# Patient Record
Sex: MALE | Race: WHITE | NOT HISPANIC OR LATINO | Employment: PART TIME | URBAN - METROPOLITAN AREA
[De-identification: names, ages, dates, MRNs, and addresses within clinical notes are randomized per-mention and may not be internally consistent; named-entity substitution may affect disease eponyms.]

---

## 2017-01-12 ENCOUNTER — DOCUMENTATION ONLY (OUTPATIENT)
Dept: FAMILY MEDICINE | Facility: CLINIC | Age: 57
End: 2017-01-12

## 2017-01-12 NOTE — PROGRESS NOTES
Pre-Visit Chart Review  For Appointment Scheduled on 1-17-17    Health Maintenance Due   Topic Date Due    Hepatitis C Screening  1960    Lipid Panel  1960    TETANUS VACCINE  03/04/1980    Colonoscopy  03/04/2012    Influenza Vaccine  08/01/2016

## 2017-01-17 ENCOUNTER — OFFICE VISIT (OUTPATIENT)
Dept: FAMILY MEDICINE | Facility: CLINIC | Age: 57
End: 2017-01-17
Payer: COMMERCIAL

## 2017-01-17 VITALS
DIASTOLIC BLOOD PRESSURE: 78 MMHG | SYSTOLIC BLOOD PRESSURE: 128 MMHG | OXYGEN SATURATION: 96 % | TEMPERATURE: 99 F | HEART RATE: 67 BPM | RESPIRATION RATE: 16 BRPM | BODY MASS INDEX: 25.93 KG/M2 | HEIGHT: 71 IN | WEIGHT: 185.19 LBS

## 2017-01-17 DIAGNOSIS — I95.9 HYPOTENSION, UNSPECIFIED HYPOTENSION TYPE: ICD-10-CM

## 2017-01-17 DIAGNOSIS — Z11.59 ENCOUNTER FOR HEPATITIS C SCREENING TEST FOR LOW RISK PATIENT: ICD-10-CM

## 2017-01-17 DIAGNOSIS — Z12.11 COLON CANCER SCREENING: ICD-10-CM

## 2017-01-17 DIAGNOSIS — Z86.711 HISTORY OF PULMONARY EMBOLISM: ICD-10-CM

## 2017-01-17 DIAGNOSIS — R97.20 ELEVATED PSA: ICD-10-CM

## 2017-01-17 DIAGNOSIS — Z76.89 ESTABLISHING CARE WITH NEW DOCTOR, ENCOUNTER FOR: ICD-10-CM

## 2017-01-17 DIAGNOSIS — S14.129A INCOMPLETE INJURY OF CERVICAL SPINAL CORD WITH CENTRAL CORD SYNDROME: ICD-10-CM

## 2017-01-17 DIAGNOSIS — R25.2 SPASM: ICD-10-CM

## 2017-01-17 DIAGNOSIS — Z00.00 ANNUAL PHYSICAL EXAM: Primary | ICD-10-CM

## 2017-01-17 DIAGNOSIS — Z86.718 HISTORY OF DVT (DEEP VEIN THROMBOSIS): ICD-10-CM

## 2017-01-17 DIAGNOSIS — K21.9 GASTROESOPHAGEAL REFLUX DISEASE, ESOPHAGITIS PRESENCE NOT SPECIFIED: ICD-10-CM

## 2017-01-17 PROCEDURE — 99396 PREV VISIT EST AGE 40-64: CPT | Mod: S$GLB,,, | Performed by: FAMILY MEDICINE

## 2017-01-17 PROCEDURE — 99999 PR PBB SHADOW E&M-EST. PATIENT-LVL III: CPT | Mod: PBBFAC,,, | Performed by: FAMILY MEDICINE

## 2017-01-17 RX ORDER — OMEPRAZOLE 20 MG/1
20 CAPSULE, DELAYED RELEASE ORAL DAILY
Qty: 90 CAPSULE | Refills: 3 | Status: SHIPPED | OUTPATIENT
Start: 2017-01-17 | End: 2018-01-12 | Stop reason: SDUPTHER

## 2017-01-17 RX ORDER — STANDARDIZED SENNA CONCENTRATE 8.6 MG/1
2 TABLET ORAL DAILY
COMMUNITY

## 2017-01-17 RX ORDER — MIDODRINE HYDROCHLORIDE 5 MG/1
10 TABLET ORAL 4 TIMES DAILY
Qty: 720 TABLET | Refills: 5 | Status: SHIPPED | OUTPATIENT
Start: 2017-01-17 | End: 2018-02-07 | Stop reason: SDUPTHER

## 2017-01-17 RX ORDER — CHOLECALCIFEROL (VITAMIN D3) 25 MCG
185 TABLET ORAL DAILY
COMMUNITY

## 2017-01-17 RX ORDER — LANOLIN ALCOHOL/MO/W.PET/CERES
400 CREAM (GRAM) TOPICAL 2 TIMES DAILY
Qty: 180 TABLET | Refills: 3 | Status: SHIPPED | OUTPATIENT
Start: 2017-01-17 | End: 2018-03-26 | Stop reason: SDUPTHER

## 2017-01-17 RX ORDER — DIAZEPAM 5 MG/1
5 TABLET ORAL 2 TIMES DAILY
Qty: 180 TABLET | Refills: 1 | Status: SHIPPED | OUTPATIENT
Start: 2017-01-17 | End: 2017-08-06 | Stop reason: SDUPTHER

## 2017-01-17 RX ORDER — ASPIRIN 81 MG/1
81 TABLET ORAL DAILY
COMMUNITY

## 2017-01-17 NOTE — MR AVS SNAPSHOT
[facility]  [address]  [place] [address]  Phone: [phone]  Fax: [phone]                  Alberto Dangelo   2017 3:20 PM   Office Visit    Description:  Male : 1960   Provider:  Dirk Ortiz MD   Department:  [facility]           Reason for Visit     Establish Care           Diagnoses this Visit        Comments    Annual physical exam    -  Primary     Colon cancer screening         Encounter for hepatitis C screening test for low risk patient         Elevated PSA         Spasm         Hypotension, unspecified hypotension type         Gastroesophageal reflux disease, esophagitis presence not specified         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 - Oral    Pharmacy: [facility] [address] [place] [address] [address] of [address] [facility] Ph #: [phone]       midodrine (PROAMATINE) 5 MG Tab 720 tablet 5 2017     Take 2 tablets (10 mg total) by mouth 4 (four) times daily. - Oral    Pharmacy: [facility] [address] [place] [address] [address] of [address] [place] Ph #: [phone]       omeprazole (PRILOSEC) 20 MG capsule 90 capsule 3 2017     Take 1 capsule (20 mg total) by mouth once daily. - Oral    [facility] [id number]  [address]ANE [address]Z [address] of [address] [employer] Ph #: [phone]       magnesium oxide (MAG-OX) 400 mg tablet 180 tablet 3 2017     Take 1 tablet (400 mg total) by mouth 2 (two) times daily. - Oral    Pharmacy:  [facility] [address] [place] [address] [address] AT [facility] [address] Ph #: [phone]         [facility] On Call     [facility] On Call Nurse Care Line - 24/7 Assistance  Registered nurses in the [facility] On Call Center provide clinical advisement, health education, appointment booking, and other advisory services.  Call for this free service at [phone].             Medications           Message regarding Medications     Verify the changes and/or additions to your medication regime listed below are the same as discussed with your clinician today.  If any of these changes or additions are incorrect, please notify your healthcare provider.        CHANGE how you are taking these medications     Start Taking Instead of    diazePAM (VALIUM) 5 MG tablet diazepam (VALIUM) 5 MG tablet    Dosage:  Take 1 tablet (5 mg total) by mouth 2 (two) times daily. Dosage:  Take 1 tablet (5 mg total) by mouth 2 (two) times daily.    Reason for Change:  Reorder            Verify that the below list of medications is an accurate representation of the medications you are currently taking.  If none reported, the list may be blank. If incorrect, please contact your healthcare provider. Carry this list with you in case of emergency.           Current Medications     aspirin (ECOTRIN) 81 MG EC tablet Take 81 mg by mouth once daily.    bisacodyl (FLEET) 10 mg/30 mL Enem Place 10 mg rectally once daily.    diazePAM (VALIUM) 5 MG tablet Take 1 tablet (5 mg total) by mouth 2 (two) times daily.    magnesium oxide (MAG-OX) 400 mg tablet Take 1 tablet (400 mg total) by mouth 2 (two) times daily.    midodrine (PROAMATINE) 5 MG Tab Take 2 tablets (10 mg total) by mouth 4 (four) times daily.    omeprazole (PRILOSEC) 20 MG capsule Take 1 capsule (20 mg total) by mouth once daily.    POLYETHYLENE GLYCOL 3350 (MIRALAX ORAL) Take 1 Dose by mouth every evening.    senna (SENOKOT) 8.6 mg tablet Take 2 tablets by mouth once  "daily.    vitamin D 1000 units Tab Take 185 mg by mouth once daily.           Clinical Reference Information           Vital Signs - Last Recorded  Most recent update: 1/17/2017  3:41 PM by Kamila Contreras MA    BP Pulse Temp Resp Ht Wt    128/78 (BP Location: Right arm, Patient Position: Sitting, BP Method: Automatic) 67 98.5 °F (36.9 °C) (Oral) 16 5' 11" (1.803 m) 84 kg (185 lb 3 oz)    SpO2 BMI             96% 25.83 kg/m2         Blood Pressure          Most Recent Value    BP  128/78      Allergies as of 1/17/2017     Codeine    Ambien [Zolpidem]      Immunizations Administered on Date of Encounter - 1/17/2017     None      Orders Placed During Today's Visit      Normal Orders This Visit    POCT Hemocult Stool X3     Future Labs/Procedures Expected by Expires    CBC auto differential  1/17/2017 1/18/2018    Comprehensive metabolic panel  1/17/2017 1/18/2018    Hepatitis C antibody  1/17/2017 3/18/2018    Lipid panel  1/17/2017 3/18/2018    PSA, Screening  1/17/2017 1/18/2018    TSH  1/17/2017 1/18/2018      "
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
## 2017-01-24 ENCOUNTER — LAB VISIT (OUTPATIENT)
Dept: LAB | Facility: HOSPITAL | Age: 57
End: 2017-01-24
Attending: FAMILY MEDICINE
Payer: COMMERCIAL

## 2017-01-24 DIAGNOSIS — Z00.00 ANNUAL PHYSICAL EXAM: ICD-10-CM

## 2017-01-24 DIAGNOSIS — Z11.59 ENCOUNTER FOR HEPATITIS C SCREENING TEST FOR LOW RISK PATIENT: ICD-10-CM

## 2017-01-24 DIAGNOSIS — R97.20 ELEVATED PSA: ICD-10-CM

## 2017-01-24 LAB
ALBUMIN SERPL BCP-MCNC: 4.3 G/DL
ALP SERPL-CCNC: 70 U/L
ALT SERPL W/O P-5'-P-CCNC: 47 U/L
ANION GAP SERPL CALC-SCNC: 9 MMOL/L
AST SERPL-CCNC: 33 U/L
BASOPHILS # BLD AUTO: 0.05 K/UL
BASOPHILS NFR BLD: 1 %
BILIRUB SERPL-MCNC: 0.9 MG/DL
BUN SERPL-MCNC: 20 MG/DL
CALCIUM SERPL-MCNC: 9.8 MG/DL
CHLORIDE SERPL-SCNC: 103 MMOL/L
CHOLEST/HDLC SERPL: 5.3 {RATIO}
CO2 SERPL-SCNC: 28 MMOL/L
COMPLEXED PSA SERPL-MCNC: 2.3 NG/ML
CREAT SERPL-MCNC: 1.3 MG/DL
DIFFERENTIAL METHOD: ABNORMAL
EOSINOPHIL # BLD AUTO: 0.1 K/UL
EOSINOPHIL NFR BLD: 2.3 %
ERYTHROCYTE [DISTWIDTH] IN BLOOD BY AUTOMATED COUNT: 12.7 %
EST. GFR  (AFRICAN AMERICAN): >60 ML/MIN/1.73 M^2
EST. GFR  (NON AFRICAN AMERICAN): >60 ML/MIN/1.73 M^2
GLUCOSE SERPL-MCNC: 96 MG/DL
HCT VFR BLD AUTO: 45.9 %
HCV AB SERPL QL IA: NEGATIVE
HDL/CHOLESTEROL RATIO: 18.8 %
HDLC SERPL-MCNC: 207 MG/DL
HDLC SERPL-MCNC: 39 MG/DL
HGB BLD-MCNC: 15.9 G/DL
LDLC SERPL CALC-MCNC: 110.4 MG/DL
LYMPHOCYTES # BLD AUTO: 1.4 K/UL
LYMPHOCYTES NFR BLD: 28.2 %
MCH RBC QN AUTO: 32 PG
MCHC RBC AUTO-ENTMCNC: 34.6 %
MCV RBC AUTO: 92 FL
MONOCYTES # BLD AUTO: 0.4 K/UL
MONOCYTES NFR BLD: 7.4 %
NEUTROPHILS # BLD AUTO: 3.1 K/UL
NEUTROPHILS NFR BLD: 60.7 %
NONHDLC SERPL-MCNC: 168 MG/DL
PLATELET # BLD AUTO: 178 K/UL
PMV BLD AUTO: 12.1 FL
POTASSIUM SERPL-SCNC: 4.1 MMOL/L
PROT SERPL-MCNC: 7.6 G/DL
RBC # BLD AUTO: 4.97 M/UL
SODIUM SERPL-SCNC: 140 MMOL/L
TRIGL SERPL-MCNC: 288 MG/DL
TSH SERPL DL<=0.005 MIU/L-ACNC: 0.85 UIU/ML
WBC # BLD AUTO: 5.11 K/UL

## 2017-01-24 PROCEDURE — 84443 ASSAY THYROID STIM HORMONE: CPT

## 2017-01-24 PROCEDURE — 80053 COMPREHEN METABOLIC PANEL: CPT

## 2017-01-24 PROCEDURE — 36415 COLL VENOUS BLD VENIPUNCTURE: CPT | Mod: PO

## 2017-01-24 PROCEDURE — 86803 HEPATITIS C AB TEST: CPT

## 2017-01-24 PROCEDURE — 85025 COMPLETE CBC W/AUTO DIFF WBC: CPT

## 2017-01-24 PROCEDURE — 84153 ASSAY OF PSA TOTAL: CPT

## 2017-01-24 PROCEDURE — 80061 LIPID PANEL: CPT

## 2017-01-31 ENCOUNTER — CLINICAL SUPPORT (OUTPATIENT)
Dept: REHABILITATION | Facility: HOSPITAL | Age: 57
End: 2017-01-31
Attending: FAMILY MEDICINE
Payer: COMMERCIAL

## 2017-01-31 DIAGNOSIS — S14.129A INCOMPLETE INJURY OF CERVICAL SPINAL CORD WITH CENTRAL CORD SYNDROME: ICD-10-CM

## 2017-01-31 PROCEDURE — 97116 GAIT TRAINING THERAPY: CPT | Mod: PN

## 2017-02-02 ENCOUNTER — CLINICAL SUPPORT (OUTPATIENT)
Dept: REHABILITATION | Facility: HOSPITAL | Age: 57
End: 2017-02-02
Attending: FAMILY MEDICINE
Payer: COMMERCIAL

## 2017-02-02 DIAGNOSIS — S14.129A INCOMPLETE INJURY OF CERVICAL SPINAL CORD WITH CENTRAL CORD SYNDROME: ICD-10-CM

## 2017-02-02 PROCEDURE — 97116 GAIT TRAINING THERAPY: CPT | Mod: PN

## 2017-02-02 NOTE — PROGRESS NOTES
Name: Alberto Dangelo  [facility] Number: 1960  Date of Treatment: 02/02/2017   Diagnosis:   Encounter Diagnosis   Name Primary?   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 Soreness in abs from workout yesterday. Mod I via manual w/c. wans to be able to walk better by November to walk his daughter down the aisle in her wedding.     Objective    Patient received individual therapy to increase strength, endurance, ROM, flexibility, posture and core stabilization with activities as follows:     Gait training via [employer] system for 50:18 minutes (plus set up):     Set up at 30 kg unloading. Computer-assisted robotic gait training via [employer] x 2.5 km/h. GF x 100% x 15', 90% x 10', 80% for distance of 2081 m. No stoppages.       Continue HEP daily.    Pt demo good understanding of the education provided. Alberto demonstrated good return demonstration of activities.     Assessment:     Initial ankle PF B which improved with warmup. Cues for increased contractions for hips flex and extn.     Pt will continue to benefit from skilled PT intervention. Medical Necessity is demonstrated by:  Requires skilled supervision to complete and progress HEP and Weakness.    Patient is making good progress towards established goals.    Plan:    Continue with established Plan of Care towards PT goals.

## 2017-02-03 NOTE — PROGRESS NOTES
"Name: Alberto Dangelo  [facility] Number: [id number]  Date of Treatment:01/31/2017  Diagnosis:   Encounter Diagnosis   Name Primary?   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  "I've been doing therapy while I was out but it was a different kind of therapy."  Patient reports their pain to be n/a/10 on a 0-10 scale with 0 being no pain and 10 being the worst pain imaginable.    Objective    Pt returned to PT after being out of town for several weeks.  No significant changes reported.  Patient received individual therapy to increase flexibility, posture and core stabilization with 0 patients with activities as follows:     Alberto participated in dynamic functional therapeutic activities to improve functional performance Including: Set up with 30 kg unloading.  Pt participated in computer assisted robotic gait training via [employer] @ 2.5 kph x 50 min, 06 sec for a total distance of 2056 m.  Utilized guidance force of 100% x 18 min, decreased to 90% x 12', decreased to 80% x 10', completed training @ 70%.  .        Written Home Exercises Provided: Instructed pt to increase stretching to ankles to facilitate improved gait training.   Pt demo good understanding of the education provided. Alberto demonstrated good return demonstration of activities.     Assessment:   Pt demonstrated decreased ankle DF which interfered somewhat with training session due to decreased foot clearance.  Several adjustments required to Lokomat set-up to achieve and maintain adequate ankle DF to prevent excessive toe drag this date.     Pt will continue to benefit from skilled PT intervention. Medical Necessity is demonstrated by:  Fall Risk, Unable to participate fully in daily activities, Weakness and Gait impairment  Patient is making fair progress towards established " goals.    New/Revised goals: N/A      Plan:  Continue with established Plan of Care towards PT goals.

## 2017-02-07 ENCOUNTER — CLINICAL SUPPORT (OUTPATIENT)
Dept: REHABILITATION | Facility: HOSPITAL | Age: 57
End: 2017-02-07
Attending: FAMILY MEDICINE
Payer: COMMERCIAL

## 2017-02-07 DIAGNOSIS — S14.129A INCOMPLETE INJURY OF CERVICAL SPINAL CORD WITH CENTRAL CORD SYNDROME: ICD-10-CM

## 2017-02-07 PROCEDURE — 97116 GAIT TRAINING THERAPY: CPT | Mod: PN

## 2017-02-07 NOTE — PROGRESS NOTES
Name: Alberto Dangelo  [facility] Number: [id number]  Date of Treatment: 02/07/2017   Diagnosis: No diagnosis found.    Time in: 1410  Time Out: 1540  Total Treatment Time: 65    Subjective:    Alberto reports no pain. Via manual w/c mod I.     Objective    Patient received individual therapy to increase strength, endurance, ROM, flexibility, posture and core stabilization with activities as follows:     Gait training via [employer] system for 53:32 minutes (plus set up):     Set up at 30 kg unloading. Computer-assisted robotic gait training via [employer] x 2.5 km/h. GF x 100% x 18', 90% x 10', 80% x 12', then 70% for distance of 53:32 m. No stoppages.     Continue HEP daily.    Pt demo good understanding of the education provided. Alberto demonstrated good return demonstration of activities.     Assessment:     Improving tones B LE's since return from hiatus with decreased spasticity with initial set up.     Pt will continue to benefit from skilled PT intervention. Medical Necessity is demonstrated by:  Requires skilled supervision to complete and progress HEP and Weakness.    Patient is making good progress towards established goals.    Plan:    Continue with established Plan of Care towards PT goals.

## 2017-02-07 NOTE — PLAN OF CARE
Date: 2/2/2017    PHYSICAL THERAPY UPDATED PLAN OF TREATMENT    Patient name: Alberto Dangelo  Onset Date: 7/30/2010  SOC Date:  8/24/2016  Primary Diagnosis:    1. 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  However, limited flexibility remains problematic.  Gastrocs R ankle to -10*. L to -7* DF manually; however, pt able to achieve neutral in standing.      L-force testing:                         LEFT                           RIGHT                                       FLEX            EXT           FLEX          EXT              HIP      0.24 Nm     19.99 Nm    0.75 Nm     0.02 Nm            KNEE    3.62 Nm     26.97 Nm   0.44 Nm     18.71 Nm  Standing:  Pt is able to stand with UE support with minimal genu recurvatum B.    Sit <-> stand moderate UE support.    Gait: pt able to ambulate short distances with KFO's and assistive device.  No significant change yet noted.      Pt traveled out of state from 12/2016.  Returned to therapy 1/31/2017.  Decline noted in L force testing results. However, pt has shown progress in LE flexibility, sit<>stand transfer, and trunk stability in standing.      Previous Short Term Goals Status:   1) Improve B ankle DF to at least 5* passively to facilitate improved foot clearance during gait, Not met  2) Improve LE flexibility to min to mod limitation in all areas, Progressing, 3) Assess stand pivot transfers and gait with assistive device. Not met (Pt was out of town for several weeks during this current POC and did not participate in therapy at this facility.  Therefore,  unable to assess gait with assistive device).    New Short Term Goals Status:   Continue from previous POC updated dated 10/15/2016 as patient did not attend sufficient sessions to achieve goals.    Long Term Goal Status:   continue per initial plan of care.1) Increase strength in LE as indicated by improved L-force testing, Progressing 2) Pt will safely ambulate > 200' using appropriate assistive device, Not yet assessed.  3) Pt will consistently perform safe stand pivot transfers, Not yet assessed. 4) Pt will demonstrate improved postural stability    Reasons for Recertification of Therapy:   Pt was out of town for approximately 8 weeks during this current POC which resulted in decline in overall status.  He continues to demonstrate significant LE tone (extensor), impaired LE flexibility, impaired ankle ROM, impaired functional mobility and impaired gait.  He should benefit from resuming PT with focus on gait training utilizing Lokomat.      Certification Period: 2/11/2017 to 5/11/2017  Recommended Treatment Plan: 2 times per week for 12 weeks: Gait Training, Locomotor Training, Manual Therapy, Neuromuscular Re-ed, Patient Education, Therapeutic Exercise and Other Dry needling PRN  Other Recommendations: Pt may benefit from dry needling to facilitate improved extensor tone in LE.          Therapist's Name: Candice Herzog, PT   Date: 2/2/2017  I CERTIFY THE NEED FOR THESE SERVICES FURNISHED UNDER THIS PLAN OF TREATMENT AND WHILE UNDER MY CARE    Physician's comments: ________________________________________________________________________________________________________________________________________________      Physician's Name: ___________________________________

## 2017-02-09 ENCOUNTER — CLINICAL SUPPORT (OUTPATIENT)
Dept: REHABILITATION | Facility: HOSPITAL | Age: 57
End: 2017-02-09
Attending: FAMILY MEDICINE
Payer: COMMERCIAL

## 2017-02-09 DIAGNOSIS — S14.129A INCOMPLETE INJURY OF CERVICAL SPINAL CORD WITH CENTRAL CORD SYNDROME: ICD-10-CM

## 2017-02-09 PROCEDURE — 97116 GAIT TRAINING THERAPY: CPT | Mod: PN

## 2017-02-09 NOTE — PROGRESS NOTES
Name: Alberto Dangelo  [facility] Number: [id number]  Date of Treatment: 02/09/2017   Diagnosis:   Encounter Diagnosis   Name Primary?   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 Mod I in manual w/c.     Objective    Patient received individual therapy to increase strength, endurance, ROM, flexibility, posture and core stabilization with activities as follows:     Gait training via [employer] system for 55:20 minutes (plus set up):     Set up at 30 kg unloading. Computer-assisted robotic gait training via [employer] x 2.5 km/h. GF x 100% x 15', 90% x 10', 80% x 10', then 70% for distance of 2286 m. No stoppages.       Continue HEP daily.    Pt demo good understanding of the education provided. Alberto demonstrated good return demonstration of activities.     Assessment:     Improving with decreased tones with initial few minutes of lokomotor training.     Pt will continue to benefit from skilled PT intervention. Medical Necessity is demonstrated by:  Requires skilled supervision to complete and progress HEP and Weakness.    Patient is making good progress towards established goals.    Plan:    Continue with established Plan of Care towards PT goals.

## 2017-02-14 ENCOUNTER — CLINICAL SUPPORT (OUTPATIENT)
Dept: REHABILITATION | Facility: HOSPITAL | Age: 57
End: 2017-02-14
Attending: FAMILY MEDICINE
Payer: COMMERCIAL

## 2017-02-14 DIAGNOSIS — S14.129A INCOMPLETE INJURY OF CERVICAL SPINAL CORD WITH CENTRAL CORD SYNDROME: ICD-10-CM

## 2017-02-14 PROCEDURE — 97116 GAIT TRAINING THERAPY: CPT | Mod: PN

## 2017-02-14 NOTE — PROGRESS NOTES
Name: Alberto Dangelo  [facility] Number: [id number]  Date of Treatment: 02/14/2017   Diagnosis:   Encounter Diagnosis   Name Primary?   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  Patient reports their pain to be n/a/10 on a 0-10 scale with 0 being no pain and 10 being the worst pain imaginable.    Objective    Patient received individual therapy to increase ROM, flexibility, posture and core stabilization with 0 patients with activities as follows:     Alberto participated in dynamic functional therapeutic activities to improve functional performance for 65 minutes. Including: Set up with 30 kg unloading.  Pt participated in computer assisted robotic gait training via [employer] @ 2.5 kph x 50 min, 10 sec for a total distance of 2074 m.  Utilized guidance force of 100% x 11 min, decreased to 90% for 9', then decreased to 80% for 12'.  Completed training @ 70% guidance force.  .      Written Home Exercises Provided: Emphasized importance of stretching calves to facilitate improved gait training efforts.    Pt demo good understanding of the education provided. Alberto demonstrated good return demonstration of activities.     Assessment:   Improved foot clearance noted during training this date.  Tolerated decreased guidance force without difficulty.      Pt will continue to benefit from skilled PT intervention. Medical Necessity is demonstrated by:  Fall Risk, Unable to participate fully in daily activities and Weakness.    Patient is making fair progress towards established goals.    New/Revised goals: N/A      Plan:  Continue with established Plan of Care towards PT goals.

## 2017-02-21 ENCOUNTER — CLINICAL SUPPORT (OUTPATIENT)
Dept: REHABILITATION | Facility: HOSPITAL | Age: 57
End: 2017-02-21
Attending: FAMILY MEDICINE
Payer: COMMERCIAL

## 2017-02-21 DIAGNOSIS — S14.129A INCOMPLETE INJURY OF CERVICAL SPINAL CORD WITH CENTRAL CORD SYNDROME: ICD-10-CM

## 2017-02-21 PROCEDURE — 97116 GAIT TRAINING THERAPY: CPT | Mod: PN

## 2017-02-21 NOTE — PROGRESS NOTES
Name: Alberto Dangelo  [facility] Number: [id number]  Date of Treatment: 02/21/2017   Diagnosis:   Encounter Diagnosis   Name Primary?   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 Via manual w/c. Missed last appt 2* feeling like possible autonomic dysreflexia issue with extreme fatigue and LB, LE discomfort. Did not have fever.  Better now.     Objective    Patient received individual therapy to increase strength, endurance, ROM, flexibility, posture and core stabilization with activities as follows:     Gait training via [employer] system for 55:23 minutes (plus set up):     Set up at 30kg, then decreased to 25 kg unloading. Computer-assisted robotic gait training via [employer] x 2.5 km/h. GF x 100% x 10', 90% x 10', 80% x 10' 70% for distance of 2294 m. No stoppages.       Continue HEP daily.    Pt demo good understanding of the education provided.    Assessment:     Good sequencing with UE swing with occasional self-correction with deviations. Fatigues after a few minutes into 70% GF with decreased feedback via monitor from force sensors in hips and knees.     Pt will continue to benefit from skilled PT intervention. Medical Necessity is demonstrated by:  Requires skilled supervision to complete and progress HEP and Weakness.    Patient is making good progress towards established goals.    Plan:    Continue with established Plan of Care towards PT goals.

## 2017-02-23 ENCOUNTER — CLINICAL SUPPORT (OUTPATIENT)
Dept: REHABILITATION | Facility: HOSPITAL | Age: 57
End: 2017-02-23
Attending: FAMILY MEDICINE
Payer: COMMERCIAL

## 2017-02-23 DIAGNOSIS — S14.129A INCOMPLETE INJURY OF CERVICAL SPINAL CORD WITH CENTRAL CORD SYNDROME: ICD-10-CM

## 2017-02-23 PROCEDURE — 97116 GAIT TRAINING THERAPY: CPT | Mod: PN

## 2017-02-23 NOTE — PROGRESS NOTES
Name: Alberto Dangelo  [facility] Number: [id number]  Date of Treatment: 02/23/2017   Diagnosis:   Encounter Diagnosis   Name Primary?   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 Mod I with manual w/c.    Objective    Patient received individual therapy to increase strength, endurance, ROM, flexibility, posture and core stabilization with activities as follows:     Gait training via [employer] system for 55:25 minutes (plus set up):     Set up at 30-25 kg unloading. Computer-assisted robotic gait training via [employer] x 2.5 km/h. GF x 100% x 8', 90% x 32', 70% for distance of 2289 m. No stoppages.     Continue HEP daily.    Pt demo good understanding of the education provided. Alberto demonstrated good return demonstration of activities.     Assessment:     Increased spasticity R LE with initial set up and during transitions with decreased GF.     Pt will continue to benefit from skilled PT intervention. Medical Necessity is demonstrated by:  Requires skilled supervision to complete and progress HEP and Weakness.    Patient is making good progress towards established goals.    Plan:    Continue with established Plan of Care towards PT goals.

## 2017-03-07 ENCOUNTER — CLINICAL SUPPORT (OUTPATIENT)
Dept: REHABILITATION | Facility: HOSPITAL | Age: 57
End: 2017-03-07
Attending: FAMILY MEDICINE
Payer: COMMERCIAL

## 2017-03-07 ENCOUNTER — TELEPHONE (OUTPATIENT)
Dept: FAMILY MEDICINE | Facility: CLINIC | Age: 57
End: 2017-03-07

## 2017-03-07 DIAGNOSIS — S14.129A INCOMPLETE INJURY OF CERVICAL SPINAL CORD WITH CENTRAL CORD SYNDROME: ICD-10-CM

## 2017-03-07 PROCEDURE — 97116 GAIT TRAINING THERAPY: CPT | Mod: PN

## 2017-03-07 PROCEDURE — 97110 THERAPEUTIC EXERCISES: CPT | Mod: PN

## 2017-03-07 NOTE — PROGRESS NOTES
Name: Alberto Dangelo  [facility] Number: [id number]  Date of Treatment: 03/07/2017   Diagnosis:   Encounter Diagnosis   Name Primary?   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 Just finished BR break and ready for completion of lokomat session which was begin last hour with Candice PT.     Objective    Patient received individual therapy to increase strength, endurance, ROM, flexibility, posture and core stabilization with activities as follows:     Gait training via [employer] computerized system for 32:00 minutes (plus set up):     Set up at 30 kg unloading. Computer-assisted robotic gait training via [email or web address] x 2.5 km/h. GF x 100% x 1'. 90% x 19', then 70% for distance of 1321 m. No stoppages.       Continue HEP daily.    Pt demo good understanding of the education provided. Alberto demonstrated good return demonstration of activities.     Assessment:     Good improvement of toe drag R after cues.     Pt will continue to benefit from skilled PT intervention. Medical Necessity is demonstrated by:  Requires skilled supervision to complete and progress HEP and Weakness.    Patient is making good progress towards established goals.    Plan:    Continue with established Plan of Care towards PT goals.

## 2017-03-07 NOTE — TELEPHONE ENCOUNTER
Received fax from [employer] out [facility] - asking  to sign order for functional electrical stimulator. We have no documentation to support this. Patient does receive therapy in [place] due cervical spine injury. I left voice mail asking contact Suzette Alvarez to please fax records to support need.

## 2017-03-08 NOTE — TELEPHONE ENCOUNTER
[employer] to fax over information on stimulator-patient seen at [facility] facility in [place] 3/2/17 to try it out-nothing in computer.

## 2017-03-08 NOTE — TELEPHONE ENCOUNTER
Patient trying to get order for functional electrical stimulator for his legs. Paperwork and order form on your desk.

## 2017-03-09 ENCOUNTER — CLINICAL SUPPORT (OUTPATIENT)
Dept: REHABILITATION | Facility: HOSPITAL | Age: 57
End: 2017-03-09
Attending: FAMILY MEDICINE
Payer: COMMERCIAL

## 2017-03-09 DIAGNOSIS — S14.129A INCOMPLETE INJURY OF CERVICAL SPINAL CORD WITH CENTRAL CORD SYNDROME: ICD-10-CM

## 2017-03-09 PROCEDURE — 97116 GAIT TRAINING THERAPY: CPT | Mod: PN

## 2017-03-09 NOTE — PROGRESS NOTES
Name: Alberto Dangelo  [facility] Number: [id number]  Date of Treatment: 03/09/2017   Diagnosis:   Encounter Diagnosis   Name Primary?   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 Mod I in manual w/c.      Objective    Patient received individual therapy to increase strength, endurance, ROM, flexibility, posture and core stabilization with activities as follows:     Gait training via [employer] system for 55:15 minutes (plus set up):     Set up at 30 kg unloading. Computer-assisted robotic gait training via [employer] x 2.5 km/h. GF x 100% x 10', 90% x 30', 70% x 15' for distance of 2282 m. One stoppage with warm up 2* increased tones which improved.     Continue HEP daily.    Pt demo good understanding of the education provided. Alberto demonstrated good return demonstration of activities.     Assessment:     Improving ability to correct R foot drag after warm up with occasional cues but did need a little time walking on lokomat with decreased loading for better adjustments to strapping.     Pt will continue to benefit from skilled PT intervention. Medical Necessity is demonstrated by:  Requires skilled supervision to complete and progress HEP and Weakness.    Patient is making good progress towards established goals.    Plan:    Continue with established Plan of Care towards PT goals.

## 2017-03-09 NOTE — TELEPHONE ENCOUNTER
I did what I could.  Most of the questions I could not answer, I just don't have the information and I don't have the training for it.

## 2017-03-13 NOTE — PROGRESS NOTES
"Name: Alberto Dangelo  [facility] Number: [id number]  Date of Treatment: 03/07/2017   Diagnosis:   Encounter Diagnosis   Name Primary?   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  Patient reports their pain to be n/a/10 on a 0-10 scale with 0 being no pain and 10 being the worst pain imaginable.  Pt requested to interrupt session due to needing to use the restroom.      Objective    Patient received individual therapy to increase strength, endurance, flexibility, posture and core stabilization with 0 patients with activities as follows:     Alberto  participated in dynamic functional therapeutic activities to improve functional performance for 35 (including set up) minutes. Including: Set up with 30 kg unloading.  Pt participated in computer assisted robotic gait training via Lokomat @ 2.5 kph x 23 min, 12 sec for a total distance of 955 m.  Utilized guidance force of 100% x 15 min then decreased to 90% for 8 min 12 sec.  Pt removed from Lokomat in order to use restroom.  Care handed off to Giulia Lock PTA to complete training session.  See her note for full details.        Written Home Exercises Provided: N/a  Pt demo good understanding of the education provided. Alberto demonstrated good return demonstration of activities.     Assessment:   Session was interrupted by pt needing to use restroom.  Prior to stopping, pt demonstrated excellent heel strike/toe off throughout session.  Posture erect.      Pt will continue to benefit from skilled PT intervention. Medical Necessity is demonstrated by:  Fall Risk, Unable to participate fully in daily activities, Weakness and Core Weakness  Patient is making fair progress towards established goals.    New/Revised goals: N/A      Plan:  Continue with established Plan of Care towards PT goals.   "

## 2017-03-14 ENCOUNTER — CLINICAL SUPPORT (OUTPATIENT)
Dept: REHABILITATION | Facility: HOSPITAL | Age: 57
End: 2017-03-14
Attending: FAMILY MEDICINE
Payer: COMMERCIAL

## 2017-03-14 DIAGNOSIS — S14.129A INCOMPLETE INJURY OF CERVICAL SPINAL CORD WITH CENTRAL CORD SYNDROME: ICD-10-CM

## 2017-03-14 PROCEDURE — 97116 GAIT TRAINING THERAPY: CPT | Mod: PN

## 2017-03-14 NOTE — PROGRESS NOTES
Name: Alberto Dangelo  [facility] Number: [id number]  Date of Treatment: 03/14/2017   Diagnosis:   Encounter Diagnosis   Name Primary?   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 Stretches before lokomat session mod I in gym . Mod I via manual w/c.       Objective    Patient received individual therapy to increase strength, endurance, ROM, flexibility, posture and core stabilization with activities as follows:     Gait training via [employer] system for 56:00 minutes (plus set up):     Set up at 30 kg unloading. Computer-assisted robotic gait training via [employer] x 2.4 km/h. GF x 100% x 7', 90% x 10', then 70% for distance of 2319 m. No stoppages.     Continue HEP daily.    Pt demo good understanding of the education provided. Alberto demonstrated good return demonstration of activities.     Assessment:     Consistent biofeedback on monitor with swing and stance phases throughout session with decrease 2* fatigue toward end of session. Progressing with improved tolerance for decreased GF-progressed to 70% faster today without problems.    Pt will continue to benefit from skilled PT intervention. Medical Necessity is demonstrated by:  Requires skilled supervision to complete and progress HEP and Weakness.    Patient is making good progress towards established goals.    Plan:    Continue with established Plan of Care towards PT goals.

## 2017-03-16 ENCOUNTER — CLINICAL SUPPORT (OUTPATIENT)
Dept: REHABILITATION | Facility: HOSPITAL | Age: 57
End: 2017-03-16
Attending: FAMILY MEDICINE
Payer: COMMERCIAL

## 2017-03-16 DIAGNOSIS — S14.129A INCOMPLETE INJURY OF CERVICAL SPINAL CORD WITH CENTRAL CORD SYNDROME: ICD-10-CM

## 2017-03-16 PROCEDURE — 97116 GAIT TRAINING THERAPY: CPT | Mod: PN

## 2017-03-16 NOTE — PROGRESS NOTES
Name: Alberto Dangelo  [facility] Number: [id number]  Date of Treatment: 03/16/2017   Diagnosis:   Encounter Diagnosis   Name Primary?   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 Mod I in manual w/c. Stretched mod I in gym prior to session.      Objective    Patient received individual therapy to increase strength, endurance, ROM, flexibility, posture and core stabilization with activities as follows:     Gait training via [employer] system for 55:24 minutes (plus set up):     Set up at 30 kg unloading. Computer-assisted robotic gait training via [employer] x 2.4 km/h. GF x 100% x 22', 70% for distance of 2202 m. No stoppages.     Continue HEP daily.    Pt demo good understanding of the education provided. Alberto demonstrated good return demonstration of activities.     Assessment:     Pt reported sensation of L LE mechanics not feeling like usual with gait, reports improved with adjustments. Progressing with faster GF drop to 70% with good feedback x approx 10' afterward and then fatigued although cont to demo minimal feedback via monitor. Initial R foot drop with difficulty clearing during swing; however, pt was able to correct clearance after warm up.,     Pt will continue to benefit from skilled PT intervention. Medical Necessity is demonstrated by:  Requires skilled supervision to complete and progress HEP and Weakness.    Patient is making good progress towards established goals.    Plan:    Continue with established Plan of Care towards PT goals.

## 2017-03-21 ENCOUNTER — CLINICAL SUPPORT (OUTPATIENT)
Dept: REHABILITATION | Facility: HOSPITAL | Age: 57
End: 2017-03-21
Attending: FAMILY MEDICINE
Payer: COMMERCIAL

## 2017-03-21 DIAGNOSIS — S14.129A INCOMPLETE INJURY OF CERVICAL SPINAL CORD WITH CENTRAL CORD SYNDROME: ICD-10-CM

## 2017-03-21 PROCEDURE — 97116 GAIT TRAINING THERAPY: CPT | Mod: PN

## 2017-03-23 ENCOUNTER — CLINICAL SUPPORT (OUTPATIENT)
Dept: REHABILITATION | Facility: HOSPITAL | Age: 57
End: 2017-03-23
Attending: FAMILY MEDICINE
Payer: COMMERCIAL

## 2017-03-23 ENCOUNTER — TELEPHONE (OUTPATIENT)
Dept: FAMILY MEDICINE | Facility: CLINIC | Age: 57
End: 2017-03-23

## 2017-03-23 DIAGNOSIS — S14.129A INCOMPLETE INJURY OF CERVICAL SPINAL CORD WITH CENTRAL CORD SYNDROME: ICD-10-CM

## 2017-03-23 PROCEDURE — 97116 GAIT TRAINING THERAPY: CPT | Mod: PN

## 2017-03-23 NOTE — TELEPHONE ENCOUNTER
----- Message from Akosua Sidhu sent at 3/22/2017  4:24 PM CDT -----  Contact: Suzette with [employer] at [phone]  Suzette with [employer] at [phone], calling to validate receipt of the additional forms and information. Also, please give the status of the Medical necessity. Thanks.

## 2017-03-23 NOTE — TELEPHONE ENCOUNTER
Left voicemail for Suzette Marquez was not able to fill out some of the order for electrical stimulator as he is not qualified and cannot answer questions. Form was picked up by patient to take to his physical therapist to see if he could answer them.

## 2017-03-23 NOTE — PROGRESS NOTES
Name: Alberto Dangelo  [facility] Number: [id number]  Date of Treatment: 03/23/2017   Diagnosis:   Encounter Diagnosis   Name Primary?   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 Via manual w/c.      Objective    Patient received individual therapy to increase strength, endurance, ROM, flexibility, posture and core stabilization with activities as follows:     Gait training via [employer] system for 55:40 minutes (plus set up):     Set up at 30 kg unloading. Computer-assisted robotic gait training via [employer] x 2.5 km/h. GF x 100% x 7', 90% x 5', then 70% for distance of 2298 m.     Continue HEP daily.    Pt demo good understanding of the education provided. Alberto demonstrated good return demonstration of activities.     Assessment:     Cues for R LE clearance toward end of [employer] session 2* dragging with good correction.     Pt will continue to benefit from skilled PT intervention. Medical Necessity is demonstrated by:  Requires skilled supervision to complete and progress HEP and Weakness.    Patient is making good progress towards established goals.    Plan:    Continue with established Plan of Care towards PT goals.

## 2017-03-27 ENCOUNTER — PATIENT MESSAGE (OUTPATIENT)
Dept: FAMILY MEDICINE | Facility: CLINIC | Age: 57
End: 2017-03-27

## 2017-03-27 DIAGNOSIS — S14.129A INCOMPLETE INJURY OF CERVICAL SPINAL CORD WITH CENTRAL CORD SYNDROME: Primary | ICD-10-CM

## 2017-03-27 DIAGNOSIS — G90.9 AUTONOMIC DYSFUNCTION: ICD-10-CM

## 2017-03-28 ENCOUNTER — CLINICAL SUPPORT (OUTPATIENT)
Dept: REHABILITATION | Facility: HOSPITAL | Age: 57
End: 2017-03-28
Attending: FAMILY MEDICINE
Payer: COMMERCIAL

## 2017-03-28 DIAGNOSIS — S14.129A INCOMPLETE INJURY OF CERVICAL SPINAL CORD WITH CENTRAL CORD SYNDROME: ICD-10-CM

## 2017-03-28 PROCEDURE — 97116 GAIT TRAINING THERAPY: CPT | Mod: PN

## 2017-03-28 NOTE — PROGRESS NOTES
Name: Alberto Dangelo  [facility] Number: [id number]  Date of Treatment: 03/28/2017   Diagnosis:   Encounter Diagnosis   Name Primary?   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 Mod I in manual w/c.      Objective    Patient received individual therapy to increase strength, endurance, ROM, flexibility, posture and core stabilization with activities as follows:     Gait training via [employer] system for 56:37 minutes (plus set up):     Set up at 30 kg unloading. Computer-assisted robotic gait training via [employer] x 2.5 km/h. GF x 100% x 7', 90% x 16', 70% for distance of 2334 m. One stoppage 2* spasticity.     Continue HEP daily.    Pt demo good understanding of the education provided. Alberto demonstrated good return demonstration of activities.     Assessment:     Initial spasticity with warmup which improved after a few minutes. Initial toe drag with loading weight on LE's but resolved with time. Consistent positive feedback via monitor.     Pt will continue to benefit from skilled PT intervention. Medical Necessity is demonstrated by:  Requires skilled supervision to complete and progress HEP and Weakness.    Patient is making good progress towards established goals.    Plan:    Continue with established Plan of Care towards PT goals.

## 2017-03-28 NOTE — PROGRESS NOTES
Name: Alberto Dangelo  [facility] Number: [id number]  Date of Treatment:3/21/2017  Diagnosis:   Encounter Diagnosis   Name Primary?   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  Patient reports their pain to be n/a/10 on a 0-10 scale with 0 being no pain and 10 being the worst pain imaginable.    Objective    Patient received individual therapy to increase flexibility, posture, core stabilization and gait deficits with 0 patients with activities as follows:     Pt performed self stretching prior to initiating gait training session.    Alberto participated in dynamic functional therapeutic activities to improve functional performance. Including: Set up with 30 kg unloading.  Pt participated in computer assisted robotic gait training via Lokomat @ 2.5 kph x 52 min, 09 sec for a total distance of 2139 m.  Utilized guidance force of 100% x 5 min, decreased tl 90% x 12', then to 80% x 13 min.  Completed remaining session @ 70%.  .        Written Home Exercises Provided: N/A  Pt demo good understanding of the education provided. Alberto demonstrated good return demonstration of activities.     Assessment:   Pt initially had difficulty achieving comfortable gait training set up.  Had to reset Lokomat in order to achieve ideal alignment to complete session.      Pt will continue to benefit from skilled PT intervention. Medical Necessity is demonstrated by:  Fall Risk, Unable to participate fully in daily activities and gait deficits.      Patient is making fair progress towards established goals.    New/Revised goals: N/A      Plan:  Continue with established Plan of Care towards PT goals.

## 2017-03-30 ENCOUNTER — CLINICAL SUPPORT (OUTPATIENT)
Dept: REHABILITATION | Facility: HOSPITAL | Age: 57
End: 2017-03-30
Attending: FAMILY MEDICINE
Payer: COMMERCIAL

## 2017-03-30 DIAGNOSIS — S14.129A INCOMPLETE INJURY OF CERVICAL SPINAL CORD WITH CENTRAL CORD SYNDROME: ICD-10-CM

## 2017-03-30 PROCEDURE — 97116 GAIT TRAINING THERAPY: CPT | Mod: PN

## 2017-03-30 NOTE — MR AVS SNAPSHOT
[facility] [address] [place] [address]  Phone: [phone]  Fax: [phone]                  Alberto Dangelo   3/30/2017 2:00 PM   Appointment    Description:  Male : 1960   Provider:  Giulia Lock PTA   Department:  [facility]                To Do List           Future Appointments        Provider Department Dept Phone    3/30/2017 2:00 PM Giulia Lock PTA [facility] [phone]    2017 2:00 PM Candice Herzog, PT [facility] [phone]    2017 2:00 PM Giulia Lock PTA [facility] [phone]    2017 2:00 PM Giulia Lock PTA [facility] [phone]    2017 2:00 PM Giulia Lock PTA [facility] [phone]      Goals (5 Years of Data)     None      [facility]     [facility] Care Line -  Assistance  Unless otherwise directed by your provider, please contact [facility], our nurse care line that is available for  assistance.     Registered nurses in the [facility] provide: appointment scheduling, clinical advisement, health education, and other advisory services.  Call: [phone] (toll free)               Medications           Message regarding Medications     Verify the changes and/or additions to your medication regime listed below are the same as discussed with your clinician today.  If any of these changes or additions are incorrect, please notify your healthcare provider.             Verify that the below list of medications is an accurate representation of the medications you are currently taking.  If none reported, the list may be blank. If incorrect, please contact your healthcare provider. Carry this list with you in case of emergency.           Current Medications     aspirin (ECOTRIN) 81 MG EC tablet Take 81 mg by mouth once daily.    bisacodyl (FLEET) 10  mg/30 mL Enem Place 10 mg rectally once daily.    diazePAM (VALIUM) 5 MG tablet Take 1 tablet (5 mg total) by mouth 2 (two) times daily.    magnesium oxide (MAG-OX) 400 mg tablet Take 1 tablet (400 mg total) by mouth 2 (two) times daily.    midodrine (PROAMATINE) 5 MG Tab Take 2 tablets (10 mg total) by mouth 4 (four) times daily.    omeprazole (PRILOSEC) 20 MG capsule Take 1 capsule (20 mg total) by mouth once daily.    POLYETHYLENE GLYCOL 3350 (MIRALAX ORAL) Take 1 Dose by mouth every evening.    senna (SENOKOT) 8.6 mg tablet Take 2 tablets by mouth once daily.    vitamin D 1000 units Tab Take 185 mg by mouth once daily.           Clinical Reference Information           Allergies as of 3/30/2017     Codeine    Ambien [Zolpidem]      Immunizations Administered on Date of Encounter - 3/30/2017     None      Language Assistance Services     ATTENTION: Language assistance services are available, free of charge. Please call [phone].      ATENCIÓN: Si coltonla dianne, tiene a go disposición servicios gratuitos de asistencia lingüística. Llame al [phone].     [facility] Ý: N?u b?n nói Ti?ng Vi?t, có các d?ch v? h? tr? ngôn ng? mi?n phí dành cho b?n. G?i s? [phone].         [facility] complies with applicable Federal civil rights laws and does not discriminate on the basis of race, color, national origin, age, disability, or sex.

## 2017-03-30 NOTE — PROGRESS NOTES
Name: Alberto Dangelo  [facility] Number: [id number]  Date of Treatment: 03/30/2017   Diagnosis:   Encounter Diagnosis   Name Primary?   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 Mod I with manual w/c. Stretches mod I in gym before session.     Objective    Patient received individual therapy to increase strength, endurance, ROM, flexibility, posture and core stabilization with activities as follows:     Gait training via [employer] system for 55:12 minutes (plus set up):     Set up at 30 kg unloading. Computer-assisted robotic gait training via [employer] x 2.5 km/h. GF x 90% x 25', 70% for distance of 2277 m. One stoppage with initial start 2* tones, which improved with warm up.     Continue HEP daily.    Pt demo good understanding of the education provided. Alberto demonstrated good return demonstration of activities.     Assessment:     Initial R LE foot drop which improved with warm up and strap adjustments. Good UE swing with gait training on [employer]. Consistent positive feedback via monitor throughout session. Dizziness reported after session with removal of harness, resolved after sitting.     Pt will continue to benefit from skilled PT intervention. Medical Necessity is demonstrated by:  Requires skilled supervision to complete and progress HEP and Weakness.    Patient is making good progress towards established goals.    Plan:    Continue with established Plan of Care towards PT goals.

## 2017-03-31 ENCOUNTER — TELEPHONE (OUTPATIENT)
Dept: FAMILY MEDICINE | Facility: CLINIC | Age: 57
End: 2017-03-31

## 2017-03-31 ENCOUNTER — PATIENT MESSAGE (OUTPATIENT)
Dept: FAMILY MEDICINE | Facility: CLINIC | Age: 57
End: 2017-03-31

## 2017-03-31 NOTE — TELEPHONE ENCOUNTER
----- Message from Kamila Jacobo sent at 3/27/2017 11:06 AM CDT -----  Contact:  call [phone]    Calling to  Speak to  Lillian   Has  Questions // please call  For  details

## 2017-04-04 ENCOUNTER — CLINICAL SUPPORT (OUTPATIENT)
Dept: REHABILITATION | Facility: HOSPITAL | Age: 57
End: 2017-04-04
Attending: FAMILY MEDICINE
Payer: COMMERCIAL

## 2017-04-04 DIAGNOSIS — S14.129A INCOMPLETE INJURY OF CERVICAL SPINAL CORD WITH CENTRAL CORD SYNDROME: ICD-10-CM

## 2017-04-04 PROCEDURE — 97116 GAIT TRAINING THERAPY: CPT | Mod: PN

## 2017-04-05 ENCOUNTER — TELEPHONE (OUTPATIENT)
Dept: FAMILY MEDICINE | Facility: CLINIC | Age: 57
End: 2017-04-05

## 2017-04-05 DIAGNOSIS — G90.9 UNSPECIFIED DISORDER OF AUTONOMIC NERVOUS SYSTEM: ICD-10-CM

## 2017-04-05 DIAGNOSIS — S14.129D CENTRAL CORD SYNDROME OF CERVICAL SPINAL CORD, SUBSEQUENT ENCOUNTER: Primary | ICD-10-CM

## 2017-04-05 NOTE — PROGRESS NOTES
"Name: Alberto Dangelo  [facility] Number: [id number]  Date of Treatment: 04/04/2017   Diagnosis:   Encounter Diagnosis   Name Primary?   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  Patient reports their pain to be 0/10 on a 0-10 scale with 0 being no pain and 10 being the worst pain imaginable.    Objective    Patient received individual therapy to increase ROM, flexibility, posture and core stabilization with 0 patients with activities as follows:     Performed self stretching of B gastrocs in // bars.  Demonstrated ability to ambulate a few steps: 1) with increased stance width, decreased knee flexion for swing through, and increased lift with B UE; 2) with decreased sadaf pt is able to flex hip/knee for swing through, increased foot drop, decreased UE support required.    Alberto participated in dynamic functional therapeutic activities to improve functional performance for 67 minutes. Including: Set up with 30 kg unloading.  Pt participated in computer assisted robotic gait training via [employer] @ 2.5 kph x 50 min, 15 sec for a total distance of 2073 m.  Utilized guidance force of 100% x 11 min, decreased to 90% x 9 min, then completed session with GF at 80%.        Written Home Exercises Provided: N/A  Pt demo good understanding of the education provided. Alberto demonstrated good return demonstration of activities.     Assessment:   Good gait training effort achieved with good foot clearance B throughout session.      Pt will continue to benefit from skilled PT intervention. Medical Necessity is demonstrated by:  Fall Risk, Unable to participate fully in daily activities and Requires skilled supervision to complete and progress HEP.    Patient is making fair progress towards established goals.    New/Revised goals: N/A      Plan:  Continue with " established Plan of Care towards PT goals.

## 2017-04-06 ENCOUNTER — CLINICAL SUPPORT (OUTPATIENT)
Dept: REHABILITATION | Facility: HOSPITAL | Age: 57
End: 2017-04-06
Attending: FAMILY MEDICINE
Payer: COMMERCIAL

## 2017-04-06 DIAGNOSIS — S14.129A INCOMPLETE INJURY OF CERVICAL SPINAL CORD WITH CENTRAL CORD SYNDROME: ICD-10-CM

## 2017-04-06 PROCEDURE — 97116 GAIT TRAINING THERAPY: CPT | Mod: PN

## 2017-04-06 NOTE — PROGRESS NOTES
Name: Alberto Dangelo  Clinic Number: [id number]  Date of Treatment: 04/06/2017   Diagnosis:   Encounter Diagnosis   Name Primary?   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  Patient denies pain. Mod I in manual w/c.     Objective    Patient received individual therapy to increase strength, endurance, ROM, flexibility, posture and core stabilization with activities as follows:     Gait training via [employer] system for 56:34 minutes (plus set up):     Set up at 30 kg unloading. Computer-assisted robotic gait training via [employer]PT x 2.5 km/h. GF x 100% x 18', then 70% for distance of 2339 m. No stoppages.     Continue HEP daily.    Pt demo good understanding of the education provided. Alberto demonstrated good return demonstration of activities.     Assessment:     Motivated and good concentration on LE mechanics as well as UE swing sequencing throughout session. Good feedback via monitor re: hip and knee ROM which decreased at end of session with fatigue.     Pt will continue to benefit from skilled PT intervention. Medical Necessity is demonstrated by:  Requires skilled supervision to complete and progress HEP and Weakness.    Patient is making good progress towards established goals.    Plan:    Continue with established Plan of Care towards PT goals.

## 2017-04-11 ENCOUNTER — CLINICAL SUPPORT (OUTPATIENT)
Dept: REHABILITATION | Facility: HOSPITAL | Age: 57
End: 2017-04-11
Attending: FAMILY MEDICINE
Payer: COMMERCIAL

## 2017-04-11 DIAGNOSIS — S14.129A INCOMPLETE INJURY OF CERVICAL SPINAL CORD WITH CENTRAL CORD SYNDROME: ICD-10-CM

## 2017-04-11 PROCEDURE — 97116 GAIT TRAINING THERAPY: CPT | Mod: PN

## 2017-04-11 NOTE — PROGRESS NOTES
"Name: Alberto Dangelo  [facility] Number: [id number]  Date of Treatment: 04/11/2017   Diagnosis:   Encounter Diagnosis   Name Primary?   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  Patient denies pain. Presents in manual w/c mod I and stretches mod I in PT gym prior to session.     Objective    Patient received individual therapy to increase strength, endurance, ROM, flexibility, posture and core stabilization with activities as follows:     Gait training via [employer] system for 56:14 minutes (plus set up):     Set up at 30 kg unloading. Computer-assisted robotic gait training via [employer] x 2.5 km/h. GF x 100% x 5', 90% x 20', then 70% for distance of 2312 m. One initial stoppage with warm up 2* R LE spasticity.     Continue HEP daily.    Pt demo good understanding of the education provided. Alberto demonstrated good return demonstration of activities.     Assessment:     Good tolerance for loading on LE's today. Good feedback via monitor re: hip and knee gait mechanics with mm fatigue notable with last few minutes of lokomat training.     Plan:    Continue with established Plan of Care towards PT goals.   "

## 2017-04-13 ENCOUNTER — CLINICAL SUPPORT (OUTPATIENT)
Dept: REHABILITATION | Facility: HOSPITAL | Age: 57
End: 2017-04-13
Attending: FAMILY MEDICINE
Payer: COMMERCIAL

## 2017-04-13 DIAGNOSIS — S14.129A INCOMPLETE INJURY OF CERVICAL SPINAL CORD WITH CENTRAL CORD SYNDROME: ICD-10-CM

## 2017-04-13 PROCEDURE — 97116 GAIT TRAINING THERAPY: CPT | Mod: PN

## 2017-04-13 NOTE — PROGRESS NOTES
Name: Alberto Dangelo  [facility] Number: [id number]  Date of Treatment: 04/13/2017   Diagnosis:   Encounter Diagnosis   Name Primary?   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 Mod I stretching in PT gym prior to session. Via manual w/c.     Objective    Patient received individual therapy to increase strength, endurance, ROM, flexibility, posture and core stabilization with activities as follows:     Gait training via [employer] system for 56:42 minutes (plus set up):     Set up at 30 kg unloading. Computer-assisted robotic gait training via [employer] x 2.5 km/h. GF x 90% x 5', 85% x 15', 70% for distance of 2343 m. NO stoppages.     Continue HEP daily.    Pt demo good understanding of the education provided. Alberto demonstrated good return demonstration of activities.     Assessment:     Good tolerance for initial GF 90%. Cont with positive biofeedback of hip and knee gait mechanics via monitor throughout session.     Pt will continue to benefit from skilled PT intervention. Medical Necessity is demonstrated by:  Requires skilled supervision to complete and progress HEP and Weakness.    Patient is making good progress towards established goals.    Plan:    Continue with established Plan of Care towards PT goals.

## 2017-04-18 ENCOUNTER — CLINICAL SUPPORT (OUTPATIENT)
Dept: REHABILITATION | Facility: HOSPITAL | Age: 57
End: 2017-04-18
Attending: FAMILY MEDICINE
Payer: COMMERCIAL

## 2017-04-18 DIAGNOSIS — S14.129A INCOMPLETE INJURY OF CERVICAL SPINAL CORD WITH CENTRAL CORD SYNDROME: ICD-10-CM

## 2017-04-18 PROCEDURE — 97116 GAIT TRAINING THERAPY: CPT | Mod: PN

## 2017-04-18 NOTE — PLAN OF CARE
Date: 04/18/2017      PHYSICAL THERAPY UPDATED PLAN OF TREATMENT    Patient name: Alberto Dangelo  Onset Date:  7/30/2010  SOC Date:  8/24/2016  Primary Diagnosis:    1. 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  Moderate hamstring tightness B.    Tone:  Increased extensor tone noted throughout B LE particularly in gastrocs and quads.  Less so in gluteals.    ROM:    Hip   Right     Left   Pain/Dysfunction with Movement     AROM PROM MMT AROM PROM MMT     Flexion X 118* 11/5 1/5* 130* 1/5     Extension X 5* 1/5 X 8* 2/5     Abduction X 22* 1/5 18* 30* 2-/5     Adduction X 15* 1/5 0* 15* 1/5     Internal rotation N/T N/T N/T N/T N/T N/T     External rotation N/T N/T N/T N/T N/T N/T        Knee   Right     Left   Pain/Dysfunction with Movement     AROM PROM MMT AROM PROM MMT     Flexion X 140* 1/5 X  142* 1/5     Extension -40* 0* 2/5 0* 0* 2+/5        Ankle   Right     Left   Pain/Dysfunction with Movement     AROM PROM MMT AROM PROM MMT     Plantarflexion                  Dorsiflexion X -10* 0/5 X -6* 1/5     Inversion                 Eversion                   L-force test:              LEFT                                              RIGHT                     FLEX                  EXT                      FLEX                     EXT        HIP     3.95 Nm           38.77 Nm                 1.35 Nm              11.11 Nm     KNEE    6.24 Nm           28.50 Nm                 0.63 Nm              12.63 Nm    Transfers:  Modified independent.  Requires significant UE support.  Pt utilizes extensor tone to assist in achieving transfer.  Hyperextends knees in standing with  minimal hip flexion and significant UE support.    Gait:  Mod I using rolling walker.  Pt utilizes vaulting type pattern, requiring significant ankle PF on stance foot to allow for swing through of opposite LE.  Trendelenburg noted B during stance. Pt struggles to advance LE during swing phase due to extensor tone.  Pt unable to disengage knee extension to allow for knee flexion during swing through.  He also utilizes hip circumduction to allow for swing through.  Fatigues quickly during gait activities.  Gait is inefficient and requires excessive energy consumption.      Previous Short Term Goals Status:   1) Improve B ankle DF to at least 5* passively to facilitate improved foot clearance during gait, Not met 2) Improve LE flexibility to min to mod limitation in all areas, Progressing, 3) Assess stand pivot transfers and gait with assistive device. MET   .      New Short Term Goals Status:    1) Initiate gait training/neuromuscular reeducation using [employer] L300 system, 2) Pt will demonstrate improved toe clearance during gait using [employer] system, 3) Improve passive ankle DF to > 5* B, 4) Pt will stand with minimal UE support and less genurecurvatum B.  Long Term Goal Status:   continue per initial plan of care..1) Increase strength in LE as indicated by improved L-force testing, Progressing 2) Pt will safely ambulate > 200' using appropriate assistive device, Progressing. 3) Pt will consistently perform safe stand pivot transfers, Not met. 4) Pt will demonstrate improved postural stability  Progressing. 5) Facilitate decreased extensor tone in LE.    Reasons for Recertification of Therapy:   Pt is progressing well with Lokomat gait training, demonstrating improved muscle force generated, improved LE flexibility.  However, he continues with significant LE flexibility limitation due to tone, impaired posture, impaired gait.  He has been assessed for appropriateness of use of [employer] for gait training and has been  deemed an appropriate candidate.      Certification Period: 5/1/2017 to 7/24/2017  Recommended Treatment Plan: 4 times per week for 12 weeks: Gait Training, Locomotor Training, Manual Therapy, Neuromuscular Re-ed, Patient Education, Therapeutic Exercise and Other trial of Bion[employer] NESS L300 system  Other Recommendations: Pt will attend PT 2x/wk for Locomotor gait training utilizing Lokomat and 2x/wk for training utilizing Bioness system.  He will participate in other activities during therapy sessions including stretching, tone inhibition, postural correction/ stabilization, transfer training, manual therapy, patient education,         Therapist's Name: Candice Herzog, PT   Date: 04/18/2017    I CERTIFY THE NEED FOR THESE SERVICES FURNISHED UNDER THIS PLAN OF TREATMENT AND WHILE UNDER MY CARE    Physician's comments: ________________________________________________________________________________________________________________________________________________      Physician's Name: ___________________________________

## 2017-04-18 NOTE — PROGRESS NOTES
Name: Alberto Dangelo  [facility] Number: [id number]  Date of Treatment: 04/18/2017   Diagnosis:   Encounter Diagnosis   Name Primary?   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  Patient reports their pain to be n/a/10 on a 0-10 scale with 0 being no pain and 10 being the worst pain imaginable.    Objective    Patient received individual therapy to increase ROM, flexibility, posture and core stabilization with 0 patients with activities as follows:     Alberto participated in dynamic functional therapeutic activities to improve functional performance Including: Set up with 30 kg unloading.  Pt participated in computer assisted robotic gait training via [employer] @ 2.5 kph x 54 min, 54 sec for a total distance of 2274 m.  Utilized guidance force of 100% x 10 min, decreased to 90% x 6', to 80% x 4' then 70% for remaining session.  L-force testing completed for progress report.  .      Written Home Exercises Provided: N/A  Pt demo good understanding of the education provided. Alberto demonstrated good return demonstration of activities.     Assessment:   Pt demonstrated difficulty achieving adequate ankle DF for appropriate heel strike B.      Pt will continue to benefit from skilled PT intervention. Medical Necessity is demonstrated by:  Fall Risk, Unable to participate fully in daily activities, Requires skilled supervision to complete and progress HEP and excessive tone which interferes with ability to achieve ankle DF.    Patient is making fair progress towards established goals.    New/Revised goals: See updated POC.        Plan:  Continue with established Plan of Care towards PT goals. See updated POC.

## 2017-04-20 ENCOUNTER — CLINICAL SUPPORT (OUTPATIENT)
Dept: REHABILITATION | Facility: HOSPITAL | Age: 57
End: 2017-04-20
Attending: FAMILY MEDICINE
Payer: COMMERCIAL

## 2017-04-20 DIAGNOSIS — S14.129A INCOMPLETE INJURY OF CERVICAL SPINAL CORD WITH CENTRAL CORD SYNDROME: ICD-10-CM

## 2017-04-20 PROCEDURE — 97110 THERAPEUTIC EXERCISES: CPT | Mod: PN

## 2017-04-20 NOTE — PROGRESS NOTES
Name: Alberto Dangelo  [facility] Number: [id number]  Date of Treatment: 04/20/2017   Diagnosis:   Encounter Diagnosis   Name Primary?   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 Mod I in manual w/c.  Mod I stretching prior to RX in PT gym.     Objective    Patient received individual therapy to increase strength, endurance, ROM, flexibility, posture and core stabilization with activities as follows:       Gait training via [employer] system for 50:21 minutes (plus set up):     Set up at 30 kg unloading. Computer-assisted robotic gait training via [employer] x 2.5 km/h. GF x 90% x 20', 80% x 20', 70% for distance of 2080 m. No stoppages.     Continue HEP daily.    Pt demo good understanding of the education provided. Alberto demonstrated good return demonstration of activities.     Assessment:     Fatigue at last few minutes of training but improving tolerance for lower GF to start after adequate stretching routine.     Pt will continue to benefit from skilled PT intervention. Medical Necessity is demonstrated by:  Requires skilled supervision to complete and progress HEP and Weakness.    Patient is making good progress towards established goals.    Plan:    Continue with established Plan of Care towards PT goals.

## 2017-04-25 ENCOUNTER — CLINICAL SUPPORT (OUTPATIENT)
Dept: REHABILITATION | Facility: HOSPITAL | Age: 57
End: 2017-04-25
Attending: FAMILY MEDICINE
Payer: COMMERCIAL

## 2017-04-25 DIAGNOSIS — S14.129A INCOMPLETE INJURY OF CERVICAL SPINAL CORD WITH CENTRAL CORD SYNDROME: ICD-10-CM

## 2017-04-25 PROCEDURE — 97116 GAIT TRAINING THERAPY: CPT | Mod: PN

## 2017-04-25 NOTE — PROGRESS NOTES
Name: Alberto Dangelo  [facility] Number: [id number]  Date of Treatment: 04/25/2017   Diagnosis:   Encounter Diagnosis   Name Primary?   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 Mod I in manual w/c with mod I stretching routine in PT gym prior to session.    Objective    Patient received individual therapy to increase strength, endurance, ROM, flexibility, posture and core stabilization with activities as follows:     Gait training via [employer] system for 55:25 minutes (plus set up):     Set up at 30 kg unloading. Computer-assisted robotic gait training via [employer] x 2.5 km/h. GF x 90% x 11', 80% x 12', 70% for distance of 2295 m. NO stoppages.     Continue HEP daily.    Pt demo good understanding of the education provided. Alberto demonstrated good return demonstration of activities.     Assessment:     Improving tolerance for quick unloading with decreased toe drag R and faster drop of GF with minimal feedback throughout.     Pt will continue to benefit from skilled PT intervention. Medical Necessity is demonstrated by:  Requires skilled supervision to complete and progress HEP and Weakness.    Patient is making good progress towards established goals.    Plan:    Continue with established Plan of Care towards PT goals.

## 2017-04-27 ENCOUNTER — CLINICAL SUPPORT (OUTPATIENT)
Dept: REHABILITATION | Facility: HOSPITAL | Age: 57
End: 2017-04-27
Attending: FAMILY MEDICINE
Payer: COMMERCIAL

## 2017-04-27 DIAGNOSIS — S14.129A INCOMPLETE INJURY OF CERVICAL SPINAL CORD WITH CENTRAL CORD SYNDROME: ICD-10-CM

## 2017-04-27 PROCEDURE — 97116 GAIT TRAINING THERAPY: CPT | Mod: PN

## 2017-04-27 NOTE — PROGRESS NOTES
Name: Alberto Dangelo  [facility] Number: [id number]  Date of Treatment: 04/27/2017   Diagnosis:   Encounter Diagnosis   Name Primary?   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 Mod I in manual w/c, stretched inn PT gym mod I prior to session.     Objective    Patient received individual therapy to increase strength, endurance, ROM, flexibility, posture and core stabilization with activities as follows:     Gait training via [employer] system for 56:04 minutes (plus set up):     Set up at 30 kg unloading. Computer-assisted robotic gait training via [employer] x 2.5 km/h. GF x 90% X 10', 80% x 20', 70% for distance of 2223 m. No stoppages.     Continue HEP daily.    Pt demo good understanding of the education provided. Alberto demonstrated good return demonstration of activities.     Assessment:     Improving endurance at 70% with consistent feedback minimally throughout rest of session after decrease of GF.     Pt will continue to benefit from skilled PT intervention. Medical Necessity is demonstrated by:  Requires skilled supervision to complete and progress HEP and Weakness.    Patient is making good progress towards established goals.    Plan:    Continue with established Plan of Care towards PT goals.

## 2017-05-02 ENCOUNTER — TELEPHONE (OUTPATIENT)
Dept: FAMILY MEDICINE | Facility: CLINIC | Age: 57
End: 2017-05-02

## 2017-05-02 ENCOUNTER — CLINICAL SUPPORT (OUTPATIENT)
Dept: REHABILITATION | Facility: HOSPITAL | Age: 57
End: 2017-05-02
Attending: FAMILY MEDICINE
Payer: COMMERCIAL

## 2017-05-02 DIAGNOSIS — S14.129A INCOMPLETE INJURY OF CERVICAL SPINAL CORD WITH CENTRAL CORD SYNDROME: ICD-10-CM

## 2017-05-02 PROCEDURE — 97116 GAIT TRAINING THERAPY: CPT | Mod: PN

## 2017-05-02 NOTE — TELEPHONE ENCOUNTER
----- Message from Yovani Gaming sent at 5/2/2017  4:09 PM CDT -----  Contact: 612  Rose ([employer]) called regarding pt stated that pt wants to try some sample cataract coude and is sending over a request /pls call back [phone]

## 2017-05-02 NOTE — PROGRESS NOTES
"Name: Alberto Dangelo  [facility] Number: [id number]  Date of Treatment: 05/02/2017   Diagnosis:   Encounter Diagnosis   Name Primary?   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    Objective    Patient received individual therapy to increase endurance, flexibility, posture and core stabilization with 0 patients with activities as follows:     Alberto participated in dynamic functional therapeutic activities to improve functional performance. Including: Set up with 30 kg unloading.  Pt participated in computer assisted robotic gait training via [email or web address] @ 2.5 kph x 54 min, 10 sec for a total distance of 2239 m.  Utilized guidance force of 100% x 10 min, decreased to 90% x 10', Completed session @ 80% guidance force.      Written Home Exercises Provided: Instructed pt in seated anterior/posterior pelvic tilt, lateral pelvic tilt, proper weight shifting in standing.  Pt verbalized understanding.    Pt demo good understanding of the education provided. Alberto demonstrated good return demonstration of activities.     Assessment:   Pt stands with anterior pelvic tilt and tends to lock knees in extension.      Pt will continue to benefit from skilled PT intervention. Medical Necessity is demonstrated by:  Fall Risk, Unable to participate fully in daily activities, Requires skilled supervision to complete and progress HEP, Weakness and Gait Instability.      Patient is making fair progress towards established goals.    New/Revised goals: N/A      Plan:  Continue with established Plan of Care towards PT goals.   "

## 2017-05-04 ENCOUNTER — CLINICAL SUPPORT (OUTPATIENT)
Dept: REHABILITATION | Facility: HOSPITAL | Age: 57
End: 2017-05-04
Attending: FAMILY MEDICINE
Payer: COMMERCIAL

## 2017-05-04 DIAGNOSIS — R53.1 DECREASED STRENGTH: ICD-10-CM

## 2017-05-04 DIAGNOSIS — S14.129A INCOMPLETE INJURY OF CERVICAL SPINAL CORD WITH CENTRAL CORD SYNDROME: ICD-10-CM

## 2017-05-04 DIAGNOSIS — Z78.9 IMPAIRED MOTOR CONTROL: Primary | ICD-10-CM

## 2017-05-04 PROCEDURE — 97116 GAIT TRAINING THERAPY: CPT | Mod: PO | Performed by: PHYSICAL THERAPIST

## 2017-05-04 PROCEDURE — 97110 THERAPEUTIC EXERCISES: CPT | Mod: PO | Performed by: PHYSICAL THERAPIST
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
## 2017-05-10 ENCOUNTER — DOCUMENTATION ONLY (OUTPATIENT)
Dept: REHABILITATION | Facility: HOSPITAL | Age: 57
End: 2017-05-10

## 2017-05-10 ENCOUNTER — CLINICAL SUPPORT (OUTPATIENT)
Dept: REHABILITATION | Facility: HOSPITAL | Age: 57
End: 2017-05-10
Attending: FAMILY MEDICINE
Payer: COMMERCIAL

## 2017-05-10 PROCEDURE — 97112 NEUROMUSCULAR REEDUCATION: CPT | Mod: PO

## 2017-05-10 PROCEDURE — 97116 GAIT TRAINING THERAPY: CPT | Mod: PO

## 2017-05-10 NOTE — PROGRESS NOTES
I, FLORENCIA RiveraT, met with Zora Pierre PTA to discuss this pt's POC. Pt is here for Bioness trial. Pt is using L300 plus on right, L300 on left. Pt needs sensor set in mid-forefoot area in shoe (due to poor heel contact). Provide VC to decrease vaulting during ambualtion. Pt can participate in exercise training with Bioness (i.e. Heel slides, bridging). Address hip flexion strengthening in side lying.      Kala Bajwa DPT  5/10/2017    Face to face consultation with supervision PT held on 05/10/2017    Zora Pierre PTA

## 2017-05-11 NOTE — PROGRESS NOTES
"                                                    Physical Therapy Progress Note     Name: Alberto Dangelo  Clinic Number: [id number]  Diagnosis: No diagnosis found.  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  Hip hiking and tone noticed with gait.      Written Home Exercises: did not give any this visit  Pt demo good understanding of the education provided. Alberto demonstrated good return demonstration of activities.     Education provided re: POC, HEP  No spiritual or educational barriers to learning provided    Pt has no cultural, educational or language barriers to learning provided.    Assessment   Pt tolerated his first trial well with Bioness and did not have any complaints.  Increased time needed to set up the Bioness and place quick fit pads on B LE.  Pt may do well with individual pads on the L calf cuff for increased DF.  Once Bioness was set up and intensity was increased pt was able to ambulate better in // bars but cont to hip hike a little to clear his feet.      Pt rehab potential is Good. Pt will benefit from continuing skilled outpatient physical therapy to address the deficits " listed below in the problem list, provide pt/family education and to maximize pt's level of independence in the home and community environment.         Medical necessity is demonstrated by the following IMPAIRMENTS/PROMBLEM LIST:   1. Fall Risk - impaired balance   2. Weakness   3. Impaired muscle tone  4. Gait deviations   5. Decreased ambulation   6. Decreased activity tolerance   7. Continued inability to participate in vocational pursuits   8. Requires skilled supervision to complete and progress HEP      Anticipated barriers to physical therapy: poor motor control in hips     Pt's spiritual, cultural and educational needs considered and pt agreeable to plan of care and goals as stated below:      GOALS:   1) Facilitate improved tone in LE, Progressing   2) Increase passive ankle DF to neutral, MET 3) Facilitate improved LE flexibility. Progressing  New Short Term Goals Status:    1) Improve B ankle DF to at least 5* passively to facilitate improved foot clearance during gait,   2) Improve LE flexibility to min to mod limitation in all areas,   3) Assess stand pivot transfers and gait with assistive device.   Long Term Goal Status:  continue per initial plan of care.   1) Increase strength in LE as indicated by improved L-force testing, Progressing   2) Pt will safely ambulate > 200' with supervision using appropriate assistive device/ Bioness units.- pt ambulates up to 30 ft with Bioness L300 on left and L300 Plus  3) Pt will consistently perform safe stand pivot transfers, Not yet assessed.   4) Pt will demonstrate improved postural stability. Met.   5) new 5/4/17:  Pt will demonstrate competency in application of Bioness device and use of training mode.         Plan   Continue PT 1- 2x weekly under established Plan of Care, with treatment to include: pt education, HEP, therapeutic exercises, neuromuscular re-education/balance exercises, therapeutic activities, joint mobilizations, and modalities PRN, to work  towards established goals. Pt may be seen by PTA to carry out plan of care.     Zora Pierre, PTA   05/11/2017

## 2017-05-12 PROBLEM — Z78.9 IMPAIRED MOTOR CONTROL: Status: ACTIVE | Noted: 2017-05-12

## 2017-05-12 PROBLEM — R53.1 DECREASED STRENGTH: Status: ACTIVE | Noted: 2017-05-12

## 2017-05-16 ENCOUNTER — CLINICAL SUPPORT (OUTPATIENT)
Dept: REHABILITATION | Facility: HOSPITAL | Age: 57
End: 2017-05-16
Attending: FAMILY MEDICINE
Payer: COMMERCIAL

## 2017-05-16 DIAGNOSIS — S14.129A INCOMPLETE INJURY OF CERVICAL SPINAL CORD WITH CENTRAL CORD SYNDROME: ICD-10-CM

## 2017-05-16 PROCEDURE — 97116 GAIT TRAINING THERAPY: CPT | Mod: PN

## 2017-05-16 NOTE — PROGRESS NOTES
Name: Alberto Dangelo  [facility] Number: [id number]  Date of Treatment: 05/16/2017   Diagnosis:   Encounter Diagnosis   Name Primary?   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  Mod I in manual w/c.     Objective    Patient received individual therapy to increase strength, endurance, ROM, flexibility, posture and core stabilization with activities as follows:     Standing TKE B 10/3 while unweighted in [facility] harnessing system    Gait training via [employer] computerized system :    Set up at 30 kg unloading. Computer-assisted robotic gait training via [employer] x 2.5 km/h. GF x 90% x 10', 80% x 20', then 70%. Completion of session per Candice Herzog PT.    Assessment:     Pt will continue to benefit from skilled PT intervention. Medical Necessity is demonstrated by:  Requires skilled supervision to complete and progress HEP and Weakness.    Patient is making good progress towards established goals.    Plan:    Continue with established Plan of Care towards PT goals.

## 2017-05-17 ENCOUNTER — CLINICAL SUPPORT (OUTPATIENT)
Dept: REHABILITATION | Facility: HOSPITAL | Age: 57
End: 2017-05-17
Attending: FAMILY MEDICINE
Payer: COMMERCIAL

## 2017-05-17 DIAGNOSIS — Z78.9 IMPAIRED MOTOR CONTROL: Primary | ICD-10-CM

## 2017-05-17 DIAGNOSIS — R53.1 DECREASED STRENGTH: ICD-10-CM

## 2017-05-17 PROCEDURE — 97112 NEUROMUSCULAR REEDUCATION: CPT | Mod: PO

## 2017-05-18 ENCOUNTER — CLINICAL SUPPORT (OUTPATIENT)
Dept: REHABILITATION | Facility: HOSPITAL | Age: 57
End: 2017-05-18
Attending: FAMILY MEDICINE
Payer: COMMERCIAL

## 2017-05-18 DIAGNOSIS — S14.129A INCOMPLETE INJURY OF CERVICAL SPINAL CORD WITH CENTRAL CORD SYNDROME: ICD-10-CM

## 2017-05-18 PROCEDURE — 97116 GAIT TRAINING THERAPY: CPT | Mod: PN

## 2017-05-18 NOTE — PROGRESS NOTES
Name: Alberto Dangelo  [facility] Number: [id number]  Date of Treatment: 05/18/2017   Diagnosis:   Encounter Diagnosis   Name Primary?   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 Mod I in manual w/c.     Objective    Patient received individual therapy to increase strength, endurance, ROM, flexibility, posture and core stabilization with activities as follows:     Standing TKE B in harness with unweighting, emphasis on hip extn with knee extn    Gait training via [employer] system for 55:30 minutes (plus set up):     Set up at 30 kg unloading. Computer-assisted robotic gait training via [employer] x 2.5 km/h. GF x 90% x 10', 80% x 33', then 65% for distance of 2286 m. No stoppages.     Continue HEP daily.    Pt demo good understanding of the education provided. Alberto demonstrated good return demonstration of activities.     Assessment:     Progressing with decreased GF and improving ability to extend hips with knee extn in standing.     Pt will continue to benefit from skilled PT intervention. Medical Necessity is demonstrated by:  Requires skilled supervision to complete and progress HEP and Weakness.    Patient is making good progress towards established goals.    Plan:    Continue with established Plan of Care towards PT goals.

## 2017-05-18 NOTE — PROGRESS NOTES
"                                                    Physical Therapy Progress Note     Name: Alberto Dangelo  Clinic Number: [id number]  Diagnosis:   Encounter Diagnoses   Name Primary?   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  The weather was to bad."  Pain Scale:  denies    Objective     Patient received individual therapy with activities as follows:       Neuromuscular re-education x 70 min including:    In // bars  Bioness L 300 plus and L 300 set up in W/C x 45 mins with [employer] Representatives Hans.  Pt was fit to personal panels on both the R LE and LLE.  Ambulation trials with RW x 10ft x 2 with Min A.  Min A to clear LLE toe level.       Training mood: L 300 plus on R LE and L 300 on the LLE.    5 sec on/8 secs off x 15 mins to include  Lunges and mini squats.    Written Home Exercises: did not give any this visit  Pt demo good understanding of the education provided. Alberto demonstrated good return demonstration of activities.     Education provided re: POC, HEP  No spiritual or educational barriers to learning provided    Pt has no cultural, educational or language barriers to learning provided.    Assessment   Pt tolerated tx session well and cont to be motivated.  [employer] representatives were available in clinic today to assist with set up, and they switched pt from a quick fit to " personal panels on both calf cuffs.  Pt with improved gait on the R LE, but cont to have problems on the LLE due to decreased hip flexion and decreased strength.  Pt cont with tx in // bars on training mood to increase strength in B LE's.  Violeta, PT  Recommended pt ride the FES bike at least 3 hours a day to the pt.  Will attempt E-stim as well during tx session for hip flexors.  Cont with POC.      Pt rehab potential is Good. Pt will benefit from continuing skilled outpatient physical therapy to address the deficits listed below in the problem list, provide pt/family education and to maximize pt's level of independence in the home and community environment.         Medical necessity is demonstrated by the following IMPAIRMENTS/PROMBLEM LIST:   1. Fall Risk - impaired balance   2. Weakness   3. Impaired muscle tone  4. Gait deviations   5. Decreased ambulation   6. Decreased activity tolerance   7. Continued inability to participate in vocational pursuits   8. Requires skilled supervision to complete and progress HEP      Anticipated barriers to physical therapy: poor motor control in hips     Pt's spiritual, cultural and educational needs considered and pt agreeable to plan of care and goals as stated below:      GOALS:   1) Facilitate improved tone in LE, Progressing   2) Increase passive ankle DF to neutral, MET 3) Facilitate improved LE flexibility. Progressing  New Short Term Goals Status:    1) Improve B ankle DF to at least 5* passively to facilitate improved foot clearance during gait,   2) Improve LE flexibility to min to mod limitation in all areas,   3) Assess stand pivot transfers and gait with assistive device.   Long Term Goal Status:  continue per initial plan of care.   1) Increase strength in LE as indicated by improved L-force testing, Progressing   2) Pt will safely ambulate > 200' with supervision using appropriate assistive device/ [employer] units.- pt ambulates up to 30 ft with [employer] L300 on  left and L300 Plus  3) Pt will consistently perform safe stand pivot transfers, Not yet assessed.   4) Pt will demonstrate improved postural stability. Met.   5) new 5/4/17:  Pt will demonstrate competency in application of Bioness device and use of training mode.         Plan   Continue PT 1- 2x weekly under established Plan of Care, with treatment to include: pt education, HEP, therapeutic exercises, neuromuscular re-education/balance exercises, therapeutic activities, joint mobilizations, and modalities PRN, to work towards established goals. Pt may be seen by PTA to carry out plan of care.     Zora Pierre, PTA   05/18/2017

## 2017-05-19 ENCOUNTER — CLINICAL SUPPORT (OUTPATIENT)
Dept: REHABILITATION | Facility: HOSPITAL | Age: 57
End: 2017-05-19
Attending: FAMILY MEDICINE
Payer: COMMERCIAL

## 2017-05-19 DIAGNOSIS — R53.1 DECREASED STRENGTH: ICD-10-CM

## 2017-05-19 DIAGNOSIS — Z78.9 IMPAIRED MOTOR CONTROL: Primary | ICD-10-CM

## 2017-05-19 PROCEDURE — 97112 NEUROMUSCULAR REEDUCATION: CPT | Mod: PO

## 2017-05-19 NOTE — PROGRESS NOTES
"                                                    Physical Therapy Progress Note     Name: Alberto Dangelo  Clinic Number: [id number]  Diagnosis:   Encounter Diagnoses   Name Primary?    Impaired motor control Yes    Decreased strength      Physician: Dirk Ortiz MD  Treatment Orders: PT Eval and Treat  Past Medical History:   Diagnosis Date    Autonomic dysfunction     Constipation     Deep vein thrombosis     Depression     GERD (gastroesophageal reflux disease)     Incomplete injury of cervical spinal cord with central cord syndrome     Neurogenic bladder     Pulmonary embolism        Evaluation Date: 8/24/2016  Visit auth #: [id number]  (Prior visits: 42)  Plan of care expiration: 7/24/2017  Precautions: universal, fall    G codes 4/10              Subjective   Pt reports: " I was thinking, can we try to use just both calf cuffs and not the thigh cuff today."  Pain Scale:  denies    Objective     Patient received individual therapy with activities as follows:       Neuromuscular re-education x 45 min including:    In // bars  Bioness L 300 set up   Pt was fit to personal panels on both the R LE and LLE.  Ambulation trials in // bars with L 300 on B LE.  Attempting placing sensors in different places on the R shoe.        Written Home Exercises: did not give any this visit  Pt demo good understanding of the education provided. Alberto demonstrated good return demonstration of activities.     Education provided re: POC, HEP  No spiritual or educational barriers to learning provided    Pt has no cultural, educational or language barriers to learning provided.    Assessment   Pt tolerated tx session well and had no complaints.  Pt cont with Bioness training and required multiple rest breaks to rotate sensor pads for correct toe off gait.  Pt does better with sensors more medially and central towards toes.  Will adjust toe off setting next tx session.   Cont with POC.      Pt rehab potential is Good. Pt " will benefit from continuing skilled outpatient physical therapy to address the deficits listed below in the problem list, provide pt/family education and to maximize pt's level of independence in the home and community environment.         Medical necessity is demonstrated by the following IMPAIRMENTS/PROMBLEM LIST:   1. Fall Risk - impaired balance   2. Weakness   3. Impaired muscle tone  4. Gait deviations   5. Decreased ambulation   6. Decreased activity tolerance   7. Continued inability to participate in vocational pursuits   8. Requires skilled supervision to complete and progress HEP      Anticipated barriers to physical therapy: poor motor control in hips     Pt's spiritual, cultural and educational needs considered and pt agreeable to plan of care and goals as stated below:      GOALS:   1) Facilitate improved tone in LE, Progressing   2) Increase passive ankle DF to neutral, MET 3) Facilitate improved LE flexibility. Progressing  New Short Term Goals Status:    1) Improve B ankle DF to at least 5* passively to facilitate improved foot clearance during gait,   2) Improve LE flexibility to min to mod limitation in all areas,   3) Assess stand pivot transfers and gait with assistive device.   Long Term Goal Status:  continue per initial plan of care.   1) Increase strength in LE as indicated by improved L-force testing, Progressing   2) Pt will safely ambulate > 200' with supervision using appropriate assistive device/ Bioness units.- pt ambulates up to 30 ft with Bioness L300 on left and L300 Plus  3) Pt will consistently perform safe stand pivot transfers, Not yet assessed.   4) Pt will demonstrate improved postural stability. Met.   5) new 5/4/17:  Pt will demonstrate competency in application of Bioness device and use of training mode.         Plan   Continue PT 1- 2x weekly under established Plan of Care, with treatment to include: pt education, HEP, therapeutic exercises, neuromuscular  re-education/balance exercises, therapeutic activities, joint mobilizations, and modalities PRN, to work towards established goals. Pt may be seen by PTA to carry out plan of care.     Zora Pierre, PTA   05/19/2017

## 2017-05-22 NOTE — PROGRESS NOTES
Name: lAberto Dangelo  [facility] Number: [id number]  Date of Treatment: 05/16/2017  Diagnosis:   Encounter Diagnosis   Name Primary?   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      Objective    Assumed care of patient from [facility].  Completed gait training using [employer] robotic gait  for a total of 55 min, 03 sec (total session) for a distance of 2279 m.      Written Home Exercises Provided: N/A    Pt demo good understanding of the education provided. Alberto demonstrated good return demonstration of activities.     Assessment:   Excellent training session.     Pt will continue to benefit from skilled PT intervention. Medical Necessity is demonstrated by:  Fall Risk, Unable to participate fully in daily activities, Requires skilled supervision to complete and progress HEP and Weakness.    Patient is making fair progress towards established goals.    New/Revised goals: N/A      Plan:  Continue with established Plan of Care towards PT goals.

## 2017-05-23 ENCOUNTER — CLINICAL SUPPORT (OUTPATIENT)
Dept: REHABILITATION | Facility: HOSPITAL | Age: 57
End: 2017-05-23
Attending: FAMILY MEDICINE
Payer: COMMERCIAL

## 2017-05-23 DIAGNOSIS — S14.129A INCOMPLETE INJURY OF CERVICAL SPINAL CORD WITH CENTRAL CORD SYNDROME: ICD-10-CM

## 2017-05-23 PROCEDURE — 97110 THERAPEUTIC EXERCISES: CPT | Mod: PN

## 2017-05-23 NOTE — PROGRESS NOTES
Name: Alberto Dangelo  [facility] Number: [id number]  Date of Treatment: 05/23/2017   Diagnosis:   Encounter Diagnosis   Name Primary?   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 Mod I via manual w/c.      Objective    Patient received individual therapy to increase strength, endurance, ROM, flexibility, posture and core stabilization with activities as follows:     Gait training via [employer] system for 60:20 minutes (plus set up):     Set up at 20 kg unloading. Computer-assisted robotic gait training via [employer] x 2.5 km/h. GF x 90% x 12', 65% for distance of 2485 m. NO stoppages.       Continue HEP daily.    Pt demo good understanding of the education provided. Alberto demonstrated good return demonstration of activities.     Assessment:     Progressing with decreased unloading today with faster drop of GF with minimal feedback throughout.     Pt will continue to benefit from skilled PT intervention. Medical Necessity is demonstrated by:  Requires skilled supervision to complete and progress HEP and Weakness.    Patient is making good progress towards established goals.    Plan:    Continue with established Plan of Care towards PT goals.

## 2017-05-24 ENCOUNTER — CLINICAL SUPPORT (OUTPATIENT)
Dept: REHABILITATION | Facility: HOSPITAL | Age: 57
End: 2017-05-24
Attending: FAMILY MEDICINE
Payer: COMMERCIAL

## 2017-05-24 DIAGNOSIS — Z78.9 IMPAIRED MOTOR CONTROL: Primary | ICD-10-CM

## 2017-05-24 DIAGNOSIS — R53.1 DECREASED STRENGTH: ICD-10-CM

## 2017-05-24 PROCEDURE — 97112 NEUROMUSCULAR REEDUCATION: CPT | Mod: PO

## 2017-05-24 NOTE — PROGRESS NOTES
"                                                    Physical Therapy Progress Note     Name: Alberto Dangelo  Clinic Number: [id number]  Diagnosis:   Encounter Diagnoses   Name Primary?   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  Ambulation trials in // bars with L 300 on B LE.   Sensors placed on R shoe mid foot/heel, and LLE on the medial side of foot near meta tarsel head of great toe       Written Home Exercises: did not give any this visit  Pt demo good understanding of the education provided. Alberto demonstrated good return demonstration of activities.     Education provided re: POC, HEP  No spiritual or educational barriers to learning provided    Pt has no cultural, educational or language barriers to learning provided.    Assessment   Pt tolerated tx session well and had no complaints.  Pt cont with Bioness training, and cont to adjust sensor pads.  Sensor on the LLE was placed medially near great toe meta tarsel head..  Cont to adjust R LE and will adjust more next time.  Pt having a difficult time with HS facilitation during gait.  Will adjust the delay or decrease the " delay next tx session on the HS cuff.   Cont with POC.      Pt rehab potential is Good. Pt will benefit from continuing skilled outpatient physical therapy to address the deficits listed below in the problem list, provide pt/family education and to maximize pt's level of independence in the home and community environment.         Medical necessity is demonstrated by the following IMPAIRMENTS/PROMBLEM LIST:   1. Fall Risk - impaired balance   2. Weakness   3. Impaired muscle tone  4. Gait deviations   5. Decreased ambulation   6. Decreased activity tolerance   7. Continued inability to participate in vocational pursuits   8. Requires skilled supervision to complete and progress HEP      Anticipated barriers to physical therapy: poor motor control in hips     Pt's spiritual, cultural and educational needs considered and pt agreeable to plan of care and goals as stated below:      GOALS:   1) Facilitate improved tone in LE, Progressing   2) Increase passive ankle DF to neutral, MET 3) Facilitate improved LE flexibility. Progressing  New Short Term Goals Status:    1) Improve B ankle DF to at least 5* passively to facilitate improved foot clearance during gait,   2) Improve LE flexibility to min to mod limitation in all areas,   3) Assess stand pivot transfers and gait with assistive device.   Long Term Goal Status:  continue per initial plan of care.   1) Increase strength in LE as indicated by improved L-force testing, Progressing   2) Pt will safely ambulate > 200' with supervision using appropriate assistive device/ Bioness units.- pt ambulates up to 30 ft with Bioness L300 on left and L300 Plus  3) Pt will consistently perform safe stand pivot transfers, Not yet assessed.   4) Pt will demonstrate improved postural stability. Met.   5) new 5/4/17:  Pt will demonstrate competency in application of Bioness device and use of training mode.         Plan   Continue PT 1- 2x weekly under established Plan of Care, with  treatment to include: pt education, HEP, therapeutic exercises, neuromuscular re-education/balance exercises, therapeutic activities, joint mobilizations, and modalities PRN, to work towards established goals. Pt may be seen by PTA to carry out plan of care.     Zora Pierre, PTA   05/24/2017

## 2017-05-25 ENCOUNTER — CLINICAL SUPPORT (OUTPATIENT)
Dept: REHABILITATION | Facility: HOSPITAL | Age: 57
End: 2017-05-25
Attending: FAMILY MEDICINE
Payer: COMMERCIAL

## 2017-05-25 DIAGNOSIS — S14.129A INCOMPLETE INJURY OF CERVICAL SPINAL CORD WITH CENTRAL CORD SYNDROME: ICD-10-CM

## 2017-05-25 PROCEDURE — 97116 GAIT TRAINING THERAPY: CPT | Mod: PN

## 2017-05-25 NOTE — PROGRESS NOTES
Name: Alberto Dangelo  [facility] Number: [id number]  Date of Treatment: 05/25/2017   Diagnosis: No diagnosis found.    Time in: 1410  Time Out: 1530  Total Treatment Time: 65    Subjective:    Alberto reports no pain. Mod I in manual w/c.      Objective    Patient received individual therapy to increase strength, endurance, ROM, flexibility, posture and core stabilization with activities as follows:     Standing ex's in unweighted position: TKE B, hip and knee flexion AAROM, and lateral weight shifting L/R    Gait training via [employer] system for 60:12 minutes (plus set up):     Set up at 20 kg unloading. Computer-assisted robotic gait training via [employer] x 2.5 km/h. GF x 90%, 65% for distance of 2485 m. No stoppages.     Continue HEP daily.    Pt demo good understanding of the education provided. Alberto demonstrated good return demonstration of activities.     Assessment:     Strong tones with standing balance ex's, cues required to reduce compensatory trunk extn for TKE. Good lokomat session with consistent feedback throughout although minimal at end with fatigue.     Pt will continue to benefit from skilled PT intervention. Medical Necessity is demonstrated by:  Requires skilled supervision to complete and progress HEP and Weakness.    Patient is making good progress towards established goals.    Plan:    Continue with established Plan of Care towards PT goals.

## 2017-05-26 ENCOUNTER — CLINICAL SUPPORT (OUTPATIENT)
Dept: REHABILITATION | Facility: HOSPITAL | Age: 57
End: 2017-05-26
Attending: FAMILY MEDICINE
Payer: COMMERCIAL

## 2017-05-26 DIAGNOSIS — Z78.9 IMPAIRED MOTOR CONTROL: Primary | ICD-10-CM

## 2017-05-26 DIAGNOSIS — R53.1 DECREASED STRENGTH: ICD-10-CM

## 2017-05-26 DIAGNOSIS — S14.129A INCOMPLETE INJURY OF CERVICAL SPINAL CORD WITH CENTRAL CORD SYNDROME: ICD-10-CM

## 2017-05-26 PROCEDURE — 97110 THERAPEUTIC EXERCISES: CPT | Mod: PO | Performed by: PHYSICAL THERAPIST

## 2017-05-26 PROCEDURE — 97116 GAIT TRAINING THERAPY: CPT | Mod: PO | Performed by: PHYSICAL THERAPIST

## 2017-05-26 NOTE — PROGRESS NOTES
Physical Therapy Progress Note     Name: Alberto Dangelo  Clinic Number: [id number]  Diagnosis:   Impaired motor control Yes     Decreased strength          Physician: Dirk Ortiz MD  Treatment Orders: PT Eval and Treat  Past Medical History:   Diagnosis Date    Autonomic dysfunction     Constipation     Deep vein thrombosis     Depression     GERD (gastroesophageal reflux disease)     Incomplete injury of cervical spinal cord with central cord syndrome     Neurogenic bladder     Pulmonary embolism        Evaluation Date: 8/24/2016  Visit auth #: [id number]  (Prior visits: 42)  Plan of care expiration: 7/24/2017  Precautions: universal, fall    G codes 6/10              Subjective   Pt reports: no new complaints  Pain Scale:  denies    Objective     Patient received individual therapy with activities as follows:       Gait training x 55 min including:    In // bars  Bioness L 300 Plus adjustment- added plus on left    Ambulation trials in // bars with L 300 on B LE.   Sensors placed on R shoe forefoot, and LLE midfoot  Trials of ambulation in // bars with A x 2 for improving weight bearing through LE, timing, and technique    therapeutic exercise x 20 minutes:   side lying hip flexion/ extension- until fatigue, mod A  LTR with ball, LE strapped together, VC for pt to isolate motion        Written Home Exercises: pt instructed to use FES cycle prior to therapy with Bioness to attempt to decrease tone.  Pt demo good understanding of the education provided. Alberto demonstrated good return demonstration of activities.     Education provided re: POC, HEP  No spiritual or educational barriers to learning provided    Pt has no cultural, educational or language barriers to learning provided.    Assessment   Alberto tolerated treatment well. L300 plus added to LLE to improve knee flexions for swing. Presser sensors moved to forefoot on right and midfoot on left.  Pt did demonstrate improved knee flex on left with addition of L300 Plus. Pt required assistance x 2 for gait training in parallel bars to clear feet right>left. PT discussed lack of hip flexion strength and motor control as a limiting factor for use of Bioness. This was addressed during the session and will be addressed in future sessions. Pt can benefit from continued training with Bioness and continued strengthening to address gait ability and technique.     Pt rehab potential is Good. Pt will benefit from continuing skilled outpatient physical therapy to address the deficits listed below in the problem list, provide pt/family education and to maximize pt's level of independence in the home and community environment.         Medical necessity is demonstrated by the following IMPAIRMENTS/PROMBLEM LIST:   1. Fall Risk - impaired balance   2. Weakness   3. Impaired muscle tone  4. Gait deviations   5. Decreased ambulation   6. Decreased activity tolerance   7. Continued inability to participate in vocational pursuits   8. Requires skilled supervision to complete and progress HEP      Anticipated barriers to physical therapy: poor motor control in hips     Pt's spiritual, cultural and educational needs considered and pt agreeable to plan of care and goals as stated below:      GOALS:   1) Facilitate improved tone in LE, Progressing   2) Increase passive ankle DF to neutral, MET 3) Facilitate improved LE flexibility. Progressing  New Short Term Goals Status:    1) Improve B ankle DF to at least 5* passively to facilitate improved foot clearance during gait,   2) Improve LE flexibility to min to mod limitation in all areas,   3) Assess stand pivot transfers and gait with assistive device.   Long Term Goal Status:  continue per initial plan of care.   1) Increase strength in LE as indicated by improved L-force testing, Progressing   2) Pt will safely ambulate > 200' with supervision using appropriate assistive device/  Bioness units.- pt ambulates up to 30 ft with Bioness L300 on left and L300 Plus  3) Pt will consistently perform safe stand pivot transfers, Not yet assessed.   4) Pt will demonstrate improved postural stability. Met.   5) new 5/4/17:  Pt will demonstrate competency in application of Bioness device and use of training mode.-ongoing  6) new 5/26/17: Pt will demonstrate improved bilateral hip flexion strength to 2/5 to improve ability to perform swing phase/ foot clearance during gait with Bioness L300 Plus.- trace bilaterally        Plan   Continue PT 1- 2x weekly under established Plan of Care, with treatment to include: pt education, HEP, therapeutic exercises, neuromuscular re-education/balance exercises, therapeutic activities, joint mobilizations, and modalities PRN, to work towards established goals. Pt may be seen by PTA to carry out plan of care.     Kala Bajwa, PT   05/26/2017

## 2017-05-29 ENCOUNTER — DOCUMENTATION ONLY (OUTPATIENT)
Dept: REHABILITATION | Facility: HOSPITAL | Age: 57
End: 2017-05-29

## 2017-05-29 NOTE — PROGRESS NOTES
I, Kala Bajwa, FLORENICAT, met with Zora Pierre PTA to discuss this pt's POC. PT adjusted bilateral Bioness units and added Plus to left. Practice gait in // bars, may need to hold feet to allow for increased weight bearing to activate device and decrease tendency for vaulting. Address sidelying hip flexion/ extension, pelvic tilts, ab strengthening to improve gait.     Kala Bajwa DPT  5/29/2017     Face to face consultation with supervision PT held on 05/29/2017    Zora Pierre PTA

## 2017-05-30 ENCOUNTER — CLINICAL SUPPORT (OUTPATIENT)
Dept: REHABILITATION | Facility: HOSPITAL | Age: 57
End: 2017-05-30
Attending: FAMILY MEDICINE
Payer: COMMERCIAL

## 2017-05-30 DIAGNOSIS — S14.129A INCOMPLETE INJURY OF CERVICAL SPINAL CORD WITH CENTRAL CORD SYNDROME: ICD-10-CM

## 2017-05-30 PROCEDURE — 97116 GAIT TRAINING THERAPY: CPT | Mod: PN

## 2017-05-30 NOTE — PROGRESS NOTES
"Name: Alberto Dangelo  [facility] Number: [id number]  Date of Treatment: 05/30/2017   Diagnosis:   Encounter Diagnosis   Name Primary?   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    Objective    Patient received individual therapy to increase endurance, flexibility, posture, core stabilization and gait quality with 0 patients with activities as follows:     Pt performed self stretching in standing in harness prior to full hook up in [employer]    Alberto participated in dynamic functional therapeutic activities to improve functional performance for 67 minutes. Including: Set up with 20 kg unloading.  Pt participated in computer assisted robotic gait training via [employer] @ 2.5 kph x 55 min, 14 sec for a total distance of 2287 m.  Utilized guidance force of 90% x 10 min then decreased to 65 for remainder of session.        Written Home Exercises Provided: N/A  Pt demo good understanding of the education provided. Alberto demonstrated good return demonstration of activities.     Assessment:   Good session achieved with appropriate heel strike and toe off achieved after ~ 5 min of gait training.      Pt will continue to benefit from skilled PT intervention. Medical Necessity is demonstrated by:  Fall Risk, Unable to participate fully in daily activities and impaired gait/trunk control     Patient is making steady progress towards established goals.    New/Revised goals: N/A      Plan:  Continue with established Plan of Care towards PT goals.   "

## 2017-06-01 ENCOUNTER — CLINICAL SUPPORT (OUTPATIENT)
Dept: REHABILITATION | Facility: HOSPITAL | Age: 57
End: 2017-06-01
Attending: FAMILY MEDICINE
Payer: COMMERCIAL

## 2017-06-01 DIAGNOSIS — S14.129A INCOMPLETE INJURY OF CERVICAL SPINAL CORD WITH CENTRAL CORD SYNDROME: ICD-10-CM

## 2017-06-01 PROCEDURE — 97116 GAIT TRAINING THERAPY: CPT | Mod: PN

## 2017-06-01 NOTE — PROGRESS NOTES
Name: Alberto Dangelo  [facility] Number: [id number]  Date of Treatment: 06/01/2017   Diagnosis:   Encounter Diagnosis   Name Primary?    Incomplete injury of cervical spinal cord with central cord syndrome        Time in: 1420  Time Out: 1540  Total Treatment Time: 65    Subjective:    Alberto reports no complaints. Mod I propulsion in manual w/c.     Objective    Patient received individual therapy to increase strength, endurance, ROM, flexibility, posture and core stabilization with activities as follows:   Gait training via [employer] system for 60:20 minutes (plus set up):     Set up at 20 kg unloading. Computer-assisted robotic gait training via [employer] x 2.5 km/h. GF x 90% x 10', 65% for distance of 2497 m. No stoppages.     Continue HEP daily.    Pt demo good understanding of the education provided. Alberto demonstrated good return demonstration of activities.     Assessment:     Improving with more consistent minimal feedback on monitor B LE swing and stance phases throughout decrease of GF at 65%.     Pt will continue to benefit from skilled PT intervention. Medical Necessity is demonstrated by:  Requires skilled supervision to complete and progress HEP and Weakness.    Patient is making good progress towards established goals.    Plan:    Continue with established Plan of Care towards PT goals.

## 2017-06-02 ENCOUNTER — CLINICAL SUPPORT (OUTPATIENT)
Dept: REHABILITATION | Facility: HOSPITAL | Age: 57
End: 2017-06-02
Attending: FAMILY MEDICINE
Payer: COMMERCIAL

## 2017-06-02 DIAGNOSIS — R53.1 DECREASED STRENGTH: ICD-10-CM

## 2017-06-02 DIAGNOSIS — S14.129A INCOMPLETE INJURY OF CERVICAL SPINAL CORD WITH CENTRAL CORD SYNDROME: ICD-10-CM

## 2017-06-02 DIAGNOSIS — Z78.9 IMPAIRED MOTOR CONTROL: Primary | ICD-10-CM

## 2017-06-02 PROCEDURE — 97116 GAIT TRAINING THERAPY: CPT | Mod: PO | Performed by: PHYSICAL THERAPIST

## 2017-06-02 PROCEDURE — 97110 THERAPEUTIC EXERCISES: CPT | Mod: PO | Performed by: PHYSICAL THERAPIST

## 2017-06-02 NOTE — PROGRESS NOTES
Physical Therapy Progress Note     Name: Alberto Dangelo  Clinic Number: [id number]  Diagnosis:   Impaired motor control Yes     Decreased strength          Physician: Dirk Ortiz MD  Treatment Orders: PT Eval and Treat  Past Medical History:   Diagnosis Date    Autonomic dysfunction     Constipation     Deep vein thrombosis     Depression     GERD (gastroesophageal reflux disease)     Incomplete injury of cervical spinal cord with central cord syndrome     Neurogenic bladder     Pulmonary embolism        Evaluation Date: 8/24/2016  Visit auth #: [id number]  (Prior visits: 42)  Plan of care expiration: 7/24/2017  Precautions: universal, fall    G codes 7/10              Subjective   Pt reports: no new complaints  Pain Scale:  denies    Objective     Patient received individual therapy with activities as follows:       Gait training x 38 min including:    Bioness L 300 Plus adjustment- added separate programs for left/ right    Ambulation trials in // bars with L 300 on B LE.   Sensors placed on R shoe forefoot, and LLE midfoot  Trials of ambulation in // bars with mod A x 1 for improving weight bearing through BLE; mod VC for timing, and technique  Gait improvements with Bioness L300 plus: improved knee flexion (R>L) for swing, improved right foot clearance for swing, improved ability to advance LLE.    therapeutic exercise x 15 minutes: (PT assisted by SPT)  side lying hip flexion/ extension- until fatigue, mod A on left, max A on right  LTR with ball, LE strapped together, VC for pt to isolate motion  Bilateral hip/ knee flex/ ext with ball until fatigue, max A    Written Home Exercises: 6/2/17- pt educated on hip flexion strengthening in pool  pt instructed to use FES cycle prior to therapy with Bioness to attempt to decrease tone.  Pt demo good understanding of the education provided. Alberto demonstrated good return demonstration of activities.      Education provided re: POC, HEP  No spiritual or educational barriers to learning provided    Pt has no cultural, educational or language barriers to learning provided.    Assessment   Alberto tolerated treatment well. Pt demonstrated improved knee flexion (right>left) during gait trials with use of L300 Pluses. Pt's extensor tone in LLE impaired ambulation. Pt required at least moderate manual assistance from PT to improve weight bearing through feet to utilize sensor for Bioness. Pt demonstrated improved hip flexion (uncontrolled) in side lying. Pt can benefit from continued training with Bioness and continued strengthening to address gait ability and technique.     Pt rehab potential is Good. Pt will benefit from continuing skilled outpatient physical therapy to address the deficits listed below in the problem list, provide pt/family education and to maximize pt's level of independence in the home and community environment.         Medical necessity is demonstrated by the following IMPAIRMENTS/PROMBLEM LIST:   1. Fall Risk - impaired balance   2. Weakness   3. Impaired muscle tone  4. Gait deviations   5. Decreased ambulation   6. Decreased activity tolerance   7. Continued inability to participate in vocational pursuits   8. Requires skilled supervision to complete and progress HEP      Anticipated barriers to physical therapy: poor motor control in hips     Pt's spiritual, cultural and educational needs considered and pt agreeable to plan of care and goals as stated below:      GOALS:   1) Facilitate improved tone in LE, Progressing   2) Increase passive ankle DF to neutral, MET 3) Facilitate improved LE flexibility. Progressing  New Short Term Goals Status:    1) Improve B ankle DF to at least 5* passively to facilitate improved foot clearance during gait,   2) Improve LE flexibility to min to mod limitation in all areas,   3) Assess stand pivot transfers and gait with assistive device.   Long Term Goal  Status:  continue per initial plan of care.   1) Increase strength in LE as indicated by improved L-force testing, Progressing   2) Pt will safely ambulate > 200' with supervision using appropriate assistive device/ Bioness units.- pt ambulates up to 30 ft with Bioness L300 on left and L300 Plus  3) Pt will consistently perform safe stand pivot transfers, Not yet assessed.   4) Pt will demonstrate improved postural stability. Met.   5) new 5/4/17:  Pt will demonstrate competency in application of Bioness device and use of training mode.-ongoing  6) new 5/26/17: Pt will demonstrate improved bilateral hip flexion strength to 2/5 to improve ability to perform swing phase/ foot clearance during gait with Bioness L300 Plus.- trace bilaterally        Plan   Continue PT 1- 2x weekly under established Plan of Care, with treatment to include: pt education, HEP, therapeutic exercises, neuromuscular re-education/balance exercises, therapeutic activities, joint mobilizations, and modalities PRN, to work towards established goals. Pt may be seen by PTA to carry out plan of care.     Kala Bajwa, PT   06/02/2017

## 2017-06-06 ENCOUNTER — CLINICAL SUPPORT (OUTPATIENT)
Dept: REHABILITATION | Facility: HOSPITAL | Age: 57
End: 2017-06-06
Attending: FAMILY MEDICINE
Payer: COMMERCIAL

## 2017-06-06 DIAGNOSIS — S14.129A INCOMPLETE INJURY OF CERVICAL SPINAL CORD WITH CENTRAL CORD SYNDROME: ICD-10-CM

## 2017-06-06 PROCEDURE — 97116 GAIT TRAINING THERAPY: CPT | Mod: PN

## 2017-06-06 NOTE — PROGRESS NOTES
"Name: Alberto Dangelo  [facility] Number: [id number]  Date of Treatment: 06/06/2017   Diagnosis:   Encounter Diagnosis   Name Primary?    Incomplete injury of cervical spinal cord with central cord syndrome        Time in: 1405  Time Out: 1525  Total Treatment Time: 65  Group Time: 0      Subjective:    Alberto reports improvement of symptoms.  Patient reports their pain to be n/a/10 on a 0-10 scale with 0 being no pain and 10 being the worst pain imaginable.    Objective    Patient received individual therapy to increase flexibility, posture, core stabilization and gait quality/stability with 0 patients with activities as follows:     Passive stretching to B hip flexors in Lázaro test position 2x45" ea.  After set up in harness, pt performed self stretching in standing to B calves and hip flexors.      Alberto participated in dynamic functional therapeutic activities to improve functional performance for 65 minutes. Including: Set up with 15 kg unloading.  Pt participated in computer assisted robotic gait training via [employer] @ 2.5 kph x 49 min, 41 sec for a total distance of 2057 m.  Utilized guidance force of 95% x 5 min, decreased to 90% x 5 min.  Completed session @ 65%.  Session discontinued at 49'41" at patient request due to need to void.  Disconnected from unit without difficulty.        Written Home Exercises Provided: Discussed importance of stretching and hip flexor strengthening at home.    Pt demo good understanding of the education provided. Alberto demonstrated good return demonstration of activities.     Assessment:   Good session today with consistent toe clearance throughout session.  Pt focused on weight shift onto stance leg.      Pt will continue to benefit from skilled PT intervention. Medical Necessity is demonstrated by:  Fall Risk, Unable to participate fully in daily activities, Requires skilled supervision to complete and progress HEP, Weakness and Impaired gait/functional " mobility    Patient is making fair progress towards established goals.    New/Revised goals: N/A this date.        Plan:  Continue with established Plan of Care towards PT goals.

## 2017-06-08 ENCOUNTER — CLINICAL SUPPORT (OUTPATIENT)
Dept: REHABILITATION | Facility: HOSPITAL | Age: 57
End: 2017-06-08
Attending: FAMILY MEDICINE
Payer: COMMERCIAL

## 2017-06-08 DIAGNOSIS — S14.129A INCOMPLETE INJURY OF CERVICAL SPINAL CORD WITH CENTRAL CORD SYNDROME: ICD-10-CM

## 2017-06-08 PROCEDURE — 97116 GAIT TRAINING THERAPY: CPT | Mod: PN

## 2017-06-08 NOTE — PROGRESS NOTES
Name: Alberto Dangelo  [facility] Number: [id number]  Date of Treatment: 06/08/2017   Diagnosis:   Encounter Diagnosis   Name Primary?   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 Mod I in manual w/c, mod I stretching routine prior to session.     Objective    Patient received individual therapy to increase strength, endurance, ROM, flexibility, posture and core stabilization with activities as follows:     Gait training via [employer] system for 49:11 minutes (plus set up):     Set up at 15 kg unloading. Computer-assisted robotic gait training via [employer] x 2.5 km/h. GF x 90% x 10', 80% x 10', 65% for distance of 2035 m. No stoppages.     Continue HEP daily.    Pt demo good understanding of the education provided. Alberto demonstrated good return demonstration of activities.     Assessment:     Doing well at 15 kg unloading without stoppages. Initial R toe drag which improved with mobilization. Cont minimal feedback throughout session via monitor.     Pt will continue to benefit from skilled PT intervention. Medical Necessity is demonstrated by:  Requires skilled supervision to complete and progress HEP and Weakness.    Patient is making good progress towards established goals.    Plan:    Continue with established Plan of Care towards PT goals.

## 2017-06-09 ENCOUNTER — CLINICAL SUPPORT (OUTPATIENT)
Dept: REHABILITATION | Facility: HOSPITAL | Age: 57
End: 2017-06-09
Attending: FAMILY MEDICINE
Payer: COMMERCIAL

## 2017-06-09 DIAGNOSIS — Z78.9 IMPAIRED MOTOR CONTROL: Primary | ICD-10-CM

## 2017-06-09 DIAGNOSIS — R53.1 DECREASED STRENGTH: ICD-10-CM

## 2017-06-09 DIAGNOSIS — S14.129A INCOMPLETE INJURY OF CERVICAL SPINAL CORD WITH CENTRAL CORD SYNDROME: ICD-10-CM

## 2017-06-09 PROCEDURE — 97116 GAIT TRAINING THERAPY: CPT | Mod: PO | Performed by: PHYSICAL THERAPIST

## 2017-06-13 ENCOUNTER — CLINICAL SUPPORT (OUTPATIENT)
Dept: REHABILITATION | Facility: HOSPITAL | Age: 57
End: 2017-06-13
Attending: FAMILY MEDICINE
Payer: COMMERCIAL

## 2017-06-13 DIAGNOSIS — S14.129A INCOMPLETE INJURY OF CERVICAL SPINAL CORD WITH CENTRAL CORD SYNDROME: ICD-10-CM

## 2017-06-13 PROCEDURE — 97116 GAIT TRAINING THERAPY: CPT | Mod: PN

## 2017-06-13 NOTE — PROGRESS NOTES
"[id number]  Date of Treatment: 06/13/2017   Diagnosis:   Encounter Diagnosis   Name Primary?   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    Objective    Patient received individual therapy to increase strength, endurance, flexibility and core stabilization with 0 patients with activities as follows:     Alberto participated in dynamic functional therapeutic activities to improve functional performance for 67 minutes. Including: Set up with 15 kg unloading.  Pt participated in computer assisted robotic gait training via Lokomat @ 2.5 kph x 56 min, 57 sec for a total distance of 2353 m.  Utilized guidance force of 90%% x 10 min, decreased to 80% x 10' then completed session with guidance force of 65%.  Session interrupted by reports of L distal cuff rubbing on medial aspect of L ankle.  Training paused in order to reset Lokomat for improved alignment.  Pt reported cessation of rubbing following adjustment.        Written Home Exercises Provided: Discussed possible use of forearm crutches for ambulation activities.  Pt reports that he has been progressed to use of forearm crutches while training with [employer].    Pt demo good understanding of the education provided. Alberto demonstrated good return demonstration of activities.     Assessment:   Rubbing of L medial ankle by distal cuff resulted in temporary disruption of training session; however, was able to resume activities after adjustments were made.      Pt will continue to benefit from skilled PT intervention. Medical Necessity is demonstrated by:  Fall Risk, Unable to participate fully in daily activities, Requires " skilled supervision to complete and progress HEP and Weakness.    Patient is making fair progress towards established goals.    New/Revised goals: N/A      Plan:  Continue with established Plan of Care towards PT goals.

## 2017-06-15 ENCOUNTER — CLINICAL SUPPORT (OUTPATIENT)
Dept: REHABILITATION | Facility: HOSPITAL | Age: 57
End: 2017-06-15
Attending: FAMILY MEDICINE
Payer: COMMERCIAL

## 2017-06-15 DIAGNOSIS — S14.129A INCOMPLETE INJURY OF CERVICAL SPINAL CORD WITH CENTRAL CORD SYNDROME: ICD-10-CM

## 2017-06-15 PROCEDURE — 97116 GAIT TRAINING THERAPY: CPT | Mod: PN

## 2017-06-15 NOTE — PROGRESS NOTES
"Name: Alberto Dangelo  [facility] Number: [id number]  Date of Treatment: 06/15/2017   Diagnosis:   Encounter Diagnosis   Name Primary?    Incomplete injury of cervical spinal cord with central cord syndrome        Time in: 1400  Time Out: 1600  Total Treatment Time: 120  Group Time: 0      Subjective:    Alberto reports using Bioness on LE's yesterday at PT session, still working out some of the "glitches". He was able to stretch in // bars today before set up on Lokomat.  Patient reports their pain to be n/a/10 on a 0-10 scale with 0 being no pain and 10 being the worst pain imaginable.        Objective  Patient received individual therapy to increase strength, endurance, flexibility and core stabilization with 0 patients with activities as follows:      Alberto participated in dynamic functional therapeutic activities to improve functional performance for 67 minutes. Including: Set up with 30 kg unloading.  Pt participated in computer assisted robotic gait training via Lokomat @ 2.5 kph x 61 min, 2 sec for a total distance of 2508 m.  Utilized guidance force of 90%% x 12min, decreased to 65% for completion of session . Session interrupted by cuff at R thigh needing tightening 10 min into session.      Written Home Exercises Provided: cont w HEP.   Pt demo good understanding of the education provided. Alberto demonstrated good return demonstration of activities.     Assessment:   Some clonus noted R LE w initial Lokomat set up. Stoppages related to  adjustments and not pt.  Pt will continue to benefit from skilled PT intervention. Medical Necessity is demonstrated by:  Fall Risk, Unable to participate fully in daily activities, Requires skilled supervision to complete and progress HEP and Weakness.     Patient is making fair progress towards established goals.     New/Revised goals: N/A         Plan:  Continue with established Plan of Care towards PT goals.   "

## 2017-06-20 ENCOUNTER — CLINICAL SUPPORT (OUTPATIENT)
Dept: REHABILITATION | Facility: HOSPITAL | Age: 57
End: 2017-06-20
Attending: FAMILY MEDICINE
Payer: COMMERCIAL

## 2017-06-20 DIAGNOSIS — S14.129A INCOMPLETE INJURY OF CERVICAL SPINAL CORD WITH CENTRAL CORD SYNDROME: ICD-10-CM

## 2017-06-20 PROCEDURE — 97116 GAIT TRAINING THERAPY: CPT | Mod: PN

## 2017-06-20 NOTE — PROGRESS NOTES
Name: Alberto Dangelo  [facility] Number: [id number]  Date of Treatment: 06/20/2017   Diagnosis:   Encounter Diagnosis   Name Primary?   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 Stretches mod I in gym prior to session. Mod I propulsion in manual w/c.      Objective    Patient received individual therapy to increase strength, endurance, ROM, flexibility, posture and core stabilization with activities as follows:     Gait training via [employer] system for 60:21 minutes (plus set up):     Set up at 15 kg unloading. Computer-assisted robotic gait training via [employer] x 2.5 km/h. GF x 90% x 10', then 65% for distance of 2497 m. No stoppages.     Continue HEP daily.    Pt demo good understanding of the education provided. Alberto demonstrated good return demonstration of activities.     Assessment:     Cues for increased B knee extn with WB with 15 kg unloading to prevent flexed stance.     Pt will continue to benefit from skilled PT intervention. Medical Necessity is demonstrated by:  Requires skilled supervision to complete and progress HEP and Weakness.    Patient is making good progress towards established goals.    Plan:    Continue with established Plan of Care towards PT goals.

## 2017-06-22 ENCOUNTER — CLINICAL SUPPORT (OUTPATIENT)
Dept: REHABILITATION | Facility: HOSPITAL | Age: 57
End: 2017-06-22
Attending: FAMILY MEDICINE
Payer: COMMERCIAL

## 2017-06-22 PROCEDURE — 97116 GAIT TRAINING THERAPY: CPT | Mod: PN

## 2017-06-22 NOTE — PROGRESS NOTES
Name: Alberto Dangelo  [facility] Number: [id number]  Date of Treatment: 06/22/2017   Diagnosis: No diagnosis found.    Time in: 1410  Time Out: 1530  Total Treatment Time: 65    Subjective:    Alberto reports no pain. Mod I in manual w/c. Mod I stretching in PT gym prior to session.      Objective    Patient received individual therapy to increase strength, endurance, ROM, flexibility, posture and core stabilization with activities as follows:     Gait training via [employer] system for 60:38 minutes (plus set up):     Set up at 15 kg unloading. Computer-assisted robotic gait training via [employer] x 2.5 km/h. GF x 90% x 5', 80% x 10', 65% for distance of 2499 m. NO stoppages.     Continue HEP daily.    Pt demo good understanding of the education provided. Alberto demonstrated good return demonstration of activities.     Assessment:     Improved B knee extn with WB today with improved LE clearance.     Pt will continue to benefit from skilled PT intervention. Medical Necessity is demonstrated by:  Requires skilled supervision to complete and progress HEP and Weakness.    Patient is making good progress towards established goals.    Plan:    Continue with established Plan of Care towards PT goals.

## 2017-06-27 ENCOUNTER — CLINICAL SUPPORT (OUTPATIENT)
Dept: REHABILITATION | Facility: HOSPITAL | Age: 57
End: 2017-06-27
Attending: FAMILY MEDICINE
Payer: COMMERCIAL

## 2017-06-27 DIAGNOSIS — S14.129A INCOMPLETE INJURY OF CERVICAL SPINAL CORD WITH CENTRAL CORD SYNDROME: ICD-10-CM

## 2017-06-27 PROCEDURE — 97116 GAIT TRAINING THERAPY: CPT | Mod: PN

## 2017-06-27 NOTE — PROGRESS NOTES
Name: Alberto Dangelo  [facility] Number: [id number]  Date of Treatment: 06/27/2017   Diagnosis:   Encounter Diagnosis   Name Primary?    Incomplete injury of cervical spinal cord with central cord syndrome        Time in: 1506  Time Out: 1540  Total Treatment Time: 34    Subjective:    Completion of session, care taken from Candice PT.     Objective    Patient received individual therapy to increase strength, endurance, ROM, flexibility, posture and core stabilization with activities as follows:     Gait training via [employer] system for 28 minutes (plus set up):     Set up at 15 kg unloading. Computer-assisted robotic gait training via [employer] x 2.5 km/h. GF x 65% for distance of 1162 m. No stoppages.       Continue HEP daily.    Pt demo good understanding of the education provided. Alberto demonstrated good return demonstration of activities.     Assessment:     Consistent minimal feedback throughout gait training.     Pt will continue to benefit from skilled PT intervention. Medical Necessity is demonstrated by:  Requires skilled supervision to complete and progress HEP and Weakness.    Patient is making good progress towards established goals.    Plan:    Continue with established Plan of Care towards PT goals.

## 2017-06-29 ENCOUNTER — CLINICAL SUPPORT (OUTPATIENT)
Dept: REHABILITATION | Facility: HOSPITAL | Age: 57
End: 2017-06-29
Attending: FAMILY MEDICINE
Payer: COMMERCIAL

## 2017-06-29 DIAGNOSIS — S14.129A INCOMPLETE INJURY OF CERVICAL SPINAL CORD WITH CENTRAL CORD SYNDROME: ICD-10-CM

## 2017-06-29 PROCEDURE — 97116 GAIT TRAINING THERAPY: CPT | Mod: PN

## 2017-06-30 NOTE — PROGRESS NOTES
Name: Alberto Dangelo  [facility] Number: [id number]  Date of Treatment: 6/29/2017   Diagnosis:   Encounter Diagnosis   Name Primary?    Incomplete injury of cervical spinal cord with central cord syndrome        Time in: 1410  Time Out: 1535  Total Treatment Time: 65    Subjective:    Alberto reports no complaints. Mod I in manual w/c, mod I stretching in PT gym prior to session.     Objective    Patient received individual therapy to increase strength, endurance, ROM, flexibility, posture and core stabilization with activities as follows:     Alberto received therapeutic exercises to develop strength, endurance, ROM, flexibility, posture and core stabilization for 60:39 minutes including:     Gait training via [email or web address] system for 60:39 minutes (plus set up):     Set up at 15 kg unloading. Computer-assisted robotic gait training via [employer] x 2.5 km/h. GF x 90% x 10', 65% for distance of 2513 m. No stoppages.     Continue HEP daily.    Pt demo good understanding of the education provided. Alberto demonstrated good return demonstration of activities.     Assessment:     Great session with consistent minimal feedback throughout.     Pt will continue to benefit from skilled PT intervention. Medical Necessity is demonstrated by:  Requires skilled supervision to complete and progress HEP and Weakness.    Patient is making good progress towards established goals.    Plan:    Continue with established Plan of Care towards PT goals.

## 2017-07-01 NOTE — PROGRESS NOTES
Name: Alberto Dangelo  [facility] Number: [id number]  Date of Treatment:06/28/2017    Diagnosis:   Encounter Diagnosis   Name Primary?    Incomplete injury of cervical spinal cord with central cord syndrome        Time in: 1410  Time Out: 1506  Total Treatment Time: 37  Group Time: 0      Subjective:    Alberto reports improvement of symptoms.  Patient reports their pain to be n/a/10 on a 0-10 scale with 0 being no pain and 10 being the worst pain imaginable.    Objective    Patient received individual therapy to increase strength, endurance, flexibility and core stabilization with 0 patients with activities as follows:     Pt performed self stretching prior to initiating treatment session.    Alberto participated in dynamic functional therapeutic activities to improve functional performance for 37 minutes. Including: Set up with 15 kg unloading.  Pt participated in computer assisted robotic gait training via [employer] @ 2.5 kph x 33 min, 0 sec for a total distance of 1370 m.  Utilized guidance force of 90% x 10 min, 45 sec then decreasing GF to 65% for remainder of session.  At 33' care of patient turned over to Giulia Lock PTA for completion of session.  .      Written Home Exercises Provided: N/A  Pt demo good understanding of the education provided. Alberto demonstrated good return demonstration of activities.     Assessment:   Excellent training session achieved this date with consistent heel strike and toe off.      Pt will continue to benefit from skilled PT intervention. Medical Necessity is demonstrated by:  Fall Risk, Unable to participate fully in daily activities and increased tone.      Patient is making fair progress towards established goals.    New/Revised goals: N/A      Plan:  Continue with established Plan of Care towards PT goals.

## 2017-07-06 ENCOUNTER — CLINICAL SUPPORT (OUTPATIENT)
Dept: REHABILITATION | Facility: HOSPITAL | Age: 57
End: 2017-07-06
Attending: FAMILY MEDICINE
Payer: COMMERCIAL

## 2017-07-06 DIAGNOSIS — S14.129A INCOMPLETE INJURY OF CERVICAL SPINAL CORD WITH CENTRAL CORD SYNDROME: ICD-10-CM

## 2017-07-06 PROCEDURE — 97116 GAIT TRAINING THERAPY: CPT | Mod: PN

## 2017-07-10 NOTE — PROGRESS NOTES
"Name: Alberto Dangelo  [facility] Number: [id number]  Date of Treatment: 07/06/2017  Diagnosis:   Encounter Diagnosis   Name Primary?   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  Patient reports their pain to be n/a/10 on a 0-10 scale with 0 being no pain and 10 being the worst pain imaginable.  "I will be out of town for the next 2 weeks so I won't be able to come in."    Objective    Patient received individual therapy to increase strength, endurance, flexibility and core stabilization with 0 patients with activities as follows:     Pt performed self stretching in // bars prior to Lokomat set up.      Alberto participated in dynamic functional therapeutic activities to improve functional performance for 67 minutes. Including: Set up with 15 kg unloading.  Pt participated in computer assisted robotic gait training via Lokomat @ 2.5 kph x 59 min, 59 sec for a total distance of 2486 m.  Utilized guidance force of 90% x 11 min then decreased to 65% for remainder of session.      Written Home Exercises Provided: Emphasized importance of stretching during absence from PT.    Pt demo good understanding of the education provided. Alberto demonstrated good return demonstration of activities.     Assessment:   Achieved excellent gait pattern throughout Lokomat session.  Pt demonstrated good arm swing throughout.     Pt will continue to benefit from skilled PT intervention. Medical Necessity is demonstrated by:  Fall Risk, Unable to participate fully in daily activities, Weakness, amd Impaired gait.    Patient is making steady progress towards established goals.    New/Revised goals: N/A.  POC update due 7/24/2017      Plan:  Continue with established Plan of Care towards PT goals.   "

## 2017-07-25 ENCOUNTER — CLINICAL SUPPORT (OUTPATIENT)
Dept: REHABILITATION | Facility: HOSPITAL | Age: 57
End: 2017-07-25
Attending: FAMILY MEDICINE
Payer: COMMERCIAL

## 2017-07-25 DIAGNOSIS — S14.129A INCOMPLETE INJURY OF CERVICAL SPINAL CORD WITH CENTRAL CORD SYNDROME: ICD-10-CM

## 2017-07-25 PROCEDURE — 97116 GAIT TRAINING THERAPY: CPT | Mod: PN

## 2017-07-25 NOTE — PROGRESS NOTES
Name: Albetro Dangelo  [facility] Number: [id number]  Date of Treatment: 07/27/2017   Diagnosis:   Encounter Diagnosis   Name Primary?    Incomplete injury of cervical spinal cord with central cord syndrome        Time in: 1400  Time Out: 1545  Total Treatment Time: 70  Group Time: 0      Subjective:    Alberto returns to PT after 2 weeks travel to [place]. His daughter is getting  in November. His goal is to walk his daughter down the aisle with ease using his walker.  Patient reports their pain to be 0/10 on a 0-10 scale with 0 being no pain and 10 being the worst pain imaginable.    Objective    Patient received individual therapy to increase strength, endurance, posture and gait with 0 patients with activities as follows:     Alberto received therapeutic exercises to develop strength, endurance, ROM, flexibility, posture and core stabilization for 70 minutes including:      [employer] computerized system for L-force testing for 10 min and 60:01 minutes (plus set up) gait training:      Set up at 15 kg unloading. Computer-assisted robotic gait training via [employer] (mytaxi) x 2.5 km/h. GF x 90% x 20', 65% for distance of 2501 m. No stoppages.      Discussed updated POC and proposed goals.     Pt demo good understanding of the education provided. Alberto demonstrated good return demonstration of activities.        Assessment/Plan:   See updated goals and POC this date.  Pt to be decreasing frequency at [facility] 2/2 scheduling conflicts.

## 2017-08-01 ENCOUNTER — CLINICAL SUPPORT (OUTPATIENT)
Dept: REHABILITATION | Facility: HOSPITAL | Age: 57
End: 2017-08-01
Attending: FAMILY MEDICINE
Payer: COMMERCIAL

## 2017-08-01 DIAGNOSIS — S14.129A INCOMPLETE INJURY OF CERVICAL SPINAL CORD WITH CENTRAL CORD SYNDROME: ICD-10-CM

## 2017-08-01 PROCEDURE — 97116 GAIT TRAINING THERAPY: CPT | Mod: PN

## 2017-08-01 NOTE — PROGRESS NOTES
Name: Alberto Dangelo  [facility] Number: [id number]  Date of Treatment: 08/01/2017   Diagnosis:   Encounter Diagnosis   Name Primary?   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 Via manual w/c mod I and stretched in PT gym prior to appt.    Objective    Patient received individual therapy to increase strength, endurance, ROM, flexibility, posture and core stabilization with activities as follows:     Gait training via [employer] system for 60:19 minutes (plus set up):     Set up at 15 kg unloading. Computer-assisted robotic gait training via [employer] x 2.5 km/h. GF x 80% x 11', 75% x 22', 65% for distance of 2497 m. No stoppages.     Alberto received therapeutic exercises to develop strength, endurance, ROM, flexibility, posture and core stabilization for 65 minutes including:     Standing NDT step thru L/R with A 10/10 with UE support on // bars  Minisquats x 5 with // bars    Continue HEP daily.    Pt demo good understanding of the education provided. Alberto demonstrated good return demonstration of activities.     Assessment:     Mm fatigue notable after session but still able to perform standing ex's. A for hip and knee flexion for step thru ex. Compensatory trunk rotation and contralateral HR for LE swing through    Pt will continue to benefit from skilled PT intervention. Medical Necessity is demonstrated by:  Requires skilled supervision to complete and progress HEP and Weakness.    Patient is making good progress towards established goals.    Plan:    Continue with established Plan of Care towards PT goals.

## 2017-08-04 ENCOUNTER — CLINICAL SUPPORT (OUTPATIENT)
Dept: REHABILITATION | Facility: HOSPITAL | Age: 57
End: 2017-08-04
Attending: FAMILY MEDICINE
Payer: COMMERCIAL

## 2017-08-04 DIAGNOSIS — S14.129A INCOMPLETE INJURY OF CERVICAL SPINAL CORD WITH CENTRAL CORD SYNDROME: ICD-10-CM

## 2017-08-04 DIAGNOSIS — R53.1 DECREASED STRENGTH: ICD-10-CM

## 2017-08-04 DIAGNOSIS — Z78.9 IMPAIRED MOTOR CONTROL: Primary | ICD-10-CM

## 2017-08-04 PROCEDURE — 97116 GAIT TRAINING THERAPY: CPT | Mod: PO | Performed by: PHYSICAL THERAPIST

## 2017-08-04 NOTE — PROGRESS NOTES
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   Pain Scale:  denies    Objective     Patient received individual therapy with activities as follows:       Gait training x 40 min including:    Gait training with RW with bilateral ankle ace wrapped for DF  // bars: gait training (min-mod A) with focus on hip extension in stance and using hip ext/ lumbar lordosis for advancing LE   Side stepping with mod A to move LE in proper position    Written Home Exercises: 6/2/17- pt educated on hip flexion strengthening in pool  pt instructed to use FES cycle prior to therapy with Bioness to attempt to decrease tone.  Pt demo good understanding of the education provided. Alberto demonstrated good return demonstration of activities.     Education provided re: POC, HEP  No spiritual or educational barriers to learning provided    Pt has no cultural, educational or language barriers to learning provided.    Assessment   Pt is returning to PT after ~2 months due to work related travel. Alberto tolerated treatment well. Pt discussed the desire to participate in gait training without use of Bioness to  improve overall gait ability. Pt indicated that he has not been formally trained with AD use and has a personal goal to walk down the aisle for daughter's wedding. PT agreed with pt and will focus on decreasing vaulting and improving active use of LE during gait. PT will address strength deficits as needed. Poor hip flexion strength and motor control in combination with LE extensor tone are limiting factors to progress. Pt can benefit from continued skilled PT to address impairments listed below to improve gait ability. POC extended x 12 weeks to address gait training and strengthening.     Pt rehab potential is Good. Pt will benefit from continuing skilled outpatient physical therapy to address the deficits listed below in the problem list, provide pt/family education and to maximize pt's level of independence in the home and community environment.         Medical necessity is demonstrated by the following IMPAIRMENTS/PROMBLEM LIST:   1. Fall Risk - impaired balance   2. Weakness   3. Impaired muscle tone  4. Gait deviations   5. Decreased ambulation   6. Decreased activity tolerance   7. Continued inability to participate in vocational pursuits   8. Requires skilled supervision to complete and progress HEP      Anticipated barriers to physical therapy: poor motor control in hips     Pt's spiritual, cultural and educational needs considered and pt agreeable to plan of care and goals as stated below:      GOALS:   1) Facilitate improved tone in LE, Progressing   2) Increase passive ankle DF to neutral, MET 3) Facilitate improved LE flexibility. Progressing  New Short Term Goals Status:    1) Improve B ankle DF to at least 5* passively to facilitate improved foot clearance during gait,   2) Improve LE flexibility to min to mod limitation in all areas,   3) Assess stand pivot transfers and gait with assistive device.   Long Term Goal Status:  continue per initial plan of care.   1) Increase strength in LE as indicated  by improved L-force testing, Progressing   2) Pt will safely ambulate > 200' with supervision using appropriate assistive device/ Bioness units.- improving (8/4/17)pt participated in gait training in // bar with bilateral feet ace wrapped for DF, min- mod A to advance LE/ maintain proper positioning for 10 foot intervals.   3) Pt will consistently perform safe stand pivot transfers, Not yet assessed.   4) Pt will demonstrate improved postural stability. Met.   5) new 5/4/17:  Pt will demonstrate competency in application of Bioness device and use of training mode.-ongoing (8/4/17)- pt reports he wishes to focus on general gait training without use of Bioness  6) new 5/26/17: Pt will demonstrate improved bilateral hip flexion strength to 2/5 to improve ability to perform swing phase/ foot clearance during gait with Bioness L300 Plus.- ~same (8/4/17)- trace bilaterally        Plan   Continue PT 1- 2x weekly under established Plan of Care, with treatment to include: pt education, HEP, therapeutic exercises, neuromuscular re-education/balance exercises, therapeutic activities, joint mobilizations, and modalities PRN, to work towards established goals. Pt may be seen by PTA to carry out plan of care.     Kala Bajwa, PT   08/04/2017

## 2017-08-06 DIAGNOSIS — R25.2 SPASM: ICD-10-CM

## 2017-08-06 RX ORDER — DIAZEPAM 5 MG/1
TABLET ORAL
Qty: 60 TABLET | Refills: 0 | Status: SHIPPED | OUTPATIENT
Start: 2017-08-06 | End: 2017-10-03 | Stop reason: SDUPTHER

## 2017-08-08 ENCOUNTER — CLINICAL SUPPORT (OUTPATIENT)
Dept: REHABILITATION | Facility: HOSPITAL | Age: 57
End: 2017-08-08
Attending: FAMILY MEDICINE
Payer: COMMERCIAL

## 2017-08-08 DIAGNOSIS — S14.129A INCOMPLETE INJURY OF CERVICAL SPINAL CORD WITH CENTRAL CORD SYNDROME: ICD-10-CM

## 2017-08-08 PROCEDURE — 97116 GAIT TRAINING THERAPY: CPT | Mod: PN

## 2017-08-10 ENCOUNTER — CLINICAL SUPPORT (OUTPATIENT)
Dept: REHABILITATION | Facility: HOSPITAL | Age: 57
End: 2017-08-10
Attending: FAMILY MEDICINE
Payer: COMMERCIAL

## 2017-08-10 DIAGNOSIS — S14.129A INCOMPLETE INJURY OF CERVICAL SPINAL CORD WITH CENTRAL CORD SYNDROME: ICD-10-CM

## 2017-08-10 PROCEDURE — 97116 GAIT TRAINING THERAPY: CPT | Mod: PN

## 2017-08-10 NOTE — PROGRESS NOTES
"Name: Alberto Dangelo  [facility] Number: [id number]  Date of Treatment: 08/10/2017   Diagnosis:   Encounter Diagnosis   Name Primary?   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  I did a lot of abs work yesterday and it is catching up with me."  Patient reports their pain to be n/a/10 on a 0-10 scale with 0 being no pain and 10 being the worst pain imaginable.    Objective    Patient received individual therapy to increase endurance, flexibility, posture and core stabilization with 0 patients with activities as follows:     Alberto participated in dynamic functional therapeutic activities to improve functional performance for 67 minutes. Including: Set up with 15 kg unloading.  Pt participated in computer assisted robotic gait training via [employer] @ 2.5 kph x 56 min, 20 sec for a total distance of 2334 m.  Utilized guidance force of 80% x 10 min, decreased to 65% x 25 min.  Completed remainder of session at 55% guidance force.  .      Written Home Exercises Provided: N/A  Pt demo good understanding of the education provided. Alberto demonstrated good return demonstration of activities.     Assessment:   Pt demonstrated spasming of abdominal mm and gluteals during initiation of gait training session; however, achieved consistent gait pattern with good toe off and heel strike B.      Pt will continue to benefit from skilled PT intervention. Medical Necessity is demonstrated by:  Fall Risk, Unable to participate fully in daily activities, increased tone, and impaired functional mobility/gait.    Patient is making steady progress towards established goals.    New/Revised goals: N/A      Plan:  Continue with established Plan of Care towards PT goals.     "

## 2017-08-11 NOTE — PROGRESS NOTES
"Name: Alberto Dangelo  [facility] Number: [id number]  Date of Treatment: 08/08/2017  Diagnosis:   Encounter Diagnosis   Name Primary?   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  "My abs are really sore today ; I did a lot of ab work yesterday."  "I don't like the constant shocking of the Bioness".  Patient reports their pain to be n/a/10 on a 0-10 scale with 0 being no pain and 10 being the worst pain imaginable.    Objective    Patient received individual therapy to increase ROM, flexibility, posture and core stabilization with 0 patients with activities as follows:     Alberto participated in dynamic functional therapeutic activities to improve functional performance for 67 minutes. Including: Set up with 15 kg unloading.  Pt participated in computer assisted robotic gait training via [employer] @ 2.5 kph x 53 min, 04 sec for a total distance of 2200 m.  Utilized guidance force of 80% x 13 min then decreased to 65% for the remainder of the session.        Written Home Exercises Provided: N/A  Pt demo good understanding of the education provided. Alberto demonstrated good return demonstration of activities.     Assessment:   Excellent training session achieved today.  No stoppages during session.      Pt will continue to benefit from skilled PT intervention. Medical Necessity is demonstrated by:  Unable to participate fully in daily activities, impaired functional mobility, excessive tone.    Patient is making steady progress towards established goals.    New/Revised goals: N/A      Plan:  Continue with established Plan of Care towards PT goals.   "

## 2017-08-15 ENCOUNTER — CLINICAL SUPPORT (OUTPATIENT)
Dept: REHABILITATION | Facility: HOSPITAL | Age: 57
End: 2017-08-15
Attending: FAMILY MEDICINE
Payer: COMMERCIAL

## 2017-08-15 DIAGNOSIS — S14.129A INCOMPLETE INJURY OF CERVICAL SPINAL CORD WITH CENTRAL CORD SYNDROME: ICD-10-CM

## 2017-08-15 PROCEDURE — 97116 GAIT TRAINING THERAPY: CPT | Mod: PN

## 2017-08-15 NOTE — PROGRESS NOTES
Name: Alberto Dangelo  [facility] Number: [id number]  Date of Treatment: 08/15/2017   Diagnosis:   Encounter Diagnosis   Name Primary?   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 Via manual w/c mod I. Mod I stretching in PT gym prior to session.     Objective    Patient received individual therapy to increase strength, endurance, ROM, flexibility, posture and core stabilization with activities as follows:     Gait training via [employer] system for 60:50 minutes (plus set up):     Set up at 15 kg unloading. Computer-assisted robotic gait training via [employer] x 2.5 km/h. GF x 80% x 10', 65% x 30', 55% for distance of 2520 m. No stoppages.     Continue HEP daily.    Pt demo good understanding of the education provided. Alberto demonstrated good return demonstration of activities.     Assessment:     Fatigue with decreased GF but minimal consistent feedback of swing and stance throughout.     Pt will continue to benefit from skilled PT intervention. Medical Necessity is demonstrated by:  Requires skilled supervision to complete and progress HEP and Weakness.    Patient is making good progress towards established goals.    Plan:    Continue with established Plan of Care towards PT goals.

## 2017-08-17 ENCOUNTER — CLINICAL SUPPORT (OUTPATIENT)
Dept: REHABILITATION | Facility: HOSPITAL | Age: 57
End: 2017-08-17
Attending: FAMILY MEDICINE
Payer: COMMERCIAL

## 2017-08-17 DIAGNOSIS — S14.129A INCOMPLETE INJURY OF CERVICAL SPINAL CORD WITH CENTRAL CORD SYNDROME: ICD-10-CM

## 2017-08-17 PROCEDURE — 97116 GAIT TRAINING THERAPY: CPT | Mod: PN

## 2017-08-17 NOTE — PROGRESS NOTES
Name: Alberto Dangelo  Clinic Number: [id number]  Date of Treatment: 08/17/2017   Diagnosis:   Encounter Diagnosis   Name Primary?   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 He will be addressing gait training when attending PT tomorrow and agreed to time ambulation w AD x 60 ft .  Patient reports their pain to be 0/10 on a 0-10 scale with 0 being no pain and 10 being the worst pain imaginable.    Objective    Patient received individual therapy to increase strength, endurance, ROM, flexibiklityposture and gait with 0 patients with activities as follows:     Alberto received therapeutic exercises to develop strength, endurance, posture and gait for minutes including:   Patient received individual therapy to increase strength, endurance, ROM, flexibility, posture and core stabilization with activities as follows:      Gait training via [employer] computerized system for 60:18 minutes (plus set up):      Set up at 15 kg unloading. Computer-assisted robotic gait training via [employer] x 2.5 km/h. GF x 80% x 10', 65% x 20', 55% for distance of 2266 m.  One stoppage at 30:14 mins when  left foot stopping TM w tone, resolved w repositioning .     Continue HEP daily.       Assessment:      Some difficulty in positioning  R LE w noted increased ER today.   Delay w stoppage limited distance achieved over timed use of [employer].  Pt has name of local vendor to start discussion on new w/c/seating system.  Pt will continue to benefit from skilled PT intervention. Medical Necessity is demonstrated by:  Unable to participate fully in daily activities, impaired functional mobility, excessive tone.     Patient is making steady progress towards established goals.     New/Revised goals: N/A     Plan:  Continue with established Plan of Care towards PT  goals.

## 2017-08-18 ENCOUNTER — CLINICAL SUPPORT (OUTPATIENT)
Dept: REHABILITATION | Facility: HOSPITAL | Age: 57
End: 2017-08-18
Attending: FAMILY MEDICINE
Payer: COMMERCIAL

## 2017-08-18 DIAGNOSIS — S14.129A INCOMPLETE INJURY OF CERVICAL SPINAL CORD WITH CENTRAL CORD SYNDROME: ICD-10-CM

## 2017-08-18 DIAGNOSIS — Z78.9 IMPAIRED MOTOR CONTROL: Primary | ICD-10-CM

## 2017-08-18 DIAGNOSIS — R53.1 DECREASED STRENGTH: ICD-10-CM

## 2017-08-18 PROCEDURE — 97116 GAIT TRAINING THERAPY: CPT | Mod: PO | Performed by: PHYSICAL THERAPIST

## 2017-08-18 PROCEDURE — 97110 THERAPEUTIC EXERCISES: CPT | Mod: PO | Performed by: PHYSICAL THERAPIST

## 2017-08-22 ENCOUNTER — CLINICAL SUPPORT (OUTPATIENT)
Dept: REHABILITATION | Facility: HOSPITAL | Age: 57
End: 2017-08-22
Attending: FAMILY MEDICINE
Payer: COMMERCIAL

## 2017-08-22 DIAGNOSIS — S14.129A INCOMPLETE INJURY OF CERVICAL SPINAL CORD WITH CENTRAL CORD SYNDROME: ICD-10-CM

## 2017-08-22 PROCEDURE — 97116 GAIT TRAINING THERAPY: CPT | Mod: PN

## 2017-08-22 NOTE — PROGRESS NOTES
Name: Alberto Dangelo  [facility] Number: [id number]  Date of Treatment: 08/22/2017   Diagnosis:   Encounter Diagnosis   Name Primary?   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  Patient reports their pain to be n/a/10 on a 0-10 scale with 0 being no pain and 10 being the worst pain imaginable.    Objective    Patient received individual therapy to increase endurance, ROM, flexibility, posture and core stabilization with 0 patients with activities as follows:     Alberto  participated in dynamic functional therapeutic activities to improve functional performance for 67 minutes. Including: Set up with 15 kg unloading.  Pt participated in computer assisted robotic gait training via [employer] @ 2.5 kph x 60 min, 26 sec for a total distance of 2493 m.  Utilized guidance force of 100% x 5 min, decreased to 80% x 15', decreased to 60% x 10'.  Completed remainder of training  @ 55% guidance force.  Training interrupted at 28 min due to discomfort @ knees.  Adjusted alignment and restarted.  Completed remaining treatment without difficulty.        Written Home Exercises Provided: N/A  Pt demo good understanding of the education provided. Alberto demonstrated good return demonstration of activities.     Assessment:   Overall achieved a good training session.  Achieving good heel strike and toe off.  However, developed some knee discomfort due to min hip external rotation     Pt will continue to benefit from skilled PT intervention. Medical Necessity is demonstrated by:  Fall Risk, Unable to participate fully in daily activities, Weakness and gait impairment    Patient is making fair progress towards established goals.    New/Revised goals: N/A      Plan:  Continue with established Plan of Care towards PT goals.

## 2017-08-24 ENCOUNTER — CLINICAL SUPPORT (OUTPATIENT)
Dept: REHABILITATION | Facility: HOSPITAL | Age: 57
End: 2017-08-24
Attending: FAMILY MEDICINE
Payer: COMMERCIAL

## 2017-08-24 DIAGNOSIS — S14.129A INCOMPLETE INJURY OF CERVICAL SPINAL CORD WITH CENTRAL CORD SYNDROME: ICD-10-CM

## 2017-08-24 PROCEDURE — 97116 GAIT TRAINING THERAPY: CPT | Mod: PN

## 2017-08-24 NOTE — PROGRESS NOTES
Name: Alberto Dangelo  [facility] Number: [id number]  Date of Treatment: 08/24/2017   Diagnosis:   Encounter Diagnosis   Name Primary?   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 Via manual w/c mod I. Stretching in PT gym prior to appt.    Objective:    Patient received individual therapy to increase strength, endurance, ROM, flexibility, posture and core stabilization with activities as follows:     Gait training via [employer] system for 61:13 minutes (plus set up):     Set up at 15 kg unloading. Computer-assisted robotic gait training via [employer] x 2.5 km/h. GF x 80% x 10', 65% for distance of 2532 m. No stoppages.     Unweighting NDT step through L/R with A x 15 reps each with vc for increased hip and knee flexion, ankles ace-wrapped to neutral DF.     Continue HEP daily.      Pt demo good understanding of the education provided. Alberto demonstrated good return demonstration of activities.     Assessment:     Knee flexion R>L in mid stance.    Pt will continue to benefit from skilled PT intervention. Medical Necessity is demonstrated by:  Requires skilled supervision to complete and progress HEP and Weakness.    Patient is making good progress towards established goals.    Plan:    Continue with established Plan of Care towards PT goals.

## 2017-08-25 ENCOUNTER — CLINICAL SUPPORT (OUTPATIENT)
Dept: REHABILITATION | Facility: HOSPITAL | Age: 57
End: 2017-08-25
Attending: FAMILY MEDICINE
Payer: COMMERCIAL

## 2017-08-25 DIAGNOSIS — S14.129A INCOMPLETE INJURY OF CERVICAL SPINAL CORD WITH CENTRAL CORD SYNDROME: ICD-10-CM

## 2017-08-25 DIAGNOSIS — Z78.9 IMPAIRED MOTOR CONTROL: Primary | ICD-10-CM

## 2017-08-25 DIAGNOSIS — R53.1 DECREASED STRENGTH: ICD-10-CM

## 2017-08-25 PROCEDURE — 97116 GAIT TRAINING THERAPY: CPT | Mod: PO | Performed by: PHYSICAL THERAPIST

## 2017-08-25 NOTE — PROGRESS NOTES
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     Neurogenic bladder     Pulmonary embolism        Evaluation Date: 8/24/2016  Visit auth #: [id number]  Plan of care expiration: 7/24/2017  Extended POC: 10/16/2017  Precautions: universal, fall        Subjective   Pt reports: no new complaints  Pain Scale:  denies    Objective     Patient received individual therapy with activities as follows:     Gait training x 45 min including:    Gait training performed with bilateral PRO AFOs and foot sliders  // bars: gait training with BUE support forward (mod A) with focus on weight shift and "sliding foot forward" - 3 laps    Side stepping with BUE min-mod A to move LE in proper position-  2- 1/2 laps    ambulation training with RW with bilateral AFOs and foot sliders 46' x 2 with mod A     therapeutic exercise (supervised with PT tech) x 17 minutes to improve motor control and strength to improve ambulation ability:   Bilateral hip/ knee flex/ ext with ball, mod A 30-40x  LTR with ball (lLE strapped together) 20x  Side lying hip flex/ ext with max A 20x    NP today:  Reviewed A-P and lateral pelvic tilts    Written Home Exercises: 8/18/17- practice pelvic tilts in all directions  6/2/17- pt educated on hip flexion strengthening in pool  pt instructed to use FES cycle prior to therapy with Bioness to attempt to decrease tone.  Pt demo good understanding of the education provided. Alberto demonstrated good return demonstration of activities.     Education " provided re: POC, HEP  No spiritual or educational barriers to learning provided    Pt has no cultural, educational or language barriers to learning provided.    Assessment   Alberto tolerated treatment well. Pt reports good compliance with HEP. Pt used bilateral PRO AFOs today for gait training. Pt demonstrated decreased vaulting and improved ability to advance feet (L>R) with use of orthotics and sliders. Pt demonstrates scissoring and required frequent repositioning (R>L). Tactile cues were provided during gait training for improved weight shift, improved pelvic positioning,improved foot clearance/ placement, and right knee flexion. Pt demonstrated a good improvement in ability to advance left foot (performed at least 70%). Pt is currently unable to clear toes during swing phase. Poor hip flexion strength and motor control in combination with LE extensor tone are limiting factors to progress. Pt can benefit from continued skilled PT to address impairments listed below to improve gait ability.     Pt rehab potential is Good. Pt will benefit from continuing skilled outpatient physical therapy to address the deficits listed below in the problem list, provide pt/family education and to maximize pt's level of independence in the home and community environment.         Medical necessity is demonstrated by the following IMPAIRMENTS/PROMBLEM LIST:   1. Fall Risk - impaired balance   2. Weakness   3. Impaired muscle tone  4. Gait deviations   5. Decreased ambulation   6. Decreased activity tolerance   7. Continued inability to participate in vocational pursuits   8. Requires skilled supervision to complete and progress HEP      Anticipated barriers to physical therapy: poor motor control in hips     Pt's spiritual, cultural and educational needs considered and pt agreeable to plan of care and goals as stated below:      GOALS:   1) Facilitate improved tone in LE, Progressing   2) Increase passive ankle DF to neutral, MET  3) Facilitate improved LE flexibility. Progressing  New Short Term Goals Status:    1) Improve B ankle DF to at least 5* passively to facilitate improved foot clearance during gait,   2) Improve LE flexibility to min to mod limitation in all areas,   3) Assess stand pivot transfers and gait with assistive device.   Long Term Goal Status:  continue per initial plan of care.   1) Increase strength in LE as indicated by improved L-force testing, Progressing   2) Pt will safely ambulate > 200' with supervision using appropriate assistive device/ Bioness units.- improving (8/4/17)pt participated in gait training in // bar with bilateral feet ace wrapped for DF, min- mod A to advance LE/ maintain proper positioning for 10 foot intervals.   3) Pt will consistently perform safe stand pivot transfers, Not yet assessed.   4) Pt will demonstrate improved postural stability. Met.   5) new 5/4/17:  Pt will demonstrate competency in application of Bioness device and use of training mode.-ongoing (8/4/17)- pt reports he wishes to focus on general gait training without use of Bioness  6) new 5/26/17: Pt will demonstrate improved bilateral hip flexion strength to 2/5 to improve ability to perform swing phase/ foot clearance during gait with Bioness L300 Plus.- ~same (8/4/17)- trace bilaterally        Plan   Continue PT 1- 2x weekly under established Plan of Care, with treatment to include: pt education, HEP, therapeutic exercises, neuromuscular re-education/balance exercises, therapeutic activities, joint mobilizations, and modalities PRN, to work towards established goals. Pt may be seen by PTA to carry out plan of care.     Kala Bajwa, PT   08/25/2017

## 2017-08-31 ENCOUNTER — CLINICAL SUPPORT (OUTPATIENT)
Dept: REHABILITATION | Facility: HOSPITAL | Age: 57
End: 2017-08-31
Attending: FAMILY MEDICINE
Payer: COMMERCIAL

## 2017-08-31 DIAGNOSIS — S14.129A INCOMPLETE INJURY OF CERVICAL SPINAL CORD WITH CENTRAL CORD SYNDROME: ICD-10-CM

## 2017-08-31 PROCEDURE — 97116 GAIT TRAINING THERAPY: CPT | Mod: PN

## 2017-08-31 NOTE — PROGRESS NOTES
Name: Alberto Dangelo  [facility] Number: [id number]  Date of Treatment: 08/31/2017   Diagnosis:   Encounter Diagnosis   Name Primary?   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 Mod I in manual w/c and stretches mod I in PT gym.     Objective:    Patient received individual therapy to increase strength, endurance, ROM, flexibility, posture and core stabilization with activities as follows:     Gait training via [employer] system for 60:37 minutes (plus set up):     Set up at 15 kg unloading. Computer-assisted robotic gait training via [employer] x 2.5 km/h. GF x 80% x 18', 65% x 17', 60% for distance of 2509 m. No stoppages.       Continue HEP daily.    Pt demo good understanding of the education provided. Alberto demonstrated good return demonstration of activities.     Assessment:     No stoppages with good biofeedback throughout, fatigued quickly after GF drop to 60% but cont minimal feedback.    Pt will continue to benefit from skilled PT intervention. Medical Necessity is demonstrated by:  Requires skilled supervision to complete and progress HEP and Weakness.    Patient is making good progress towards established goals.    Plan:    Continue with established Plan of Care towards PT goals.

## 2017-09-01 ENCOUNTER — CLINICAL SUPPORT (OUTPATIENT)
Dept: REHABILITATION | Facility: HOSPITAL | Age: 57
End: 2017-09-01
Attending: FAMILY MEDICINE
Payer: COMMERCIAL

## 2017-09-01 DIAGNOSIS — S14.129A INCOMPLETE INJURY OF CERVICAL SPINAL CORD WITH CENTRAL CORD SYNDROME: ICD-10-CM

## 2017-09-01 DIAGNOSIS — R53.1 DECREASED STRENGTH: ICD-10-CM

## 2017-09-01 DIAGNOSIS — Z78.9 IMPAIRED MOTOR CONTROL: Primary | ICD-10-CM

## 2017-09-01 PROCEDURE — 97116 GAIT TRAINING THERAPY: CPT | Mod: PO | Performed by: PHYSICAL THERAPIST

## 2017-09-01 NOTE — PROGRESS NOTES
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     Neurogenic bladder     Pulmonary embolism        Evaluation Date: 8/24/2016  Visit auth #: [id number]  Plan of care expiration: 7/24/2017  Extended POC: 10/16/2017  Precautions: universal, fall        Subjective   Pt reports: no new complaints  Pain Scale:  denies    Objective     Patient received individual therapy with activities as follows:     Gait training x 45 min including:    Gait training performed with bilateral PRO AFOs and foot sliders  // bars: Side stepping with BUE min-mod A to move LE in proper position-  1 lap    ambulation training with RW with bilateral AFOs and foot sliders 46' x 4 with mod A     strength  Hip flex: right= 2-/5, left= 2/5  Hip ext: right= 2-/5, left= 2/5  Hip abd: right= 1/5, left= 2-/5  Hip add: MARCOS at 2+/5    therapeutic exercise (supervised with PT tech) x 20 minutes to improve motor control and strength to improve ambulation ability:     Bilateral hip/ knee flex/ ext with ball, mod A 30-40x  LTR with ball (lLE strapped together) 20x  Side lying hip flex/ ext with max A 20x  Clamshells 20x    NP today:  Reviewed A-P and lateral pelvic tilts    Written Home Exercises: 8/18/17- practice pelvic tilts in all directions  6/2/17- pt educated on hip flexion strengthening in pool  pt instructed to use FES cycle prior to therapy with Bioness to attempt to decrease tone.  Pt demo good understanding of the education provided. Alberto demonstrated good return demonstration  of activities.     Education provided re: POC, HEP  No spiritual or educational barriers to learning provided    Pt has no cultural, educational or language barriers to learning provided.    Assessment   Alberto tolerated treatment well. Pt reports good compliance with HEP. Pt used bilateral PRO AFOs today for gait training. Pt demonstrated a good improvement with ability to advance LLE during gait with decreased vaulting noted. Increased gait impairments are noted with right swing due to decreased motor control of this extremity. Tactile cues were provided during gait training for improved pelvic positioning,improved foot clearance/ placement, and right knee flexion. Pt only require verbal cues for appropriate weight shift. Intermittent scissoring is noted. Pt is currently unable to clear toes during swing phase. Pt demonstrates improved motor control and strength in hips in all directions (L>R). Poor pelvic mobility occurs during gait. Gait improvements are also limited by increased extensor tone.  Pt can benefit from continued skilled PT to address impairments listed below to improve gait ability.     Pt rehab potential is Good. Pt will benefit from continuing skilled outpatient physical therapy to address the deficits listed below in the problem list, provide pt/family education and to maximize pt's level of independence in the home and community environment.         Medical necessity is demonstrated by the following IMPAIRMENTS/PROMBLEM LIST:   1. Fall Risk - impaired balance   2. Weakness   3. Impaired muscle tone  4. Gait deviations   5. Decreased ambulation   6. Decreased activity tolerance   7. Continued inability to participate in vocational pursuits   8. Requires skilled supervision to complete and progress HEP      Anticipated barriers to physical therapy: poor motor control in hips     Pt's spiritual, cultural and educational needs considered and pt agreeable to plan of care and goals as stated below:       GOALS:   1) Facilitate improved tone in LE, Progressing   2) Increase passive ankle DF to neutral, MET 3) Facilitate improved LE flexibility. Progressing  New Short Term Goals Status:    1) Improve B ankle DF to at least 5* passively to facilitate improved foot clearance during gait,   2) Improve LE flexibility to min to mod limitation in all areas,   3) Assess stand pivot transfers and gait with assistive device.   Long Term Goal Status:  continue per initial plan of care.   1) Increase strength in LE as indicated by improved L-force testing, Progressing   2) Pt will safely ambulate > 200' with supervision using appropriate assistive device/ Bioness units.- improving (9/1/17)pt participated in gait training with RW, MARCOS PRO AFOs, MARCOS foot sliders, and mod A to advance LE/  maintain proper positioning for 40-50 ft intervals  3) Pt will consistently perform safe stand pivot transfers, Not yet assessed.   4) Pt will demonstrate improved postural stability. Met.   5) new 5/4/17:  Pt will demonstrate competency in application of Bioness device and use of training mode.-NA 9/1/17 pt would like to focus on regular gait training  6) new 5/26/17: Pt will demonstrate improved bilateral hip flexion strength to 2/5 to improve ability to perform swing phase/ foot clearance during gait (with Bioness L300 Plus.)- improved- right 2-/5, left 2/5        Plan   Continue PT 1- 2x weekly under established Plan of Care, with treatment to include: pt education, HEP, therapeutic exercises, neuromuscular re-education/balance exercises, therapeutic activities, joint mobilizations, and modalities PRN, to work towards established goals. Pt may be seen by PTA to carry out plan of care.     Kala Bajwa, PT   09/01/2017

## 2017-09-05 ENCOUNTER — CLINICAL SUPPORT (OUTPATIENT)
Dept: REHABILITATION | Facility: HOSPITAL | Age: 57
End: 2017-09-05
Attending: FAMILY MEDICINE
Payer: COMMERCIAL

## 2017-09-05 DIAGNOSIS — S14.129A INCOMPLETE INJURY OF CERVICAL SPINAL CORD WITH CENTRAL CORD SYNDROME: ICD-10-CM

## 2017-09-05 PROCEDURE — 97116 GAIT TRAINING THERAPY: CPT | Mod: PN

## 2017-09-05 NOTE — PROGRESS NOTES
Name: Alberto Dangelo  [facility] Number: [id number]  Date of Treatment: 09/05/2017   Diagnosis:   Encounter Diagnosis   Name Primary?   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 Mod I in manual w/c. Ordering a new w/c with Thomas from [employer] w/c [employer].     Objective    Patient received individual therapy to increase strength, endurance, ROM, flexibility, posture and core stabilization with activities as follows:     Gait training via [employer] system for 60:42 minutes (plus set up):     Set up at 15 kg unloading. Computer-assisted robotic gait training via [employer] x 2.5 km/h. GF x 80% x 21', 60% for distance of 2503 m. No stoppages.     Continue HEP daily.    Pt demo good understanding of the education provided. Alberto demonstrated good return demonstration of activities.     Assessment:     Consistent feedback with fatigue latter part of 60% GF. Improving UE swing sequencing with gait.     Pt will continue to benefit from skilled PT intervention. Medical Necessity is demonstrated by:  Requires skilled supervision to complete and progress HEP and Weakness.    Patient is making good progress towards established goals.    Plan:    Continue with established Plan of Care towards PT goals.

## 2017-09-07 ENCOUNTER — CLINICAL SUPPORT (OUTPATIENT)
Dept: REHABILITATION | Facility: HOSPITAL | Age: 57
End: 2017-09-07
Attending: FAMILY MEDICINE
Payer: COMMERCIAL

## 2017-09-07 DIAGNOSIS — S14.129A INCOMPLETE INJURY OF CERVICAL SPINAL CORD WITH CENTRAL CORD SYNDROME: ICD-10-CM

## 2017-09-07 PROCEDURE — 97116 GAIT TRAINING THERAPY: CPT | Mod: PN

## 2017-09-07 NOTE — PROGRESS NOTES
Name: Alberto Dangelo  [facility] Number: [id number]  Date of Treatment: 09/07/2017   Diagnosis:   Encounter Diagnosis   Name Primary?   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 Mod I in manual w/c and mod I stretching prior to appt in PT gym.      Objective    Patient received individual therapy to increase strength, endurance, ROM, flexibility, posture and core stabilization with activities as follows:     Gait training via [employer] system for 60:07 minutes (plus set up):     Set up at 15 kg unloading. Computer-assisted robotic gait training via [employer] x 2.5 km/h. GF x 80% x 11', 60% for distance of 2477 m. One stop for BR break which required unstrapping and restrapping pt into [employer].     Continue HEP daily.    Pt demo good understanding of the education provided. Alberto demonstrated good return demonstration of activities.     Assessment:     Consistent feedback with quicker drop of GF today; however, increased GF temporarily for warm up when restarting after break.     Pt will continue to benefit from skilled PT intervention. Medical Necessity is demonstrated by:  Requires skilled supervision to complete and progress HEP and Weakness.    Patient is making good progress towards established goals.    Plan:    Continue with established Plan of Care towards PT goals.

## 2017-09-12 ENCOUNTER — CLINICAL SUPPORT (OUTPATIENT)
Dept: REHABILITATION | Facility: HOSPITAL | Age: 57
End: 2017-09-12
Attending: FAMILY MEDICINE
Payer: COMMERCIAL

## 2017-09-12 DIAGNOSIS — S14.129A INCOMPLETE INJURY OF CERVICAL SPINAL CORD WITH CENTRAL CORD SYNDROME: ICD-10-CM

## 2017-09-12 PROCEDURE — 97116 GAIT TRAINING THERAPY: CPT | Mod: PN

## 2017-09-13 NOTE — PROGRESS NOTES
Name: Alberto Dangelo  [facility] Number: [id number]  Date of Treatment:09/12/2017   Diagnosis:   Encounter Diagnosis   Name Primary?   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  Patient reports their pain to be n/a/10 on a 0-10 scale with 0 being no pain and 10 being the worst pain imaginable.    Objective    Patient received individual therapy to increase strength, flexibility and core stabilization with 0 patients with activities as follows:     Alberto participated in dynamic functional therapeutic activities to improve functional performance for 67 minutes. Including: Set up with 15 kg unloading.  Pt participated in computer assisted robotic gait training via [employer] @ 2.5-3.0 kph x 60 min, 18 sec for a total distance of 2523 m.  Utilized guidance force of 80% x 15 min then decreased to 60% for remainder of session.  Final 4 min of training were completed @ 3.0 kph.        Written Home Exercises Provided: Pt continues to participate in HEP as previiously instructed  Pt demo good understanding of the education provided. Alberto demonstrated good return demonstration of activities.     Assessment:   Pt demonstrated good control with sit >  bars starting with hips and knees flexed.  Min to mod use of UE for support during transfer.      Pt will continue to benefit from skilled PT intervention. Medical Necessity is demonstrated by:  Fall Risk, Unable to participate fully in daily activities and impaired gait    Patient is making fair progress towards established goals.    New/Revised goals: N/A      Plan:  Continue with established Plan of Care towards PT goals.

## 2017-09-15 ENCOUNTER — CLINICAL SUPPORT (OUTPATIENT)
Dept: REHABILITATION | Facility: HOSPITAL | Age: 57
End: 2017-09-15
Attending: FAMILY MEDICINE
Payer: COMMERCIAL

## 2017-09-15 DIAGNOSIS — S14.129A INCOMPLETE INJURY OF CERVICAL SPINAL CORD WITH CENTRAL CORD SYNDROME: ICD-10-CM

## 2017-09-15 DIAGNOSIS — Z78.9 IMPAIRED MOTOR CONTROL: Primary | ICD-10-CM

## 2017-09-15 DIAGNOSIS — R53.1 DECREASED STRENGTH: ICD-10-CM

## 2017-09-15 PROCEDURE — 97116 GAIT TRAINING THERAPY: CPT | Mod: PO | Performed by: PHYSICAL THERAPIST

## 2017-09-15 NOTE — PROGRESS NOTES
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     Neurogenic bladder     Pulmonary embolism        Evaluation Date: 8/24/2016  Visit auth #: [id number]  Plan of care expiration: 7/24/2017  Extended POC: 10/16/2017  Precautions: universal, fall        Subjective   Pt reports: no new complaints  Pain Scale:  denies    Objective   Pt ~10-15 min late  Patient received individual therapy with activities as follows:     Gait training x 38 min including:    DF stretch via runner's stretch with mod A for maintaining heel contact    Gait training performed with bilateral PRO AFOs and foot sliders and PTB to encourage swing-   // bars: Side stepping with BUE min-mod A to move LE in proper position-  1 lap    ambulation training with RW with bilateral AFOs and foot sliders + Tband 25' x 2 + 50 ft with min A for weight shifts and hip position    therapeutic exercise (supervised with PT tech) x 20 minutes to improve motor control and strength to improve ambulation ability:     Bilateral hip/ knee flex/ ext with ball, mod A 30-40x  LTR with ball (lLE strapped together) 20x  Side lying hip flex/ ext with max A 20x  Clamshells 20x    NP today:  Reviewed A-P and lateral pelvic tilts    Written Home Exercises: 8/18/17- practice pelvic tilts in all directions  6/2/17- pt educated on hip flexion strengthening in pool  pt instructed to use FES cycle prior to therapy with Bioness to attempt to decrease tone.  Pt demo good understanding of the education provided. Alberto  demonstrated good return demonstration of activities.     Education provided re: POC, HEP  No spiritual or educational barriers to learning provided    Pt has no cultural, educational or language barriers to learning provided.    Assessment   Alberto tolerated treatment well. Pt used bilateral PRO AFOs today for gait training. PT also added thera band to each LE to encourage hip and knee flexion during swing.  Tactile cues were provided during gait training for improved pelvic positioning and weight shifts. Narrow base of support is noted. Pt was able to intermittently clear toes during swing phase. Pt continues to vault, but appears smoother and continuous stepping is noted.  Poor pelvic mobility occurs during gait. Gait improvements are also limited by increased extensor tone.  Pt can benefit from continued skilled PT to address impairments listed below to improve gait ability.      Pt rehab potential is Good. Pt will benefit from continuing skilled outpatient physical therapy to address the deficits listed below in the problem list, provide pt/family education and to maximize pt's level of independence in the home and community environment.         Medical necessity is demonstrated by the following IMPAIRMENTS/PROMBLEM LIST:   1. Fall Risk - impaired balance   2. Weakness   3. Impaired muscle tone  4. Gait deviations   5. Decreased ambulation   6. Decreased activity tolerance   7. Continued inability to participate in vocational pursuits   8. Requires skilled supervision to complete and progress HEP      Anticipated barriers to physical therapy: poor motor control in hips     Pt's spiritual, cultural and educational needs considered and pt agreeable to plan of care and goals as stated below:      GOALS:   1) Facilitate improved tone in LE, Progressing   2) Increase passive ankle DF to neutral, MET 3) Facilitate improved LE flexibility. Progressing  New Short Term Goals Status:    1) Improve B ankle DF to at least  5* passively to facilitate improved foot clearance during gait,   2) Improve LE flexibility to min to mod limitation in all areas,   3) Assess stand pivot transfers and gait with assistive device.   Long Term Goal Status:  continue per initial plan of care.   1) Increase strength in LE as indicated by improved L-force testing, Progressing   2) Pt will safely ambulate > 200' with supervision using appropriate assistive device/ Bioness units.- improving (9/1/17)pt participated in gait training with RW, MARCOS PRO AFOs, MARCOS foot sliders, and mod A to advance LE/  maintain proper positioning for 40-50 ft intervals  3) Pt will consistently perform safe stand pivot transfers, Not yet assessed.   4) Pt will demonstrate improved postural stability. Met.   5) new 5/4/17:  Pt will demonstrate competency in application of Bioness device and use of training mode.-NA 9/1/17 pt would like to focus on regular gait training  6) new 5/26/17: Pt will demonstrate improved bilateral hip flexion strength to 2/5 to improve ability to perform swing phase/ foot clearance during gait (with Bioness L300 Plus.)- improved- right 2-/5, left 2/5        Plan   Continue PT 1- 2x weekly under established Plan of Care, with treatment to include: pt education, HEP, therapeutic exercises, neuromuscular re-education/balance exercises, therapeutic activities, joint mobilizations, and modalities PRN, to work towards established goals. Pt may be seen by PTA to carry out plan of care.     Kala Bajwa, PT   09/15/2017

## 2017-09-19 ENCOUNTER — CLINICAL SUPPORT (OUTPATIENT)
Dept: REHABILITATION | Facility: HOSPITAL | Age: 57
End: 2017-09-19
Attending: FAMILY MEDICINE
Payer: COMMERCIAL

## 2017-09-19 DIAGNOSIS — S14.129A INCOMPLETE INJURY OF CERVICAL SPINAL CORD WITH CENTRAL CORD SYNDROME: ICD-10-CM

## 2017-09-19 PROCEDURE — 97116 GAIT TRAINING THERAPY: CPT | Mod: PN

## 2017-09-19 NOTE — PROGRESS NOTES
Name: Alberto Dangelo  [facility] Number: [id number]  Date of Treatment: 09/19/2017   Diagnosis:   Encounter Diagnosis   Name Primary?   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 States he continues OP PT in [place] for gait training and strengthening and performs HEP regularly.  Via manual w/c mod I and mod I stretching LE program in PT gym prior to session.     Objective:    Patient received individual therapy to increase strength, endurance, ROM, flexibility, posture and core stabilization with activities as follows:     Gait training via [employer] system for 60:31 minutes (plus set up):     Set up at 15 kg unloading. Computer-assisted robotic gait training via [employer] x 2.5 km/h. GF x 80% x 10', 65% x 10', 55% for distance of 2500 m. No stoppages.     Continue HEP daily.    Pt demo good understanding of the education provided. Alberto demonstrated good return demonstration of activities.     Assessment:     Increased difficulty with B knee extn during stance when GF dropped but cont to demonstrate consistent minimal feedback throughout session. L LE IR with setup which was corrected with strapping but later had L tibial ER which was corrected with readjustments.     Pt will continue to benefit from skilled PT intervention. Medical Necessity is demonstrated by:  Requires skilled supervision to complete and progress HEP and Weakness.    Patient is making good progress towards established goals.    Plan:    Continue with established Plan of Care towards PT goals.

## 2017-09-22 ENCOUNTER — CLINICAL SUPPORT (OUTPATIENT)
Dept: REHABILITATION | Facility: HOSPITAL | Age: 57
End: 2017-09-22
Attending: FAMILY MEDICINE
Payer: COMMERCIAL

## 2017-09-22 DIAGNOSIS — S14.129A INCOMPLETE INJURY OF CERVICAL SPINAL CORD WITH CENTRAL CORD SYNDROME: ICD-10-CM

## 2017-09-22 DIAGNOSIS — R53.1 DECREASED STRENGTH: ICD-10-CM

## 2017-09-22 DIAGNOSIS — Z78.9 IMPAIRED MOTOR CONTROL: Primary | ICD-10-CM

## 2017-09-22 PROCEDURE — 97116 GAIT TRAINING THERAPY: CPT | Mod: PO | Performed by: PHYSICAL THERAPIST

## 2017-09-22 NOTE — PROGRESS NOTES
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     Neurogenic bladder     Pulmonary embolism        Evaluation Date: 8/24/2016  Visit auth #: [id number]  Plan of care expiration: 7/24/2017  Extended POC: 10/16/2017  Precautions: universal, fall        Subjective   Pt reports: no new complaints  Pain Scale:  denies    Objective     Patient received individual therapy with activities as follows:     Gait training x 39 min including:    DF stretch via runner's stretch with supervision    Gait training performed with bilateral PRO AFOs and foot sliders and PTB to encourage swing-   // bars: Side stepping with BUE min-mod A to move LE in proper position-  1 lap x 2    ambulation training with RW with bilateral AFOs and foot sliders + Tband 45' x 2 + 30 ft with min A- CGA for improving weight bearing through stance LE    therapeutic exercise (supervised with PT tech) x 20 minutes to improve motor control and strength to improve ambulation ability:     Bilateral hip/ knee flex/ ext with ball, mod A 40x  LTR with ball (lLE strapped together) 40x  Side lying hip flex/ ext with max A 40x  Clamshells 40x    NP today:  Reviewed A-P and lateral pelvic tilts    Written Home Exercises: 8/18/17- practice pelvic tilts in all directions  6/2/17- pt educated on hip flexion strengthening in pool  pt  instructed to use FES cycle prior to therapy with Bioness to attempt to decrease tone.  Pt demo good understanding of the education provided. Alberto demonstrated good return demonstration of activities.     Education provided re: POC, HEP  No spiritual or educational barriers to learning provided    Pt has no cultural, educational or language barriers to learning provided.    Assessment   Alberto tolerated treatment well. Pt reports excellent compliance with HEP for strengthening. Reports ambulation practice at home is mainly vaulting to get from place to place. Pt used bilateral PRO AFOs and bilateral thera bands to each LE to encourage hip and knee flexion during swing.  Facilitation/ approximation was provided to encourage increased weight bearing through stance LE. Narrow base of support continues, but pt demonstrates decreased scissoring. Pt was able to consistently clear toes during swing phase with set up used today. Pt continues to vault, but appears smoother.  Poor pelvic mobility occurs during gait. Gait improvements are also limited by increased extensor tone.  Pt can benefit from continued skilled PT to address impairments listed below to improve gait ability.      Pt rehab potential is Good. Pt will benefit from continuing skilled outpatient physical therapy to address the deficits listed below in the problem list, provide pt/family education and to maximize pt's level of independence in the home and community environment.         Medical necessity is demonstrated by the following IMPAIRMENTS/PROMBLEM LIST:   1. Fall Risk - impaired balance   2. Weakness   3. Impaired muscle tone  4. Gait deviations   5. Decreased ambulation   6. Decreased activity tolerance   7. Continued inability to participate in vocational pursuits   8. Requires skilled supervision to complete and progress HEP      Anticipated barriers to physical therapy: poor motor control in hips     Pt's spiritual, cultural and educational  needs considered and pt agreeable to plan of care and goals as stated below:      GOALS:   1) Facilitate improved tone in LE, Progressing   2) Increase passive ankle DF to neutral, MET 3) Facilitate improved LE flexibility. Progressing  New Short Term Goals Status:    1) Improve B ankle DF to at least 5* passively to facilitate improved foot clearance during gait,   2) Improve LE flexibility to min to mod limitation in all areas,   3) Assess stand pivot transfers and gait with assistive device.   Long Term Goal Status:  continue per initial plan of care.   1) Increase strength in LE as indicated by improved L-force testing, Progressing   2) Pt will safely ambulate > 200' with supervision using appropriate assistive device/ Bioness units.- improving (9/22/17)pt participated in gait training with RW, MARCOS PRO AFOs, MARCOS foot sliders, MARCOS thera bands to encourage LE flex, and min A to improve LE weight bearing for 40-50 ft intervals  3) Pt will consistently perform safe stand pivot transfers, Not yet assessed.   4) Pt will demonstrate improved postural stability. Met.   5) new 5/4/17:  Pt will demonstrate competency in application of Bioness device and use of training mode.-NA 9/1/17 pt would like to focus on regular gait training  6) new 5/26/17: Pt will demonstrate improved bilateral hip flexion strength to 2/5 to improve ability to perform swing phase/ foot clearance during gait (with Bioness L300 Plus.)- improved  9/1/17- right 2-/5, left 2/5        Plan   Continue PT 1x weekly under established Plan of Care, with treatment to include: pt education, HEP, therapeutic exercises, neuromuscular re-education/balance exercises, therapeutic activities, joint mobilizations, and modalities PRN, to work towards established goals. Pt may be seen by PTA to carry out plan of care.     Kala Bajwa, PT   09/22/2017

## 2017-09-26 ENCOUNTER — CLINICAL SUPPORT (OUTPATIENT)
Dept: REHABILITATION | Facility: HOSPITAL | Age: 57
End: 2017-09-26
Attending: FAMILY MEDICINE
Payer: COMMERCIAL

## 2017-09-26 DIAGNOSIS — S14.129A INCOMPLETE INJURY OF CERVICAL SPINAL CORD WITH CENTRAL CORD SYNDROME: ICD-10-CM

## 2017-09-26 PROCEDURE — 97116 GAIT TRAINING THERAPY: CPT | Mod: PN

## 2017-09-26 NOTE — PROGRESS NOTES
Name: Alberto Dangelo  [facility] Number: [id number]  Date of Treatment: 09/26/2017   Diagnosis: No diagnosis found.    Time in: 1400  Time Out: 1600  Total Treatment Time: 75 min  Group Time: 0      Subjective:    Alberto reports no pain today. He has lost some weight and is in hopes that will improve ability to gait train w AD..   Objective    Patient received individual therapy to increase strength, endurance, ROM, flexibility, posture and core stabilization with activities as follows:   Pt initiated standing stretches w support of upright rails of stairs prior to session  Gait training via [employer] system for 60:01 minutes (plus set up):      Set up at 15 kg unloading. Computer-assisted robotic gait training via [employer] x 2.5 km/h. GF x 80% x 20', 65% x 30', 55% for distance of 2452 m.         Right Left     PROM PROM   Knee Flexion 135* 135*   Knee ext 0* 0*   Hip flexion 138* 138*   Hip extension 15* 15*      Ankle Right Left     PROM PROM   Plantarflexion 70* 65*   Dorsiflexion w knee extended -2 0       Continue HEP daily.     Pt demo good understanding of the education provided. Alberto demonstrated good return demonstration of activities.            Assessment:   Multiple stoppages to assure proper alignment and secure cuff. LLE ER corrected with repositioning placement in thigh and leg cuffs.  Noted excessive hip ER when supine for assessing PROM.  Observed active movement R ankle DF/toe extension, trace on R w active toe flexion.  LE PROM taken to complete w/c assessment   Pt will continue to benefit from skilled PT intervention. Medical Necessity is demonstrated by:  Fall Risk, Requires skilled supervision to complete and progress HEP and Weakness.    Patient is making good progress towards established goals.    New/Revised goals: n/a      Plan:  Continue with established Plan of Care towards PT goals.

## 2017-09-28 ENCOUNTER — CLINICAL SUPPORT (OUTPATIENT)
Dept: REHABILITATION | Facility: HOSPITAL | Age: 57
End: 2017-09-28
Attending: FAMILY MEDICINE
Payer: COMMERCIAL

## 2017-09-28 DIAGNOSIS — S14.129A INCOMPLETE INJURY OF CERVICAL SPINAL CORD WITH CENTRAL CORD SYNDROME: ICD-10-CM

## 2017-09-28 PROCEDURE — 97116 GAIT TRAINING THERAPY: CPT | Mod: PN

## 2017-09-28 NOTE — PROGRESS NOTES
Name: Alberto Dangelo  [facility] Number: [id number]  Date of Treatment: 09/28/2017   Diagnosis:   Encounter Diagnosis   Name Primary?   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 Mod I in manual w/c with mod I stretching in PT gym prior to RX.    Objective:    Patient received individual therapy to increase strength, endurance, ROM, flexibility, posture and core stabilization with activities as follows:     Gait training via [employer] system for 60:03 minutes (plus set up):     Set up at 15 kg unloading. Computer-assisted robotic gait training via [employer] x 2.5 km/h. GF x 80% x 17', then 60% for distance of 2460 m. No stoppages.     Continue HEP daily.  Pt demo good understanding of the education provided. Alberto demonstrated good return demonstration of activities.     Assessment:     Improving endurance with decreased GF today through feedback with significant increase swing and stance phases nursing home through session, even with distractions.     Pt will continue to benefit from skilled PT intervention. Medical Necessity is demonstrated by:  Requires skilled supervision to complete and progress HEP and Weakness.    Patient is making good progress towards established goals.    Plan:    Continue with established Plan of Care towards PT goals.

## 2017-10-03 ENCOUNTER — CLINICAL SUPPORT (OUTPATIENT)
Dept: REHABILITATION | Facility: HOSPITAL | Age: 57
End: 2017-10-03
Attending: FAMILY MEDICINE
Payer: COMMERCIAL

## 2017-10-03 DIAGNOSIS — S14.129A INCOMPLETE INJURY OF CERVICAL SPINAL CORD WITH CENTRAL CORD SYNDROME: ICD-10-CM

## 2017-10-03 DIAGNOSIS — R25.2 SPASM: ICD-10-CM

## 2017-10-03 PROCEDURE — 97116 GAIT TRAINING THERAPY: CPT | Mod: PN

## 2017-10-03 RX ORDER — DIAZEPAM 5 MG/1
5 TABLET ORAL EVERY 12 HOURS PRN
Qty: 60 TABLET | Refills: 1 | Status: SHIPPED | OUTPATIENT
Start: 2017-10-03 | End: 2018-01-06 | Stop reason: SDUPTHER

## 2017-10-03 NOTE — PROGRESS NOTES
Name: Alberto Dangelo  [facility] Number: [id number]  Date of Treatment: 10/03/2017   Diagnosis:   Encounter Diagnosis   Name Primary?   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  Denies pain. Via manual w/c. Limited stretching today in PT gym 2* time constraints per pt request.     Objective:    Patient received individual therapy to increase strength, endurance, ROM, flexibility, posture and core stabilization with activities as follows:     Gait training via [employer] system for 60:35 minutes (plus set up):     Set up at 15 kg unloading. Computer-assisted robotic gait training via [employer] x 2.5 km/h. GF x 80% x 11', then 60% for distance of 2508 m. Multiple initial stoppages 2* tones.       Continue HEP daily.    Pt demo good understanding of the education provided. Alberto demonstrated good return demonstration of activities.     Assessment:     Initial stoppages improved after warmup, possibly 2* limited stretching routine today. Consistent minimal feedback today throughout training.     Pt will continue to benefit from skilled PT intervention. Medical Necessity is demonstrated by:  Requires skilled supervision to complete and progress HEP and Weakness.    Patient is making good progress towards established goals.    Plan:    Continue with established Plan of Care towards PT goals.

## 2017-10-05 ENCOUNTER — CLINICAL SUPPORT (OUTPATIENT)
Dept: REHABILITATION | Facility: HOSPITAL | Age: 57
End: 2017-10-05
Attending: FAMILY MEDICINE
Payer: COMMERCIAL

## 2017-10-05 DIAGNOSIS — S14.129A INCOMPLETE INJURY OF CERVICAL SPINAL CORD WITH CENTRAL CORD SYNDROME: ICD-10-CM

## 2017-10-05 PROCEDURE — 97116 GAIT TRAINING THERAPY: CPT | Mod: PN

## 2017-10-05 NOTE — PROGRESS NOTES
Name: Alberto Dangelo  [facility] Number: [id number]  Date of Treatment: 10/05/2017   Diagnosis:   Encounter Diagnosis   Name Primary?   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 States he is having an improvement with transferring sit-stand using core mm activation to recruit increased quad and glutes for improved technique x last 6 weeks. Mod I in manual w/c with stretching prior to session mod I in PT gym.     Objective    Patient received individual therapy to increase strength, endurance, ROM, flexibility, posture and core stabilization with activities as follows:     Gait training via [employer] system for 60:35 minutes (plus set up):     Set up at 15 kg unloading. Computer-assisted robotic gait training via [email or web address] x 2.5 km/h. GF x 80% x 11', 60% for distance of 2510 m. No stoppages.       Continue HEP daily.    Pt demo good understanding of the education provided. Alberto demonstrated good return demonstration of activities.     Assessment:     Consistent feedback throughout session with looser strapping of feet-occasional drag, rasheed with talking without UE support but pt corrects well. No stoppages.     Pt will continue to benefit from skilled PT intervention. Medical Necessity is demonstrated by:  Requires skilled supervision to complete and progress HEP and Weakness.    Patient is making good progress towards established goals.    Plan:    Continue with established Plan of Care towards PT goals.

## 2017-10-06 ENCOUNTER — CLINICAL SUPPORT (OUTPATIENT)
Dept: REHABILITATION | Facility: HOSPITAL | Age: 57
End: 2017-10-06
Attending: FAMILY MEDICINE
Payer: COMMERCIAL

## 2017-10-06 DIAGNOSIS — S14.129A INCOMPLETE INJURY OF CERVICAL SPINAL CORD WITH CENTRAL CORD SYNDROME: ICD-10-CM

## 2017-10-06 DIAGNOSIS — Z78.9 IMPAIRED MOTOR CONTROL: Primary | ICD-10-CM

## 2017-10-06 DIAGNOSIS — R53.1 DECREASED STRENGTH: ICD-10-CM

## 2017-10-06 PROCEDURE — 97110 THERAPEUTIC EXERCISES: CPT | Mod: PO | Performed by: PHYSICAL THERAPIST

## 2017-10-06 PROCEDURE — 97116 GAIT TRAINING THERAPY: CPT | Mod: PO | Performed by: PHYSICAL THERAPIST

## 2017-10-06 NOTE — PROGRESS NOTES
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   G90.9 (ICD-10-CM) - Autonomic dysfunction       Encounter Diagnosis:   Impaired motor control Yes     Decreased strength          Physician: Dirk Ortiz MD  Treatment Orders: PT Eval and Treat  Past Medical History:   Diagnosis Date    Autonomic dysfunction     Constipation     Deep vein thrombosis     Depression     GERD (gastroesophageal reflux disease)     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 "I felt my abs kick in when I went to stand up"  Pain Scale:  denies    Objective     Patient received individual therapy with activities as follows:     Gait training x 35 min including:    DF stretch via runner's stretch with supervision    Gait training performed with bilateral PRO AFOs and foot sliders and PTB to encourage swing-   // bars: Side stepping with BUE min A to move LE in proper position-  2 laps x 2    ambulation training with RW with bilateral AFOs and foot sliders + Tband 100 ft x 2 with min A- CGA for improving weight bearing through stance LE    therapeutic exercise x 10 minutes to improve motor control and strength to improve ambulation ability:   Partial sit ups off of large wedge with pillows, minimal UE support- 2 x 10    Supervised treatment with PT tech:   Sitting: trunk rotation with 2000g ball with fair- balance 2 x 10    Chest press with 2000g ball 2 x 10    Bilateral hip/ knee flex/ ext with ball, mod A 2 x 20x  LTR with ball (lLE strapped together) 2 x " 20x  Side lying hip flex/ ext with max A 2 x 20x  Clamshells 2 x 10x    NP today:  Reviewed A-P and lateral pelvic tilts    Written Home Exercises: 8/18/17- practice pelvic tilts in all directions  6/2/17- pt educated on hip flexion strengthening in pool  pt instructed to use FES cycle prior to therapy with Bioness to attempt to decrease tone.  Pt demo good understanding of the education provided. Alberto demonstrated good return demonstration of activities.     Education provided re: POC, HEP  No spiritual or educational barriers to learning provided    Pt has no cultural, educational or language barriers to learning provided.    Assessment   Alberto tolerated treatment well. Pt reports excellent compliance with HEP for strengthening. Pt reports feeling improved contraction of abdominals with performing sit<>stand. Pt demonstrated improved tolerance to ambulation, requiring decreased sitting rest breaks. Pt demonstrated improved ability to advance LE using trunk/ pelvic control vs. Pure UE strength. Gait quality improved with increased practice. Pt continues to demonstrate a narrow base of support but only scissors ~25% of the time.   Fair pelvic mobility occurs during gait. Gait improvements are also limited by increased extensor tone. Therapeutic exercise advanced to include more core strengthening as pt reports increased return of strength. Pt can benefit from continued skilled PT to address impairments listed below to improve gait ability.      Pt rehab potential is Good. Pt will benefit from continuing skilled outpatient physical therapy to address the deficits listed below in the problem list, provide pt/family education and to maximize pt's level of independence in the home and community environment.         Medical necessity is demonstrated by the following IMPAIRMENTS/PROMBLEM LIST:   1. Fall Risk - impaired balance   2. Weakness   3. Impaired muscle tone  4. Gait deviations   5. Decreased ambulation    6. Decreased activity tolerance   7. Continued inability to participate in vocational pursuits   8. Requires skilled supervision to complete and progress HEP      Anticipated barriers to physical therapy: poor motor control in hips     Pt's spiritual, cultural and educational needs considered and pt agreeable to plan of care and goals as stated below:      GOALS:   1) Facilitate improved tone in LE, Progressing   2) Increase passive ankle DF to neutral, MET 3) Facilitate improved LE flexibility. Progressing  New Short Term Goals Status:    1) Improve B ankle DF to at least 5* passively to facilitate improved foot clearance during gait,   2) Improve LE flexibility to min to mod limitation in all areas,   3) Assess stand pivot transfers and gait with assistive device.   Long Term Goal Status:  continue per initial plan of care.   1) Increase strength in LE as indicated by improved L-force testing, Progressing   2) Pt will safely ambulate > 200' with supervision using appropriate assistive device/ Bioness units.- improving (10/6/17)pt participated in gait training with RW, MARCOS PRO AFOs, MARCOS foot sliders, MARCOS thera bands to encourage LE flex, and CGA- min A to improve LE weight bearing for 100 ft intervals  3) Pt will consistently perform safe stand pivot transfers, Not yet assessed.   4) Pt will demonstrate improved postural stability. Met.   5) new 5/4/17:  Pt will demonstrate competency in application of Bioness device and use of training mode.-NA 9/1/17 pt would like to focus on regular gait training  6) new 5/26/17: Pt will demonstrate improved bilateral hip flexion strength to 2/5 to improve ability to perform swing phase/ foot clearance during gait (with Bioness L300 Plus.)- improved  9/1/17- right 2-/5, left 2/5        Plan   Continue PT 1x weekly under established Plan of Care, with treatment to include: pt education, HEP, therapeutic exercises, neuromuscular re-education/balance exercises, therapeutic  activities, joint mobilizations, and modalities PRN, to work towards established goals. Pt may be seen by PTA to carry out plan of care.     Kala Bajwa, PT   10/06/2017

## 2017-10-10 ENCOUNTER — CLINICAL SUPPORT (OUTPATIENT)
Dept: REHABILITATION | Facility: HOSPITAL | Age: 57
End: 2017-10-10
Attending: FAMILY MEDICINE
Payer: COMMERCIAL

## 2017-10-10 DIAGNOSIS — S14.129A INCOMPLETE INJURY OF CERVICAL SPINAL CORD WITH CENTRAL CORD SYNDROME: ICD-10-CM

## 2017-10-10 PROCEDURE — 97116 GAIT TRAINING THERAPY: CPT | Mod: PN

## 2017-10-12 ENCOUNTER — CLINICAL SUPPORT (OUTPATIENT)
Dept: REHABILITATION | Facility: HOSPITAL | Age: 57
End: 2017-10-12
Attending: FAMILY MEDICINE
Payer: COMMERCIAL

## 2017-10-12 DIAGNOSIS — S14.129A INCOMPLETE INJURY OF CERVICAL SPINAL CORD WITH CENTRAL CORD SYNDROME: ICD-10-CM

## 2017-10-12 PROCEDURE — 97116 GAIT TRAINING THERAPY: CPT | Mod: PN

## 2017-10-12 NOTE — PROGRESS NOTES
Name: Alberto Dangelo  [facility] Number: [id number]  Date of Treatment: 10/12/2017   Diagnosis:   Encounter Diagnosis   Name Primary?   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 Mod I in manual w/c with mod I stretching in PT gym prior to session.        Objective:    Patient received individual therapy to increase strength, endurance, ROM, flexibility, posture and core stabilization with activities as follows:     Gait training via [employer] system for 60:17 minutes (plus set up):     Set up at 15 kg unloading. Computer-assisted robotic gait training via [email or web address] x 2.5 km/h. GF x 80% x 12', 60% for distance of 2475 m. No stoppages.       Continue HEP daily.    Pt demo good understanding of the education provided. Alberto demonstrated good return demonstration of activities.     Assessment:     Consistent feedback in swing and stance phases throughout session. Pt working on increased core mm activation with gait and UE swing while in lokomat.     Pt will continue to benefit from skilled PT intervention. Medical Necessity is demonstrated by:  Requires skilled supervision to complete and progress HEP and Weakness.    Patient is making good progress towards established goals.    Plan:  Continue with established Plan of Care towards PT goals.

## 2017-10-13 ENCOUNTER — CLINICAL SUPPORT (OUTPATIENT)
Dept: REHABILITATION | Facility: HOSPITAL | Age: 57
End: 2017-10-13
Attending: FAMILY MEDICINE
Payer: COMMERCIAL

## 2017-10-13 DIAGNOSIS — R53.1 DECREASED STRENGTH: ICD-10-CM

## 2017-10-13 DIAGNOSIS — Z78.9 IMPAIRED MOTOR CONTROL: Primary | ICD-10-CM

## 2017-10-13 DIAGNOSIS — S14.129A INCOMPLETE INJURY OF CERVICAL SPINAL CORD WITH CENTRAL CORD SYNDROME: ICD-10-CM

## 2017-10-13 PROCEDURE — 97116 GAIT TRAINING THERAPY: CPT | Mod: PO | Performed by: PHYSICAL THERAPIST

## 2017-10-13 NOTE — PLAN OF CARE
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   G90.9 (ICD-10-CM) - Autonomic dysfunction       Encounter Diagnosis:   Impaired motor control Yes     Decreased strength          Physician: Dirk Ortiz MD  Treatment Orders: PT Eval and Treat  Past Medical History:   Diagnosis Date    Autonomic dysfunction     Constipation     Deep vein thrombosis     Depression     GERD (gastroesophageal reflux disease)     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 "i've been practicing a lot of walking at home"  Pain Scale:  denies    Objective     Patient received individual therapy with activities as follows:     Gait training x 46 min including:    Strength:   Hip flex: R= 2/5, L=2/5  Hip ext: R= 2+/5, L=3-/5  Hip abd: B= 1/5  Hip add: B= 2+/5    DF stretch via runner's stretch with supervision    Gait training performed with bilateral PRO AFOs and foot sliders and PTB to encourage swing-   // bars: Side stepping with BUE min A to move LE in proper position-  1 laps x 2    ambulation training with RW with bilateral AFOs and foot sliders + Tband 84 ft x 2 with CGA- supervision for improving weight bearing through stance LE  ambulation to mat from // bar- close supervision  Stand>sit to mat from RW, min A    Supervised treatment with PT tech to improve motor control and strength to improve ambulation ability:   Partial sit ups off of large wedge with 1 pillow, minimal UE support- 2 x 10    Sitting: trunk rotation with 2000g ball with fair- balance 2 x " 10    Chest press with 2000g ball 2 x 10    Bilateral hip/ knee flex/ ext with ball, mod A 2 x 20x  LTR with ball (lLE strapped together) 2 x 20x  Side lying hip flex/ ext with max A 2 x 20x  Clamshells 2 x 10x      Written Home Exercises: 8/18/17- practice pelvic tilts in all directions  6/2/17- pt educated on hip flexion strengthening in pool  pt instructed to use FES cycle prior to therapy with Bioness to attempt to decrease tone.  Pt demo good understanding of the education provided. Alberto demonstrated good return demonstration of activities.     Education provided re: POC, HEP  No spiritual or educational barriers to learning provided    Pt has no cultural, educational or language barriers to learning provided.    Assessment   Alberto tolerated treatment well. Pt reports excellent compliance with practicing ambulation at home. Pt is demonstrating a good improvement in abdominal and hip strength. Pt demonstrates improve control of LE placement during forward ambulation. Occasional assistance is provided to increase proprioceptive input into LE during stance phase to improve LE motor control. Pt was able to prevent scissoring (noted <25% of the time). Pt met goal set for improving hip flexion strength, goal upgraded to reflect progress. new LTG added to address hip abd strength. Pt can benefit from continued skilled PT to address impairments listed below to improve gait ability.  POC extended x 12 weeks to allow for continued progress with gait, pt demonstrates good progress.     Pt rehab potential is Good. Pt will benefit from continuing skilled outpatient physical therapy to address the deficits listed below in the problem list, provide pt/family education and to maximize pt's level of independence in the home and community environment.         Medical necessity is demonstrated by the following IMPAIRMENTS/PROMBLEM LIST:   1. Fall Risk - impaired balance   2. Weakness   3. Impaired muscle tone  4. Gait  deviations   5. Decreased ambulation   6. Decreased activity tolerance   7. Continued inability to participate in vocational pursuits   8. Requires skilled supervision to complete and progress HEP      Anticipated barriers to physical therapy: poor motor control in hips     Pt's spiritual, cultural and educational needs considered and pt agreeable to plan of care and goals as stated below:      GOALS:   1) Facilitate improved tone in LE, Progressing   2) Increase passive ankle DF to neutral, MET 3) Facilitate improved LE flexibility. Progressing  New Short Term Goals Status:    1) Improve B ankle DF to at least 5* passively to facilitate improved foot clearance during gait,   2) Improve LE flexibility to min to mod limitation in all areas,   3) Assess stand pivot transfers and gait with assistive device.   Long Term Goal Status:  continue per initial plan of care.   1) Increase strength in LE as indicated by improved L-force testing, Progressing   2) Pt will safely ambulate > 200' with supervision using appropriate assistive device/ Bioness units.- improving (10/13/17)pt participated in gait training with RW, MARCOS PRO AFOs, MARCOS foot sliders, MARCOS thera bands to encourage LE flex, and CGA to improve LE weight bearing for 84 ft intervals  3) Pt will consistently perform safe stand pivot transfers, Not yet assessed.   4) Pt will demonstrate improved postural stability. Met.   5) new 5/4/17:  Pt will demonstrate competency in application of Bioness device and use of training mode.-NA 9/1/17 pt would like to focus on regular gait training  6) new 5/26/17: Pt will demonstrate improved bilateral hip flexion strength to 2/5 to improve ability to perform swing phase/ foot clearance during gait (with Bioness L300 Plus.)- met 10/13/17- 2/5 MARCOS    Revised 10/13/17- pt will demonstrate improved hip flex strength to 2+/5 to improve foot clearance during swing phase.  7) New 10/13/17: pt will demonstrate improved MARCOS hip abd  strength to 2/5 to improve ability to step sideways for transfers and to improve ZENA in standing/ ambulation.- 1/5     Plan   Continue PT 1x weekly under established Plan of Care, with treatment to include: pt education, HEP, therapeutic exercises, neuromuscular re-education/balance exercises, therapeutic activities, joint mobilizations, and modalities PRN, to work towards established goals. Pt may be seen by PTA to carry out plan of care.     Kala Bajwa, PT   10/13/2017

## 2017-10-13 NOTE — PROGRESS NOTES
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   G90.9 (ICD-10-CM) - Autonomic dysfunction       Encounter Diagnosis:   Impaired motor control Yes     Decreased strength          Physician: Dirk Ortiz MD  Treatment Orders: PT Eval and Treat  Past Medical History:   Diagnosis Date    Autonomic dysfunction     Constipation     Deep vein thrombosis     Depression     GERD (gastroesophageal reflux disease)     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 "i've been practicing a lot of walking at home"  Pain Scale:  denies    Objective     Patient received individual therapy with activities as follows:     Gait training x 46 min including:    Strength:   Hip flex: R= 2/5, L=2/5  Hip ext: R= 2+/5, L=3-/5  Hip abd: B= 1/5  Hip add: B= 2+/5    DF stretch via runner's stretch with supervision    Gait training performed with bilateral PRO AFOs and foot sliders and PTB to encourage swing-   // bars: Side stepping with BUE min A to move LE in proper position-  1 laps x 2    ambulation training with RW with bilateral AFOs and foot sliders + Tband 84 ft x 2 with CGA- supervision for improving weight bearing through stance LE  ambulation to mat from // bar- close supervision  Stand>sit to mat from RW, min A    Supervised treatment with PT tech to improve motor control and strength to improve ambulation ability:   Partial sit ups off of large wedge with 1 pillow, minimal UE support- 2 x 10    Sitting: trunk rotation with 2000g ball with fair- balance 2 x " 10    Chest press with 2000g ball 2 x 10    Bilateral hip/ knee flex/ ext with ball, mod A 2 x 20x  LTR with ball (lLE strapped together) 2 x 20x  Side lying hip flex/ ext with max A 2 x 20x  Clamshells 2 x 10x      Written Home Exercises: 8/18/17- practice pelvic tilts in all directions  6/2/17- pt educated on hip flexion strengthening in pool  pt instructed to use FES cycle prior to therapy with Bioness to attempt to decrease tone.  Pt demo good understanding of the education provided. Alberto demonstrated good return demonstration of activities.     Education provided re: POC, HEP  No spiritual or educational barriers to learning provided    Pt has no cultural, educational or language barriers to learning provided.    Assessment   Alberto tolerated treatment well. Pt reports excellent compliance with practicing ambulation at home. Pt is demonstrating a good improvement in abdominal and hip strength. Pt demonstrates improve control of LE placement during forward ambulation. Occasional assistance is provided to increase proprioceptive input into LE during stance phase to improve LE motor control. Pt was able to prevent scissoring (noted <25% of the time). Pt met goal set for improving hip flexion strength, goal upgraded to reflect progress. new LTG added to address hip abd strength. Pt can benefit from continued skilled PT to address impairments listed below to improve gait ability.  POC extended x 12 weeks to allow for continued progress with gait, pt demonstrates good progress.     Pt rehab potential is Good. Pt will benefit from continuing skilled outpatient physical therapy to address the deficits listed below in the problem list, provide pt/family education and to maximize pt's level of independence in the home and community environment.         Medical necessity is demonstrated by the following IMPAIRMENTS/PROMBLEM LIST:   1. Fall Risk - impaired balance   2. Weakness   3. Impaired muscle tone  4. Gait  deviations   5. Decreased ambulation   6. Decreased activity tolerance   7. Continued inability to participate in vocational pursuits   8. Requires skilled supervision to complete and progress HEP      Anticipated barriers to physical therapy: poor motor control in hips     Pt's spiritual, cultural and educational needs considered and pt agreeable to plan of care and goals as stated below:      GOALS:   1) Facilitate improved tone in LE, Progressing   2) Increase passive ankle DF to neutral, MET 3) Facilitate improved LE flexibility. Progressing  New Short Term Goals Status:    1) Improve B ankle DF to at least 5* passively to facilitate improved foot clearance during gait,   2) Improve LE flexibility to min to mod limitation in all areas,   3) Assess stand pivot transfers and gait with assistive device.   Long Term Goal Status:  continue per initial plan of care.   1) Increase strength in LE as indicated by improved L-force testing, Progressing   2) Pt will safely ambulate > 200' with supervision using appropriate assistive device/ Bioness units.- improving (10/13/17)pt participated in gait training with RW, MARCOS PRO AFOs, MARCOS foot sliders, MARCOS thera bands to encourage LE flex, and CGA to improve LE weight bearing for 84 ft intervals  3) Pt will consistently perform safe stand pivot transfers, Not yet assessed.   4) Pt will demonstrate improved postural stability. Met.   5) new 5/4/17:  Pt will demonstrate competency in application of Bioness device and use of training mode.-NA 9/1/17 pt would like to focus on regular gait training  6) new 5/26/17: Pt will demonstrate improved bilateral hip flexion strength to 2/5 to improve ability to perform swing phase/ foot clearance during gait (with Bioness L300 Plus.)- met 10/13/17- 2/5 MARCOS    Revised 10/13/17- pt will demonstrate improved hip flex strength to 2+/5 to improve foot clearance during swing phase.  7) New 10/13/17: pt will demonstrate improved MARCOS hip abd  strength to 2/5 to improve ability to step sideways for transfers and to improve ZENA in standing/ ambulation.- 1/5     Plan   Continue PT 1x weekly under established Plan of Care, with treatment to include: pt education, HEP, therapeutic exercises, neuromuscular re-education/balance exercises, therapeutic activities, joint mobilizations, and modalities PRN, to work towards established goals. Pt may be seen by PTA to carry out plan of care.     Kala Bajwa, PT   10/13/2017

## 2017-10-17 ENCOUNTER — CLINICAL SUPPORT (OUTPATIENT)
Dept: REHABILITATION | Facility: HOSPITAL | Age: 57
End: 2017-10-17
Attending: FAMILY MEDICINE
Payer: COMMERCIAL

## 2017-10-17 DIAGNOSIS — S14.129A INCOMPLETE INJURY OF CERVICAL SPINAL CORD WITH CENTRAL CORD SYNDROME: ICD-10-CM

## 2017-10-17 PROCEDURE — 97116 GAIT TRAINING THERAPY: CPT | Mod: PN

## 2017-10-17 NOTE — PROGRESS NOTES
"Name: Alberto Dangelo  [facility] Number: [id number]  Date of Treatment: 10/10/2017  Diagnosis:   Encounter Diagnosis   Name Primary?   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  Patient reports their pain to be n/a/10 on a 0-10 scale with 0 being no pain and 10 being the worst pain imaginable.    Objective    Patient received individual therapy to increase flexibility, posture and core stabilization with 0 patients with activities as follows:     Alberto participated in dynamic functional therapeutic activities to improve functional performance for 67 minutes. Including: Set up with 15 kg unloading.  Pt participated in computer assisted robotic gait training via [employer] @ 2.5 kph x 60 min, 06 sec for a total distance of 2547 m.  Utilized guidance force of 80% x 17 min, decreased to 60% x 34', decreased to 55% for remainder of session.  .     Written Home Exercises Provided: N/A  Pt demo good understanding of the education provided. Alberto demonstrated good return demonstration of activities.     Assessment:   Continues to demonstrate tight gastrocs and limited active hip flexion which interfere with ability to ambulate without device/assistance    Pt will continue to benefit from skilled PT intervention. Medical Necessity is demonstrated by:  Fall Risk, Unable to participate fully in daily activities, Requires skilled supervision to complete and progress HEP and Weakness.    Patient is making fair progress towards established goals.    New/Revised goals: N/A      Plan:  Continue with established Plan of Care towards PT goals.   "

## 2017-10-18 NOTE — PROGRESS NOTES
"Name: Alberto Dangelo  [facility] Number: [id number]  Date of Treatment: 10/17/2017   Diagnosis:   Encounter Diagnosis   Name Primary?   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  "If my hips would just lift up, I would be so much easier"   Patient reports their pain to be n/a/10 on a 0-10 scale with 0 being no pain and 10 being the worst pain imaginable.    Objective2    Patient received individual therapy to increase strength, endurance, flexibility and core stabilization with 0 patients with activities as follows:     Alberto participated in dynamic functional therapeutic activities to improve functional performance for 67 minutes. Including: Set up with 15 kg unloading x 40 min then [id number] to 10 kg for remaining session..  Pt participated in computer assisted robotic gait training via [employer] @ 2.5 kph x 47 min, then increased to 3 kph for remaining session (total time 60 min 14 sec for a total distance of 2607 m.  Utilized guidance force of 80% x 16 min then decreased to 60% for remainder of session.        Written Home Exercises Provided: N/A  Pt demo good understanding of the education provided. Alberto demonstrated good return demonstration of activities.     Assessment:   Achieved good gait training session but tight calves due to increased tone persists.      Pt will continue to benefit from skilled PT intervention. Medical Necessity is demonstrated by:  Fall Risk, Unable to participate fully in daily activities and Requires skilled supervision to complete and progress HEP.    Patient is making fair progress towards established goals.    New/Revised goals: N/A      Plan:  Continue with established Plan of Care towards PT goals.   "

## 2017-10-19 ENCOUNTER — CLINICAL SUPPORT (OUTPATIENT)
Dept: REHABILITATION | Facility: HOSPITAL | Age: 57
End: 2017-10-19
Attending: FAMILY MEDICINE
Payer: COMMERCIAL

## 2017-10-19 DIAGNOSIS — S14.129A INCOMPLETE INJURY OF CERVICAL SPINAL CORD WITH CENTRAL CORD SYNDROME: ICD-10-CM

## 2017-10-19 PROCEDURE — 97116 GAIT TRAINING THERAPY: CPT | Mod: PN

## 2017-10-19 NOTE — PROGRESS NOTES
Name: Alberto Dangelo  [facility] Number: [id number]  Date of Treatment: 10/19/2017   Diagnosis:   Encounter Diagnosis   Name Primary?   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 Mod I stretching routine in PT gym prior to session.  Mod I in     Objective:    Patient received individual therapy to increase strength, endurance, ROM, flexibility, posture and core stabilization with activities as follows:     Gait training via [employer] system for 60:18 minutes (plus set up):     Set up at 15 kg unloading. Computer-assisted robotic gait training via [employer] x 2.5 km/h. GF x 80% x 11', then 60% for distance of 2493 m. One stoppage 2* R foot drag.     Continue HEP daily.    Pt demo good understanding of the education provided. Alberto demonstrated good return demonstration of activities.     Assessment:    Great effort throughout session with good feedback.     Pt will continue to benefit from skilled PT intervention. Medical Necessity is demonstrated by:  Requires skilled supervision to complete and progress HEP and Weakness.    Patient is making good progress towards established goals.    Plan:    Continue with established Plan of Care towards PT goals.

## 2017-10-24 ENCOUNTER — CLINICAL SUPPORT (OUTPATIENT)
Dept: REHABILITATION | Facility: HOSPITAL | Age: 57
End: 2017-10-24
Attending: FAMILY MEDICINE
Payer: COMMERCIAL

## 2017-10-24 DIAGNOSIS — S14.129A INCOMPLETE INJURY OF CERVICAL SPINAL CORD WITH CENTRAL CORD SYNDROME: ICD-10-CM

## 2017-10-24 PROCEDURE — 97116 GAIT TRAINING THERAPY: CPT | Mod: PN

## 2017-10-24 NOTE — PROGRESS NOTES
Name: Alberto Dangelo  [facility] Number: [id number]  Date of Treatment: 10/24/2017   Diagnosis:   Encounter Diagnosis   Name Primary?   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  Patient reports their pain to be n/a/10 on a 0-10 scale with 0 being no pain and 10 being the worst pain imaginable.    Objective    Patient received individual therapy to increase strength, endurance, flexibility and core stabilization with 0 patients with activities as follows:     Alberto participated in dynamic functional therapeutic activities to improve functional performance for 67 minutes. Including: Set up with 15 kg unloading initially (for 27 min) then decreased to 5kg for remainder of session.  Pt participated in computer assisted robotic gait training via the grafter @ 2.5 kph x 60 min, 06 sec for a total distance of 2489 m.  Utilized guidance force of 80% x 12 min then decreased to 60% for remainder of session.      Written Home Exercises Provided: Reviewed importance of stretching.    Pt demo good understanding of the education provided. Alberto demonstrated good return demonstration of activities.     Assessment:   Pt demonstrated excellent foot clearance throughout session; however, did also demonstrate flexion of knees during stance phase B after 30 min.      Pt will continue to benefit from skilled PT intervention. Medical Necessity is demonstrated by:  Fall Risk, Unable to participate fully in daily activities, Weakness and limited gait activity.    Patient is making fair progress towards established goals.    New/Revised goals: N/A      Plan:  Continue with established Plan of Care towards PT goals.      Cardiovascular: Negative for chest pain and palpitations.   Gastrointestinal: Positive for constipation. Negative for diarrhea, nausea and vomiting.   Endocrine: Negative for polyuria.   Genitourinary: Negative for difficulty urinating, frequency and urgency.   Musculoskeletal: Positive for neck pain and neck stiffness. Negative for back pain and gait problem.   Skin: Negative for rash.   Allergic/Immunologic: Negative for immunocompromised state.   Neurological: Positive for light-headedness, numbness (little finger left hand on occasion) and headaches. Negative for seizures, syncope and weakness.   Hematological: Does not bruise/bleed easily.   Psychiatric/Behavioral: Negative for decreased concentration, dysphoric mood, sleep disturbance and suicidal ideas.       PHYSICAL EXAM     Physical Exam    ASSESSMENT/PLAN     This office note has been dictated.

## 2017-10-26 ENCOUNTER — CLINICAL SUPPORT (OUTPATIENT)
Dept: REHABILITATION | Facility: HOSPITAL | Age: 57
End: 2017-10-26
Attending: FAMILY MEDICINE
Payer: COMMERCIAL

## 2017-10-26 DIAGNOSIS — S14.129A INCOMPLETE INJURY OF CERVICAL SPINAL CORD WITH CENTRAL CORD SYNDROME: ICD-10-CM

## 2017-10-26 PROCEDURE — 97116 GAIT TRAINING THERAPY: CPT | Mod: PN

## 2017-10-26 NOTE — PROGRESS NOTES
Name: Alberto Dangelo  [facility] Number: [id number]  Date of Treatment: 10/26/2017   Diagnosis:   Encounter Diagnosis   Name Primary?   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     Objective:    Patient received individual therapy to increase strength, endurance, ROM, flexibility, posture and core stabilization with activities as follows:   Gait training via [employer] system for 62:40 minutes (plus set up):     Set up at 15 kg (dropped to 5 kg at 30 minutes of training) unloading. Computer-assisted robotic gait training via [employer] x 2.5 km/h. GF x 100 x 20', 60% for distance of 2592 m. No stoppages.       Continue HEP daily.    Pt demo good understanding of the education provided. Alberto demonstrated good return demonstration of activities.     Assessment:     Good tolerance for GF drop at 30 minutes.     Pt will continue to benefit from skilled PT intervention. Medical Necessity is demonstrated by:  Requires skilled supervision to complete and progress HEP and Weakness.    Patient is making good progress towards established goals.    Plan:    Continue with established Plan of Care towards PT goals.

## 2017-10-27 ENCOUNTER — CLINICAL SUPPORT (OUTPATIENT)
Dept: REHABILITATION | Facility: HOSPITAL | Age: 57
End: 2017-10-27
Attending: FAMILY MEDICINE
Payer: COMMERCIAL

## 2017-10-27 DIAGNOSIS — S14.129A INCOMPLETE INJURY OF CERVICAL SPINAL CORD WITH CENTRAL CORD SYNDROME: ICD-10-CM

## 2017-10-27 DIAGNOSIS — Z78.9 IMPAIRED MOTOR CONTROL: Primary | ICD-10-CM

## 2017-10-27 DIAGNOSIS — R53.1 DECREASED STRENGTH: ICD-10-CM

## 2017-10-27 PROCEDURE — 97110 THERAPEUTIC EXERCISES: CPT | Mod: PO | Performed by: PHYSICAL THERAPIST

## 2017-10-27 NOTE — PROGRESS NOTES
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 Pt late for appointment, accommodations made  Pain Scale:  denies    Objective   Pt missed original appointment, accommodations made    Patient received individual therapy with activities as follows:     Gait training x 30 min including:    DF stretch via runner's stretch     Gait training performed with bilateral PRO AFOs and bilateral ace wraps for DF  ambulation training with RW with bilateral AFOs and foot sliders + Tband 94 ft x 2 with CGA- supervision for improving weight bearing through stance LE    Pre gait with RW with above set up: forward step with intermittent A for foot placement 2 x 10   Intermittent mod A to step back    NP today:   // bars: Side stepping with BUE min A to move LE in proper position-  1 laps x 2  Stand>sit to mat from RW, min A    Supervised treatment with PT tech to improve motor control and strength to improve ambulation ability:   Partial sit ups off of large wedge with 1 pillow, minimal UE support- 2 x 10    Sitting: trunk rotation with 2000g ball with fair- balance 2 x 10    Chest press with  2000g ball 2 x 10    Bilateral hip/ knee flex/ ext with ball, mod A 2 x 20x  LTR with ball (lLE strapped together) 2 x 20x  Side lying hip flex/ ext with max A 2 x 20x  Clamshells 2 x 10x      Written Home Exercises: 8/18/17- practice pelvic tilts in all directions  6/2/17- pt educated on hip flexion strengthening in pool  pt instructed to use FES cycle prior to therapy with Bioness to attempt to decrease tone.  Pt demo good understanding of the education provided. Alberto demonstrated good return demonstration of activities.     Education provided re: POC, HEP  No spiritual or educational barriers to learning provided    Pt has no cultural, educational or language barriers to learning provided.    Assessment   Alberto tolerated treatment well. Pt reports he is now participating in use of Locomat with full body weight at 2.5-3 mph. Pt is demonstrating improved tolerance to gait training with increased distances tolerated prior to resting. Pt is also demonstrating improved BLE control for stepping (continues to minimally- moderately vault) during gait. Occasional scissoring occurs but has improved (occurs <25% of the time). Pt was able to tolerate pre gait activites with quick muscular fatigue noted.  Pt can benefit from continued skilled PT to address impairments listed below to improve gait ability.      Pt rehab potential is Good. Pt will benefit from continuing skilled outpatient physical therapy to address the deficits listed below in the problem list, provide pt/family education and to maximize pt's level of independence in the home and community environment.         Medical necessity is demonstrated by the following IMPAIRMENTS/PROMBLEM LIST:   1. Fall Risk - impaired balance   2. Weakness   3. Impaired muscle tone  4. Gait deviations   5. Decreased ambulation   6. Decreased activity tolerance   7. Continued inability to participate in vocational pursuits   8. Requires skilled supervision to complete and  progress HEP      Anticipated barriers to physical therapy: poor motor control in hips     Pt's spiritual, cultural and educational needs considered and pt agreeable to plan of care and goals as stated below:      GOALS:   1) Facilitate improved tone in LE, Progressing   2) Increase passive ankle DF to neutral, MET 3) Facilitate improved LE flexibility. Progressing  New Short Term Goals Status:    1) Improve B ankle DF to at least 5* passively to facilitate improved foot clearance during gait,   2) Improve LE flexibility to min to mod limitation in all areas,   3) Assess stand pivot transfers and gait with assistive device.   Long Term Goal Status:  continue per initial plan of care.   1) Increase strength in LE as indicated by improved L-force testing, Progressing   2) Pt will safely ambulate > 200' with supervision using appropriate assistive device/ Bioness units.- improving (10/13/17)pt participated in gait training with RW, MARCOS PRO AFOs, MARCOS foot sliders, MARCOS thera bands to encourage LE flex, and CGA to improve LE weight bearing for 84 ft intervals  3) Pt will consistently perform safe stand pivot transfers, Not yet assessed.   4) Pt will demonstrate improved postural stability. Met.   5) new 5/4/17:  Pt will demonstrate competency in application of Bioness device and use of training mode.-NA 9/1/17 pt would like to focus on regular gait training  6) new 5/26/17: Pt will demonstrate improved bilateral hip flexion strength to 2/5 to improve ability to perform swing phase/ foot clearance during gait (with Bioness L300 Plus.)- met 10/13/17- 2/5 MARCOS    Revised 10/13/17- pt will demonstrate improved hip flex strength to 2+/5 to improve foot clearance during swing phase.  7) New 10/13/17: pt will demonstrate improved MARCOS hip abd strength to 2/5 to improve ability to step sideways for transfers and to improve ZENA in standing/ ambulation.- 1/5     Plan   Continue PT 1x weekly under established Plan of Care, with  treatment to include: pt education, HEP, therapeutic exercises, neuromuscular re-education/balance exercises, therapeutic activities, joint mobilizations, and modalities PRN, to work towards established goals. Pt may be seen by PTA to carry out plan of care.     Kala Bajwa, PT   10/27/2017

## 2017-10-31 ENCOUNTER — CLINICAL SUPPORT (OUTPATIENT)
Dept: REHABILITATION | Facility: HOSPITAL | Age: 57
End: 2017-10-31
Attending: FAMILY MEDICINE
Payer: COMMERCIAL

## 2017-10-31 DIAGNOSIS — S14.129A INCOMPLETE INJURY OF CERVICAL SPINAL CORD WITH CENTRAL CORD SYNDROME: ICD-10-CM

## 2017-10-31 PROCEDURE — 97116 GAIT TRAINING THERAPY: CPT | Mod: PN

## 2017-11-01 NOTE — PLAN OF CARE
Date: 10/31/2017    PHYSICAL THERAPY UPDATED PLAN OF TREATMENT    Patient name: Alberto Dangelo  Onset Date:  7/30/2010  SOC Date:  8/24/2016  Primary Diagnosis:    1. 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 Home equipment includes B AFO's, shower chair, ramped entrance w support bars to swimming pool, FES bike, free weights, plyobox system.     Updated Assessment:  Pt presents to PT with elevated extensor tone in B LE, decreased ankle DF, decreased LE strength, and impaired posture.  He is motivated to make improvements in his functional mobility and gait by participating in gait training utilizing the [employer] computerized, robotic gait training device.     He attends PT at [facility] in [facility] 1 x /week w focus on gait training without assistance of Lokomat.    Knee   Right     Left       AROM PROM MMT AROM PROM MMT   Flexion X 140* 1/5 X  142* 1/5-2-/5   Extension -36* 0* 2+/5 0* 0* 2+/5      Ankle   Right     Left       AROM PROM MMT AROM PROM MMT   Plantarflexion                Dorsiflexion w knee extended X -5* 0/5 X -0* 1/5   Gait:  Pt typically utilizes wheelchair for mobility purposes; however, he is able to ambulate short distances using rolling walker, B PRO AFO's, and foot sliders.  He demonstrates poor foot clearance B, hip hiking, increased UE support. (per PT note 10/27/2017)  Distance 94' utilizing CGA   He tolerates 1 hour gait training on Locomat 2x/wk with sadaf from 2.5-3.0kph, unweighting from 15kg-5kg, and guidance force from 80%-60%  Pt reports improved flexibility and improved gait tolerance noted over the past couple of weeks.      Previous Short Term Goals Status:     1) Improve B ankle  DF to at least 5* passively to facilitate improved foot clearance during gait (Slowly progressing)  2) Improve LE flexibility to min to mod limitation in all areas, (slowly progressing)  3) Assess stand pivot transfers and gait with assistive device. (Not met)  4) Pt will amb 60 ft w RW, SBA in less than  30 sec  (Not met)    New Short Term Goals Status:   Continue 1-4 as listed above.      Long Term Goal Status:     1) Increase strength in B  LE as indicated by improved L-force testing (not tested this date)  2) Pt will safely ambulate > 200' with supervision (using appropriate assistive device/ Bioness units-Discontinued as pt no longer using Bioness)  (Not met)   3) Pt will consistently perform safe stand pivot transfers, Not yet assessed.   4) Pt will demonstrate improved postural stability. Met.   5) Pt will demonstrate competency in application of Bioness device and use of training mode.-ongoing- pt able to apply 3/4 pieces without cueing from PT, PT placed sensors in shoes (6/9/17)  N/A  Discontinued trial of Bioness based on patient request.    6) Modified:  Pt will demonstrate improved bilateral hip flexion strength to 2/5 to improve ability to perform swing phase/ foot clearance during gait   7) Added: 10/31/2017-Pt will demonstrate appropriate weight shift during ambulation to limit hip hiking and excessive weight shift.     Reasons for Recertification of Therapy:   Pt is progressing well with [employer] gait training, demonstrating increased tolerance for time and speed. He demonstrates improved LE flexibility, supine and sititng. Muscle tone continues to affect gait, posture, joint ROM and positioning. He is seen at two separate [facility] facilities due to specialized equipment available-[employer] robotic assisted locomotor training device at [facility].  Pt rehab potential is Good. Pt will benefit from continuing skilled outpatient physical therapy to address the deficits listed below in the problem  list, provide pt/family education and to maximize pt's level of independence in the home and community environment.      Medical necessity is demonstrated by the following IMPAIRMENTS/PROMBLEM LIST:   1. Fall Risk - impaired balance   2. Weakness   3. Impaired muscle tone  4. Gait deviations   5. Decreased ambulation   6. Decreased activity tolerance   7. Continued inability to participate in vocational pursuits   8. Requires skilled supervision to complete and progress HEP   Pt may be seen by PTA to carry out plan of care.       Certification Period:  11/1/2017 to 1/24/2017  Recommended Treatment Plan: 3 times per week for 12 weeks: Gait Training, Locomotor Training, Manual Therapy, Neuromuscular Re-ed, Patient Education, Therapeutic Activites and Therapeutic Exercise  Other Recommendations: Pt will continue to attend 1 x/week in [place] for gait training, transfer training, therex, etc and 2x/week in [place] for Lokomat locomotor training. Therapy sessions to include stretching, strengthening, postural control, core and LE strengthening along w pt education for carryover into HEP.     Other Recommendations: N/A      Therapist's Name:   Candice Herzog, PT  Date:10/31/2017  I CERTIFY THE NEED FOR THESE SERVICES FURNISHED UNDER THIS PLAN OF TREATMENT AND WHILE UNDER MY CARE    Physician's comments: ________________________________________________________________________________________________________________________________________________      Physician's Name: ___________________________________

## 2017-11-01 NOTE — PROGRESS NOTES
"Name: Alberto Dangelo  [facility] Number: [id number]  Date of Treatment: 10/31/2017   Diagnosis:   Encounter Diagnosis   Name Primary?   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  Patient reports their pain to be n/a/10 on a 0-10 scale with 0 being no pain and 10 being the worst pain imaginable.  "When you decreased the amount of unloading the other day, my walking got easier."    Objective    Patient received individual therapy to increase strength, ROM, flexibility and core stabilization with 0 patients with activities as follows:     Alberto participated in dynamic functional therapeutic activities to improve functional performance for 67 minutes. Including: Set up with 15 kg unloading x 15 min then decreased to 5kg for remaining of session.  Pt participated in computer assisted robotic gait training via [employer] @ 2.5 kph x 57 min then increased to 3 kph for remainder of session, Total time 60 min 40 sec for a total distance of 2541 m.  Utilized guidance force of 80% x 10 min then decreased to 60% for remainder of session..      Written Home Exercises Provided: N.A  Pt demo good understanding of the education provided. Alberto demonstrated good return demonstration of activities.     Assessment:   Excellent training session.  Tolerating decrease in unweighting without difficulty.    Pt will continue to benefit from skilled PT intervention. Medical Necessity is demonstrated by:  Fall Risk, Unable to participate fully in daily activities and Weakness.    Patient is making fair progress towards established goals.    New/Revised goals: See updated POC      Plan:  Continue with established Plan of Care towards PT goals.   "

## 2017-11-02 ENCOUNTER — CLINICAL SUPPORT (OUTPATIENT)
Dept: REHABILITATION | Facility: HOSPITAL | Age: 57
End: 2017-11-02
Attending: FAMILY MEDICINE
Payer: COMMERCIAL

## 2017-11-02 DIAGNOSIS — S14.129A INCOMPLETE INJURY OF CERVICAL SPINAL CORD WITH CENTRAL CORD SYNDROME: ICD-10-CM

## 2017-11-02 PROCEDURE — 97116 GAIT TRAINING THERAPY: CPT | Mod: PN

## 2017-11-03 ENCOUNTER — CLINICAL SUPPORT (OUTPATIENT)
Dept: REHABILITATION | Facility: HOSPITAL | Age: 57
End: 2017-11-03
Attending: FAMILY MEDICINE
Payer: COMMERCIAL

## 2017-11-03 DIAGNOSIS — R53.1 DECREASED STRENGTH: ICD-10-CM

## 2017-11-03 DIAGNOSIS — S14.129A INCOMPLETE INJURY OF CERVICAL SPINAL CORD WITH CENTRAL CORD SYNDROME: ICD-10-CM

## 2017-11-03 DIAGNOSIS — Z78.9 IMPAIRED MOTOR CONTROL: Primary | ICD-10-CM

## 2017-11-03 PROCEDURE — 97116 GAIT TRAINING THERAPY: CPT | Mod: PO | Performed by: PHYSICAL THERAPIST

## 2017-11-03 NOTE — PROGRESS NOTES
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   Pain Scale:  denies    Objective       Patient received individual therapy with activities as follows:     Gait training x 45 min including:    DF stretch via runner's stretch     Gait training performed with bilateral PRO AFOs and bilateral ace wraps for DF  ambulation training with RW with bilateral AFOs and foot sliders + Tband 128 ft x 2 with CGA- supervision for improving weight bearing through stance LE    // bars: Side stepping with BUE support and min A to move LE in proper position-  1 laps x 2    Sit<>stand to RW from w/c with mod I     Tall kneeling squats with theraball/ BUE support- mod A 10x    NP today:   Pre gait with RW with above set up: forward step with intermittent A for foot placement 2 x 10   Intermittent mod A to step back      Supervised treatment with PT tech to improve motor control and strength to improve ambulation ability:   Partial sit ups off of large wedge with 1 pillow, 2000g ball- 2 x 10    Sitting: trunk rotation with 3000g ball with fair- balance 2 x 10    Chest press with 3000g ball 2 x  10    Bilateral hip/ knee flex/ ext with ball, mod A 2 x 20x  LTR with ball (lLE strapped together) 2 x 20x  Side lying hip flex/ ext with max A 2 x 20x  Clamshells 2 x 10x      Written Home Exercises: 8/18/17- practice pelvic tilts in all directions  6/2/17- pt educated on hip flexion strengthening in pool  pt instructed to use FES cycle prior to therapy with Bioness to attempt to decrease tone.  Pt demo good understanding of the education provided. Alberto demonstrated good return demonstration of activities.     Education provided re: POC, HEP  No spiritual or educational barriers to learning provided    Pt has no cultural, educational or language barriers to learning provided.    Assessment   Alberto tolerated treatment well. No significant scissoring noted today. Pt demonstrated improved weight bearing in BLE during ambulation, minimal vaulting is noted due to decreased LE strength and increased tone. Pt was able to tolerate tall kneeling with BUE support, but demonstrated fast fatigue during squats. Improved BLE motor control is noted with sit<>stand. Improved ab strength was noted with pt performing modified sit ups without UE support.   Pt can benefit from continued skilled PT to address impairments listed below to improve gait ability.      Pt rehab potential is Good. Pt will benefit from continuing skilled outpatient physical therapy to address the deficits listed below in the problem list, provide pt/family education and to maximize pt's level of independence in the home and community environment.         Medical necessity is demonstrated by the following IMPAIRMENTS/PROMBLEM LIST:   1. Fall Risk - impaired balance   2. Weakness   3. Impaired muscle tone  4. Gait deviations   5. Decreased ambulation   6. Decreased activity tolerance   7. Continued inability to participate in vocational pursuits   8. Requires skilled supervision to complete and progress HEP      Anticipated barriers to physical therapy:  poor motor control in hips     Pt's spiritual, cultural and educational needs considered and pt agreeable to plan of care and goals as stated below:      GOALS:   1) Facilitate improved tone in LE, Progressing   2) Increase passive ankle DF to neutral, MET 3) Facilitate improved LE flexibility. Progressing  New Short Term Goals Status:    1) Improve B ankle DF to at least 5* passively to facilitate improved foot clearance during gait,   2) Improve LE flexibility to min to mod limitation in all areas,   3) Assess stand pivot transfers and gait with assistive device.   Long Term Goal Status:  continue per initial plan of care.   1) Increase strength in LE as indicated by improved L-force testing, Progressing   2) Pt will safely ambulate > 200' with supervision using appropriate assistive device/ Bioness units.- improving (10/13/17)pt participated in gait training with RW, MARCOS PRO AFOs, MARCOS foot sliders, MARCOS thera bands to encourage LE flex, and CGA to improve LE weight bearing for 84 ft intervals  3) Pt will consistently perform safe stand pivot transfers, Not yet assessed.   4) Pt will demonstrate improved postural stability. Met.   5) new 5/4/17:  Pt will demonstrate competency in application of Bioness device and use of training mode.-NA 9/1/17 pt would like to focus on regular gait training  6) new 5/26/17: Pt will demonstrate improved bilateral hip flexion strength to 2/5 to improve ability to perform swing phase/ foot clearance during gait (with Bioness L300 Plus.)- met 10/13/17- 2/5 MARCOS    Revised 10/13/17- pt will demonstrate improved hip flex strength to 2+/5 to improve foot clearance during swing phase.  7) New 10/13/17: pt will demonstrate improved MARCOS hip abd strength to 2/5 to improve ability to step sideways for transfers and to improve ZENA in standing/ ambulation.- 1/5     Plan   Continue PT 1x weekly under established Plan of Care, with treatment to include: pt education, HEP, therapeutic exercises,  neuromuscular re-education/balance exercises, therapeutic activities, joint mobilizations, and modalities PRN, to work towards established goals. Pt may be seen by PTA to carry out plan of care.     Kala Bajwa, PT   11/03/2017

## 2017-11-03 NOTE — PROGRESS NOTES
Name: Alberto Dangelo  [facility] Number: [id number]  Date of Treatment: 11/2/2017   Diagnosis:   Encounter Diagnosis   Name Primary?   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 Via manual w/c mod I. Mod I stretching in PT gym prior to session.    Objective:    Patient received individual therapy to increase strength, endurance, ROM, flexibility, posture and core stabilization with activities as follows:     Gait training via [employer] system for 60:27 minutes (plus set up):     Set up at 15 kg x 30', then 5 kg unloading. Computer-assisted robotic gait training via [employer] x 2.5 km/h. GF x 80% x 16', then 60% for distance of 2488 m. No stoppages.       Continue HEP daily.    Pt demo good understanding of the education provided. Alberto demonstrated good return demonstration of activities.     Assessment:     Cues for maintaining knee extn to prevent premature flexion in stance. Cont to demo good feedback via monitor throughout session in swing and stance phases.     Pt will continue to benefit from skilled PT intervention. Medical Necessity is demonstrated by:  Requires skilled supervision to complete and progress HEP and Weakness.    Patient is making good progress towards established goals.    Plan:    Continue with established Plan of Care towards PT goals.

## 2017-11-07 ENCOUNTER — CLINICAL SUPPORT (OUTPATIENT)
Dept: REHABILITATION | Facility: HOSPITAL | Age: 57
End: 2017-11-07
Attending: FAMILY MEDICINE
Payer: COMMERCIAL

## 2017-11-07 DIAGNOSIS — S14.129A INCOMPLETE INJURY OF CERVICAL SPINAL CORD WITH CENTRAL CORD SYNDROME: ICD-10-CM

## 2017-11-07 PROCEDURE — 97116 GAIT TRAINING THERAPY: CPT | Mod: PN

## 2017-11-07 PROCEDURE — 97116 GAIT TRAINING THERAPY: CPT

## 2017-11-07 NOTE — PROGRESS NOTES
Name: Alberto Dangelo  [facility] Number: [id number]  Date of Treatment: 11/07/2017   Diagnosis:   Encounter Diagnosis   Name Primary?   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 Mod I in manual w/c and mod I stretching in  PT gym prior to appt. States he feels like he is making progress with gait training with Kala, PT [facility].     Objective:    Patient received individual therapy to increase strength, endurance, ROM, flexibility, posture and core stabilization with activities as follows:     Gait training via [employer] system for 60:23 minutes (plus set up):     Set up at 15 kg x 30 min, then 5 kg unloading. Computer-assisted robotic gait training via [employer] x 2.5 km/h. GF x 80% x 15', then 60% for distance of 2494 m. No stoppages.     Continue HEP daily.    Pt demo good understanding of the education provided. Alberto demonstrated good return demonstration of activities.     Assessment:     Consistent minimal feedback throughout session and improved knee extn during stance today.     Pt will continue to benefit from skilled PT intervention. Medical Necessity is demonstrated by:  Requires skilled supervision to complete and progress HEP and Weakness.    Patient is making good progress towards established goals.    Plan:    Continue with established Plan of Care towards PT goals.

## 2017-11-09 ENCOUNTER — CLINICAL SUPPORT (OUTPATIENT)
Dept: REHABILITATION | Facility: HOSPITAL | Age: 57
End: 2017-11-09
Attending: FAMILY MEDICINE
Payer: COMMERCIAL

## 2017-11-09 DIAGNOSIS — S14.129A INCOMPLETE INJURY OF CERVICAL SPINAL CORD WITH CENTRAL CORD SYNDROME: ICD-10-CM

## 2017-11-10 ENCOUNTER — CLINICAL SUPPORT (OUTPATIENT)
Dept: REHABILITATION | Facility: HOSPITAL | Age: 57
End: 2017-11-10
Attending: FAMILY MEDICINE
Payer: COMMERCIAL

## 2017-11-10 DIAGNOSIS — Z78.9 IMPAIRED MOTOR CONTROL: Primary | ICD-10-CM

## 2017-11-10 DIAGNOSIS — R53.1 DECREASED STRENGTH: ICD-10-CM

## 2017-11-10 DIAGNOSIS — S14.129A INCOMPLETE INJURY OF CERVICAL SPINAL CORD WITH CENTRAL CORD SYNDROME: ICD-10-CM

## 2017-11-10 PROCEDURE — 97110 THERAPEUTIC EXERCISES: CPT | Mod: PO | Performed by: PHYSICAL THERAPIST

## 2017-11-10 PROCEDURE — 97116 GAIT TRAINING THERAPY: CPT | Mod: PO | Performed by: PHYSICAL THERAPIST

## 2017-11-10 NOTE — PROGRESS NOTES
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   Pain Scale:  denies    Objective       Patient received individual therapy with activities as follows:     Gait training x 30 min including:    DF stretch via runner's stretch- supervised     Gait training performed with bilateral PRO AFOs and bilateral ace wraps for DF- 20 ft + 63 ft x 2 with min A for tibial advancement and decreasing knee hyperextension to break tone    Sit<>stand to RW from w/c with mod I  (uncontrolled descent onto low surface)     Pre gait with RW with above set up: forward step with MIN  A for foot placement 10X   SIDE STEP WITH MIN a FOR FOOT PLACEMENT      Pt participated in therapeutic exercise x 20 minutes to improve strength and motor return/ control:   Tall kneeling squats with theraball/ BUE support- min-mod A 10x  Ab rolls with green tball with mod A 2 x 5  Prone on hands  Prone>quadruped with supervision    Quadruped alternate UE lift with 2# 10x ea    NP today:   Partial sit ups off of large wedge with 1 pillow, 2000g ball- 2 x 10  Sitting: trunk rotation with 3000g ball with fair- balance 2 x  10    Chest press with 3000g ball 2 x 10  Bilateral hip/ knee flex/ ext with ball, mod A 2 x 20x  LTR with ball (lLE strapped together) 2 x 20x  Side lying hip flex/ ext with max A 2 x 20x  Clamshells 2 x 10x      Written Home Exercises: 8/18/17- practice pelvic tilts in all directions  6/2/17- pt educated on hip flexion strengthening in pool  pt instructed to use FES cycle prior to therapy with Bioness to attempt to decrease tone.  Pt demo good understanding of the education provided. Alberto demonstrated good return demonstration of activities.     Education provided re: POC, HEP  No spiritual or educational barriers to learning provided    Pt has no cultural, educational or language barriers to learning provided.    Assessment   Alberto tolerated treatment well. Pt reported soreness in gluts, quads, and abs after last session. PT progress mat activities to address core and hip strength. PT noted good activation of thoracic erector spinae and fair recruitment of lumbar area erector spinae during core stability ex. Pt demonstrated ability to tolerate minimal knee flexion during stance phase to improve tibial advancement during stance. Pt required ~minimal assistance to perform. Pt demonstrated increased scissoring today. Pt demonstrated good tolerance to new exercises performed. Pt can benefit from continued skilled PT to address impairments listed below to improve gait ability.      Pt rehab potential is Good. Pt will benefit from continuing skilled outpatient physical therapy to address the deficits listed below in the problem list, provide pt/family education and to maximize pt's level of independence in the home and community environment.         Medical necessity is demonstrated by the following IMPAIRMENTS/PROMBLEM LIST:   1. Fall Risk - impaired balance   2. Weakness   3. Impaired muscle tone  4. Gait deviations   5. Decreased ambulation   6. Decreased activity tolerance   7. Continued inability to  participate in vocational pursuits   8. Requires skilled supervision to complete and progress HEP      Anticipated barriers to physical therapy: poor motor control in hips     Pt's spiritual, cultural and educational needs considered and pt agreeable to plan of care and goals as stated below:      GOALS:   1) Facilitate improved tone in LE, Progressing   2) Increase passive ankle DF to neutral, MET 3) Facilitate improved LE flexibility. Progressing  New Short Term Goals Status:    1) Improve B ankle DF to at least 5* passively to facilitate improved foot clearance during gait,   2) Improve LE flexibility to min to mod limitation in all areas,   3) Assess stand pivot transfers and gait with assistive device.   Long Term Goal Status:  continue per initial plan of care.   1) Increase strength in LE as indicated by improved L-force testing, Progressing   2) Pt will safely ambulate > 200' with supervision using appropriate assistive device/ Bioness units.- improving (11/10/17)pt participated in gait training with RW, MARCOS PRO AFOs, marcos feet ace wrapped for DF 63' x 2  3) Pt will consistently perform safe stand pivot transfers improved-  Can perform with RW and mod I  4) Pt will demonstrate improved postural stability. Met.   5) new 5/4/17:  Pt will demonstrate competency in application of Bioness device and use of training mode.-NA 9/1/17 pt would like to focus on regular gait training  6) new 5/26/17: Pt will demonstrate improved bilateral hip flexion strength to 2/5 to improve ability to perform swing phase/ foot clearance during gait (with Bioness L300 Plus.)- met 10/13/17- 2/5 MARCOS    Revised 10/13/17- pt will demonstrate improved hip flex strength to 2+/5 to improve foot clearance during swing phase.  7) New 10/13/17: pt will demonstrate improved MARCOS hip abd strength to 2/5 to improve ability to step sideways for transfers and to improve ZENA in standing/ ambulation. Improved (11/10/17)- 2-/5     Plan   Continue PT 1x  weekly under established Plan of Care, with treatment to include: pt education, HEP, therapeutic exercises, neuromuscular re-education/balance exercises, therapeutic activities, joint mobilizations, and modalities PRN, to work towards established goals. Pt may be seen by PTA to carry out plan of care.     Kala Bajwa, PT   11/10/2017

## 2017-11-14 ENCOUNTER — PATIENT MESSAGE (OUTPATIENT)
Dept: FAMILY MEDICINE | Facility: CLINIC | Age: 57
End: 2017-11-14

## 2017-11-21 ENCOUNTER — TELEPHONE (OUTPATIENT)
Dept: FAMILY MEDICINE | Facility: CLINIC | Age: 57
End: 2017-11-21

## 2017-11-22 NOTE — TELEPHONE ENCOUNTER
DME orders from [employer] signed by Dr. Ortiz and faxed back to [phone]-copy sent to scanning.

## 2017-11-24 ENCOUNTER — CLINICAL SUPPORT (OUTPATIENT)
Dept: REHABILITATION | Facility: HOSPITAL | Age: 57
End: 2017-11-24
Attending: FAMILY MEDICINE
Payer: COMMERCIAL

## 2017-11-24 DIAGNOSIS — Z78.9 IMPAIRED MOTOR CONTROL: Primary | ICD-10-CM

## 2017-11-24 DIAGNOSIS — R53.1 DECREASED STRENGTH: ICD-10-CM

## 2017-11-24 DIAGNOSIS — S14.129A INCOMPLETE INJURY OF CERVICAL SPINAL CORD WITH CENTRAL CORD SYNDROME: ICD-10-CM

## 2017-11-24 PROCEDURE — 97116 GAIT TRAINING THERAPY: CPT | Mod: PO | Performed by: PHYSICAL THERAPIST

## 2017-11-24 PROCEDURE — 97110 THERAPEUTIC EXERCISES: CPT | Mod: PO | Performed by: PHYSICAL THERAPIST

## 2017-11-24 NOTE — PROGRESS NOTES
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     Neurogenic bladder     Pulmonary embolism        Evaluation Date: 8/24/2016  Visit auth #: [id number]  Extended POC: 1/8/2018  Precautions: universal, fall        Subjective   Pt reports: pt reports he has been unable to participate in gait training with Locomat x 2 weeks due to mechanical issues  Pain Scale:  denies    Objective       Patient received individual therapy with activities as follows:     Gait training x 35 min including:    DF stretch via runner's stretch- supervised     Gait training performed with bilateral PRO AFOs, bilateral ace wraps for DF, and RW- 164 ft with CGA for tibial advancement and decreasing knee hyperextension to break tone    Sit<>stand to RW from w/c with mod I     Pre gait with RW with above set up: forward step with MIN  A for foot placement 10X  // bar: Side stepping with min -CGA for abd- 1 lap x 2      Pt participated in therapeutic exercise x 30 minutes to improve strength and motor return/ control:   Tall kneeling squats with theraball/ BUE support- min-mod A 10x (unable to complete>5)  Ab rolls with green tball with mod A 2 x 5 (unable to complete)  Bilateral hip/ knee flex/ ext with ball, mod A 2 x 20x (unable to complete due to increased quad tone)   LTR with ball (lLE strapped together)  2 x 20x    Prone on hands  Prone>quadruped with supervision  Quadruped alternate UE lift with 2# 10x ea with supervision    Partial sit ups off of large wedge, 3000g ball- 2 x 10, CGA  Sitting EOM with 2000g ball toss with trunk rotation  Overhead lift with 2000g ball 10x    Side lying: hip knee flex/ ext with powder board- until fatigue 2x, mod- max A    NP today:   Sitting: Chest press with 3000g ball 2 x 10  Side lying hip flex/ ext with max A 2 x 20x  Clamshells 2 x 10x      Written Home Exercises: 8/18/17- practice pelvic tilts in all directions  6/2/17- pt educated on hip flexion strengthening in pool  pt instructed to use FES cycle prior to therapy with Bioness to attempt to decrease tone.  Pt demo good understanding of the education provided. Alberto demonstrated good return demonstration of activities.     Education provided re: POC, HEP  No spiritual or educational barriers to learning provided    Pt has no cultural, educational or language barriers to learning provided.    Assessment   Alberto tolerated treatment well. Increased quad tone and limited recent participation in Locomat and HEP affected treatment today. Pt had more difficulty completing LE mat exercises due to increased extensor tone. Pt was able to tolerate more advanced core strengthening activities (i.e. Weighted ball toss). Pt also demonstrated progress in ambulation with only CGA needed to facilitate tibial advancement and decrease knee hyperextension needed for swing. Pt is ambulating with decreased noted vaulting, but continues to demonstrate decreased heel contact due to extensor tone. Pt was able to perform side stepping at parallel bars with decreased assistance, but increased trunk lean was noted. Pt demonstrated good tolerance to new exercises performed. Pt can benefit from continued skilled PT to address impairments listed below to improve gait ability.      Pt rehab potential is Good. Pt will benefit from continuing skilled  outpatient physical therapy to address the deficits listed below in the problem list, provide pt/family education and to maximize pt's level of independence in the home and community environment.         Medical necessity is demonstrated by the following IMPAIRMENTS/PROMBLEM LIST:   1. Fall Risk - impaired balance   2. Weakness   3. Impaired muscle tone  4. Gait deviations   5. Decreased ambulation   6. Decreased activity tolerance   7. Continued inability to participate in vocational pursuits   8. Requires skilled supervision to complete and progress HEP      Anticipated barriers to physical therapy: poor motor control in hips     Pt's spiritual, cultural and educational needs considered and pt agreeable to plan of care and goals as stated below:      GOALS:   1) Facilitate improved tone in LE, Progressing   2) Increase passive ankle DF to neutral, MET 3) Facilitate improved LE flexibility. Progressing  New Short Term Goals Status:    1) Improve B ankle DF to at least 5* passively to facilitate improved foot clearance during gait,   2) Improve LE flexibility to min to mod limitation in all areas,   3) Assess stand pivot transfers and gait with assistive device.   Long Term Goal Status:  continue per initial plan of care.   1) Increase strength in LE as indicated by improved L-force testing, Progressing   2) Pt will safely ambulate > 200' with supervision using appropriate assistive device/ Bioness units.- improving (11/10/17)pt participated in gait training with RW, MARCOS PRO AFOs, marcos feet ace wrapped for DF 63' x 2  3) Pt will consistently perform safe stand pivot transfers improved-  Can perform with RW and mod I  4) Pt will demonstrate improved postural stability. Met.   5) new 5/4/17:  Pt will demonstrate competency in application of Bioness device and use of training mode.-NA 9/1/17 pt would like to focus on regular gait training  6) new 5/26/17: Pt will demonstrate improved bilateral hip flexion strength to  2/5 to improve ability to perform swing phase/ foot clearance during gait (with [employer] L300 Plus.)- met 10/13/17- 2/5 MARCOS    Revised 10/13/17- pt will demonstrate improved hip flex strength to 2+/5 to improve foot clearance during swing phase.  7) New 10/13/17: pt will demonstrate improved MARCOS hip abd strength to 2/5 to improve ability to step sideways for transfers and to improve ZENA in standing/ ambulation. Improved (11/10/17)- 2-/5     Plan   Continue PT 1x weekly under established Plan of Care, with treatment to include: pt education, HEP, therapeutic exercises, neuromuscular re-education/balance exercises, therapeutic activities, joint mobilizations, and modalities PRN, to work towards established goals. Pt may be seen by PTA to carry out plan of care.     Kala Bajwa, PT   11/24/2017

## 2017-12-01 ENCOUNTER — CLINICAL SUPPORT (OUTPATIENT)
Dept: REHABILITATION | Facility: HOSPITAL | Age: 57
End: 2017-12-01
Attending: FAMILY MEDICINE
Payer: COMMERCIAL

## 2017-12-01 DIAGNOSIS — S14.129A INCOMPLETE INJURY OF CERVICAL SPINAL CORD WITH CENTRAL CORD SYNDROME: ICD-10-CM

## 2017-12-01 DIAGNOSIS — R53.1 DECREASED STRENGTH: ICD-10-CM

## 2017-12-01 DIAGNOSIS — Z78.9 IMPAIRED MOTOR CONTROL: Primary | ICD-10-CM

## 2017-12-01 PROCEDURE — 97116 GAIT TRAINING THERAPY: CPT | Mod: PO | Performed by: PHYSICAL THERAPIST

## 2017-12-01 NOTE — PROGRESS NOTES
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     Neurogenic bladder     Pulmonary embolism        Evaluation Date: 8/24/2016  Visit auth #: [id number]  Extended POC: 1/8/2018  Precautions: universal, fall        Subjective   Pt reports: pt reports obtained bilateral external AFOs and wore to session  Pain Scale:  denies    Objective       Patient received individual therapy with activities as follows:     Gait training x 55 min including:    DF stretch via runner's stretch- supervised     Gait training performed with bilateral external AFOs, sliders on toes, and RW- 120 ft with VC for decreasing knee hyperextension to break tone  Sit<>stand to RW from w/c with mod I   // bar: Side stepping with min -CGA for abd- 1 lap x 2    Biodex unweighted harness- ~70 lb unweighted (~40-50% of body weight)  0.5 mph, BUE support 10 min + 3 min + 3 min  Max A x 2 at LE to maintain speed and improve foot clearance (increased PF tone initially)      NP today:   Pre gait with RW with above set up: forward step with MIN  A for foot placement 10X      Pt participated in therapeutic exercise x 0 minutes to improve strength and motor return/ control:   NP today:   Tall kneeling squats with theraball/ BUE support- min-mod A 10x (unable to complete>5)  Ab rolls with green tball with mod A 2 x 5 (unable to  complete)  Bilateral hip/ knee flex/ ext with ball, mod A 2 x 20x (unable to complete due to increased quad tone)   LTR with ball (lLE strapped together) 2 x 20x  Prone on hands  Prone>quadruped with supervision  Quadruped alternate UE lift with 2# 10x ea with supervision  Partial sit ups off of large wedge, 3000g ball- 2 x 10, CGA  Sitting EOM with 2000g ball toss with trunk rotation  Overhead lift with 2000g ball 10x  Side lying: hip knee flex/ ext with powder board- until fatigue 2x, mod- max A  Sitting: Chest press with 3000g ball 2 x 10  Side lying hip flex/ ext with max A 2 x 20x  Clamshells 2 x 10x      Written Home Exercises: 8/18/17- practice pelvic tilts in all directions  6/2/17- pt educated on hip flexion strengthening in pool  pt instructed to use FES cycle prior to therapy with Bioness to attempt to decrease tone.  Pt demo good understanding of the education provided. Alberto demonstrated good return demonstration of activities.     Education provided re: POC, HEP  No spiritual or educational barriers to learning provided    Pt has no cultural, educational or language barriers to learning provided.    Assessment   Alberto tolerated treatment well. Pt arrived with new external AFOs and different shoes. Traction on shoes and new braces caused a slight increase in vaulting. Pt was nabil to partially correct once provided with sliders for feet. PT trialed use of unweighted harness to improve gait pattern/ central pattern generator. Pt was unweighted 40-50% and required max A x 2 with advancing LE. Pt demonstrated improved motor control of LLE vs RLE on treadmill. Improved gait pattern was noted with extended use. Pt tolerated treadmill use well.  Pt can benefit from continued skilled PT to address impairments listed below to improve gait ability.      Pt rehab potential is Good. Pt will benefit from continuing skilled outpatient physical therapy to address the deficits listed below in the problem list,  provide pt/family education and to maximize pt's level of independence in the home and community environment.         Medical necessity is demonstrated by the following IMPAIRMENTS/PROMBLEM LIST:   1. Fall Risk - impaired balance   2. Weakness   3. Impaired muscle tone  4. Gait deviations   5. Decreased ambulation   6. Decreased activity tolerance   7. Continued inability to participate in vocational pursuits   8. Requires skilled supervision to complete and progress HEP      Anticipated barriers to physical therapy: poor motor control in hips     Pt's spiritual, cultural and educational needs considered and pt agreeable to plan of care and goals as stated below:      GOALS:   1) Facilitate improved tone in LE, Progressing   2) Increase passive ankle DF to neutral, MET 3) Facilitate improved LE flexibility. Progressing  New Short Term Goals Status:    1) Improve B ankle DF to at least 5* passively to facilitate improved foot clearance during gait,   2) Improve LE flexibility to min to mod limitation in all areas,   3) Assess stand pivot transfers and gait with assistive device.   Long Term Goal Status:  continue per initial plan of care.   1) Increase strength in LE as indicated by improved L-force testing, Progressing   2) Pt will safely ambulate > 200' with supervision using appropriate assistive device/ Bioness units.- improving (11/10/17)pt participated in gait training with RW, MARCOS PRO AFOs, marcos feet ace wrapped for DF 63' x 2  3) Pt will consistently perform safe stand pivot transfers improved-  Can perform with RW and mod I  4) Pt will demonstrate improved postural stability. Met.   5) new 5/4/17:  Pt will demonstrate competency in application of Bioness device and use of training mode.-NA 9/1/17 pt would like to focus on regular gait training  6) new 5/26/17: Pt will demonstrate improved bilateral hip flexion strength to 2/5 to improve ability to perform swing phase/ foot clearance during gait (with Bioness  L300 Plus.)- met 10/13/17- 2/5 MARCOS    Revised 10/13/17- pt will demonstrate improved hip flex strength to 2+/5 to improve foot clearance during swing phase.  7) New 10/13/17: pt will demonstrate improved MARCOS hip abd strength to 2/5 to improve ability to step sideways for transfers and to improve ZENA in standing/ ambulation. Improved (11/10/17)- 2-/5     Plan   Continue PT 1x weekly under established Plan of Care, with treatment to include: pt education, HEP, therapeutic exercises, neuromuscular re-education/balance exercises, therapeutic activities, joint mobilizations, and modalities PRN, to work towards established goals. Pt may be seen by PTA to carry out plan of care.     Kala Bajwa, PT   12/01/2017

## 2017-12-15 ENCOUNTER — CLINICAL SUPPORT (OUTPATIENT)
Dept: REHABILITATION | Facility: HOSPITAL | Age: 57
End: 2017-12-15
Attending: FAMILY MEDICINE
Payer: COMMERCIAL

## 2017-12-15 DIAGNOSIS — Z78.9 IMPAIRED MOTOR CONTROL: Primary | ICD-10-CM

## 2017-12-15 DIAGNOSIS — R53.1 DECREASED STRENGTH: ICD-10-CM

## 2017-12-15 DIAGNOSIS — S14.129A INCOMPLETE INJURY OF CERVICAL SPINAL CORD WITH CENTRAL CORD SYNDROME: ICD-10-CM

## 2017-12-15 PROCEDURE — 97116 GAIT TRAINING THERAPY: CPT | Mod: PO | Performed by: PHYSICAL THERAPIST

## 2017-12-15 NOTE — PROGRESS NOTES
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   G90.9 (ICD-10-CM) - Autonomic dysfunction       Encounter Diagnosis:   Impaired motor control Yes     Decreased strength          Physician: Dirk Ortiz MD  Treatment Orders: PT Eval and Treat  Past Medical History:   Diagnosis Date    Autonomic dysfunction     Constipation     Deep vein thrombosis     Depression     GERD (gastroesophageal reflux disease)     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 Still unable to participate in Locomat use  Pain Scale:  denies    Objective       Patient received individual therapy with activities as follows:     Gait training x 45 min including:    DF stretch via runner's stretch  Prone hip flexor stretch  Child's pose     Sit<>stand to RW from w/c with mod I     Biodex unweighted harness- ~50-70 lb unweighted (~40-50% of body weight)  0.5 mph, BUE support 3''40" + 2'30" + 2'23" + 2'5"  Sliders applied to both feet  Max A x 2 at LE to maintain speed and improve foot clearance (increased PF tone initially)    Stepping up on 6" step with RW and mod A x 2  Stepping down 9" step- max A x 2 due to MARCOS knee buckling    NP today:   Pre gait with RW with above set up: forward step with MIN  A for foot placement 10X  Gait training performed with bilateral external AFOs, sliders on toes, and RW- 120 ft with VC for decreasing knee hyperextension to break tone  // bar: Side stepping with min -CGA for abd- 1 lap x 2      Pt participated in therapeutic exercise x 0 minutes to improve strength and " motor return/ control:   NP today:   Tall kneeling squats with theraball/ BUE support- min-mod A 10x (unable to complete>5)  Ab rolls with green tball with mod A 2 x 5 (unable to complete)  Bilateral hip/ knee flex/ ext with ball, mod A 2 x 20x (unable to complete due to increased quad tone)   LTR with ball (lLE strapped together) 2 x 20x  Prone on hands  Prone>quadruped with supervision  Quadruped alternate UE lift with 2# 10x ea with supervision  Partial sit ups off of large wedge, 3000g ball- 2 x 10, CGA  Sitting EOM with 2000g ball toss with trunk rotation  Overhead lift with 2000g ball 10x  Side lying: hip knee flex/ ext with powder board- until fatigue 2x, mod- max A  Sitting: Chest press with 3000g ball 2 x 10  Side lying hip flex/ ext with max A 2 x 20x  Clamshells 2 x 10x      Written Home Exercises: 8/18/17- practice pelvic tilts in all directions  6/2/17- pt educated on hip flexion strengthening in pool  pt instructed to use FES cycle prior to therapy with Bioness to attempt to decrease tone.  Pt demo good understanding of the education provided. Alberto demonstrated good return demonstration of activities.     Education provided re: POC, HEP  No spiritual or educational barriers to learning provided    Pt has no cultural, educational or language barriers to learning provided.    Assessment   Alberto tolerated treatment well. Pt arrived with new external AFOs and different shoes. Pt participated in use of unweighted harness system and treadmill to stimulate the central pattern generator to improve muscle memory and motor control for walking. Pt required decreased percentage of weight to be unweighted (67#>50#) to improve bilateral heel contact during training. Pt requires at least 75% to advance BLE due to increased extensor/ PF tone.  Improved gait pattern was noted with extended use. Pt tolerated treadmill use well.  Pt can benefit from continued skilled PT to address impairments listed below to improve  gait ability.      Pt rehab potential is Good. Pt will benefit from continuing skilled outpatient physical therapy to address the deficits listed below in the problem list, provide pt/family education and to maximize pt's level of independence in the home and community environment.         Medical necessity is demonstrated by the following IMPAIRMENTS/PROMBLEM LIST:   1. Fall Risk - impaired balance   2. Weakness   3. Impaired muscle tone  4. Gait deviations   5. Decreased ambulation   6. Decreased activity tolerance   7. Continued inability to participate in vocational pursuits   8. Requires skilled supervision to complete and progress HEP      Anticipated barriers to physical therapy: poor motor control in hips     Pt's spiritual, cultural and educational needs considered and pt agreeable to plan of care and goals as stated below:      GOALS:   1) Facilitate improved tone in LE, Progressing   2) Increase passive ankle DF to neutral, MET 3) Facilitate improved LE flexibility. Progressing  New Short Term Goals Status:    1) Improve B ankle DF to at least 5* passively to facilitate improved foot clearance during gait,   2) Improve LE flexibility to min to mod limitation in all areas,   3) Assess stand pivot transfers and gait with assistive device.   Long Term Goal Status:  continue per initial plan of care.   1) Increase strength in LE as indicated by improved L-force testing, Progressing   2) Pt will safely ambulate > 200' with supervision using appropriate assistive device/ Bioness units.- improving (11/10/17)pt participated in gait training with RW, MARCOS PRO AFOs, marcos feet ace wrapped for DF 63' x 2  3) Pt will consistently perform safe stand pivot transfers improved-  Can perform with RW and mod I  4) Pt will demonstrate improved postural stability. Met.   5) new 5/4/17:  Pt will demonstrate competency in application of Bioness device and use of training mode.-NA 9/1/17 pt would like to focus on regular gait  training  6) new 5/26/17: Pt will demonstrate improved bilateral hip flexion strength to 2/5 to improve ability to perform swing phase/ foot clearance during gait (with Upworthy L300 Plus.)- met 10/13/17- 2/5 MARCOS    Revised 10/13/17- pt will demonstrate improved hip flex strength to 2+/5 to improve foot clearance during swing phase.  7) New 10/13/17: pt will demonstrate improved MARCOS hip abd strength to 2/5 to improve ability to step sideways for transfers and to improve ZENA in standing/ ambulation. Improved (11/10/17)- 2-/5     Plan   Continue PT 1x weekly under established Plan of Care, with treatment to include: pt education, HEP, therapeutic exercises, neuromuscular re-education/balance exercises, therapeutic activities, joint mobilizations, and modalities PRN, to work towards established goals. Pt may be seen by PTA to carry out plan of care.     Kala Bajwa, PT   12/15/2017

## 2017-12-19 ENCOUNTER — CLINICAL SUPPORT (OUTPATIENT)
Dept: REHABILITATION | Facility: HOSPITAL | Age: 57
End: 2017-12-19
Attending: FAMILY MEDICINE
Payer: COMMERCIAL

## 2017-12-19 DIAGNOSIS — S14.129A INCOMPLETE INJURY OF CERVICAL SPINAL CORD WITH CENTRAL CORD SYNDROME: ICD-10-CM

## 2017-12-19 DIAGNOSIS — Z78.9 IMPAIRED MOTOR CONTROL: ICD-10-CM

## 2017-12-19 DIAGNOSIS — R53.1 DECREASED STRENGTH: Primary | ICD-10-CM

## 2017-12-19 PROCEDURE — 97116 GAIT TRAINING THERAPY: CPT | Mod: PN

## 2017-12-20 NOTE — PROGRESS NOTES
"Name: Alberto Dangelo  [facility] Number: [id number]  Date of Treatment: 12/19/2017   Diagnosis:   Encounter Diagnoses   Name Primary?   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  I've been working with those at the [facility].  I was getting good hip flexion but I've kind of lost some of it since I've been working on the treadmill at the [facility]..  Patient reports their pain to be n/a/10 on a 0-10 scale with 0 being no pain and 10 being the worst pain imaginable.    Objective    Pt returned to clinic today to resume [employer] training (as equipment now available).      Patient received individual therapy to increase flexibility, core stabilization and gait quality with 0 patients with activities as follows:    Set up with 15 kg unloading.  Pt participated in computer assisted robotic gait training via [employer] @ 2.5 kph x 56 min then increased to 3.0 kph x 4 min, 4 sec for a total time of 60 min, 4 sec and a total distance of 2523 m.  Utilized guidance force of 85% x 16 min then decreased to 60% for remainder of session.       Written Home Exercises Provided: Discussed importance of improving active hip flexion to assist with improving gait quality.  Pt demo good understanding of the education provided. Alberto demonstrated good return demonstration of activities.     Assessment:   Achieved good training session with good foot clearance throughout session due to adequate ankle DF.      Pt will continue to benefit from skilled PT intervention. Medical Necessity is demonstrated by:  Fall Risk, Unable to participate fully in daily activities, Requires skilled supervision to complete and progress HEP and Weakness.    Patient is making fair progress towards established " goals (due to interruption of [employer] training).    New/Revised goals: N/A      Plan:  Resume [employer] training with focus on following established Plan of Care with progression towards PT goals.

## 2017-12-26 ENCOUNTER — CLINICAL SUPPORT (OUTPATIENT)
Dept: REHABILITATION | Facility: HOSPITAL | Age: 57
End: 2017-12-26
Attending: FAMILY MEDICINE
Payer: COMMERCIAL

## 2017-12-26 DIAGNOSIS — S14.129A INCOMPLETE INJURY OF CERVICAL SPINAL CORD WITH CENTRAL CORD SYNDROME: ICD-10-CM

## 2017-12-26 DIAGNOSIS — R53.1 DECREASED STRENGTH: ICD-10-CM

## 2017-12-26 DIAGNOSIS — Z78.9 IMPAIRED MOTOR CONTROL: ICD-10-CM

## 2017-12-26 PROCEDURE — 97116 GAIT TRAINING THERAPY: CPT | Mod: PN

## 2017-12-26 NOTE — PROGRESS NOTES
"Name: Alberto Dangelo  [facility] Number: [id number]  Date of Treatment: 12/26/2017   Diagnosis:   Encounter Diagnoses   Name Primary?   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  It's a long drive so I wanted to make sure I came in today to get stretched out.  Patient reports their pain to be n/a/10 on a 0-10 scale with 0 being no pain and 10 being the worst pain imaginable.    Objective    Patient received individual therapy to increase strength, ROM, flexibility, core stabilization and gait quality with 0 patients with activities as follows:     Alberto was set up for [employer]:  Set up with 15 kg unloading.  Pt participated in computer assisted robotic gait training via [employer] @ 2.5 kph x 56 min, 0 sec for a total distance of 2315 m then increased sadaf to 3.0 km/h x 4 min 07 sec for a distance of 204 m.  Total time 60 min, 07 sec and total distance of 2519 m.  Utilized guidance force of 80% x 16 min then decreased to 60% for remainder of session.        Written Home Exercises Provided: N/A  Pt demo good understanding of the education provided. Alberto demonstrated good return demonstration of activities.     Assessment:   Good session after adjustment of R LE position.      Pt will continue to benefit from skilled PT intervention. Medical Necessity is demonstrated by:  Fall Risk, Unable to participate fully in daily activities and impaired gait.     Patient is making fair progress towards established goals.    New/Revised goals: N/A      Plan:  Continue with established Plan of Care towards PT goals.   "

## 2018-01-04 ENCOUNTER — CLINICAL SUPPORT (OUTPATIENT)
Dept: REHABILITATION | Facility: HOSPITAL | Age: 58
End: 2018-01-04
Attending: FAMILY MEDICINE
Payer: COMMERCIAL

## 2018-01-04 DIAGNOSIS — S14.129A INCOMPLETE INJURY OF CERVICAL SPINAL CORD WITH CENTRAL CORD SYNDROME: ICD-10-CM

## 2018-01-04 DIAGNOSIS — R53.1 DECREASED STRENGTH: ICD-10-CM

## 2018-01-04 DIAGNOSIS — Z78.9 IMPAIRED MOTOR CONTROL: ICD-10-CM

## 2018-01-04 PROCEDURE — 97116 GAIT TRAINING THERAPY: CPT | Mod: PN

## 2018-01-04 NOTE — PROGRESS NOTES
Name: Alberto Dangelo  [facility] Number: [id number]  Date of Treatment: 01/04/2018   Diagnosis:   Encounter Diagnoses   Name Primary?   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 Mod I in manual w/c with [employer].      Objective:    Patient received individual therapy to increase strength, endurance, ROM, flexibility, posture and core stabilization with activities as follows:     Gait training via [employer]ed system for 60:16 minutes (plus set up):     Set up at 15 kg unloading. Computer-assisted robotic gait training via [employer] x 2.5 kmph x 52', 3.0 kmph x 8' km/h. GF x 80% x 10', 60% for distance of 2561 m. No stoppages.     Continue HEP daily.    Pt demo good understanding of the education provided. Alberto demonstrated good return demonstration of activities.     Assessment:     Progressing with faster drop in GF and increased time in higher speed but did need slight unweighting for proper knee extn during stance at 3.0 kmph.    Pt will continue to benefit from skilled PT intervention. Medical Necessity is demonstrated by:  Requires skilled supervision to complete and progress HEP and Weakness.    Patient is making good progress towards established goals.    Plan:    Continue with established Plan of Care towards PT goals.

## 2018-01-06 DIAGNOSIS — R25.2 SPASM: ICD-10-CM

## 2018-01-06 RX ORDER — DIAZEPAM 5 MG/1
TABLET ORAL
Qty: 60 TABLET | Refills: 0 | Status: SHIPPED | OUTPATIENT
Start: 2018-01-06 | End: 2018-03-26 | Stop reason: SDUPTHER

## 2018-01-09 ENCOUNTER — CLINICAL SUPPORT (OUTPATIENT)
Dept: REHABILITATION | Facility: HOSPITAL | Age: 58
End: 2018-01-09
Attending: FAMILY MEDICINE
Payer: COMMERCIAL

## 2018-01-09 DIAGNOSIS — R53.1 DECREASED STRENGTH: ICD-10-CM

## 2018-01-09 DIAGNOSIS — Z78.9 IMPAIRED MOTOR CONTROL: ICD-10-CM

## 2018-01-09 DIAGNOSIS — S14.129A INCOMPLETE INJURY OF CERVICAL SPINAL CORD WITH CENTRAL CORD SYNDROME: ICD-10-CM

## 2018-01-09 PROCEDURE — 97116 GAIT TRAINING THERAPY: CPT | Mod: PN

## 2018-01-09 NOTE — PROGRESS NOTES
Name: Alberto Dangelo  [facility] Number: [id number]  Date of Treatment: 01/09/2018   Diagnosis:   Encounter Diagnoses   Name Primary?   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  Patient reports their pain to be n/a/10 on a 0-10 scale with 0 being no pain and 10 being the worst pain imaginable.    Objective    Patient received individual therapy to increase strength, endurance, flexibility, posture and core stabilization with 0 patients with activities as follows:     Alberto participated in dynamic functional therapeutic activities to improve functional performance for 67 minutes. Including: Set up with 15 kg unloading.  Pt participated in computer assisted robotic gait training via [employer] @ 2.5 kph x 56 min then increased to 3.0 kph x 4 min, for a total of 60 min, 10 sec for a total distance of 2523 m.  Utilized guidance force of 100% x 2 min decreased to 80% x 8', and completed @ 60% for remainder of session.       Written Home Exercises Provided: N/A  Pt demo good understanding of the education provided. Alberto demonstrated good return demonstration of activities.     Assessment:   Excellent training session achieved.  Able to reduce guidance force without interruption.  Also able to increase sadaf for last 4 min of session    Pt will continue to benefit from skilled PT intervention. Medical Necessity is demonstrated by:  Fall Risk, Unable to participate fully in daily activities, Requires skilled supervision to complete and progress HEP and Weakness.    Patient is making fair progress towards established goals.    New/Revised goals: N/A      Plan:  Continue with established Plan of Care towards PT goals.

## 2018-01-11 ENCOUNTER — CLINICAL SUPPORT (OUTPATIENT)
Dept: REHABILITATION | Facility: HOSPITAL | Age: 58
End: 2018-01-11
Attending: FAMILY MEDICINE
Payer: COMMERCIAL

## 2018-01-11 DIAGNOSIS — S14.129A INCOMPLETE INJURY OF CERVICAL SPINAL CORD WITH CENTRAL CORD SYNDROME: ICD-10-CM

## 2018-01-11 DIAGNOSIS — R53.1 DECREASED STRENGTH: ICD-10-CM

## 2018-01-11 DIAGNOSIS — Z78.9 IMPAIRED MOTOR CONTROL: ICD-10-CM

## 2018-01-11 PROCEDURE — 97116 GAIT TRAINING THERAPY: CPT | Mod: PN

## 2018-01-11 NOTE — PROGRESS NOTES
Name: Alberto Dangelo  Clinic Number: [id number]  Date of Treatment: 01/11/2018   Diagnosis:   Encounter Diagnoses   Name Primary?   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 Mod I stretching routine in PT gym prior to session. Mod I in manual w/c. States he has noticed that his R LE ER lately and asking what he can do to correct. Daily HEP and workouts to include squats x 100, weight shifts, NuStep, and [place]. Wants to  Increase speed on mEgot for longer time today.     Objective:    Patient received individual therapy to increase strength, endurance, ROM, flexibility, posture and core stabilization with activities as follows:     Gait training via [employer] computerized system for 60:27 minutes (plus set up):     Set up at 15 kg unloading. Computer-assisted robotic gait training via [employer] x 2.6-3.0 (slow progression ~every 10-15 min of 1 kmph increment) km/h. GF x 80% x 10', then 60% for distance of 2825 m. No stoppages.       Continue HEP daily.    Pt demo good understanding of the education provided. Alberto demonstrated good return demonstration of activities.     Assessment:     Good tolerance for increased speed for longer duration with slow progression. Notable knee flexion in stance with increased speed, requiring cues for correction.     Pt will continue to benefit from skilled PT intervention. Medical Necessity is demonstrated by:  Requires skilled supervision to complete and progress HEP and Weakness.    Patient is making good progress towards established goals.    Plan:    Continue with established Plan of Care towards PT goals.

## 2018-01-12 DIAGNOSIS — K21.9 GASTROESOPHAGEAL REFLUX DISEASE, ESOPHAGITIS PRESENCE NOT SPECIFIED: ICD-10-CM

## 2018-01-12 RX ORDER — OMEPRAZOLE 20 MG/1
CAPSULE, DELAYED RELEASE ORAL
Qty: 90 CAPSULE | Refills: 0 | Status: SHIPPED | OUTPATIENT
Start: 2018-01-12 | End: 2018-04-13 | Stop reason: SDUPTHER

## 2018-01-16 ENCOUNTER — CLINICAL SUPPORT (OUTPATIENT)
Dept: REHABILITATION | Facility: HOSPITAL | Age: 58
End: 2018-01-16
Attending: FAMILY MEDICINE
Payer: COMMERCIAL

## 2018-01-16 DIAGNOSIS — S14.129A INCOMPLETE INJURY OF CERVICAL SPINAL CORD WITH CENTRAL CORD SYNDROME: ICD-10-CM

## 2018-01-16 DIAGNOSIS — Z78.9 IMPAIRED MOTOR CONTROL: ICD-10-CM

## 2018-01-16 DIAGNOSIS — R53.1 DECREASED STRENGTH: ICD-10-CM

## 2018-01-16 PROCEDURE — 97116 GAIT TRAINING THERAPY: CPT | Mod: PN

## 2018-01-16 NOTE — PROGRESS NOTES
"Name: Alberto Dangelo  [facility] Number: [id number]  Date of Treatment: 01/16/2018   Diagnosis:   Encounter Diagnoses   Name Primary?   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 "Felt good" after last session, like a good workout.  Mod I in manual w/c and in stretching routine prior to PT session.     Objective:    Patient received individual therapy to increase strength, endurance, ROM, flexibility, posture and core stabilization with activities as follows:     Gait training via [employer] system for 63:01 minutes (plus set up):      Set up at 15 kg unloading. Computer-assisted robotic gait training via [employer] x 2.6 km/h (slow progression ~every 10-15 min of 1 kmph increment) km/h. GF x 80% x 10', then 60% for distance of 2988 m. No stoppages.    Continue HEP daily.    Pt demo good understanding of the education provided. Alberto demonstrated good return demonstration of activities.     Assessment:     Notable improvement in knee extn today at latter part of session with increased speed vs last session. No foot/ankle adjustments required today as pt able to prevent toe drag. Decreased fatigue after session and no SOB with increased speed.     Pt will continue to benefit from skilled PT intervention. Medical Necessity is demonstrated by:  Requires skilled supervision to complete and progress HEP and Weakness.    Patient is making good progress towards established goals.    Plan:    Continue with established Plan of Care towards PT goals.   "

## 2018-01-23 ENCOUNTER — CLINICAL SUPPORT (OUTPATIENT)
Dept: REHABILITATION | Facility: HOSPITAL | Age: 58
End: 2018-01-23
Attending: FAMILY MEDICINE
Payer: COMMERCIAL

## 2018-01-23 DIAGNOSIS — S14.129A INCOMPLETE INJURY OF CERVICAL SPINAL CORD WITH CENTRAL CORD SYNDROME: ICD-10-CM

## 2018-01-23 DIAGNOSIS — Z78.9 IMPAIRED MOTOR CONTROL: ICD-10-CM

## 2018-01-23 DIAGNOSIS — R53.1 DECREASED STRENGTH: ICD-10-CM

## 2018-01-23 PROCEDURE — 97116 GAIT TRAINING THERAPY: CPT | Mod: PN

## 2018-01-23 NOTE — PROGRESS NOTES
Name: Alberto Dangelo  [facility] Number: [id number]  Date of Treatment: 01/23/2018   Diagnosis:   Encounter Diagnoses   Name Primary?   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  Patient reports their pain to be n/a/10 on a 0-10 scale with 0 being no pain and 10 being the worst pain imaginable.    Objective    Patient received individual therapy to increase strength, endurance, flexibility and core stabilization with 0 patients with activities as follows:     L-force testing completed after initial set up, prior to training for POC update.    Alberto participated in dynamic functional therapeutic activities to improve functional performance for 75 minutes. Including: Set up with 15 kg unloading.  Pt participated in computer assisted robotic gait training via [employer] @ 2.5-3.0 kph x 60 min, 9 sec for a total distance of 2846 m.  Utilized guidance force of 80% x 10 min then decreased to 60% for remainder of session.      Written Home Exercises Provided: N/A  Pt demo good understanding of the education provided. Alberto demonstrated good return demonstration of activities.     Assessment:   Pt demonstrated consistent foot clearance and good arm swing throughout the session.      Pt will continue to benefit from skilled PT intervention. Medical Necessity is demonstrated by:  Unable to participate fully in daily activities, Requires skilled supervision to complete and progress HEP and Weakness.    Patient is making fair progress towards established goals.    New/Revised goals: See updated POC      Plan:  Continue with established Plan of Care towards PT goals.

## 2018-01-23 NOTE — PLAN OF CARE
Date: 01/23/2018      PHYSICAL THERAPY UPDATED PLAN OF TREATMENT    Patient name: Alberto Dangelo  Onset Date:  7/30/2010  SOC Date:  8/24/2016  Primary Diagnosis:    1. Impaired motor control     2. Decreased strength     3. 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 Home equipment includes B AFO's, shower chair, ramped entrance w support bars to swimming pool, FES bike, free weights, plyobox system.   Updated Assessment:    Knee   Right     Left       AROM PROM MMT AROM PROM MMT   Flexion  initiates  140*  1/5  initiates   142*  1/5-2-/5   Extension  -30*  0*  2+/5  0*  0*  2+/5      Ankle   Right     Left       AROM PROM MMT AROM PROM MMT   Plantarflexion                Dorsiflexion w knee extended X  -2* 0/5 X  2*  1/5        L-FORCE TEST           LEFT                                              RIGHT                             FLEX                  EXT                      FLEX                     EXT                HIP     0.46 Nm            28.45 Nm                 1.82 Nm             10.36 Nm             KNEE    9.55 Nm            18.21 Nm                 4.16  Nm            18.80 Nm    Gait:  Pt typically utilizes wheelchair for mobility purposes; however, he is able to ambulate short distances using rolling walker both with and without AFOs  He demonstrates significant hip hiking and circumduction B with decreased foot clearance, B foot drop when not using AFOs, increased UE support. He tolerates 1 hour gait training on Locomat 2x/wk with sadaf from 2.5-3.0kph, unweighting from 15kg-5kg, and guidance force from 80%-60% demonstrating good gait pattern, appropriate arm swing, good foot clearance, and excellent trunk  control.      Previous Short Term Goals Status:     1) Improve B ankle DF to at least 5* passively to facilitate improved foot clearance during gait (Almost met)  2) Improve LE flexibility to min to mod limitation in all areas, (slowly progressing)  3) Assess stand pivot transfers and gait with assistive device. (Not met)    4) Pt will amb 60 ft w RW, SBA in less than  30 sec  (Not met)    New Short Term Goals Status:        1) Pt will perform sit <> stand transfers with moderate UE support     2) Pt will improve B ankle DF to at least 5* passively to facilitate improved foot clearance during gait      3) Facilitate active hip flexion in standing for improved LE swing through in gait    Long Term Goal Status:   continue per previous plan of care.  1) Increase strength in B  LE as indicated by improved L-force testing   2) Pt will safely ambulate > 200' with supervision (using appropriate assistive device/ Bioness units-Discontinued as pt no longer using Bioness)  (Not met)   3) Pt will consistently perform safe stand pivot transfers, Not yet assessed.   4) Pt will demonstrate improved bilateral hip flexion strength to 2/5 to improve ability to perform swing phase/ foot clearance during gait     5) Pt will demonstrate appropriate weight shift during ambulation to limit hip hiking and excessive weight shift.   Reasons for Recertification of Therapy:   Pt continues to make slow gains in flexibility, trunk/core stability, and endurance which enhances his overall well being and functional mobility.  However, he continues with increased tone, decreased LE flexibility and limited LE strength which contribute to significant impairment in sit<>stand and stand pivot transfers, standing and gait activities.  He should continue to benefit from skilled PT services to address these limitations.      Certification Period: 1/23/2018 to 4/17/2018  Recommended Treatment Plan: 2 times per week for 12 weeks: Gait Training, Locomotor  Training, Neuromuscular Re-ed, Patient Education, Therapeutic Activites and Therapeutic Exercise  Other Recommendations: Pt may benefit from dry needling to address tone issues and facilitate muscle activity.          Therapist's Name: Candice Herzog, PT   Date: 01/23/2018    I CERTIFY THE NEED FOR THESE SERVICES FURNISHED UNDER THIS PLAN OF TREATMENT AND WHILE UNDER MY CARE    Physician's comments: ________________________________________________________________________________________________________________________________________________      Physician's Name: ___________________________________

## 2018-01-25 ENCOUNTER — CLINICAL SUPPORT (OUTPATIENT)
Dept: REHABILITATION | Facility: HOSPITAL | Age: 58
End: 2018-01-25
Attending: FAMILY MEDICINE
Payer: COMMERCIAL

## 2018-01-25 DIAGNOSIS — Z78.9 IMPAIRED MOTOR CONTROL: ICD-10-CM

## 2018-01-25 DIAGNOSIS — R53.1 DECREASED STRENGTH: ICD-10-CM

## 2018-01-25 DIAGNOSIS — S14.129A INCOMPLETE INJURY OF CERVICAL SPINAL CORD WITH CENTRAL CORD SYNDROME: ICD-10-CM

## 2018-01-25 PROCEDURE — 97116 GAIT TRAINING THERAPY: CPT | Mod: PN

## 2018-01-25 NOTE — PROGRESS NOTES
Name: Alberto Dangelo  [facility] Number: [id number]  Date of Treatment: 01/25/2018   Diagnosis:   Encounter Diagnoses   Name Primary?   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 Did UE workout this morning.      Objective:    Patient received individual therapy to increase strength, endurance, ROM, flexibility, posture and core stabilization with activities as follows:     Gait training via [employer] system for 60:17 minutes (plus set up):     Set up at 15 kg unloading. Computer-assisted robotic gait training via [employer] x 2.7-3.0 km/h. GF x 80% x 20', then 60% for distance of 2876 m. No stoppages.     Continue HEP daily.    Pt demo good understanding of the education provided. Pt demonstrated good return demonstration of activities.     Assessment:     Progressing with tolerance for faster increase in speed to 3.0 km/h for last 20 min of session. Needed strap adjustments once for feet 2* drag with increase of speed to 3.0 but good gait mechanics and consistent positive feedback via monitor.     Pt will continue to benefit from skilled PT intervention. Medical Necessity is demonstrated by:  Requires skilled supervision to complete and progress HEP and Weakness.    Patient is making good progress towards established goals.    Plan:    Continue with established Plan of Care towards PT goals.

## 2018-01-30 ENCOUNTER — CLINICAL SUPPORT (OUTPATIENT)
Dept: REHABILITATION | Facility: HOSPITAL | Age: 58
End: 2018-01-30
Attending: FAMILY MEDICINE
Payer: COMMERCIAL

## 2018-01-30 DIAGNOSIS — Z78.9 IMPAIRED MOTOR CONTROL: ICD-10-CM

## 2018-01-30 DIAGNOSIS — R53.1 DECREASED STRENGTH: ICD-10-CM

## 2018-01-30 DIAGNOSIS — S14.129A INCOMPLETE INJURY OF CERVICAL SPINAL CORD WITH CENTRAL CORD SYNDROME: ICD-10-CM

## 2018-01-30 PROCEDURE — 97116 GAIT TRAINING THERAPY: CPT | Mod: PN

## 2018-01-30 NOTE — PROGRESS NOTES
Name: Alberto Dangelo  [facility] Number: [id number]  Date of Treatment: 01/30/2018   Diagnosis:   Encounter Diagnoses   Name Primary?   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 Mod I in manual w/c and mod zi stretching routine prior to appt. Pt states had a great weekend, relaxing with his wife, uneventful.      Objective:    Patient received individual therapy to increase strength, endurance, ROM, flexibility, posture and core stabilization with activities as follows:     Gait training via [employer] system for 60:17 minutes (plus set up):     Set up at 15 kg unloading. Computer-assisted robotic gait training via [employer] x 2.7-3.0 km/h. GF x 80% x 20', then 60% for distance of 2850 m. No stoppages.       Continue HEP daily.    Pt demo good understanding of the education provided. Pt demonstrated good return demonstration of activities.     Assessment:     LE fatigue after 40 min with toe drag, requiring strap adjustments. Pt motivated and eager to progress. Improving TKE with stance B with increased speeds.     Pt will continue to benefit from skilled PT intervention. Medical Necessity is demonstrated by:  Requires skilled supervision to complete and progress HEP and Weakness.    Patient is making good progress towards established goals.    Plan:    Continue with established Plan of Care towards PT goals.

## 2018-02-01 ENCOUNTER — CLINICAL SUPPORT (OUTPATIENT)
Dept: REHABILITATION | Facility: HOSPITAL | Age: 58
End: 2018-02-01
Attending: FAMILY MEDICINE
Payer: COMMERCIAL

## 2018-02-01 DIAGNOSIS — S14.129A INCOMPLETE INJURY OF CERVICAL SPINAL CORD WITH CENTRAL CORD SYNDROME: ICD-10-CM

## 2018-02-01 DIAGNOSIS — R53.1 DECREASED STRENGTH: ICD-10-CM

## 2018-02-01 DIAGNOSIS — Z78.9 IMPAIRED MOTOR CONTROL: ICD-10-CM

## 2018-02-01 PROCEDURE — 97116 GAIT TRAINING THERAPY: CPT | Mod: PN

## 2018-02-01 NOTE — PROGRESS NOTES
Name: Alberto Dangelo  [facility] Number: [id number]  Date of Treatment: 02/01/2018   Diagnosis:   Encounter Diagnoses   Name Primary?   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 States his legs felt weak this morning when he was doing his squats. Mentioned that he spent approx 1.5 hours on [place] last night (usually performs much earlier in the day) and had flu shot yesterday. Didn't take tylenol or ibuprofen after shot.     Objective:    Patient received individual therapy to increase strength, endurance, ROM, flexibility, posture and core stabilization with activities as follows:     Gait training via [employer] system for 60:08 minutes (plus set up):     Set up at 15 kg unloading. Computer-assisted robotic gait training via [employer] x 2.7-3.0 km/h (increased at 15 min intervals). GF x 80% x 15', 70% x 10', then 60% for distance of 2833 m. No stoppages.     Continue HEP daily.    Pt demo good understanding of the education provided. Pt demonstrated good return demonstration of activities.     Assessment:     Good session with consistent feedback without fatigue.     Pt will continue to benefit from skilled PT intervention. Medical Necessity is demonstrated by:  Requires skilled supervision to complete and progress HEP and Weakness.    Patient is making good progress towards established goals.    Plan:    Continue with established Plan of Care towards PT goals.

## 2018-02-06 ENCOUNTER — CLINICAL SUPPORT (OUTPATIENT)
Dept: REHABILITATION | Facility: HOSPITAL | Age: 58
End: 2018-02-06
Attending: FAMILY MEDICINE
Payer: COMMERCIAL

## 2018-02-06 DIAGNOSIS — Z78.9 IMPAIRED MOTOR CONTROL: ICD-10-CM

## 2018-02-06 DIAGNOSIS — R53.1 DECREASED STRENGTH: ICD-10-CM

## 2018-02-06 DIAGNOSIS — S14.129A INCOMPLETE INJURY OF CERVICAL SPINAL CORD WITH CENTRAL CORD SYNDROME: ICD-10-CM

## 2018-02-06 PROCEDURE — 97116 GAIT TRAINING THERAPY: CPT | Mod: PN

## 2018-02-06 NOTE — PROGRESS NOTES
Name: Alberto Dangelo  [facility] Number: [id number]  Date of Treatment: 02/06/2018   Diagnosis:   Encounter Diagnoses   Name Primary?   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  Patient reports their pain to be n/a/10 on a 0-10 scale with 0 being no pain and 10 being the worst pain imaginable.    Objective    Patient received individual therapy to increase strength, flexibility, posture and core stabilization with 0 patients with activities as follows:     Alberto  participated in dynamic functional therapeutic activities to improve functional performance for 67 minutes. Including: Set up with 15 kg unloading.  Pt participated in computer assisted robotic gait training via [employer] @ 2.7 kph x 16 min then increased to 3.0 kph for remainder of session, Total gait training time 60 min, 11 sec for a total distance of 2917 m.  Utilized guidance force of 80% x 10 min then 60% for remainder of session.      Written Home Exercises Provided: N/A  Pt demo good understanding of the education provided. Alberto demonstrated good return demonstration of activities.     Assessment:   Demonstrated good posture throughout session.  Minimal correction of R foot toeing out required.      Pt will continue to benefit from skilled PT intervention. Medical Necessity is demonstrated by:  Unable to participate fully in daily activities, Requires skilled supervision to complete and progress HEP and Weakness.    Patient is making fair progress towards established goals.    New/Revised goals: N/A      Plan:  Continue with established Plan of Care towards PT goals.

## 2018-02-07 DIAGNOSIS — I95.9 HYPOTENSION, UNSPECIFIED HYPOTENSION TYPE: ICD-10-CM

## 2018-02-07 RX ORDER — MIDODRINE HYDROCHLORIDE 5 MG/1
TABLET ORAL
Qty: 720 TABLET | Refills: 0 | Status: SHIPPED | OUTPATIENT
Start: 2018-02-07 | End: 2018-06-03 | Stop reason: SDUPTHER

## 2018-02-08 ENCOUNTER — CLINICAL SUPPORT (OUTPATIENT)
Dept: REHABILITATION | Facility: HOSPITAL | Age: 58
End: 2018-02-08
Attending: FAMILY MEDICINE
Payer: COMMERCIAL

## 2018-02-08 DIAGNOSIS — Z78.9 IMPAIRED MOTOR CONTROL: ICD-10-CM

## 2018-02-08 DIAGNOSIS — R53.1 DECREASED STRENGTH: ICD-10-CM

## 2018-02-08 DIAGNOSIS — S14.129A INCOMPLETE INJURY OF CERVICAL SPINAL CORD WITH CENTRAL CORD SYNDROME: ICD-10-CM

## 2018-02-08 PROCEDURE — 97116 GAIT TRAINING THERAPY: CPT | Mod: PN

## 2018-02-08 NOTE — PROGRESS NOTES
Name: Alberto Dangelo  [facility] Number: [id number]  Date of Treatment: 02/08/2018   Diagnosis:   Encounter Diagnoses   Name Primary?   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 Mod I with manual w/ c. Mod I stretching program in PT gym prior to appt.    Objective:    Patient received individual therapy to increase strength, endurance, ROM, flexibility, posture and core stabilization with activities as follows:     Gait training via [employer] system for 60:12 minutes (plus set up):     Set up at 15 kg unloading. Computer-assisted robotic gait training via [employer] x 2.7-3.0 km/h. GF x 80% x 14', then 60% for distance of 2812 m. No stoppages.     Continue HEP daily.    Pt demo good understanding of the education provided. Patient demonstrated good return demonstration of activities.     Assessment:     Improving tolerance for increased speeds at faster rate with decreased LE fatigue. Consistent feedback.     Pt will continue to benefit from skilled PT intervention. Medical Necessity is demonstrated by:  Requires skilled supervision to complete and progress HEP and Weakness.    Patient is making good progress towards established goals.    Plan:    Continue with established Plan of Care towards PT goals.

## 2018-02-12 ENCOUNTER — DOCUMENTATION ONLY (OUTPATIENT)
Dept: REHABILITATION | Facility: HOSPITAL | Age: 58
End: 2018-02-12

## 2018-02-12 NOTE — PROGRESS NOTES
Physical Therapy (Land) Discharge    Pt was referred to outpatient PT on [facility] to demo Bioness L300 plus units. PT was able to elicit a contraction, but pt demonstrated significant trunk and LE strength deficits that needed to improve prior to obtaining Bioness L300 Plus's for gait.  PT began to address these impairments via increasing pt's HEP to include more hip and core strengthening. Pt was making good gains with improving strength and motor control to decrease vaulting and circumduction. BIoness L300 Plus were trialed multiple times, but appeared to increase extensor tone vs.assist with flexing LE.  PT also trialed unweighted harness over treadmill to address gait pattern. Pt required max A x 2 at a slowed speed (<1.0 mph) with ~50-70# unweighted. Pt's increased extensor tone is a limiting factor to improvement with gait. Pt demonstrated improved ambulation ability through the course of this treatment. Pt started with ambulation using a RW for ~30 ft. By the conclusion of PT, pt was able to ambulate up to 164 ft with RW and bilateral AFOs prior to resting. Pt no longer demonstrated scissoring during gait. Pt was also demonstrating improved core and LE strength and motor control (NT formally due to unanticipated d/c). PT also recommended external AFO's to determine if these devices would improve foot clearance as opposed to current PRO AFO's. Despite progress made, pt did not schedule appointments into 2018. PT left a message regarding continued land based PT, pt d/c'd    Status towards goals:   3) Pt will consistently perform safe stand pivot transfers improved-  Can perform with RW and mod I  4) Pt will demonstrate improved postural stability. Met.   5) new 5/4/17:  Pt will demonstrate competency in application of Bioness device and use of training mode.-NT as of  9/1/17- pt wanting to focus on gait with AFOs  6) new 5/26/17: Pt will demonstrate improved bilateral hip flexion strength to 2/5  to improve ability to perform swing phase/ foot clearance during gait (with [employer] L300 Plus.)- met 10/13/17- 2/5 MARCOS               Revised 10/13/17- pt will demonstrate improved hip flex strength to 2+/5 to improve foot clearance during swing phase. NT due to unanticipated d/c  7) New 10/13/17: pt will demonstrate improved MARCOS hip abd strength to 2/5 to improve ability to step sideways for transfers and to improve ZENA in standing/ ambulation. Improved (11/10/17)- 2-/5      Kala Bajwa DPT  2/12/2018

## 2018-02-15 ENCOUNTER — CLINICAL SUPPORT (OUTPATIENT)
Dept: REHABILITATION | Facility: HOSPITAL | Age: 58
End: 2018-02-15
Attending: FAMILY MEDICINE
Payer: COMMERCIAL

## 2018-02-15 DIAGNOSIS — R53.1 DECREASED STRENGTH: ICD-10-CM

## 2018-02-15 DIAGNOSIS — S14.129A INCOMPLETE INJURY OF CERVICAL SPINAL CORD WITH CENTRAL CORD SYNDROME: ICD-10-CM

## 2018-02-15 DIAGNOSIS — Z78.9 IMPAIRED MOTOR CONTROL: ICD-10-CM

## 2018-02-15 PROCEDURE — 97116 GAIT TRAINING THERAPY: CPT | Mod: PN

## 2018-02-15 NOTE — PROGRESS NOTES
Name: Alberto Dangelo  [facility] Number: [id number]  Date of Treatment: 02/15/2018   Diagnosis:   Encounter Diagnoses   Name Primary?   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 Went to [facility] in [place] without any issues. Has family coming to stay with his family this weekend.  Mod I in manual w/c and mod I stretching prior to session in PT gym.     Objective:    Patient received individual therapy to increase strength, endurance, ROM, flexibility, posture and core stabilization with activities as follows:   Gait training via [employer] system for 60:25 minutes (plus set up):     Set up at 15 kg unloading. Computer-assisted robotic gait training via [employer] x 2.8-3.0 (2.8 x 15', 2.9 x 30', then 3.0) km/h. GF x 80% x 10' then 60% for distance of 2875 m. No stoppages.     Continue HEP daily.    Pt demo good understanding of the education provided. Patient demonstrated good return demonstration of activities.     Assessment:     Improving tolerance for quick increase in speed without LE buckling. Consistent feedback.     Pt will continue to benefit from skilled PT intervention. Medical Necessity is demonstrated by:  Requires skilled supervision to complete and progress HEP and Weakness.    Patient is making good progress towards established goals.    Plan:    Continue with established Plan of Care towards PT goals.

## 2018-02-22 ENCOUNTER — CLINICAL SUPPORT (OUTPATIENT)
Dept: REHABILITATION | Facility: HOSPITAL | Age: 58
End: 2018-02-22
Attending: FAMILY MEDICINE
Payer: COMMERCIAL

## 2018-02-22 DIAGNOSIS — R53.1 DECREASED STRENGTH: ICD-10-CM

## 2018-02-22 DIAGNOSIS — Z78.9 IMPAIRED MOTOR CONTROL: ICD-10-CM

## 2018-02-22 DIAGNOSIS — S14.129A INCOMPLETE INJURY OF CERVICAL SPINAL CORD WITH CENTRAL CORD SYNDROME: ICD-10-CM

## 2018-02-22 PROCEDURE — 97116 GAIT TRAINING THERAPY: CPT | Mod: PN

## 2018-02-22 NOTE — PROGRESS NOTES
Name: Alberto Dangelo  Clinic Number: [id number]  Date of Treatment: 02/22/2018   Diagnosis:   Encounter Diagnoses   Name Primary?   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 Reports tightness B LE initially 2* limited stretching routine prior session 2* time constraints. Mod I in manual w/c. Has been working on increased glute mm activation with squats at home but is concerned re: increased R LE ER. Drinking more water lately.     Objective:    Patient received individual therapy to increase strength, endurance, ROM, flexibility, posture and core stabilization with activities as follows:     Gait training via [employer] system for 48:02 minutes (plus set up):     Set up at 15 kg unloading. Computer-assisted robotic gait training via [employer] x 2.5-3.0 (2.5 x 18', 2.9 x 13', then 3.0) km/h. GF x 80% x 16' then 60% for distance of 2227 m. No stoppages. Ended session a little early 2* need for BR break     Continue HEP daily.    Pt demo good understanding of the education provided. Patient demonstrated good return demonstration of activities.     Assessment:     Faster increase of speed to 3.0 at 30 minutes of gait training-initially good response but later increased foot drag, requiring strap adjustments; however, pt able to complete session at 3.0 from this point.     Pt will continue to benefit from skilled PT intervention. Medical Necessity is demonstrated by:  Requires skilled supervision to complete and progress HEP and Weakness.    Patient is making good progress towards established goals.    Plan:    Continue with established Plan of Care towards PT goals.

## 2018-02-24 ENCOUNTER — PATIENT MESSAGE (OUTPATIENT)
Dept: FAMILY MEDICINE | Facility: CLINIC | Age: 58
End: 2018-02-24

## 2018-02-25 ENCOUNTER — NURSE TRIAGE (OUTPATIENT)
Dept: ADMINISTRATIVE | Facility: CLINIC | Age: 58
End: 2018-02-25

## 2018-02-25 RX ORDER — CIPROFLOXACIN 500 MG/1
500 TABLET ORAL 2 TIMES DAILY
Qty: 20 TABLET | Refills: 0 | Status: SHIPPED | OUTPATIENT
Start: 2018-02-25 | End: 2018-03-07

## 2018-02-25 NOTE — TELEPHONE ENCOUNTER
"Patient stated that he has quadriplegia and his home test UTI kit was positive. He does self craterization. Reported bladder spasms and concerned about autonomic dysreflexia. Notified Dr. Benson who authorized Cipro 500mg BID for 10 days. Informed the patient that I would call in to the preferred pharmacy and he verbalized understanding. Instructed to call back with any additional problems and/or concerns    Reason for Disposition   All other urine symptoms    Answer Assessment - Initial Assessment Questions  1. SYMPTOM: "What's the main symptom you're concerned about?" (e.g., frequency, incontinence)      Possible UTI  2. ONSET: "When did the  ________  start?"      yesterday  3. PAIN: "Is there any pain?" If so, ask: "How bad is it?" (Scale: 1-10; mild, moderate, severe)      Spasms. 3-4/10  4. CAUSE: "What do you think is causing the symptoms?"      UTI  5. OTHER SYMPTOMS: "Do you have any other symptoms?" (e.g., fever, flank pain, blood in urine, pain with urination)      no  6. PREGNANCY: "Is there any chance you are pregnant?" "When was your last menstrual period?"      N/A    Protocols used: ST URINARY SYMPTOMS-A-      "

## 2018-02-27 ENCOUNTER — CLINICAL SUPPORT (OUTPATIENT)
Dept: REHABILITATION | Facility: HOSPITAL | Age: 58
End: 2018-02-27
Payer: COMMERCIAL

## 2018-02-27 DIAGNOSIS — Z78.9 IMPAIRED MOTOR CONTROL: ICD-10-CM

## 2018-02-27 DIAGNOSIS — R53.1 DECREASED STRENGTH: ICD-10-CM

## 2018-02-27 DIAGNOSIS — S14.129A INCOMPLETE INJURY OF CERVICAL SPINAL CORD WITH CENTRAL CORD SYNDROME: ICD-10-CM

## 2018-02-27 PROCEDURE — 97116 GAIT TRAINING THERAPY: CPT | Mod: PN

## 2018-02-27 NOTE — PROGRESS NOTES
"Name: Alberto Dangelo  [facility] Number: [id number]  Date of Treatment: 02/27/2018   Diagnosis:   Encounter Diagnoses   Name Primary?   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  Patient reports their pain to be n/a/10 on a 0-10 scale with 0 being no pain and 10 being the worst pain imaginable.  "I can get up a lot smoother now."  Pt reports having had a UTI since last week.      Objective    Patient received individual therapy to increase strength, endurance, flexibility and core stabilization with 0 patients with activities as follows:     Alberto participated in dynamic functional therapeutic activities to improve functional performance for 82 minutes. Including: Set up with 15 kg unloading.  Pt participated in computer assisted robotic gait training via [employer] @ 2.5 kph x 10 min increased to 2.7 kph x 10 min, then increased to 3.0 kph for remainder of session.  Pt participated in gait training x ~ 35 min then had to interrupt therapy in order to utilize bathroom.  Returned and had to undergo 2nd set up in order to complete session.   Total treatment time 60 min, 9 sec for a total distance of 2854 m.  Utilized guidance force of 80% x 10 min then decreased to 60% for remaining session.        Pt demo good understanding of the education provided. Alberto demonstrated good return demonstration of activities.     Assessment:   Pt demonstrates improved ability to perform sit <> stand transfers with decreased UE support.      Pt will continue to benefit from skilled PT intervention. Medical Necessity is demonstrated by:  Fall Risk, Unable to participate fully in daily activities, Requires skilled supervision to complete and progress HEP and Weakness.    Patient is making good progress towards established " goals.    New/Revised goals: N/A      Plan:  Continue with established Plan of Care towards PT goals.

## 2018-03-01 ENCOUNTER — CLINICAL SUPPORT (OUTPATIENT)
Dept: REHABILITATION | Facility: HOSPITAL | Age: 58
End: 2018-03-01
Payer: COMMERCIAL

## 2018-03-01 DIAGNOSIS — Z78.9 IMPAIRED MOTOR CONTROL: ICD-10-CM

## 2018-03-01 DIAGNOSIS — R53.1 DECREASED STRENGTH: ICD-10-CM

## 2018-03-01 DIAGNOSIS — S14.129A INCOMPLETE INJURY OF CERVICAL SPINAL CORD WITH CENTRAL CORD SYNDROME: ICD-10-CM

## 2018-03-01 PROCEDURE — 97116 GAIT TRAINING THERAPY: CPT | Mod: PN

## 2018-03-01 NOTE — PROGRESS NOTES
Name: Alberto Dangelo  [facility] Number: [id number]  Date of Treatment: 03/01/2018   Diagnosis:   Encounter Diagnoses   Name Primary?   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 Via manual w/c. Mod I stretching prior to appt.     Objective:    Patient received individual therapy to increase strength, endurance, ROM, flexibility, posture and core stabilization with activities as follows:     Gait training via [email or web address] system for 60:23 minutes (plus set up):     Set up at 15 kg unloading. Computer-assisted robotic gait training via [employer] x 2.8 x 15' then 3.0 km/h. GF x 80% x 15' then 60% for distance of 2958 m. No stoppages.       Continue HEP daily.    Pt demo good understanding of the education provided. Patient demonstrated good return demonstration of activities.     Assessment:     Much faster increase in speed today without LE buckling or toe drag issues as previous sessions. Consistent minimal feedback from hips and knees in swing and stance throughout session.     Pt will continue to benefit from skilled PT intervention. Medical Necessity is demonstrated by:  Requires skilled supervision to complete and progress HEP and Weakness.    Patient is making good progress towards established goals.    Plan:    Continue with established Plan of Care towards PT goals.

## 2018-03-06 ENCOUNTER — CLINICAL SUPPORT (OUTPATIENT)
Dept: REHABILITATION | Facility: HOSPITAL | Age: 58
End: 2018-03-06
Attending: FAMILY MEDICINE
Payer: COMMERCIAL

## 2018-03-06 DIAGNOSIS — S14.129A INCOMPLETE INJURY OF CERVICAL SPINAL CORD WITH CENTRAL CORD SYNDROME: ICD-10-CM

## 2018-03-06 DIAGNOSIS — R53.1 DECREASED STRENGTH: ICD-10-CM

## 2018-03-06 DIAGNOSIS — Z78.9 IMPAIRED MOTOR CONTROL: ICD-10-CM

## 2018-03-06 PROCEDURE — 97116 GAIT TRAINING THERAPY: CPT | Mod: PN

## 2018-03-08 ENCOUNTER — DOCUMENTATION ONLY (OUTPATIENT)
Dept: FAMILY MEDICINE | Facility: CLINIC | Age: 58
End: 2018-03-08

## 2018-03-08 ENCOUNTER — CLINICAL SUPPORT (OUTPATIENT)
Dept: REHABILITATION | Facility: HOSPITAL | Age: 58
End: 2018-03-08
Payer: COMMERCIAL

## 2018-03-08 DIAGNOSIS — Z78.9 IMPAIRED MOTOR CONTROL: ICD-10-CM

## 2018-03-08 DIAGNOSIS — R53.1 DECREASED STRENGTH: ICD-10-CM

## 2018-03-08 DIAGNOSIS — S14.129A INCOMPLETE INJURY OF CERVICAL SPINAL CORD WITH CENTRAL CORD SYNDROME: ICD-10-CM

## 2018-03-08 PROCEDURE — 97116 GAIT TRAINING THERAPY: CPT | Mod: PN

## 2018-03-08 NOTE — PROGRESS NOTES
Pre-Visit Chart Review  For Appointment Scheduled on 3-26-18    Health Maintenance Due   Topic Date Due    Colonoscopy  03/04/2012    Influenza Vaccine  08/01/2017

## 2018-03-08 NOTE — PROGRESS NOTES
Name: Alberto Dangelo  [employer] Number: [id number]  Date of Treatment: 03/08/2018   Diagnosis:   Encounter Diagnoses   Name Primary?   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 Mod I in manual w/c, mod I stretching prior to RX    Objective:    Patient received individual therapy to increase strength, endurance, ROM, flexibility, posture and core stabilization with activities as follows:     Gait training via [employer] system for 80:37 minutes (plus set up):     Set up at 15 kg unloading. Computer-assisted robotic gait training via [employer] x 2.8(x 10 min)-3.0 km/h. GF x 80% x 10', then 60% for distance of 3984 m. No stoppages.     Continue HEP daily.    Pt demo good understanding of the education provided. Patient demonstrated good return demonstration of activities.     Assessment:     Consistent feedback throughout session. Required increased strapping for initial foot drag with decreased GF, which improved with mobility. Overall decreased foot drag and knee buckling with changes in GF and speed vs past sessions.     Pt will continue to benefit from skilled PT intervention. Medical Necessity is demonstrated by:  Requires skilled supervision to complete and progress HEP and Weakness.    Patient is making good progress towards established goals.    Plan:    Continue with established Plan of Care towards PT goals.

## 2018-03-12 NOTE — PROGRESS NOTES
Name: Alberto Dangelo  [facility] Number: [id number]  Date of Treatment: 03/06/2018  Diagnosis:   Encounter Diagnoses   Name Primary?   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  Patient reports their pain to be n/a/10 on a 0-10 scale with 0 being no pain and 10 being the worst pain imaginable.    Objective    Patient received individual therapy to increase flexibility, posture, core stabilization and gait quality with 0 patients with activities as follows:     Alberto participated in dynamic functional therapeutic activities to improve functional performance for 46 minutes. Including: Set up with 15 kg unloading.  Pt participated in computer assisted robotic gait training via Lokomat @ 2.7 kph x 10 min then increased to 3.0 kph for remainder of session,  for a total distance of 1683 m.  Utilized guidance force of 80% x 10 min then decreased to 60% for remainder of session.  At 34 min of training, turned session over to Giulia Lock PTA to complete.      Written Home Exercises Provided: N/a  Pt demo good understanding of the education provided. Alberto demonstrated good return demonstration of activities.     Assessment:   Achieves good heel strike and toe off throughout session.  Tolerated increased sadaf without difficulty     Pt will continue to benefit from skilled PT intervention. Medical Necessity is demonstrated by:  Unable to participate fully in daily activities, Weakness and Gait impairment  Patient is making steady progress towards established goals.    New/Revised goals: N/A        Plan:  Continue with established Plan of Care towards PT goals.   Giulia Lock PTA to complete this session.

## 2018-03-12 NOTE — PROGRESS NOTES
Name: Alberto Dangelo  [facility] Number: [id number]  Date of Treatment: 03/12/2018   Diagnosis:   Encounter Diagnoses   Name Primary?   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 Care taken from FITZ Harvey for completion of session.     Objective:    Completion of Lokomat training     Gait training via [employer] robotic computerized system for 53 minutes (plus take down):     Set up at 15 kg unloading. Computer-assisted robotic gait training via [employer] x 3.0 km/h. GF x 60% for distance of 2630 m. No stoppages.     Continue HEP daily.    Pt demo good understanding of the education provided. Patient demonstrated good return demonstration of activities.     Assessment:     Increased time on lokomat 2* pt increased endurance and time availability.     Pt will continue to benefit from skilled PT intervention. Medical Necessity is demonstrated by:  Requires skilled supervision to complete and progress HEP and Weakness.    Patient is making good progress towards established goals.    Plan:    Continue with established Plan of Care towards PT goals.

## 2018-03-13 ENCOUNTER — CLINICAL SUPPORT (OUTPATIENT)
Dept: REHABILITATION | Facility: HOSPITAL | Age: 58
End: 2018-03-13
Payer: COMMERCIAL

## 2018-03-13 DIAGNOSIS — R53.1 DECREASED STRENGTH: ICD-10-CM

## 2018-03-13 DIAGNOSIS — Z78.9 IMPAIRED MOTOR CONTROL: ICD-10-CM

## 2018-03-13 DIAGNOSIS — S14.129A INCOMPLETE INJURY OF CERVICAL SPINAL CORD WITH CENTRAL CORD SYNDROME: ICD-10-CM

## 2018-03-13 PROCEDURE — 97116 GAIT TRAINING THERAPY: CPT | Mod: PN

## 2018-03-16 NOTE — PROGRESS NOTES
Name: Alberto Dangelo  [facility] Number: [id number]  Date of Treatment:03/13/2018  Diagnosis:   Encounter Diagnoses   Name Primary?   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  Patient reports their pain to be n/a/10 on a 0-10 scale with 0 being no pain and 10 being the worst pain imaginable.    Objective    Patient received individual therapy to increase strength, endurance, flexibility and core stabilization with 0 patients with activities as follows:     Alberto participated in dynamic functional therapeutic activities to improve functional performance for 90 minutes. Including: Set up with 15 kg unloading.  Pt participated in computer assisted robotic gait training via [employer] @ 2.7-3.0 kph x 82 min, 34 sec for a total distance of 4061 m.  Utilized guidance force of 80% x 10 min then decreased to 60% for remainder of session.  .      Written Home Exercises Provided: N/A  Pt demo good understanding of the education provided. Alberto demonstrated good return demonstration of activities.     Assessment:   Demonstrates good trunk control during [employer] activites as well as appropriate arm swing     Pt will continue to benefit from skilled PT intervention. Medical Necessity is demonstrated by:  Unable to participate fully in daily activities, Requires skilled supervision to complete and progress HEP, Impaired gait and functional mobility, and Weakness.    Patient is making steady progress towards established goals.    New/Revised goals: N/A      Plan:  Continue with established Plan of Care towards PT goals.

## 2018-03-20 ENCOUNTER — CLINICAL SUPPORT (OUTPATIENT)
Dept: REHABILITATION | Facility: HOSPITAL | Age: 58
End: 2018-03-20
Attending: FAMILY MEDICINE
Payer: COMMERCIAL

## 2018-03-20 DIAGNOSIS — Z78.9 IMPAIRED MOTOR CONTROL: ICD-10-CM

## 2018-03-20 DIAGNOSIS — S14.129A INCOMPLETE INJURY OF CERVICAL SPINAL CORD WITH CENTRAL CORD SYNDROME: ICD-10-CM

## 2018-03-20 DIAGNOSIS — R53.1 DECREASED STRENGTH: ICD-10-CM

## 2018-03-20 PROCEDURE — 97116 GAIT TRAINING THERAPY: CPT | Mod: PN

## 2018-03-22 ENCOUNTER — CLINICAL SUPPORT (OUTPATIENT)
Dept: REHABILITATION | Facility: HOSPITAL | Age: 58
End: 2018-03-22
Payer: COMMERCIAL

## 2018-03-22 DIAGNOSIS — Z12.11 COLON CANCER SCREENING: Primary | ICD-10-CM

## 2018-03-22 DIAGNOSIS — S14.129A INCOMPLETE INJURY OF CERVICAL SPINAL CORD WITH CENTRAL CORD SYNDROME: ICD-10-CM

## 2018-03-22 DIAGNOSIS — R53.1 DECREASED STRENGTH: ICD-10-CM

## 2018-03-22 DIAGNOSIS — Z78.9 IMPAIRED MOTOR CONTROL: ICD-10-CM

## 2018-03-22 PROCEDURE — 97116 GAIT TRAINING THERAPY: CPT | Mod: PN

## 2018-03-22 NOTE — PROGRESS NOTES
Name: Alberto Dangelo  [facility] Number: [id number]  Date of Treatment: 03/22/2018   Diagnosis:   Encounter Diagnoses   Name Primary?   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 Mod I in manual w/c and mod I stretching routine in PT gym prior to appt.     Objective:    Patient received individual therapy to increase strength, endurance, ROM, flexibility, posture and core stabilization with activities as follows:     Gait training via [employer] system for 60:18 minutes (plus set up):     Set up at 15 kg unloading. Computer-assisted robotic gait training via [employer] x 2.8 mph x 11', then 3.0 km/h. GF x 75% x 11', then 55% for distance of 2966 m. No stoppages.     Continue HEP daily.    Pt demo good understanding of the education provided. Patient demonstrated good return demonstration of activities.     Assessment:     Good tolerance for increased speed with decreased GF early on during session without excessive foot drag today.     Pt will continue to benefit from skilled PT intervention. Medical Necessity is demonstrated by:  Requires skilled supervision to complete and progress HEP and Weakness.    Patient is making good progress towards established goals.    Plan:    Continue with established Plan of Care towards PT goals.

## 2018-03-23 NOTE — PROGRESS NOTES
Name: Alberto Dangelo  [facility] Number: [id number]  Date of Treatment: 03/20/2018   Diagnosis:   Encounter Diagnoses   Name Primary?   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  Patient reports their pain to be n/a/10 on a 0-10 scale with 0 being no pain and 10 being the worst pain imaginable.    Objective  Patient performed self stretching (@ no charge)  prior to set up in harness.    Patient received individual therapy to increase strength, flexibility, core stabilization and gait quality with 0 patients with activities as follows:     Alberto participated in dynamic functional therapeutic activities to improve functional performance for 67 minutes. Including: Set up with 15 kg unloading.  Pt participated in computer assisted robotic gait training via [employer] @ 2.8-3.0 kph x 60 min, 18 sec for a total distance of 2950 m.  Utilized guidance force of 80% x 10 min then decreased to 60% for remainder of session.      Written Home Exercises Provided: Pt to continue his usual activities at home between Lokomat sessions.    Pt demo good understanding of the education provided. Alberto demonstrated good return demonstration of activities.     Assessment:   Pt achieving consistent heel strike and toe off throughout entire session following set up.  No ankle instability noted.      Pt will continue to benefit from skilled PT intervention. Medical Necessity is demonstrated by:  Unable to participate fully in daily activities, Weakness and Impaired gait/functional mobility    Patient is making slow but steady progress towards established goals.    New/Revised goals: N/A      Plan:  Continue with established Plan of Care towards PT goals.

## 2018-03-24 DIAGNOSIS — S14.129A INCOMPLETE INJURY OF CERVICAL SPINAL CORD WITH CENTRAL CORD SYNDROME: ICD-10-CM

## 2018-03-24 DIAGNOSIS — R25.2 SPASM: ICD-10-CM

## 2018-03-26 ENCOUNTER — LAB VISIT (OUTPATIENT)
Dept: LAB | Facility: HOSPITAL | Age: 58
End: 2018-03-26
Attending: FAMILY MEDICINE
Payer: COMMERCIAL

## 2018-03-26 ENCOUNTER — OFFICE VISIT (OUTPATIENT)
Dept: FAMILY MEDICINE | Facility: CLINIC | Age: 58
End: 2018-03-26
Attending: FAMILY MEDICINE
Payer: COMMERCIAL

## 2018-03-26 VITALS
HEIGHT: 71 IN | DIASTOLIC BLOOD PRESSURE: 80 MMHG | SYSTOLIC BLOOD PRESSURE: 134 MMHG | WEIGHT: 180.31 LBS | RESPIRATION RATE: 12 BRPM | TEMPERATURE: 98 F | OXYGEN SATURATION: 96 % | HEART RATE: 62 BPM | BODY MASS INDEX: 25.24 KG/M2

## 2018-03-26 DIAGNOSIS — K21.9 GASTROESOPHAGEAL REFLUX DISEASE, ESOPHAGITIS PRESENCE NOT SPECIFIED: ICD-10-CM

## 2018-03-26 DIAGNOSIS — Z12.11 COLON CANCER SCREENING: ICD-10-CM

## 2018-03-26 DIAGNOSIS — Z86.711 HISTORY OF PULMONARY EMBOLISM: ICD-10-CM

## 2018-03-26 DIAGNOSIS — Z12.5 PROSTATE CANCER SCREENING: ICD-10-CM

## 2018-03-26 DIAGNOSIS — Z00.00 ENCOUNTER FOR PREVENTIVE HEALTH EXAMINATION: Primary | ICD-10-CM

## 2018-03-26 DIAGNOSIS — Z00.00 ENCOUNTER FOR PREVENTIVE HEALTH EXAMINATION: ICD-10-CM

## 2018-03-26 DIAGNOSIS — S14.129A INCOMPLETE INJURY OF CERVICAL SPINAL CORD WITH CENTRAL CORD SYNDROME: ICD-10-CM

## 2018-03-26 DIAGNOSIS — R53.1 DECREASED STRENGTH: ICD-10-CM

## 2018-03-26 DIAGNOSIS — Z78.9 IMPAIRED MOTOR CONTROL: ICD-10-CM

## 2018-03-26 DIAGNOSIS — G90.9 AUTONOMIC DYSFUNCTION: ICD-10-CM

## 2018-03-26 DIAGNOSIS — R25.2 SPASM: ICD-10-CM

## 2018-03-26 DIAGNOSIS — Z86.718 HISTORY OF DVT (DEEP VEIN THROMBOSIS): ICD-10-CM

## 2018-03-26 LAB
ALBUMIN SERPL BCP-MCNC: 4.4 G/DL
ALP SERPL-CCNC: 82 U/L
ALT SERPL W/O P-5'-P-CCNC: 44 U/L
ANION GAP SERPL CALC-SCNC: 9 MMOL/L
AST SERPL-CCNC: 31 U/L
BASOPHILS # BLD AUTO: 0.08 K/UL
BASOPHILS NFR BLD: 1.3 %
BILIRUB SERPL-MCNC: 0.7 MG/DL
BUN SERPL-MCNC: 21 MG/DL
CALCIUM SERPL-MCNC: 9.8 MG/DL
CHLORIDE SERPL-SCNC: 103 MMOL/L
CO2 SERPL-SCNC: 28 MMOL/L
COMPLEXED PSA SERPL-MCNC: 1.3 NG/ML
CREAT SERPL-MCNC: 1.2 MG/DL
DIFFERENTIAL METHOD: NORMAL
EOSINOPHIL # BLD AUTO: 0.2 K/UL
EOSINOPHIL NFR BLD: 3 %
ERYTHROCYTE [DISTWIDTH] IN BLOOD BY AUTOMATED COUNT: 12.3 %
EST. GFR  (AFRICAN AMERICAN): >60 ML/MIN/1.73 M^2
EST. GFR  (NON AFRICAN AMERICAN): >60 ML/MIN/1.73 M^2
GLUCOSE SERPL-MCNC: 87 MG/DL
HCT VFR BLD AUTO: 47.5 %
HGB BLD-MCNC: 15.8 G/DL
IMM GRANULOCYTES # BLD AUTO: 0.03 K/UL
IMM GRANULOCYTES NFR BLD AUTO: 0.5 %
LYMPHOCYTES # BLD AUTO: 1.9 K/UL
LYMPHOCYTES NFR BLD: 30.2 %
MAGNESIUM SERPL-MCNC: 2.2 MG/DL
MCH RBC QN AUTO: 31 PG
MCHC RBC AUTO-ENTMCNC: 33.3 G/DL
MCV RBC AUTO: 93 FL
MONOCYTES # BLD AUTO: 0.5 K/UL
MONOCYTES NFR BLD: 7.9 %
NEUTROPHILS # BLD AUTO: 3.6 K/UL
NEUTROPHILS NFR BLD: 57.1 %
NRBC BLD-RTO: 0 /100 WBC
PLATELET # BLD AUTO: 180 K/UL
PMV BLD AUTO: 12.3 FL
POTASSIUM SERPL-SCNC: 4.1 MMOL/L
PROT SERPL-MCNC: 7.9 G/DL
RBC # BLD AUTO: 5.1 M/UL
SODIUM SERPL-SCNC: 140 MMOL/L
WBC # BLD AUTO: 6.36 K/UL

## 2018-03-26 PROCEDURE — 36415 COLL VENOUS BLD VENIPUNCTURE: CPT | Mod: PO

## 2018-03-26 PROCEDURE — 83735 ASSAY OF MAGNESIUM: CPT

## 2018-03-26 PROCEDURE — 85025 COMPLETE CBC W/AUTO DIFF WBC: CPT

## 2018-03-26 PROCEDURE — 99396 PREV VISIT EST AGE 40-64: CPT | Mod: S$GLB,,, | Performed by: FAMILY MEDICINE

## 2018-03-26 PROCEDURE — 84153 ASSAY OF PSA TOTAL: CPT

## 2018-03-26 PROCEDURE — 99999 PR PBB SHADOW E&M-EST. PATIENT-LVL IV: CPT | Mod: PBBFAC,,, | Performed by: FAMILY MEDICINE

## 2018-03-26 PROCEDURE — 80053 COMPREHEN METABOLIC PANEL: CPT

## 2018-03-26 RX ORDER — LANOLIN ALCOHOL/MO/W.PET/CERES
400 CREAM (GRAM) TOPICAL 2 TIMES DAILY
Qty: 180 TABLET | Refills: 3 | Status: SHIPPED | OUTPATIENT
Start: 2018-03-26 | End: 2019-04-22 | Stop reason: SDUPTHER

## 2018-03-26 RX ORDER — LANOLIN ALCOHOL/MO/W.PET/CERES
CREAM (GRAM) TOPICAL
Qty: 180 TABLET | Refills: 0 | OUTPATIENT
Start: 2018-03-26

## 2018-03-26 RX ORDER — DIAZEPAM 5 MG/1
TABLET ORAL
Qty: 60 TABLET | Refills: 0 | OUTPATIENT
Start: 2018-03-26

## 2018-03-26 RX ORDER — DIAZEPAM 5 MG/1
TABLET ORAL
Qty: 60 TABLET | Refills: 5 | Status: SHIPPED | OUTPATIENT
Start: 2018-03-26 | End: 2018-10-18 | Stop reason: SDUPTHER

## 2018-03-26 NOTE — PROGRESS NOTES
Subjective:       Patient ID: Alberto Dangelo is a 58 y.o. male.    Chief Complaint: Annual Exam    58-year-old male coming in for physical exam.  He sustained a C7 burst fracture followed by a C6-T1 fusion several years ago.  In addition he's had deep vein thrombosis and pulmonary embolism, neurogenic bladder, autonomic dysfunction and chronic constipation and GERD.  He is in for a regular checkup and is fasting other than some yogurt early this morning.  He is still working with his therapist here who does generalized deconditioning but he has not seen his therapist across the leg is been doing specialized gait training for several months.  He is seeing the lack and would like to resume that.  He has to call her and set up a repeat appointment and may need a new referral.  He will let us know.  Otherwise he's feeling well, working in his home gym daily and seeing his therapist here regularly.  He has no other complaints.  He is due for a colonoscopy but prefers to do a fit kit once we discussed the differences between them.  His flu shot was given late in January but he did not have any trouble with the flu this year.    Past Medical History:  No date: Autonomic dysfunction  No date: Constipation  No date: Deep vein thrombosis  No date: Depression  No date: GERD (gastroesophageal reflux disease)  No date: Incomplete injury of cervical spinal cord with*  No date: Neurogenic bladder  No date: Pulmonary embolism    Past Surgical History:  No date: NECK SURGERY  No date: SPINE SURGERY      Comment: fuse C 6 - T 1 burst C7  No date: TONSILLECTOMY  No date: WISDOM TOOTH EXTRACTION    Review of patient's family history indicates:    Cancer                         Mother                      Comment: brain    Early death                    Mother                    Cancer                         Father                      Comment: tumor foot     Heart disease                  Father                    Cancer                          Sister                      Comment: ovarian    No Known Problems              Brother                   No Known Problems              Daughter                  No Known Problems              Son                       Heart disease                  Maternal Grandmother      Heart disease                  Paternal Grandmother      Heart disease                  Maternal Grandfather      Parkinsonism                   Paternal Grandfather      Drug abuse                     Maternal Aunt             Early death                    Maternal Aunt             No Known Problems              Maternal Uncle            No Known Problems              Paternal Aunt             Drug abuse                     Paternal Uncle            Heart disease                  Paternal Uncle            Kidney disease                 Paternal Uncle            No Known Problems              Paternal Uncle            Social History    Marital status:              Spouse name:                       Years of education:                 Number of children:               Social History Main Topics    Smoking status: Never Smoker                                                                Smokeless tobacco: Never Used                        Alcohol use: No              Drug use: No                Current Outpatient Prescriptions:     aspirin (ECOTRIN) 81 MG EC tablet, Take 81 mg by mouth once daily., Disp: , Rfl:     bisacodyl (FLEET) 10 mg/30 mL Enem, Place 10 mg rectally once daily., Disp: , Rfl:     diazePAM (VALIUM) 5 MG tablet, TAKE 1 TABLET(5 MG) BY MOUTH EVERY 12 HOURS AS NEEDED FOR SPASM, Disp: 60 tablet, Rfl: 5    magnesium oxide (MAG-OX) 400 mg tablet, Take 1 tablet (400 mg total) by mouth 2 (two) times daily., Disp: 180 tablet, Rfl: 3    midodrine (PROAMATINE) 5 MG Tab, TAKE 2 TABLETS BY MOUTH FOUR TIMES DAILY, Disp: 720 tablet, Rfl: 0    omeprazole (PRILOSEC) 20 MG capsule, TAKE 1 CAPSULE(20 MG) BY MOUTH  EVERY DAY, Disp: 90 capsule, Rfl: 0    POLYETHYLENE GLYCOL 3350 (MIRALAX ORAL), Take 1 Dose by mouth every evening., Disp: , Rfl:     senna (SENOKOT) 8.6 mg tablet, Take 2 tablets by mouth once daily., Disp: , Rfl:     vitamin D 1000 units Tab, Take 185 mg by mouth once daily., Disp: , Rfl:     Colonoscopy due on 03/04/2012  Influenza Vaccine due on 08/01/2017        Review of Systems   Constitutional: Negative for activity change, chills, fatigue and fever.   HENT: Negative for congestion, ear pain, rhinorrhea and sore throat.    Eyes: Negative for visual disturbance.   Respiratory: Negative for cough, chest tightness and shortness of breath.    Cardiovascular: Negative for chest pain and palpitations.   Gastrointestinal: Positive for constipation. Negative for abdominal pain, blood in stool, diarrhea, nausea and vomiting.   Genitourinary: Negative for difficulty urinating, dysuria and hematuria.   Musculoskeletal: Positive for gait problem and neck stiffness (Status post C6-T1 fusion). Negative for arthralgias and neck pain.   Skin: Negative for color change and rash.   Neurological: Positive for weakness. Negative for dizziness and headaches.   Psychiatric/Behavioral: Negative for dysphoric mood and sleep disturbance. The patient is not nervous/anxious.        Objective:      Physical Exam   Constitutional: He is oriented to person, place, and time. He appears well-developed and well-nourished. No distress.   Good blood pressure control  Normal weight with BMI 25.2 he is down 4.9 pounds from his last physical January 17, 2017   HENT:   Head: Normocephalic and atraumatic.   Right Ear: External ear normal.   Left Ear: External ear normal.   Nose: Nose normal.   Mouth/Throat: Oropharynx is clear and moist. No oropharyngeal exudate.   Eyes: Conjunctivae and EOM are normal. Pupils are equal, round, and reactive to light. No scleral icterus.   Neck: Normal range of motion. Neck supple. No JVD present. No tracheal  deviation present. No thyromegaly present.   Cardiovascular: Normal rate, regular rhythm and normal heart sounds.  Exam reveals no gallop and no friction rub.    No murmur heard.  Carotid pulses 2+ without bruit   Pulmonary/Chest: Effort normal and breath sounds normal. No respiratory distress. He has no wheezes. He has no rales. He exhibits no tenderness.   Abdominal: Soft. Bowel sounds are normal. He exhibits no distension and no mass. There is no tenderness. There is no rebound and no guarding.   Musculoskeletal: Normal range of motion. He exhibits no edema or tenderness (No cords, no calf tenderness).   Lymphadenopathy:     He has no cervical adenopathy.   Neurological: He is alert and oriented to person, place, and time. He has normal reflexes. No cranial nerve deficit.   Skin: Skin is warm and dry. No rash noted. He is not diaphoretic. No erythema.   Psychiatric: He has a normal mood and affect. His behavior is normal. Thought content normal.   Nursing note and vitals reviewed.      Assessment:       1. Encounter for preventive health examination    2. Colon cancer screening    3. Prostate cancer screening    4. 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 Spasm    6. Autonomic dysfunction    7. History of pulmonary embolism    8. History of DVT (deep vein thrombosis)    9. Gastroesophageal reflux disease, esophagitis presence not specified    10. Impaired motor control    11. Decreased strength    12. BMI 25.0-25.9,adult        Plan:       1. Encounter for preventive health examination  - Comprehensive metabolic panel; Future  - CBC auto differential; Future  - Magnesium; Future  - PSA, Screening; Future    2. Colon cancer screening  - Fecal Immunochemical Test (iFOBT); Future    3. Prostate cancer screening  - PSA, Screening; Future    4. 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  Dispense: 180 tablet; Refill: 3    5. Spasm  - diazePAM (VALIUM) 5 MG tablet; TAKE 1 TABLET(5 MG) BY MOUTH EVERY 12 HOURS AS NEEDED FOR SPASM  Dispense: 60 tablet; Refill: 5    6. Autonomic dysfunction    7. History of pulmonary embolism    8. History of DVT (deep vein thrombosis)    9. Gastroesophageal reflux disease, esophagitis presence not specified  Asymptomatic    10. Impaired motor control    11. Decreased strength    12. BMI 25.0-25.9,adult

## 2018-03-27 ENCOUNTER — PATIENT MESSAGE (OUTPATIENT)
Dept: FAMILY MEDICINE | Facility: CLINIC | Age: 58
End: 2018-03-27

## 2018-03-27 ENCOUNTER — CLINICAL SUPPORT (OUTPATIENT)
Dept: REHABILITATION | Facility: HOSPITAL | Age: 58
End: 2018-03-27
Payer: COMMERCIAL

## 2018-03-27 DIAGNOSIS — S14.129A INCOMPLETE INJURY OF CERVICAL SPINAL CORD WITH CENTRAL CORD SYNDROME: ICD-10-CM

## 2018-03-27 DIAGNOSIS — Z78.9 IMPAIRED MOTOR CONTROL: ICD-10-CM

## 2018-03-27 DIAGNOSIS — R53.1 DECREASED STRENGTH: ICD-10-CM

## 2018-03-27 PROCEDURE — 97116 GAIT TRAINING THERAPY: CPT | Mod: PN

## 2018-03-27 NOTE — PROGRESS NOTES
Name: Alberto Dangelo  [facility] Number: [id number]  Date of Treatment: 03/27/2018   Diagnosis:   Encounter Diagnoses   Name Primary?   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 Went boating for a short time this weekend.  Mod I stretching in PT gym prior to appt. Mod I in manual w/c.  Was dizzy this morning but it has resolved.     Objective:    Patient received individual therapy to increase strength, endurance, ROM, flexibility, posture and core stabilization with activities as follows:     Gait training via [employer] system for 57:50 minutes (plus set up):     Set up at 15 kg unloading. Computer-assisted robotic gait training via [employer] x 3.0 km/h. GF x 75% x 13', then 55% for distance of 2852 m. Initial stoppage 2* LE tones with increased speed for starting training.     Continue HEP daily.    Pt demo good understanding of the education provided. Patient demonstrated good return demonstration of activities.     Assessment:     Good tolerance for in increased initial speed today. Consistent feedback with good knee extn during stance. Occasional foot drag requiring adjustments. R LE ER when setting up on [employer] and continues soon after training begins, requiring tightening straps to correct.     Pt will continue to benefit from skilled PT intervention. Medical Necessity is demonstrated by:  Requires skilled supervision to complete and progress HEP and Weakness.    Patient is making good progress towards established goals.    Plan:    Continue with established Plan of Care towards PT goals.

## 2018-04-02 ENCOUNTER — PATIENT MESSAGE (OUTPATIENT)
Dept: FAMILY MEDICINE | Facility: CLINIC | Age: 58
End: 2018-04-02

## 2018-04-03 ENCOUNTER — CLINICAL SUPPORT (OUTPATIENT)
Dept: REHABILITATION | Facility: HOSPITAL | Age: 58
End: 2018-04-03
Attending: FAMILY MEDICINE
Payer: COMMERCIAL

## 2018-04-03 DIAGNOSIS — S14.129A INCOMPLETE INJURY OF CERVICAL SPINAL CORD WITH CENTRAL CORD SYNDROME: ICD-10-CM

## 2018-04-03 DIAGNOSIS — Z78.9 IMPAIRED MOTOR CONTROL: ICD-10-CM

## 2018-04-03 DIAGNOSIS — R53.1 DECREASED STRENGTH: ICD-10-CM

## 2018-04-03 PROCEDURE — 97116 GAIT TRAINING THERAPY: CPT | Mod: PN

## 2018-04-03 NOTE — TELEPHONE ENCOUNTER
He had a relatively normal one January 2017.  Guidelines are to do it every five years if not on treatment.

## 2018-04-03 NOTE — PROGRESS NOTES
Name: Alberto Dangelo  [facility] Number: [id number]  Date of Treatment: 04/03/2018   Diagnosis:   Encounter Diagnoses   Name Primary?   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 Limited stretching prior to appt 2* time constraints. Mod I in manual w/c.     Objective:    Patient received individual therapy to increase strength, endurance, ROM, flexibility, posture and core stabilization with activities as follows:     Gait training via [email or web address] system for 60:39 minutes (plus set up):     Set up at 15 kg unloading. Computer-assisted robotic gait training via [employer] x 3.0 km/h. GF x 75% x 10' then 55% for distance of 3012 m. One initial stoppage 2* tones.     Continue HEP daily.    Pt demo good understanding of the education provided. Patient demonstrated good return demonstration of activities.     Assessment:     R LE ER manageable to strapping. Initial difficulty with clearance 2* tones and speed but corrected with tightening straps.     Pt will continue to benefit from skilled PT intervention. Medical Necessity is demonstrated by:  Requires skilled supervision to complete and progress HEP and Weakness.    Patient is making good progress towards established goals.    Plan:    Continue with established Plan of Care towards PT goals.

## 2018-04-06 ENCOUNTER — TELEPHONE (OUTPATIENT)
Dept: FAMILY MEDICINE | Facility: CLINIC | Age: 58
End: 2018-04-06

## 2018-04-06 DIAGNOSIS — S14.129A INCOMPLETE INJURY OF CERVICAL SPINAL CORD WITH CENTRAL CORD SYNDROME: Primary | ICD-10-CM

## 2018-04-06 NOTE — TELEPHONE ENCOUNTER
----- Message from Regulo BELLAMY Napoleon sent at 4/6/2018  1:05 PM CDT -----  Contact: Kala/[facility]  Kala called in and stated patient want to resume physical therapy on [place].  Kala needs new PT orders put EPIC and then they can contact patient to schedule appt.    Kala's call back number is [phone], ask for Kala

## 2018-04-06 NOTE — TELEPHONE ENCOUNTER
Melissa with [facility] is requesting new orders be placed for physical therapy on the [place].    Please Advise:

## 2018-04-10 ENCOUNTER — CLINICAL SUPPORT (OUTPATIENT)
Dept: REHABILITATION | Facility: HOSPITAL | Age: 58
End: 2018-04-10
Payer: COMMERCIAL

## 2018-04-10 DIAGNOSIS — R53.1 DECREASED STRENGTH: ICD-10-CM

## 2018-04-10 DIAGNOSIS — S14.129A INCOMPLETE INJURY OF CERVICAL SPINAL CORD WITH CENTRAL CORD SYNDROME: ICD-10-CM

## 2018-04-10 DIAGNOSIS — Z78.9 IMPAIRED MOTOR CONTROL: ICD-10-CM

## 2018-04-10 PROCEDURE — 97116 GAIT TRAINING THERAPY: CPT | Mod: PN

## 2018-04-12 ENCOUNTER — CLINICAL SUPPORT (OUTPATIENT)
Dept: REHABILITATION | Facility: HOSPITAL | Age: 58
End: 2018-04-12
Attending: FAMILY MEDICINE
Payer: COMMERCIAL

## 2018-04-12 DIAGNOSIS — R53.1 DECREASED STRENGTH: ICD-10-CM

## 2018-04-12 DIAGNOSIS — S14.129A INCOMPLETE INJURY OF CERVICAL SPINAL CORD WITH CENTRAL CORD SYNDROME: ICD-10-CM

## 2018-04-12 DIAGNOSIS — Z78.9 IMPAIRED MOTOR CONTROL: ICD-10-CM

## 2018-04-12 PROCEDURE — 97116 GAIT TRAINING THERAPY: CPT | Mod: PN

## 2018-04-12 NOTE — PROGRESS NOTES
Name: Alberto Dangelo  [facility] Number: [id number]  Date of Treatment: 04/12/2018   Diagnosis:   Encounter Diagnoses   Name Primary?   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 Has more tones B LE's 2* did not have the time to do his usual workout this morning 2* busy at work. Mod I in manual w/c and mod I stretching program prior to appt.     Objective:    Patient received individual therapy to increase strength, endurance, ROM, flexibility, posture and core stabilization with activities as follows:   Gait training via [employer] system for 63:40 minutes (plus set up):     Set up at 15 kg unloading. Computer-assisted robotic gait training via [employer] x 3.0 km/h. GF x 75% x 37, 55% for distance of 3155 m. One stoppage 2* R>L foot drag.     Continue HEP daily.    Pt demo good understanding of the education provided. Patient demonstrated good return demonstration of activities.     Assessment:     Tones present for first half of session which resolved after mobilization.     Pt will continue to benefit from skilled PT intervention. Medical Necessity is demonstrated by:  Requires skilled supervision to complete and progress HEP and Weakness.    Patient is making good progress towards established goals.    Plan:    Continue with established Plan of Care towards PT goals.

## 2018-04-13 DIAGNOSIS — K21.9 GASTROESOPHAGEAL REFLUX DISEASE, ESOPHAGITIS PRESENCE NOT SPECIFIED: ICD-10-CM

## 2018-04-13 RX ORDER — OMEPRAZOLE 20 MG/1
CAPSULE, DELAYED RELEASE ORAL
Qty: 90 CAPSULE | Refills: 3 | Status: SHIPPED | OUTPATIENT
Start: 2018-04-13 | End: 2019-04-20 | Stop reason: SDUPTHER

## 2018-04-17 ENCOUNTER — CLINICAL SUPPORT (OUTPATIENT)
Dept: REHABILITATION | Facility: HOSPITAL | Age: 58
End: 2018-04-17
Attending: FAMILY MEDICINE
Payer: COMMERCIAL

## 2018-04-17 DIAGNOSIS — S14.129A INCOMPLETE INJURY OF CERVICAL SPINAL CORD WITH CENTRAL CORD SYNDROME: ICD-10-CM

## 2018-04-17 DIAGNOSIS — R53.1 DECREASED STRENGTH: ICD-10-CM

## 2018-04-17 DIAGNOSIS — Z78.9 IMPAIRED MOTOR CONTROL: ICD-10-CM

## 2018-04-17 PROCEDURE — 97116 GAIT TRAINING THERAPY: CPT | Mod: PN

## 2018-04-17 NOTE — PROGRESS NOTES
"Name: Alberto Dangelo  [facility] Number: [id number]  Date of Treatment: 04/17/2018   Diagnosis:   Encounter Diagnoses   Name Primary?   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  Patient reports their pain to be n/a/10 on a 0-10 scale with 0 being no pain and 10 being the worst pain imaginable.  "I did have a little red spot on my ankle but it's better. And I'll feel it if it becomes a problem."    Objective    Patient received individual therapy to increase strength, endurance, flexibility and core stabilization with 0 patients with activities as follows:     Alberto performed self stretching prior to participation in gait training.  Patient participated in dynamic functional therapeutic activities to improve functional performance for 67 minutes. Including: Set up with 10 kg unloading.  Pt participated in computer assisted robotic gait training via [employer] @ 3.0 kph x 60 min, 15 sec for a total distance of 2999 m.  Utilized guidance force of 75% x 21 min then decreased to 60% for remainder of session.  .      Written Home Exercises Provided: Reviewed stretching   Pt demo good understanding of the education provided. Alberto demonstrated good return demonstration of activities.     Assessment:   Pt noted to have toe drag on R during conversation (mild distraction from task).  Able to correct somewhat but did require adjustment to ankle straps to complete session without continued toe drag.      Pt will continue to benefit from skilled PT intervention. Medical Necessity is demonstrated by:  Fall Risk, Unable to participate fully in daily activities, Requires skilled supervision to complete and progress HEP, Weakness and Gait impairment  Patient is making Slow but steady progress towards established " goals.    New/Revised goals: N/A Reassessment next week.        Plan:  Continue with established Plan of Care towards PT goals. Complete updated POC next week.

## 2018-04-17 NOTE — PROGRESS NOTES
Name: Alberto Dangelo  [facility] Number: [id number]  Date of Treatment: 04/10/2018  Diagnosis:   Encounter Diagnoses   Name Primary?   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  Patient reports their pain to be n/a/10 on a 0-10 scale with 0 being no pain and 10 being the worst pain imaginable.    Objective    Patient received individual therapy to increase strength, endurance, flexibility and core stabilization with 0 patients with activities as follows:     Alberto participated in dynamic functional therapeutic activities to improve functional performance for 67 minutes. Including: Set up with 15 kg unloading.  Pt participated in computer assisted robotic gait training via [employer] @ 3.0 kph x 60 min, 46 sec for a total distance of 3027 m.  Utilized guidance force of 80% x 12 min followed by 60% guidance force for remainder of session.      Written Home Exercises Provided: Reviewed home activities with patient.    Pt demo good understanding of the education provided. Alberto demonstrated good return demonstration of activities.     Assessment:   Reports sense of pressure on medial aspect of L ankle.  Minimal redness noted medial aspect of L ankle.      Pt will continue to benefit from skilled PT intervention. Medical Necessity is demonstrated by:  Fall Risk, Unable to participate fully in daily activities, Requires skilled supervision to complete and progress HEP and Weakness.    Patient is making steady progress towards established goals.    New/Revised goals: N/A      Plan:  Continue with established Plan of Care towards PT goals.

## 2018-04-19 ENCOUNTER — CLINICAL SUPPORT (OUTPATIENT)
Dept: REHABILITATION | Facility: HOSPITAL | Age: 58
End: 2018-04-19
Attending: FAMILY MEDICINE
Payer: COMMERCIAL

## 2018-04-19 DIAGNOSIS — S14.129A INCOMPLETE INJURY OF CERVICAL SPINAL CORD WITH CENTRAL CORD SYNDROME: ICD-10-CM

## 2018-04-19 DIAGNOSIS — R53.1 DECREASED STRENGTH: ICD-10-CM

## 2018-04-19 DIAGNOSIS — Z78.9 IMPAIRED MOTOR CONTROL: ICD-10-CM

## 2018-04-19 PROCEDURE — 97116 GAIT TRAINING THERAPY: CPT | Mod: PN

## 2018-04-19 NOTE — PROGRESS NOTES
Name: Alberto Dangelo  [facility] Number: [id number]  Date of Treatment: 04/19/2018   Diagnosis:   Encounter Diagnoses   Name Primary?   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 Mod I in manual w/c with mod I stretching in PT gym prior to session.      Objective:    Patient received individual therapy to increase strength, endurance, ROM, flexibility, posture and core stabilization with activities as follows:     Gait training via [employer] system for 64:42 minutes (plus set up):     Set up at 10 kg unloading. Computer-assisted robotic gait training via [employer] x 3.0 km/h. GF x 75% x 20' then 55% for distance of 3224 m. No stoppages.     Continue HEP daily.    Pt demo good understanding of the education provided. Patient demonstrated good return demonstration of activities.     Assessment:     LE drag with decreased GF to 55% but improved after strap adjustments.     Pt will continue to benefit from skilled PT intervention. Medical Necessity is demonstrated by:  Requires skilled supervision to complete and progress HEP and Weakness.    Patient is making good progress towards established goals.    Plan:    Continue with established Plan of Care towards PT goals.

## 2018-04-24 ENCOUNTER — CLINICAL SUPPORT (OUTPATIENT)
Dept: REHABILITATION | Facility: HOSPITAL | Age: 58
End: 2018-04-24
Attending: FAMILY MEDICINE
Payer: COMMERCIAL

## 2018-04-24 DIAGNOSIS — Z78.9 IMPAIRED MOTOR CONTROL: ICD-10-CM

## 2018-04-24 DIAGNOSIS — R53.1 DECREASED STRENGTH: ICD-10-CM

## 2018-04-24 DIAGNOSIS — S14.129A INCOMPLETE INJURY OF CERVICAL SPINAL CORD WITH CENTRAL CORD SYNDROME: ICD-10-CM

## 2018-04-24 PROCEDURE — 97116 GAIT TRAINING THERAPY: CPT | Mod: PN

## 2018-04-26 ENCOUNTER — CLINICAL SUPPORT (OUTPATIENT)
Dept: REHABILITATION | Facility: HOSPITAL | Age: 58
End: 2018-04-26
Attending: FAMILY MEDICINE
Payer: COMMERCIAL

## 2018-04-26 DIAGNOSIS — Z78.9 IMPAIRED MOTOR CONTROL: ICD-10-CM

## 2018-04-26 DIAGNOSIS — S14.129A INCOMPLETE INJURY OF CERVICAL SPINAL CORD WITH CENTRAL CORD SYNDROME: ICD-10-CM

## 2018-04-26 DIAGNOSIS — R53.1 DECREASED STRENGTH: ICD-10-CM

## 2018-04-26 PROCEDURE — 97116 GAIT TRAINING THERAPY: CPT | Mod: PN

## 2018-04-26 NOTE — PROGRESS NOTES
Name: Alberto Dangelo  [facility] Number: [id number]  Date of Treatment: 04/26/2018   Diagnosis:   Encounter Diagnoses   Name Primary?   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 Would like to decreased GF today. .Mod I in manual w/c, mod I stretching in PT gym prior to appt.     Objective:    Patient received individual therapy to increase strength, endurance, ROM, flexibility, posture and core stabilization with activities as follows:     Gait training via [employer] system for 60:18 minutes (plus set up):     Set up at 10 kg unloading. Computer-assisted robotic gait training via [employer] x 3.0 km/h. GF x 75% x 10', 55% x 21', then 35% for distance of 2978 m. One stoppage when attempting to decrease GF lower than 35%, so returned to 35% for completion of lokomotor training.     Continue HEP daily.    Pt demo good understanding of the education provided. Patient demonstrated good return demonstration of activities.     Assessment:     No foot drag with decreased GF to 35% today; however, clonus present L with decrease to 55% and later R with decrease to 35%.     Pt will continue to benefit from skilled PT intervention. Medical Necessity is demonstrated by:  Requires skilled supervision to complete and progress HEP and Weakness.    Patient is making good progress towards established goals.    Plan:    Continue with established Plan of Care towards PT goals.

## 2018-04-27 NOTE — PROGRESS NOTES
Name: Alberto Dangelo  [facility] Number: [id number]  Date of Treatment: 04/24/2018  Diagnosis:   Encounter Diagnoses   Name Primary?   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  Patient reports their pain to be n/a/10 on a 0-10 scale with 0 being no pain and 10 being the worst pain imaginable.    Objective    Patient received individual therapy to increase strength, endurance, flexibility, core stabilization and gait quality with 0 patients with activities as follows:     Alberto participated in dynamic functional therapeutic activities to improve functional performance for 67 minutes. Including: Set up with 10 kg unloading.  Pt participated in computer assisted robotic gait training via [employer] @ 3.0 kph x 60 min, 08 sec for a total distance of 2985 m.  Utilized guidance force of 75% x 11 min then decreased to 60% for remainder of session.  Brief trial of 10% guidance force; however, increased foot drag on R.  Therefore resumed @ 60%.        Written Home Exercises Provided: N/A this date.   Pt demo good understanding of the education provided. Alberto demonstrated good return demonstration of activities.     Assessment:   Able to tolerate decreased guidance force but only to 25% without increased R foot drag.  Will progressively decrease guidance force as patient tolerates.      Pt will continue to benefit from skilled PT intervention. Medical Necessity is demonstrated by:  Unable to participate fully in daily activities, Weakness and Gait dysfunction  Patient is making steady progress towards established goals.    New/Revised goals: N/A      Plan:  Continue with established Plan of Care towards PT goals.

## 2018-05-01 ENCOUNTER — CLINICAL SUPPORT (OUTPATIENT)
Dept: REHABILITATION | Facility: HOSPITAL | Age: 58
End: 2018-05-01
Payer: COMMERCIAL

## 2018-05-01 DIAGNOSIS — M25.673 DECREASED ROM OF ANKLE: ICD-10-CM

## 2018-05-01 DIAGNOSIS — Z78.9 IMPAIRED MOTOR CONTROL: Primary | ICD-10-CM

## 2018-05-01 DIAGNOSIS — R53.1 DECREASED STRENGTH: ICD-10-CM

## 2018-05-01 PROCEDURE — 97161 PT EVAL LOW COMPLEX 20 MIN: CPT | Mod: PO | Performed by: PHYSICAL THERAPIST

## 2018-05-01 PROCEDURE — 97112 NEUROMUSCULAR REEDUCATION: CPT | Mod: PO | Performed by: PHYSICAL THERAPIST

## 2018-05-03 ENCOUNTER — CLINICAL SUPPORT (OUTPATIENT)
Dept: REHABILITATION | Facility: HOSPITAL | Age: 58
End: 2018-05-03
Payer: COMMERCIAL

## 2018-05-03 DIAGNOSIS — S14.129A INCOMPLETE INJURY OF CERVICAL SPINAL CORD WITH CENTRAL CORD SYNDROME: ICD-10-CM

## 2018-05-03 DIAGNOSIS — Z78.9 IMPAIRED MOTOR CONTROL: ICD-10-CM

## 2018-05-03 DIAGNOSIS — R53.1 DECREASED STRENGTH: ICD-10-CM

## 2018-05-03 PROCEDURE — 97116 GAIT TRAINING THERAPY: CPT | Mod: PN

## 2018-05-03 NOTE — PROGRESS NOTES
Name: Alberto Dangelo  Clinic Number: [id number]  Date of Treatment: 05/03/2018   Diagnosis:   Encounter Diagnoses   Name Primary?   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 Went into his pool yesterday and was able to walk better than previously, stating he feels like his legs are stronger. Mod I in manual w/c with mod I stretching prior to PT session.     Objective:    Patient received individual therapy to increase strength, endurance, ROM, flexibility, posture and core stabilization with activities as follows:     Gait training via [employer] system for 60:24 minutes (plus set up):     Set up at 10 kg unloading. Computer-assisted robotic gait training via [employer] x 3/0 km/h. GF x 70% x 20, 50% x 10' then 35% for distance of 3000 m. No stoppages.     Continue HEP daily.    Pt demo good understanding of the education provided. Patient demonstrated good return demonstration of activities.     Assessment:     Great response to decreased GF today with consistent minimal feedback. Increased effort required at 35% for consistent feedback, pt envisioning running in harness system, which helps.     Pt will continue to benefit from skilled PT intervention. Medical Necessity is demonstrated by:  Requires skilled supervision to complete and progress HEP and Weakness.    Patient is making good progress towards established goals.    Plan:    Continue with established Plan of Care towards PT goals.

## 2018-05-08 ENCOUNTER — CLINICAL SUPPORT (OUTPATIENT)
Dept: REHABILITATION | Facility: HOSPITAL | Age: 58
End: 2018-05-08
Attending: FAMILY MEDICINE
Payer: COMMERCIAL

## 2018-05-08 DIAGNOSIS — R53.1 DECREASED STRENGTH: ICD-10-CM

## 2018-05-08 DIAGNOSIS — Z78.9 IMPAIRED MOTOR CONTROL: ICD-10-CM

## 2018-05-08 DIAGNOSIS — S14.129A INCOMPLETE INJURY OF CERVICAL SPINAL CORD WITH CENTRAL CORD SYNDROME: ICD-10-CM

## 2018-05-08 PROCEDURE — 97116 GAIT TRAINING THERAPY: CPT | Mod: PN

## 2018-05-09 PROBLEM — R53.1 DECREASED STRENGTH: Chronic | Status: ACTIVE | Noted: 2017-05-12

## 2018-05-09 PROBLEM — Z78.9 IMPAIRED MOTOR CONTROL: Chronic | Status: ACTIVE | Noted: 2017-05-12

## 2018-05-09 NOTE — PROGRESS NOTES
"Name: Alberto Dangelo  [facility] Number: [id number]  Date of Treatment: 05/08/2018   Diagnosis:   Encounter Diagnoses   Name Primary?   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  The tone kicks in".  Patient reports their pain to be n/a/10 on a 0-10 scale with 0 being no pain and 10 being the worst pain imaginable.    Objective    Patient received individual therapy to increase strength, endurance, flexibility, posture and core stabilization with 0 patients with activities as follows:     Pt performed self stretching in // bars to enhance flexibility of B LE to maximize gait training.      Alberto participated in dynamic functional therapeutic activities to improve functional performance for 67 minutes. Including: Set up with 10 kg unloading.  Pt participated in computer assisted robotic gait training via [employer] @ 3.0 kph x 60 min, 33 sec for a total distance of 3007 m.  Utilized guidance force of 100% x 7 min, decreased to 70% x 8 min.  50% GF x 15 min.  Completed remainder of session @ 35% guidance force.  Attempted to decrease guidance force further to enhance training; however, significant increase in B toe drag noted.  Minor adjustments to L LE position needed to decrease stress on knee.        Written Home Exercises Provided: Reviewed importance of stretching and strengthening @ home.    Pt demo good understanding of the education provided. Alberto demonstrated good return demonstration of activities.     Assessment:   Unable to decrease guidance force to less than 35% due to excessive toe drag.  Able to complete gait training with good foot clearance B except with decrease of guidance force below 35%.      Pt will continue to benefit from skilled PT intervention. Medical Necessity is " demonstrated by:  Fall Risk, Unable to participate fully in daily activities, Weakness and Gait impairment    Patient is making fair progress towards established goals.    New/Revised goals: N/A      Plan:  Continue with established Plan of Care towards PT goals.

## 2018-05-09 NOTE — PLAN OF CARE
Date: 04/17/2018    PHYSICAL THERAPY UPDATED PLAN OF TREATMENT (late entry)    Patient name: Alberto Dangelo  Onset Date:  7/30/2010  SOC Date:  8/24/2016  Primary Diagnosis:    1. Impaired motor control     2. Decreased strength     3. 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 Home equipment includes B AFO's, shower chair, ramped entrance w support bars to swimming pool, FES bike, free weights, plyobox system.     Updated Assessment:    Knee   Right     Left       AROM PROM MMT AROM PROM MMT   Flexion  5-10*  140*  1/5 - 2-/5  5-10*  142*  2-/5   Extension  -20*  0*  2+/5  0*  0*  2+/5         Ankle   Right     Left       AROM PROM MMT AROM PROM MMT   Plantarflexion                Dorsiflexion w knee extended  slight  5*  1/5  slight  6*  1/5      L-FORCE TEST           LEFT                                              RIGHT                             FLEX                  EXT                      FLEX                     EXT                HIP     3.51 Nm            30.05 Nm                 4.18 Nm             15.22 Nm             KNEE    1.8 Nm              21.95 Nm                 1.86  Nm            17.56 Nm  Gait:  Pt continues to perform household ambulation using rolling walker both with and without AFOs  He continues to demonstrate significant hip hiking and circumduction B with decreased foot clearance.  He utilizes increased UE support.  He is able to ambulate short distance without assistive device but with increased UE support on walls.  He reports increased LE tone with cold temperature which impedes gait.    Posture:  Pt stands with foot flat position, feet shoulder width apart.  Tends toward knee hyperextension and  anterior thrust of pelvis with him ext.  Increased lumbar lordosis.      Previous Short Term Goals Status:       1) Pt will perform sit <> stand transfers with moderate UE support  Nearly MET     2) Pt will improve B ankle DF to at least 5* passively to facilitate improved foot clearance during gait   MET     3) Facilitate active hip flexion in standing for improved LE swing through in gait  Progressing  New Short Term Goals Status:        1) Pt will perform squat pivot transfer safely     2) Pt will stand for up to 10 min without excessive lumbar lordosis or increased hip/knee ext     3) Tolerate decreased guidance force to < 25% without excessive toe drag.    Long Term Goal Status:   continue per initial plan of care.  1) Increase strength in B  LE as indicated by improved L-force testing  Progressing (Impovement noted in B hip flex/ext, L knee ext)  Continue   2)  Pt will stand > 15 min with minimal UE support.  NEW  3) Pt will consistently perform safe stand pivot transfers,  Progressing  4) Pt will demonstrate improved bilateral hip flexion strength to 2/5 to improve ability to perform swing phase/ foot clearance during gait   Progressing    5) Pt will demonstrate appropriate weight shift during ambulation to limit hip hiking and excessive weight shift.  Progressing  Reasons for Recertification of Therapy:   Patient is making slow but steady progress in PT with improved LE flexibility, improved B hip strength, and minimally improved balance in standing.  He continues to demonstrate impaired standing posture, decreased LE flexibility, impaired LE tone, and  Impaired functional mobility, impaired balance and  Impaired gait.  He should continue from skilled PT services to address these limitations.      Certification Period: 4/18/2018 to 7/17/2018  Recommended Treatment Plan: 2 times per week for 12 weeks: Gait Training, Locomotor Training, Manual Therapy, Neuromuscular Re-ed, Patient Education, Therapeutic  Activites and Therapeutic Exercise  Other Recommendations: continue home exercise program.          Therapist's Name: Candice Herzog, PT   Date: 4/17/2018    I CERTIFY THE NEED FOR THESE SERVICES FURNISHED UNDER THIS PLAN OF TREATMENT AND WHILE UNDER MY CARE    Physician's comments: ________________________________________________________________________________________________________________________________________________      Physician's Name: ___________________________________

## 2018-05-10 PROBLEM — M25.673 DECREASED ROM OF ANKLE: Status: ACTIVE | Noted: 2018-05-10
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
## 2018-05-11 ENCOUNTER — CLINICAL SUPPORT (OUTPATIENT)
Dept: REHABILITATION | Facility: HOSPITAL | Age: 58
End: 2018-05-11
Payer: COMMERCIAL

## 2018-05-11 DIAGNOSIS — R53.1 DECREASED STRENGTH: ICD-10-CM

## 2018-05-11 DIAGNOSIS — M25.673 DECREASED ROM OF ANKLE: ICD-10-CM

## 2018-05-11 DIAGNOSIS — Z78.9 IMPAIRED MOTOR CONTROL: Primary | ICD-10-CM

## 2018-05-11 PROCEDURE — 97112 NEUROMUSCULAR REEDUCATION: CPT | Mod: PO | Performed by: PHYSICAL THERAPIST

## 2018-05-12 NOTE — PROGRESS NOTES
Physical Therapy Progress Note     Name: Alberto Dangelo  [facility] Number: [id number]  Diagnosis:   Encounter Diagnoses   Name Primary?    Decreased ROM of ankle     Impaired motor control Yes    Decreased strength      Physician: Dirk Ortiz MD  Treatment Orders: PT Eval and Treat, PT x 1 year  Past Medical History:   Diagnosis Date    Autonomic dysfunction     Constipation     Deep vein thrombosis     Depression     GERD (gastroesophageal reflux disease)     Incomplete injury of cervical spinal cord with central cord syndrome     Neurogenic bladder     Pulmonary embolism        Precautions: standard  Visit #: 2  Date of Eval: 5/1/2018  Plan of Care Expiration: 7/24/2018        Subjective   Pt reports: pt is eager for PT, no new complaints  Pain Scale:  denies    Objective     Patient received individual therapy to increase strength, core stabilization and motor control to improve gait ability with activities as follows:     Pt participated in neuromuscular re education x 60 minutes to address motor control, strength, ROM, and gait ability/ technique:   Gait with MARCOS external AFOs, RW and min A to improve weight bearing and heel contact  45 ft x 2 + 90 ft  Vaulting occurring every step at 25-50%  Poor to fair knee flex for L swing  Poor R knee flex for swing    Mat ex AAROM: MARCOS knee/ hip flex with ball, max A 30x   LTR with ball mod A 30x   sidelying hip flex with bolster, max A 30x   Sitting HS curls with rollor board, min A R, YTB on L 30x      Written Home Exercises: 5/11/18- practice ab crunches and hip flexion in pool- verbally explained   Pt demo good understanding of the education provided.      Education provided re: POC, HEP    Pt has no cultural, educational or language barriers to learning provided.    Assessment   Alberto tolerated treatment well. PT focused on gait training and neuromuscular reeducation of hip, core, and knee flexion  motor control to improve swing phase of gait. Pt demonstrates vaulting every step ~25-50% of initial swing, poor foot clearance due to foot drop. Pt also demonstrates poor hip flexion motor control affecting swing ability. Pt can benefit from continued skilled PT to address impairments listed below to improve gait ability and continue to encourage increased return of strength and motor control to BLE/ trunk.     Pt prognosis is Good. Pt will continue to benefit from skilled outpatient physical therapy to address the deficits listed in the problem list, provide pt/family education and to maximize pt's level of independence in the home and community environment.     Anticipated barriers to physical therapy: Distance to PT clinic      Medical necessity is demonstrated by the following IMPAIRMENTS/PROBLEM LIST:   Fall Risk - impaired balance   Weakness   Impaired muscle tone  Decreased ROM  Gait deviations   Decreased ambulation   Difficulty participating in daily activities   Requires skilled supervision to complete and progress HEP        Pt's spiritual, cultural and educational needs considered and pt agreeable to plan of care and GOALS as stated below:   Short term goals: 6 weeks, pt agrees to goals set.  1. Pt will report performing HEP for LE/ core strengthening at home at least 2-3x/week to improve and maintain progress made in PT.  2. Pt will demonstrate improved MARCOS DF AAROM to 0 degrees to improve gait ability and foot clearance.   3. Pt will demonstrate improved gross strength of RLE in available musculature to 2/5 to improve gait ability.  4. Assess SSWS and TUG, set goals as needed.      Long term goals: 12 weeks, pt agrees to goals set  5. Pt will demonstrate improved MARCOS AAROM DF to 5 degrees to improve foot clearance during gait.   6. Pt will demonstrate improved abdominal strength to ~3+/5 to decrease back pain and improve balance and gait.  7. Pt will demonstrate improved gait ability by ambulating 100  ft with RW and MARCOS AFOs with mod I with <25% vaulting.           Plan   Continue PT 1-2x weekly under established Plan of Care, with treatment to include: pt education, HEP, gait training, and neuromuscular re-education/balance exercises to work towards established goals. Pt may be seen by PTA to carry out plan of care.     Kala Bajwa, PT   05/11/2018

## 2018-05-14 ENCOUNTER — CLINICAL SUPPORT (OUTPATIENT)
Dept: REHABILITATION | Facility: HOSPITAL | Age: 58
End: 2018-05-14
Payer: COMMERCIAL

## 2018-05-14 DIAGNOSIS — R53.1 DECREASED STRENGTH: ICD-10-CM

## 2018-05-14 DIAGNOSIS — M25.673 DECREASED ROM OF ANKLE: ICD-10-CM

## 2018-05-14 DIAGNOSIS — Z78.9 IMPAIRED MOTOR CONTROL: Primary | ICD-10-CM

## 2018-05-14 PROCEDURE — 97112 NEUROMUSCULAR REEDUCATION: CPT | Mod: PO | Performed by: PHYSICAL THERAPIST

## 2018-05-14 NOTE — PROGRESS NOTES
Physical Therapy Progress Note     Name: Alberto Dangelo  [facility] Number: [id number]  Diagnosis:   Encounter Diagnoses   Name Primary?   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     Neurogenic bladder     Pulmonary embolism        Precautions: standard  Visit #: 3  Date of Eval: 5/1/2018  Plan of Care Expiration: 7/24/2018        Subjective   Pt reports: pt is eager for PT, no new complaints  Pain Scale:  denies    Objective     Patient received individual therapy to increase strength, core stabilization and motor control to improve gait ability with activities as follows:     Pt participated in neuromuscular re education x 45 minutes to address motor control, strength, ROM, and gait ability/ technique:   Gait with MARCOS external AFOs, RW and min A to improve weight bearing and heel contact  45 ft x 2  Vaulting occurring every step at 25-50%  Poor to fair knee flex for L swing  Poor R knee flex for swing    Standing with RW: weight shifts in stance   Pre gait weight shift + forward step- mod A    reviewed quad stretch in kaitlin test position with strap    Mat ex AAROM (supervised with PT tech x 15 min) :   LTR with ball mod A 30x  Sitting HS curls with rollor board, min A R, YTB on L 30x  Side lying hip flex w/ powder board 30x- max A  Side lying hip ext w/ powder board 30x- max A    MARCOS knee/ hip flex with ball, max A 30x  sidelying hip flex with bolster, max A 30x     Written Home Exercises: 5/14/18- quad stretch with strap in kaitlin test position  5/11/18- practice ab crunches and hip flexion in pool-  verbally explained   Pt demo good understanding of the education provided.      Education provided re: POC, HEP    Pt has no cultural, educational or language barriers to learning provided.    Assessment   Alberto tolerated treatment well. Today's session focused on pt's weight shifts and proper hip positioning to improve unweighting of opposite foot for swing phase. Pt demonstrated fair performance with weight shift onto RLE, poor onto LLE.  Pt can benefit from continued skilled PT to address impairments listed below to improve gait ability and continue to encourage increased return of strength and motor control to BLE/ trunk.     Pt prognosis is Good. Pt will continue to benefit from skilled outpatient physical therapy to address the deficits listed in the problem list, provide pt/family education and to maximize pt's level of independence in the home and community environment.     Anticipated barriers to physical therapy: Distance to PT clinic      Medical necessity is demonstrated by the following IMPAIRMENTS/PROBLEM LIST:   Fall Risk - impaired balance   Weakness   Impaired muscle tone  Decreased ROM  Gait deviations   Decreased ambulation   Difficulty participating in daily activities   Requires skilled supervision to complete and progress HEP        Pt's spiritual, cultural and educational needs considered and pt agreeable to plan of care and GOALS as stated below:   Short term goals: 6 weeks, pt agrees to goals set.  1. Pt will report performing HEP for LE/ core strengthening at home at least 2-3x/week to improve and maintain progress made in PT.  2. Pt will demonstrate improved MARCOS DF AAROM to 0 degrees to improve gait ability and foot clearance.   3. Pt will demonstrate improved gross strength of RLE in available musculature to 2/5 to improve gait ability.  4. Assess SSWS and TUG, set goals as needed.      Long term goals: 12 weeks, pt agrees to goals set  5. Pt will demonstrate improved MARCOS AAROM DF to 5  degrees to improve foot clearance during gait.   6. Pt will demonstrate improved abdominal strength to ~3+/5 to decrease back pain and improve balance and gait.  7. Pt will demonstrate improved gait ability by ambulating 100 ft with RW and MARCOS AFOs with mod I with <25% vaulting.           Plan   Continue PT 1-2x weekly under established Plan of Care, with treatment to include: pt education, HEP, gait training, and neuromuscular re-education/balance exercises to work towards established goals. Pt may be seen by PTA to carry out plan of care.     Kala Bajwa, PT   05/14/2018

## 2018-05-15 ENCOUNTER — CLINICAL SUPPORT (OUTPATIENT)
Dept: REHABILITATION | Facility: HOSPITAL | Age: 58
End: 2018-05-15
Attending: FAMILY MEDICINE
Payer: COMMERCIAL

## 2018-05-15 DIAGNOSIS — R53.1 DECREASED STRENGTH: ICD-10-CM

## 2018-05-15 DIAGNOSIS — M25.673 DECREASED ROM OF ANKLE: ICD-10-CM

## 2018-05-15 DIAGNOSIS — Z78.9 IMPAIRED MOTOR CONTROL: ICD-10-CM

## 2018-05-15 DIAGNOSIS — S14.129A INCOMPLETE INJURY OF CERVICAL SPINAL CORD WITH CENTRAL CORD SYNDROME: ICD-10-CM

## 2018-05-15 PROCEDURE — 97116 GAIT TRAINING THERAPY: CPT | Mod: PN

## 2018-05-17 ENCOUNTER — CLINICAL SUPPORT (OUTPATIENT)
Dept: REHABILITATION | Facility: HOSPITAL | Age: 58
End: 2018-05-17
Attending: FAMILY MEDICINE
Payer: COMMERCIAL

## 2018-05-17 DIAGNOSIS — R53.1 DECREASED STRENGTH: ICD-10-CM

## 2018-05-17 DIAGNOSIS — Z78.9 IMPAIRED MOTOR CONTROL: ICD-10-CM

## 2018-05-17 DIAGNOSIS — S14.129A INCOMPLETE INJURY OF CERVICAL SPINAL CORD WITH CENTRAL CORD SYNDROME: ICD-10-CM

## 2018-05-17 PROCEDURE — 97116 GAIT TRAINING THERAPY: CPT | Mod: PN

## 2018-05-17 NOTE — PROGRESS NOTES
Name: Alberto Dangelo  [facility] Number: [id number]  Date of Treatment: 05/17/2018   Diagnosis:   Encounter Diagnoses   Name Primary?   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     Objective:    Patient received individual therapy to increase strength, endurance, ROM, flexibility, posture and core stabilization with activities as follows:     Gait training via [employer] system for 60:43 minutes (plus set up):     Set up at 10 kg unloading. Computer-assisted robotic gait training via [employer] x 3.0 km/h. GF x 75% x 10', 50% x 30', then 30%  for distance of 3016 m. No stoppages.     Continue HEP daily.    Pt demo good understanding of the education provided. Patient demonstrated good return demonstration of activities.     Assessment:      LE buckling with decreased GF to 30% but cont positive feedback.     Pt will continue to benefit from skilled PT intervention. Medical Necessity is demonstrated by:  Requires skilled supervision to complete and progress HEP and Weakness.    Patient is making good progress towards established goals.    Plan:    Continue with established Plan of Care towards PT goals.

## 2018-05-21 ENCOUNTER — CLINICAL SUPPORT (OUTPATIENT)
Dept: REHABILITATION | Facility: HOSPITAL | Age: 58
End: 2018-05-21
Payer: COMMERCIAL

## 2018-05-21 DIAGNOSIS — M25.673 DECREASED ROM OF ANKLE: ICD-10-CM

## 2018-05-21 DIAGNOSIS — R53.1 DECREASED STRENGTH: ICD-10-CM

## 2018-05-21 DIAGNOSIS — Z78.9 IMPAIRED MOTOR CONTROL: Primary | ICD-10-CM

## 2018-05-21 PROCEDURE — 97112 NEUROMUSCULAR REEDUCATION: CPT | Mod: PO | Performed by: PHYSICAL THERAPIST

## 2018-05-21 NOTE — PROGRESS NOTES
Physical Therapy Progress Note     Name: Alberto Dangelo  Clinic Number: [id number]  Diagnosis:   Encounter Diagnoses   Name Primary?   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     Neurogenic bladder     Pulmonary embolism        Precautions: standard  Visit #: 4  Date of Eval: 5/1/2018  Plan of Care Expiration: 7/24/2018        Subjective   Pt reports: pt is eager for PT, no new complaints  Pain Scale:  denies    Objective     Prior to PT session, pt stretched all LE muscle groups in clinic    Patient received individual therapy to increase strength, core stabilization and motor control to improve gait ability with activities as follows:     Pt participated in neuromuscular re education x 70 minutes to address motor control, strength, ROM, and gait ability/ technique:   Gait with MARCOS external AFOs, RW and min A to improve weight bearing and heel contact  87 ft x 2  Vaulting occurring every step at 25-40%  Poor to fair knee flex for R swing  Poor L knee flex for swing    NP today:   Standing with RW: weight shifts in stance   Pre gait weight shift + forward step- mod A    Mat ex AAROM:   LTR with ball mod A 30x  Side lying hip flex w/ powder board 30x- max A  MARCOS knee/ hip flex with ball, max A 30x    Tall kneeling with UE support on ball: forward leans, mod-max A 2 x 5  Quadruped: alternate UE lift, mod-max A to maintain balance 10x    Sitting: HS curls with rollor board, min A R, YTB on L 30x   Russian twists with 6# ball >30x   Overhead lifts with 6# ball 20-30x   6# ball toss without UE support-  fair to good balance     Written Home Exercises: 5/14/18- quad stretch with strap in kaitlin test position  5/11/18- practice ab crunches and hip flexion in pool- verbally explained   Pt demo good understanding of the education provided.      Education provided re: POC, HEP    Pt has no cultural, educational or language barriers to learning provided.    Assessment   Alberto tolerated treatment well. Pt demonstrated less vaulting today as compared to the last visit. Pt demonstrated vaulting ~25-40% during each step. Pt tolerated increased ambulation distance. Fair t o poor quality knee flexion is noted MARCOS during terminal stance- swing phase. PT continues to focus on improving LE and trunk motor control to improve pt's gait quality and ability. Pt can benefit from continued skilled PT to address impairments listed below to improve gait ability and continue to encourage increased return of strength and motor control to BLE/ trunk.     Pt prognosis is Good. Pt will continue to benefit from skilled outpatient physical therapy to address the deficits listed in the problem list, provide pt/family education and to maximize pt's level of independence in the home and community environment.     Anticipated barriers to physical therapy: Distance to PT clinic      Medical necessity is demonstrated by the following IMPAIRMENTS/PROBLEM LIST:   Fall Risk - impaired balance   Weakness   Impaired muscle tone  Decreased ROM  Gait deviations   Decreased ambulation   Difficulty participating in daily activities   Requires skilled supervision to complete and progress HEP        Pt's spiritual, cultural and educational needs considered and pt agreeable to plan of care and GOALS as stated below:   Short term goals: 6 weeks, pt agrees to goals set.  1. Pt will report performing HEP for LE/ core strengthening at home at least 2-3x/week to improve and maintain progress made in PT.  2. Pt will demonstrate improved MARCOS DF AAROM to 0 degrees to  improve gait ability and foot clearance.   3. Pt will demonstrate improved gross strength of RLE in available musculature to 2/5 to improve gait ability.  4. Assess SSWS and TUG, set goals as needed.      Long term goals: 12 weeks, pt agrees to goals set  5. Pt will demonstrate improved MARCOS AAROM DF to 5 degrees to improve foot clearance during gait.   6. Pt will demonstrate improved abdominal strength to ~3+/5 to decrease back pain and improve balance and gait.  7. Pt will demonstrate improved gait ability by ambulating 100 ft with RW and MRACOS AFOs with mod I with <25% vaulting.           Plan   Continue PT 1-2x weekly under established Plan of Care, with treatment to include: pt education, HEP, gait training, and neuromuscular re-education/balance exercises to work towards established goals. Pt may be seen by PTA to carry out plan of care.     Kala Bajwa, PT   05/21/2018

## 2018-05-21 NOTE — PROGRESS NOTES
Name: Alberto Dangelo  [facility] Number: [id number]  Date of Treatment: 05/15/2018  Diagnosis:   Encounter Diagnoses   Name Primary?   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  Patient reports their pain to be n/a/10 on a 0-10 scale with 0 being no pain and 10 being the worst pain imaginable.    Objective    Patient received individual therapy to increase strength, endurance, flexibility, posture and core stabilization with 0 patients with activities as follows:     Pt participated in self stretching to hips and calves in // bars prior to gait training activity.  Demonstrated gait with significant UE support. Lacks sufficient ankle DF and knee flexion to allow swing through without substitution of hip hiking, circumduction of hips to advance each foot    Alberto participated in dynamic functional therapeutic activities to improve functional performance for 67 minutes. Including: Set up with 10 kg unloading.  Pt participated in computer assisted robotic gait training via fromAtoB @ 3.0 kph x 62 min, 0 sec for a total distance of 3088 m.  Utilized guidance force of 80% x 7 min, 70% x 7', 50% x 16'.  Completed training @ 35% guidance force.  .      Written Home Exercises Provided: Pt participating in HEP for stretching and strengthening.    Pt demo good understanding of the education provided. Alberto demonstrated good return demonstration of activities.     Assessment:   Significant gait disturbance persists.  Pt is unable to advance LE without significant UE support.  He requires UE support to initiate sit > stand but once weight is sufficiently over his feet, he is able to complete sit > stand transfer with slight UE support.      Pt will continue to benefit from skilled PT intervention. Medical  Necessity is demonstrated by:  Fall Risk, Unable to participate fully in daily activities, Requires skilled supervision to complete and progress HEP, Weakness and gait impairment    Patient is making fair progress towards established goals.    New/Revised goals: N/A      Plan:  Continue with established Plan of Care towards PT goals.

## 2018-05-22 ENCOUNTER — CLINICAL SUPPORT (OUTPATIENT)
Dept: REHABILITATION | Facility: HOSPITAL | Age: 58
End: 2018-05-22
Attending: FAMILY MEDICINE
Payer: COMMERCIAL

## 2018-05-22 DIAGNOSIS — R53.1 DECREASED STRENGTH: ICD-10-CM

## 2018-05-22 DIAGNOSIS — Z78.9 IMPAIRED MOTOR CONTROL: ICD-10-CM

## 2018-05-22 DIAGNOSIS — S14.129A INCOMPLETE INJURY OF CERVICAL SPINAL CORD WITH CENTRAL CORD SYNDROME: ICD-10-CM

## 2018-05-22 PROCEDURE — 97116 GAIT TRAINING THERAPY: CPT | Mod: PN

## 2018-05-22 NOTE — PROGRESS NOTES
Name: Albetro Dangelo  Clinic Number: [id number]  Date of Treatment: 05/22/2018   Diagnosis:   Encounter Diagnoses   Name Primary?   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 Mod I in manual w/c with mod I stretching prior to session. Brought his grandson Meet to clinic today.     Objective:    Patient received individual therapy to increase strength, endurance, ROM, flexibility, posture and core stabilization with activities as follows:     Gait training via Palette system for 50:25 minutes (plus set up):     Set up at 10 kg unloading. Computer-assisted robotic gait training via Flexiroam x 3.0 km/h. GF x 75% x 10', 50% x 15', the 35% for distance of 2495 m. No stoppages.     Continue HEP daily.    Pt demo good understanding of the education provided. Patient demonstrated good return demonstration of activities.     Assessment:     Great effort. Fatigued after session. Improving performance with GF drops with decreased LE buckling.     Pt will continue to benefit from skilled PT intervention. Medical Necessity is demonstrated by:  Requires skilled supervision to complete and progress HEP and Weakness.    Patient is making good progress towards established goals.    Plan:    Continue with established Plan of Care towards PT goals.

## 2018-05-29 ENCOUNTER — CLINICAL SUPPORT (OUTPATIENT)
Dept: REHABILITATION | Facility: HOSPITAL | Age: 58
End: 2018-05-29
Attending: FAMILY MEDICINE
Payer: COMMERCIAL

## 2018-05-29 DIAGNOSIS — R53.1 DECREASED STRENGTH: ICD-10-CM

## 2018-05-29 DIAGNOSIS — M25.673 DECREASED ROM OF ANKLE: ICD-10-CM

## 2018-05-29 DIAGNOSIS — S14.129A INCOMPLETE INJURY OF CERVICAL SPINAL CORD WITH CENTRAL CORD SYNDROME: ICD-10-CM

## 2018-05-29 DIAGNOSIS — Z78.9 IMPAIRED MOTOR CONTROL: ICD-10-CM

## 2018-05-29 PROCEDURE — 97116 GAIT TRAINING THERAPY: CPT | Mod: PN

## 2018-05-30 NOTE — PROGRESS NOTES
Name: Alberto Dangelo  Clinic Number: [id number]  Date of Treatment: 05/29/2018  Diagnosis:   Encounter Diagnoses   Name Primary?   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  Patient reports their pain to be n/a/10 on a 0-10 scale with 0 being no pain and 10 being the worst pain imaginable.    Objective  Pt performed independent stretching prior to PT session for gait training.      Patient received individual therapy to increase strength, endurance, flexibility, core stabilization and gait quality with 0 patients with activities as follows:     Alberto  participated in dynamic functional therapeutic activities to improve functional performance for 67 minutes. Including: Set up with 10 kg unloading.  Pt participated in computer assisted robotic gait training via Clikthrough @ 3.0 kph x 60 min, 11 sec for a total distance of 2997 m.  Utilized guidance force of 75% x 11 min, decreased to 50% x 13 min, and completed session @ 35% guidance force.        Written Home Exercises Provided: N/A  Pt demo good understanding of the education provided. Alberto demonstrated good return demonstration of activities.     Assessment:   Pt continues to demonstrate genu recurvatum in standing with anterior pelvic tilt and increased lumbar lordosis.  .      Pt will continue to benefit from skilled PT intervention. Medical Necessity is demonstrated by:  Unable to participate fully in daily activities, Weakness and Impaired gait    Patient is making fair progress towards established goals.    New/Revised goals: N/a      Plan:  Continue with established Plan of Care towards PT goals.

## 2018-05-31 ENCOUNTER — CLINICAL SUPPORT (OUTPATIENT)
Dept: REHABILITATION | Facility: HOSPITAL | Age: 58
End: 2018-05-31
Attending: FAMILY MEDICINE
Payer: COMMERCIAL

## 2018-05-31 DIAGNOSIS — S14.129A INCOMPLETE INJURY OF CERVICAL SPINAL CORD WITH CENTRAL CORD SYNDROME: ICD-10-CM

## 2018-05-31 DIAGNOSIS — Z78.9 IMPAIRED MOTOR CONTROL: ICD-10-CM

## 2018-05-31 DIAGNOSIS — R53.1 DECREASED STRENGTH: ICD-10-CM

## 2018-05-31 PROCEDURE — 97116 GAIT TRAINING THERAPY: CPT | Mod: PN

## 2018-05-31 NOTE — PROGRESS NOTES
Name: Alberto Dangelo  Clinic Number: [id number]  Date of Treatment: 05/31/2018   Diagnosis:   Encounter Diagnoses   Name Primary?   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 Via manual w/c mod I and mod I stretching prior to session in PT gym. Has been performing HEP and strengthening program at home. States he is working on decreasing flexor tones L hand when stretching and walking on [employer].     Objective:    Patient received individual therapy to increase strength, endurance, ROM, flexibility, posture and core stabilization with activities as follows:     Gait training via Interbank FX system for 61:33 minutes (plus set up):     Set up at 10 kg unloading. Computer-assisted robotic gait training via Venturocket x 3.0 km/h. GF x 75% x 10', 55% x 15', then 35% for distance of 3057 m. No stoppages.     Continue HEP daily.    Pt demo good understanding of the education provided. Patient demonstrated good return demonstration of activities.     Assessment:     Improving response to decreased GF with decreased drag and improving TKE in stance. Consistent feedback throughout.     Pt will continue to benefit from skilled PT intervention. Medical Necessity is demonstrated by:  Requires skilled supervision to complete and progress HEP and Weakness.    Patient is making good progress towards established goals.    Plan:    Continue with established Plan of Care towards PT goals.

## 2018-06-03 DIAGNOSIS — I95.9 HYPOTENSION, UNSPECIFIED HYPOTENSION TYPE: ICD-10-CM

## 2018-06-04 DIAGNOSIS — I95.9 HYPOTENSION, UNSPECIFIED HYPOTENSION TYPE: ICD-10-CM

## 2018-06-04 RX ORDER — MIDODRINE HYDROCHLORIDE 5 MG/1
TABLET ORAL
Qty: 720 TABLET | Refills: 0 | Status: SHIPPED | OUTPATIENT
Start: 2018-06-04 | End: 2018-09-15 | Stop reason: SDUPTHER

## 2018-06-04 RX ORDER — MIDODRINE HYDROCHLORIDE 5 MG/1
TABLET ORAL
Qty: 720 TABLET | Refills: 0 | OUTPATIENT
Start: 2018-06-04

## 2018-06-05 ENCOUNTER — CLINICAL SUPPORT (OUTPATIENT)
Dept: REHABILITATION | Facility: HOSPITAL | Age: 58
End: 2018-06-05
Attending: FAMILY MEDICINE
Payer: COMMERCIAL

## 2018-06-05 DIAGNOSIS — Z78.9 IMPAIRED MOTOR CONTROL: ICD-10-CM

## 2018-06-05 DIAGNOSIS — R53.1 DECREASED STRENGTH: ICD-10-CM

## 2018-06-05 DIAGNOSIS — M25.673 DECREASED ROM OF ANKLE: ICD-10-CM

## 2018-06-05 DIAGNOSIS — S14.129A INCOMPLETE INJURY OF CERVICAL SPINAL CORD WITH CENTRAL CORD SYNDROME: ICD-10-CM

## 2018-06-05 PROCEDURE — 97116 GAIT TRAINING THERAPY: CPT | Mod: PN

## 2018-06-06 ENCOUNTER — CLINICAL SUPPORT (OUTPATIENT)
Dept: REHABILITATION | Facility: HOSPITAL | Age: 58
End: 2018-06-06
Payer: COMMERCIAL

## 2018-06-06 DIAGNOSIS — Z78.9 IMPAIRED MOTOR CONTROL: Primary | ICD-10-CM

## 2018-06-06 DIAGNOSIS — R53.1 DECREASED STRENGTH: ICD-10-CM

## 2018-06-06 DIAGNOSIS — M25.673 DECREASED ROM OF ANKLE: ICD-10-CM

## 2018-06-06 PROCEDURE — 97112 NEUROMUSCULAR REEDUCATION: CPT | Mod: PO | Performed by: PHYSICAL THERAPIST

## 2018-06-08 NOTE — PROGRESS NOTES
Name: Alberto Dangelo  [facility] Number: [id number]  Date of Treatment: 06/05/2018  Diagnosis:   Encounter Diagnoses   Name Primary?   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        Time in: 1410  Time Out: 1535  Total Treatment Time: 67  Group Time: 0      Subjective:    Alberto reports improvement of symptoms.  Patient reports their pain to be n/a/10 on a 0-10 scale with 0 being no pain and 10 being the worst pain imaginable.    Objective    Patient received individual therapy to increase strength, flexibility, posture, core stabilization and gait quality with 0 patients with activities as follows:     Alberto performed independent self stretching to hips knees and ankles prior to training.  He participated in dynamic functional therapeutic activities to improve functional performance for 67 minutes. Including: Set up with 30 kg unloading to facilitate focus on active hip flexion/extension.  Pt participated in computer assisted robotic gait training via Makarat @ 3.0 kph x 56 min, 19 sec and @ 3.2 kph x 5 min for a total distance of 3073 m.  Utilized guidance force of 75% x 12 min; decreased to 55% x 10', completed training @ 35% guidance force.      Written HEP issued:  Reviewed.    Pt demo good understanding of the education provided. Alberto demonstrated good return demonstration of activities.     Assessment:   Achieved good heel strike consistently throughout session.  Able to tolerate increased sadaf (to 3.2 kph) for short period.      Pt will continue to benefit from skilled PT intervention. Medical Necessity is demonstrated by:  Unable to participate fully in daily activities, Weakness and Impaired gait quaility    Patient is making steady progress towards established goals.    New/Revised goals: N/A      Plan:  Continue with established Plan of Care towards PT goals.

## 2018-06-11 ENCOUNTER — CLINICAL SUPPORT (OUTPATIENT)
Dept: REHABILITATION | Facility: HOSPITAL | Age: 58
End: 2018-06-11
Attending: FAMILY MEDICINE
Payer: COMMERCIAL

## 2018-06-11 DIAGNOSIS — R53.1 DECREASED STRENGTH: ICD-10-CM

## 2018-06-11 DIAGNOSIS — S14.129A INCOMPLETE INJURY OF CERVICAL SPINAL CORD WITH CENTRAL CORD SYNDROME: ICD-10-CM

## 2018-06-11 DIAGNOSIS — Z78.9 IMPAIRED MOTOR CONTROL: ICD-10-CM

## 2018-06-11 PROCEDURE — 97116 GAIT TRAINING THERAPY: CPT | Mod: PN

## 2018-06-11 NOTE — PROGRESS NOTES
Name: Alberto Dangelo  [facility] Number: [id number]  Date of Treatment: 06/11/2018   Diagnosis:   Encounter Diagnoses   Name Primary?   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 Mod I in manual w/c and mod I stretching before PT session in PT gym .     Objective:    Patient received individual therapy to increase strength, endurance, ROM, flexibility, posture and core stabilization with activities as follows:     Gait training via [employer] system for 49:57 minutes (plus set up):     Set up at 10 kg unloading. Computer-assisted robotic gait training via Mophie x 3.0 km/h. GF x 70% x 5', 75% x 10', 55% x 15', then 15% for distance of 2486 m. No stoppages.     Continue HEP daily.    Pt demo good understanding of the education provided. Patient demonstrated good return demonstration of activities.     Assessment:     Early ending to session 2* need for BR break. Initial drag R LE with attempts at initial GF at 70% but improved with strapping and increased GF to 75%.     Pt will continue to benefit from skilled PT intervention. Medical Necessity is demonstrated by:  Requires skilled supervision to complete and progress HEP and Weakness.    Patient is making good progress towards established goals.    Plan:    Continue with established Plan of Care towards PT goals.

## 2018-06-13 ENCOUNTER — CLINICAL SUPPORT (OUTPATIENT)
Dept: REHABILITATION | Facility: HOSPITAL | Age: 58
End: 2018-06-13
Attending: FAMILY MEDICINE
Payer: COMMERCIAL

## 2018-06-13 DIAGNOSIS — Z78.9 IMPAIRED MOTOR CONTROL: ICD-10-CM

## 2018-06-13 DIAGNOSIS — S14.129A INCOMPLETE INJURY OF CERVICAL SPINAL CORD WITH CENTRAL CORD SYNDROME: ICD-10-CM

## 2018-06-13 DIAGNOSIS — R53.1 DECREASED STRENGTH: ICD-10-CM

## 2018-06-13 PROCEDURE — 97116 GAIT TRAINING THERAPY: CPT | Mod: PN

## 2018-06-13 NOTE — PROGRESS NOTES
Name: Alberto Dangelo  [facility] Number: [id number]  Date of Treatment: 06/13/2018   Diagnosis:   Encounter Diagnoses   Name Primary?   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 Mod I in manual w/c, mod I stretching prior to session.     Objective:    Patient received individual therapy to increase strength, endurance, ROM, flexibility, posture and core stabilization with activities as follows:     Gait training via Goowy system for 53:54 minutes (plus set up):     Set up at 5 kg unloading. Computer-assisted robotic gait training via XGIMI x 3.0 km/h. GF x 70% x 10', 55% x 25', then 35% for distance of 2679 m. No stoppages.     Continue HEP daily.    Pt demo good understanding of the education provided. Patient demonstrated good return demonstration of activities.     Assessment:     Improving with decreased unloading to 5 kg and improved response to GF start at 70%. Mm fatigue toward end of session with difficulty     Pt will continue to benefit from skilled PT intervention. Medical Necessity is demonstrated by:  Requires skilled supervision to complete and progress HEP and Weakness.    Patient is making good progress towards established goals.    Plan:    Continue with established Plan of Care towards PT goals.     I certify that I was present in the room directing the student in service delivery and guiding them using my skilled judgment. As the co-signing therapist I have reviewed the students documentation and am responsible for the treatment, assessment, and plan.    Giulia Lock, PTA

## 2018-06-14 NOTE — PLAN OF CARE
Physical Therapy Progress Note     Name: Alberto Dangelo  Clinic Number: [id number]  Diagnosis:   Encounter Diagnoses   Name Primary?   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     Neurogenic bladder     Pulmonary embolism        Precautions: standard  Visit #: 5  Date of Eval: 5/1/2018  Plan of Care Expiration: 7/24/2018        Subjective   Pt reports: pt is eager for PT, no new complaints  Pain Scale:  denies    Objective     Prior to PT session, pt stretched all LE muscle groups in clinic    Patient received individual therapy to increase strength, core stabilization and motor control to improve gait ability with activities as follows:     Pt participated in neuromuscular re education x 90 minutes to address motor control, strength, ROM, and gait ability/ technique:   Gait with MARCOS external AFOs, RW and min A to improve weight bearing and heel contact  120 ft  Vaulting occurring every step at 25-40%  Poor to fair knee flex for swing MARCOS    Pre gait with RW:   Front step- 5x mod A  Side step 10x, min A    // bar: static standing balance without UE with focus on decreasing knee hyperextension- fair- balance    With head turns- poor+ to fair- balance    With trunk rotation poor+ balance   Static standing on foam- poor+ balance    Mat ex AAROM:   LTR with ball mod A 30x  Side lying hip flex w/ powder board R30x- max A  MARCOS knee/ hip flex with ball, max A R=25x + 10x + 10x, L= 25x + 20x    Tall kneeling with UE support on ball:single UE lifts- poor balance   Squats- 10x, min-mod A to perform full hip  extension    Quadruped: alternate UE lift, mod-max A to maintain balance 10x    Sitting: HS curls with rollor board, min A R, YTB on L 30x    Lower Extremity Strength    RLE eval RLE 6/18 LLE eval LLE 6/18   Hip Flexion: trace 2-/5 2/5 2/5   Hip Extension:  2/5 2+/5 2/5 2+/5   Hip Abduction: trace trace trace trace   Hip Adduction: 2/5 2+/5 2/5 2+/5   Knee Extension: 2/5 4/5 2/5 4/5   Knee Flexion: trace trace 2-/5 2-/5   Ankle Dorsiflexion: trace NT 2-/5 NT   Ankle Plantarflexion: 2-/5 NT 2-/5 NT   Ankle Inversion: NT NT NT NT   Ankle Eversion: NT NT NT NT   Abdominal strength 2+/5 NT NT NT      DF AAROM= MARCOS 2 deg      NP today:   LTR with ball mod A 30x  MARCOS knee/ hip flex with ball, max A 30x   Tall kneeling with UE support on ball: forward leans, mod-max A 2 x 5  Quadruped: alternate UE lift, mod-max A to maintain balance 10x  Russian twists with 6# ball >30x   Overhead lifts with 6# ball 20-30x   6# ball toss without UE support- fair to good balance      Written Home Exercises: 5/14/18- quad stretch with strap in kaitlin test position  5/11/18- practice ab crunches and hip flexion in pool- verbally explained   Pt demo good understanding of the education provided.      Education provided re: POC, HEP    Pt has no cultural, educational or language barriers to learning provided.    Assessment   assessment period: 5/1/2018 to 6/6/2018. Alberto tolerates treatments well and demonstrates progress with improving gait and motor control of trunk and BLE. Pt is demonstrating good improvements in motor control and strength of BLE. Pt demonstrates improvements in MARCOS hip flexion, extension, hip adduction and knee extension. Increased LE tone (mainly in extensors) is limiting progress in PT and with gait. Pt demonstrates decreased control and strength of LE flexors. Pt continues to demonstrate vaulting ~25-40% during gait with RW. Knee flexion during swing is inconsistent. Pt requires use of sliders on feet to allow for  progression even though he wears MARCOS AFOs to prevent foot drop. Pt demonstrates increased gait distance with RW. PT continues to focus on improving LE and trunk motor control to improve pt's gait quality and ability. Pt can benefit from continued skilled PT to address impairments listed below to improve gait ability and continue to encourage increased return of strength and motor control to BLE/ trunk.     Pt prognosis is Good. Pt will continue to benefit from skilled outpatient physical therapy to address the deficits listed in the problem list, provide pt/family education and to maximize pt's level of independence in the home and community environment.     Anticipated barriers to physical therapy: Distance to PT clinic      Medical necessity is demonstrated by the following IMPAIRMENTS/PROBLEM LIST:   Fall Risk - impaired balance   Weakness   Impaired muscle tone  Decreased ROM  Gait deviations   Decreased ambulation   Difficulty participating in daily activities   Requires skilled supervision to complete and progress HEP        Pt's spiritual, cultural and educational needs considered and pt agreeable to plan of care and GOALS as stated below:   Short term goals: 6 weeks, pt agrees to goals set.  1. Pt will report performing HEP for LE/ core strengthening at home at least 2-3x/week to improve and maintain progress made in PT. Ongoing- pt reports good compliance  2. Pt will demonstrate improved MARCOS DF AAROM to 0 degrees to improve gait ability and foot clearance. Met 6/6/18- 2 deg AAROM MARCOS  3. Pt will demonstrate improved gross strength of RLE in available musculature to 2/5 to improve gait ability. Improving- see chart in objective info  4. Assess SSWS and TUG, set goals as needed. NT- addressing gait quality/ technique     Long term goals: 12 weeks, pt agrees to goals set  5. Pt will demonstrate improved MARCOS AAROM DF to 5 degrees to improve foot clearance during gait.   6. Pt will demonstrate improved abdominal  strength to ~3+/5 to decrease back pain and improve balance and gait.  7. Pt will demonstrate improved gait ability by ambulating 100 ft with RW and MARCOS AFOs with mod I with <25% vaulting.           Plan   Continue PT 1-2x weekly under established Plan of Care, with treatment to include: pt education, HEP, gait training, and neuromuscular re-education/balance exercises to work towards established goals. Pt may be seen by PTA to carry out plan of care.     Kala Bajwa, PT   06/06/2018

## 2018-06-19 ENCOUNTER — CLINICAL SUPPORT (OUTPATIENT)
Dept: REHABILITATION | Facility: HOSPITAL | Age: 58
End: 2018-06-19
Attending: FAMILY MEDICINE
Payer: COMMERCIAL

## 2018-06-19 DIAGNOSIS — S14.129A INCOMPLETE INJURY OF CERVICAL SPINAL CORD WITH CENTRAL CORD SYNDROME: ICD-10-CM

## 2018-06-19 DIAGNOSIS — R53.1 DECREASED STRENGTH: ICD-10-CM

## 2018-06-19 DIAGNOSIS — Z78.9 IMPAIRED MOTOR CONTROL: ICD-10-CM

## 2018-06-19 PROCEDURE — 97116 GAIT TRAINING THERAPY: CPT | Mod: PN

## 2018-06-20 ENCOUNTER — CLINICAL SUPPORT (OUTPATIENT)
Dept: REHABILITATION | Facility: HOSPITAL | Age: 58
End: 2018-06-20
Payer: COMMERCIAL

## 2018-06-20 DIAGNOSIS — M25.673 DECREASED ROM OF ANKLE: ICD-10-CM

## 2018-06-20 DIAGNOSIS — S14.129A INCOMPLETE INJURY OF CERVICAL SPINAL CORD WITH CENTRAL CORD SYNDROME: ICD-10-CM

## 2018-06-20 DIAGNOSIS — R53.1 DECREASED STRENGTH: ICD-10-CM

## 2018-06-20 DIAGNOSIS — Z78.9 IMPAIRED MOTOR CONTROL: Primary | ICD-10-CM

## 2018-06-20 PROCEDURE — 97112 NEUROMUSCULAR REEDUCATION: CPT | Mod: PO | Performed by: PHYSICAL THERAPIST

## 2018-06-20 NOTE — PROGRESS NOTES
Physical Therapy Progress Note      Name: Alberto Dangelo  Clinic Number: [id number]  Diagnosis:        Encounter Diagnoses   Name Primary?    Decreased ROM of ankle      Impaired motor control Yes    Decreased strength        Physician: Kaitlyn Arreguin MD  Treatment Orders: PT Eval and Treat, PT x 1 year       Past Medical History:   Diagnosis Date    Autonomic dysfunction      Constipation      Deep vein thrombosis      Depression      GERD (gastroesophageal reflux disease)      Incomplete injury of cervical spinal cord with central cord syndrome      Neurogenic bladder      Pulmonary embolism           Precautions: standard  Visit #: 6  Date of Eval: 5/1/2018  Plan of Care Expiration: 7/24/2018         Subjective   Pt reports: pt is eager for PT, no new complaints  Pain Scale:  denies     Objective      Prior to PT session, pt stretched all LE muscle groups in clinic     Patient received individual therapy to increase strength, core stabilization and motor control to improve gait ability with activities as follows:      Pt participated in neuromuscular re education x 55 minutes to address motor control, strength, ROM, and gait ability/ technique:   Gait with MARCOS external AFOs, RW and close supervision to improve weight bearing and heel contact  120 ft  Vaulting occurring every step at ~30%  Poor to fair knee flex for swing MARCOS     Pre gait with RW:   Front step- 10x min A  Side step 10x, min-mod A     // bar: static standing balance without UE with focus on decreasing knee hyperextension- fair- balance- 1 min                          With horizontal head turns- poor+ to fair- balance 30 sec x 2    With horizontal head turns- poor+ to fair- balance vertical- 1 min                          With trunk rotation poor+ balance              Static standing on foam- poor+ to fair- balance- 1 min x 2     NP today:   Mat ex AAROM:   LTR with ball mod A 30x  Side lying hip flex w/  powder board R30x- max A  MARCOS knee/ hip flex with ball, max A R=25x + 10x + 10x, L= 25x + 20x  Tall kneeling with UE support on ball:single UE lifts- poor balance              Squats- 10x, min-mod A to perform full hip extension  Quadruped: alternate UE lift, mod-max A to maintain balance 10x  Sitting: HS curls with rollor board, min A R, YTB on L 30x  LTR with ball mod A 30x  MARCOS knee/ hip flex with ball, max A 30x             Tall kneeling with UE support on ball: forward leans, mod-max A 2 x 5  Quadruped: alternate UE lift, mod-max A to maintain balance 10x  Russian twists with 6# ball >30x              Overhead lifts with 6# ball 20-30x              6# ball toss without UE support- fair to good balance        Written Home Exercises: 5/14/18- quad stretch with strap in kaitlin test position  5/11/18- practice ab crunches and hip flexion in pool- verbally explained   Pt demo good understanding of the education provided.       Education provided re: POC, HEP     Pt has no cultural, educational or language barriers to learning provided.     Assessment   Alberto tolerated treatment well. Pt reports practicing gait with focus on decreasing knee hyperextension and improving LE weight bearing. Pt demonstrated decreased vaulting today. Vaulting conitues to occur nearly every step, but pt's heel does not come up as high and improved weight bearing through LE is observed. Pt demonstrated improved ability to perform forward step during pre gait exercises and improved standing balance with intermittent UE support. PT may trial [place] crutches at next visit to advance gait.  PT continues to focus on improving LE and trunk motor control to improve pt's gait quality and ability. Pt can benefit from continued skilled PT to address impairments listed below to improve gait ability and continue to encourage increased return of strength and motor control to BLE/ trunk.      Pt prognosis is Good. Pt will continue to benefit from  skilled outpatient physical therapy to address the deficits listed in the problem list, provide pt/family education and to maximize pt's level of independence in the home and community environment.      Anticipated barriers to physical therapy: Distance to PT clinic        Medical necessity is demonstrated by the following IMPAIRMENTS/PROBLEM LIST:   Fall Risk - impaired balance   Weakness   Impaired muscle tone  Decreased ROM  Gait deviations   Decreased ambulation   Difficulty participating in daily activities   Requires skilled supervision to complete and progress HEP         Pt's spiritual, cultural and educational needs considered and pt agreeable to plan of care and GOALS as stated below:   Short term goals: 6 weeks, pt agrees to goals set.  1. Pt will report performing HEP for LE/ core strengthening at home at least 2-3x/week to improve and maintain progress made in PT. Ongoing- pt reports good compliance  2. Pt will demonstrate improved MARCOS DF AAROM to 0 degrees to improve gait ability and foot clearance. Met 6/6/18- 2 deg AAROM MARCOS  3. Pt will demonstrate improved gross strength of RLE in available musculature to 2/5 to improve gait ability. Improving- see chart in objective info  4. Assess SSWS and TUG, set goals as needed. NT- addressing gait quality/ technique     Long term goals: 12 weeks, pt agrees to goals set  5. Pt will demonstrate improved MARCOS AAROM DF to 5 degrees to improve foot clearance during gait.   6. Pt will demonstrate improved abdominal strength to ~3+/5 to decrease back pain and improve balance and gait.  7. Pt will demonstrate improved gait ability by ambulating 100 ft with RW and MARCOS AFOs with mod I with <25% vaulting.            Plan   Continue PT 1-2x weekly under established Plan of Care, with treatment to include: pt education, HEP, gait training, and neuromuscular re-education/balance exercises to work towards established goals. Pt may be seen by PTA to carry out plan of care.       Kala Bajwa, PT     6/20/2018

## 2018-06-21 ENCOUNTER — CLINICAL SUPPORT (OUTPATIENT)
Dept: REHABILITATION | Facility: HOSPITAL | Age: 58
End: 2018-06-21
Attending: FAMILY MEDICINE
Payer: COMMERCIAL

## 2018-06-21 DIAGNOSIS — S14.129A INCOMPLETE INJURY OF CERVICAL SPINAL CORD WITH CENTRAL CORD SYNDROME: ICD-10-CM

## 2018-06-21 DIAGNOSIS — Z78.9 IMPAIRED MOTOR CONTROL: ICD-10-CM

## 2018-06-21 DIAGNOSIS — R53.1 DECREASED STRENGTH: ICD-10-CM

## 2018-06-21 PROCEDURE — 97116 GAIT TRAINING THERAPY: CPT | Mod: PN

## 2018-06-22 NOTE — PROGRESS NOTES
Name: Alberto Dangelo  [facility] Number: [id number]  Date of Treatment: 6/21/2018   Diagnosis:   Encounter Diagnoses   Name Primary?   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 Mod I with manual w/c and mod I stretching in PT gym prior to session.     Objective:    Patient received individual therapy to increase strength, endurance, ROM, flexibility, posture and core stabilization with activities as follows:     Gait training via [employer] system for 62:27 minutes (plus set up):     Set up at 5 kg unloading. Computer-assisted robotic gait training via [employer] x 3.0 km/h. GF x 70% x 12', 50% x 13;, then 35 for distance of 3089 m. No stoppages.     Continue HEP daily.    Pt demo good understanding of the education provided. Patient demonstrated good return demonstration of activities.     Assessment:     Improving tolerance for GF drops with decreased LE drag, improving endurance and TKE in stance.     Pt will continue to benefit from skilled PT intervention. Medical Necessity is demonstrated by:  Requires skilled supervision to complete and progress HEP and Weakness.    Patient is making good progress towards established goals.    Plan:    Continue with established Plan of Care towards PT goals.

## 2018-06-26 ENCOUNTER — CLINICAL SUPPORT (OUTPATIENT)
Dept: REHABILITATION | Facility: HOSPITAL | Age: 58
End: 2018-06-26
Attending: FAMILY MEDICINE
Payer: COMMERCIAL

## 2018-06-26 DIAGNOSIS — M25.673 DECREASED ROM OF ANKLE: ICD-10-CM

## 2018-06-26 DIAGNOSIS — R26.9 GAIT DISTURBANCE: ICD-10-CM

## 2018-06-26 DIAGNOSIS — R53.1 DECREASED STRENGTH: ICD-10-CM

## 2018-06-26 DIAGNOSIS — S14.129A INCOMPLETE INJURY OF CERVICAL SPINAL CORD WITH CENTRAL CORD SYNDROME: ICD-10-CM

## 2018-06-26 DIAGNOSIS — Z78.9 IMPAIRED MOTOR CONTROL: ICD-10-CM

## 2018-06-26 PROCEDURE — 97116 GAIT TRAINING THERAPY: CPT | Mod: PN

## 2018-06-26 NOTE — PROGRESS NOTES
"Name: Alberto Dangelo  [facility] Number: [id number]  Date of Treatment: 06/26/2018   Diagnosis:   Encounter Diagnoses   Name Primary?   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  Patient reports their pain to be n/a/10 on a 0-10 scale with 0 being no pain and 10 being the worst pain imaginable. "I spent a lot of time walking yesterday.  From about 1 to 5.  I had to take a lot of rests but I focused on walking"    Objective    Patient received individual therapy to increase strength, endurance, flexibility, posture and core stabilization with 0 patients with activities as follows:     Alberto participated in dynamic functional therapeutic activities to improve functional performance for 80 minutes. Including: Set up with 5 kg unloading.  Pt participated in computer assisted robotic gait training via [email or web address] @ 3.0 kph x 67 min, 10 sec for a total distance of 3340 m.  Utilized guidance force of 75% x 10 min, decreased to 55% x 15'; decreased further to 35% for 15'.  Completed session @ 25% guidance force.  Required adjustment of thigh and upper shin cuffs as well as to hip position in order to achieve good gait pattern @ 25% guidance force.        Written Home Exercises Provided: N/A    Pt demo good understanding of the education provided. Alberto demonstrated good return demonstration of activities.     Assessment:   As patient progressed through gait training effort, he was noted to demonstrate increased flexion of knees during stance phase B which was corrected significantly by tightening of B thigh cuffs, B upper shin cuffs and adjustment of hip position to facilitate more neutral hip position and more erect standing posture.     Pt will continue to benefit from skilled PT intervention. Medical " Necessity is demonstrated by:  Fall Risk, Unable to participate fully in daily activities, Weakness and Gait disturbance.    Patient is making steady progress towards established goals.    New/Revised goals: N/A      Plan:  Continue with established Plan of Care towards PT goals.

## 2018-06-26 NOTE — PROGRESS NOTES
Name: Alberto Dangelo  [facility] Number: [id number]  Date of Treatment: 06/19/2018  Diagnosis:   Encounter Diagnoses   Name Primary?   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  Patient reports their pain to be n/a/10 on a 0-10 scale with 0 being no pain and 10 being the worst pain imaginable.    Objective    Patient received individual therapy to increase strength, flexibility, posture and core stabilization with 0 patients with activities as follows:   Pt deferred self stretching due to late arrival.      Alberto participated in dynamic functional therapeutic activities to improve functional performance for 67 minutes. Including: Set up with 5 kg unloading.  Pt participated in computer assisted robotic gait training via [employer] @ 3.0 kph x 60 min, 17 sec for a total distance of 2996 m.  Utilized guidance force of 75% x 12 min, decreased to 55 x 13 min; decreased again to 35% x 28 min.  Completed training @ 25% for remainder of session (7 min)      Written Home Exercises Provided: N/A.    Pt demo good understanding of the education provided. Alberto demonstrated good return demonstration of activities.     Assessment:   Patient able to tolerate decreased guidance force without difficulty.  Reported increased fatigue following session.      Pt will continue to benefit from skilled PT intervention. Medical Necessity is demonstrated by:  Unable to participate fully in daily activities, Weakness and gait deficit.    Patient is making fair progress towards established goals.    New/Revised goals: N/A      Plan:  Continue with established Plan of Care towards PT goals.

## 2018-06-29 ENCOUNTER — DOCUMENTATION ONLY (OUTPATIENT)
Dept: REHABILITATION | Facility: HOSPITAL | Age: 58
End: 2018-06-29

## 2018-07-02 ENCOUNTER — DOCUMENTATION ONLY (OUTPATIENT)
Dept: REHABILITATION | Facility: HOSPITAL | Age: 58
End: 2018-07-02

## 2018-07-03 ENCOUNTER — CLINICAL SUPPORT (OUTPATIENT)
Dept: REHABILITATION | Facility: HOSPITAL | Age: 58
End: 2018-07-03
Attending: FAMILY MEDICINE
Payer: COMMERCIAL

## 2018-07-03 DIAGNOSIS — R53.1 DECREASED STRENGTH: ICD-10-CM

## 2018-07-03 DIAGNOSIS — Z78.9 IMPAIRED MOTOR CONTROL: ICD-10-CM

## 2018-07-03 DIAGNOSIS — S14.129A INCOMPLETE INJURY OF CERVICAL SPINAL CORD WITH CENTRAL CORD SYNDROME: ICD-10-CM

## 2018-07-03 PROCEDURE — 97116 GAIT TRAINING THERAPY: CPT | Mod: PN

## 2018-07-05 ENCOUNTER — CLINICAL SUPPORT (OUTPATIENT)
Dept: REHABILITATION | Facility: HOSPITAL | Age: 58
End: 2018-07-05
Attending: FAMILY MEDICINE
Payer: COMMERCIAL

## 2018-07-05 DIAGNOSIS — R53.1 DECREASED STRENGTH: ICD-10-CM

## 2018-07-05 DIAGNOSIS — S14.129A INCOMPLETE INJURY OF CERVICAL SPINAL CORD WITH CENTRAL CORD SYNDROME: ICD-10-CM

## 2018-07-05 DIAGNOSIS — Z78.9 IMPAIRED MOTOR CONTROL: ICD-10-CM

## 2018-07-05 PROCEDURE — 97116 GAIT TRAINING THERAPY: CPT | Mod: PN

## 2018-07-05 NOTE — PROGRESS NOTES
Name: Alberto Dangelo  [facility] Number: [id number]  Date of Treatment: 07/05/2018   Diagnosis:   Encounter Diagnoses   Name Primary?   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 Mod I in manual w/c and mod I stretching prior to session. Went to son's house yesterday for July 4th celebration.     Objective:    Patient received individual therapy to increase strength, endurance, ROM, flexibility, posture and core stabilization with activities as follows:     Gait training via [employer] system for 63:17 minutes (plus set up):     Set up at 5 kg unloading. Computer-assisted robotic gait training via [employer] x 3.0 km/h. GF x 70% x 14', 50% x 15', then 30% for distance of 3111 m. Several stoppages 2* R LE tones.     Continue HEP daily.    Pt demo good understanding of the education provided. Patient demonstrated good return demonstration of activities.     Assessment:     Increased tones R LE, causing stoppages. Strap adjustments made to correct LE drag, rasheed with decreased GF today. Once tone improved, decreased drag.     Pt will continue to benefit from skilled PT intervention. Medical Necessity is demonstrated by:  Requires skilled supervision to complete and progress HEP and Weakness.    Patient is making good progress towards established goals.    Plan:    Continue with established Plan of Care towards PT goals.     I certify that I was present in the room directing the student in service delivery and guiding them using my skilled judgment. As the co-signing therapist I have reviewed the students documentation and am responsible for the treatment, assessment, and plan.    Giulia Lock, PTA

## 2018-07-09 ENCOUNTER — CLINICAL SUPPORT (OUTPATIENT)
Dept: REHABILITATION | Facility: HOSPITAL | Age: 58
End: 2018-07-09
Payer: COMMERCIAL

## 2018-07-09 DIAGNOSIS — R53.1 DECREASED STRENGTH: ICD-10-CM

## 2018-07-09 DIAGNOSIS — M25.673 DECREASED ROM OF ANKLE: ICD-10-CM

## 2018-07-09 DIAGNOSIS — R26.9 GAIT DISTURBANCE: ICD-10-CM

## 2018-07-09 DIAGNOSIS — Z78.9 IMPAIRED MOTOR CONTROL: Primary | ICD-10-CM

## 2018-07-09 PROCEDURE — 97112 NEUROMUSCULAR REEDUCATION: CPT | Mod: PO | Performed by: PHYSICAL THERAPIST

## 2018-07-10 ENCOUNTER — CLINICAL SUPPORT (OUTPATIENT)
Dept: REHABILITATION | Facility: HOSPITAL | Age: 58
End: 2018-07-10
Attending: FAMILY MEDICINE
Payer: COMMERCIAL

## 2018-07-10 DIAGNOSIS — R53.1 DECREASED STRENGTH: ICD-10-CM

## 2018-07-10 DIAGNOSIS — S14.129A INCOMPLETE INJURY OF CERVICAL SPINAL CORD WITH CENTRAL CORD SYNDROME: ICD-10-CM

## 2018-07-10 DIAGNOSIS — Z78.9 IMPAIRED MOTOR CONTROL: ICD-10-CM

## 2018-07-10 PROCEDURE — 97116 GAIT TRAINING THERAPY: CPT | Mod: PN

## 2018-07-12 ENCOUNTER — CLINICAL SUPPORT (OUTPATIENT)
Dept: REHABILITATION | Facility: HOSPITAL | Age: 58
End: 2018-07-12
Attending: FAMILY MEDICINE
Payer: COMMERCIAL

## 2018-07-12 DIAGNOSIS — R53.1 DECREASED STRENGTH: ICD-10-CM

## 2018-07-12 DIAGNOSIS — S14.129A INCOMPLETE INJURY OF CERVICAL SPINAL CORD WITH CENTRAL CORD SYNDROME: ICD-10-CM

## 2018-07-12 DIAGNOSIS — Z78.9 IMPAIRED MOTOR CONTROL: ICD-10-CM

## 2018-07-12 PROCEDURE — 97116 GAIT TRAINING THERAPY: CPT | Mod: PN

## 2018-07-12 NOTE — PROGRESS NOTES
Name: Alberto Dangelo  [facility] Number: [id number]  Date of Treatment: 07/12/2018   Diagnosis:   Encounter Diagnoses   Name Primary?   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        Time in: 1415  Time Out: 1530  Total Treatment Time: 65    Subjective:    Alberto reports had L calf spasms after last session which have resolved. Reports doing well with Kala, PT for RX session for pre-gait training. Mod I in manual w/c and mod I stretching prior to session in PT gym.     Objective:    Patient received individual therapy to increase strength, endurance, ROM, flexibility, posture and core stabilization with activities as follows:     Gait training via [employer] system for 60:20 minutes (plus set up):     Set up at 5 kg unloading. Computer-assisted robotic gait training via [employer] x 3.0 km/h. GF x 70% x 8', 50% x 22', then 30% for distance of 2997 m. One initial stoppage 2* R hip tightness/tones.      Continue HEP daily.    Pt demo good understanding of the education provided. Patient demonstrated good return demonstration of activities.     Assessment:     Initial tones improve with warm up. Notable improvement toward end of session with improving R TKE during stance.     Pt will continue to benefit from skilled PT intervention. Medical Necessity is demonstrated by:  Requires skilled supervision to complete and progress HEP and Weakness.    Patient is making good progress towards established goals.    Plan:    Continue with established Plan of Care towards PT goals.

## 2018-07-13 NOTE — PLAN OF CARE
Date: 07/10/2018    PHYSICAL THERAPY UPDATED PLAN OF TREATMENT    Patient name: Alberto Dangelo  Onset Date:  7/30/2010  SOC Date:  8/24/2016  Primary Diagnosis:    1. Impaired motor control     2. Decreased strength     3. 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 Home equipment includes B AFO's, shower chair, ramped entrance w support bars to swimming pool, FES bike, free weights, plyobox system.     Updated Assessment:    L-FORCE TEST           LEFT                                              RIGHT                             FLEX                  EXT                      FLEX                     EXT                HIP     1.16 Nm            45.52 Nm                 2.73 Nm             30.7 Nm             KNEE    0.7 Nm              12.16 Nm                 0.25  Nm             5.4 Nm  Gait:  Pt continues to perform household ambulation using rolling walker both with and without AFOs  He continues to demonstrate significant hip hiking and circumduction B with decreased foot clearance.  However, he is able to flex his hips to achieve swing through with significant UE support.  He is able to ambulate short distance without assistive device but with increased UE support on walls.   Posture:  Pt stands with foot flat position, feet shoulder width apart.  Tends toward knee hyperextension and anterior thrust of pelvis with hip ext.  Increased lumbar lordosis. He is able to correct this with significant UE support.     Transfers:  Sit <> stand: pt is able to achieve sit > stand using moderate UE support.  He requires substantial anterior weight shift to insure weight is fully over his feet.  Ascent is relatively controlled but upon  reaching erect position he tends to hyperextend B B knees.  Stand > sit requires moderate UE support but is controlled descent.      Previous Short Term Goals Status:        1) Pt will perform squat pivot transfer safely  Met     2) Pt will stand for up to 10 min without excessive lumbar lordosis or increased hip/knee ext  Progressing     3) Tolerate decreased guidance force to < 25% without excessive toe drag.   Progressing  New Short Term Goals:    1) Pt will stand for 10 min without excessive lumbar lordosis or increased hip/knee ext    2) Pt will tolerate decreased guidance force to < 25% without excessive toe drag    3) Pt will perform sit > stand with minimal to moderate UE  support  Long Term Goals:   1) Increase strength in B  LE as indicated by improved L-force testing  (demonstrated significant improved in hip extension strength) Continue   2)  Pt will stand > 15 min with minimal UE support. Continue  3) Pt will consistently perform safe stand pivot transfers,  Progressing  4) Pt will demonstrate improved bilateral hip flexion strength to 2/5 to improve ability to perform swing phase/ foot clearance during gait   Progressing     5) Pt will demonstrate appropriate weight shift during ambulation to limit hip hiking and excessive weight shift.  Progressing  Reasons for Recertification of Therapy:   Patient continues to make slow but steady progress in PT with improved LE flexibility, improved B hip strength, and minimally improved balance in standing.  He continues to demonstrate impaired standing posture, decreased LE flexibility, impaired LE tone, and  Impaired functional mobility, impaired balance and  Impaired gait.  He should continue from skilled PT services to address these limitations.      Certification Period: 07/10/2018 to 10/10/2018  Recommended Treatment Plan: 2 times per week for 12 weeks: Gait Training, Neuromuscular Re-ed, Patient Education, Therapeutic Activites, Therapeutic Exercise and  Locomotor training  Other Recommendations: N/A        Therapist's Name: Candice Herzog, PT   Date: 07/10/2018    I CERTIFY THE NEED FOR THESE SERVICES FURNISHED UNDER THIS PLAN OF TREATMENT AND WHILE UNDER MY CARE    Physician's comments: ________________________________________________________________________________________________________________________________________________      Physician's Name: ___________________________________

## 2018-07-13 NOTE — PROGRESS NOTES
Name: Alberto Dangelo  [facility] Number: [id number]  Date of Treatment:07/10/2018    Diagnosis:   Encounter Diagnoses   Name Primary?   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  Patient reports their pain to be n/a/10 on a 0-10 scale with 0 being no pain and 10 being the worst pain imaginable.    Objective    Patient received individual therapy to increase strength, endurance, flexibility, core stabilization and gait quality with 0 patients with activities as follows:     Pt performed self stretching prior to session    L force test completed.    Alberto participated in the following therapeutic activities:  Set up with 5 kg unloading.  Pt participated in computer assisted robotic gait training via [employer] @ 3.0 kph x 61 min, 44 sec for a total distance of 3069 m.  Utilized guidance force of 70% x 14 min, decreased to 50% x 11, then completed the session @ 30%.      Written Home Exercises Provided: N/A  Pt demo steady understanding of the education provided. Alberto demonstrated good return demonstration of activities.     Assessment:   Pt achieved good heel strike and toe off throughout session.  Occasional R toe drag.      Pt will continue to benefit from skilled PT intervention. Medical Necessity is demonstrated by:  Unable to participate fully in daily activities, Weakness and gait/functional mobility impairment    Patient is making steady progress towards established goals.    New/Revised goals: See updated POC      Plan:  Continue with established Plan of Care towards PT goals.

## 2018-07-16 ENCOUNTER — CLINICAL SUPPORT (OUTPATIENT)
Dept: REHABILITATION | Facility: HOSPITAL | Age: 58
End: 2018-07-16
Payer: COMMERCIAL

## 2018-07-16 DIAGNOSIS — Z78.9 IMPAIRED MOTOR CONTROL: Primary | ICD-10-CM

## 2018-07-16 DIAGNOSIS — R53.1 DECREASED STRENGTH: ICD-10-CM

## 2018-07-16 DIAGNOSIS — R26.9 GAIT DISTURBANCE: ICD-10-CM

## 2018-07-16 DIAGNOSIS — M25.673 DECREASED ROM OF ANKLE: ICD-10-CM

## 2018-07-16 PROCEDURE — 97112 NEUROMUSCULAR REEDUCATION: CPT | Mod: PO | Performed by: PHYSICAL THERAPIST

## 2018-07-16 NOTE — PROGRESS NOTES
Physical Therapy Progress Note      Name: Alberto Dangelo  Clinic Number: [id number]  Diagnosis:        Encounter Diagnoses   Name Primary?    Decreased ROM of ankle      Impaired motor control Yes    Decreased strength        Physician: Kaitlyn Arreguin MD  Treatment Orders: PT Eval and Treat, PT x 1 year       Past Medical History:   Diagnosis Date    Autonomic dysfunction      Constipation      Deep vein thrombosis      Depression      GERD (gastroesophageal reflux disease)      Incomplete injury of cervical spinal cord with central cord syndrome      Neurogenic bladder      Pulmonary embolism           Precautions: standard  Visit #: 8  Date of Eval: 5/1/2018  Plan of Care Expiration: 10/16/2018         Subjective   Pt reports: pt is eager for PT, no new complaints  Pain Scale:  denies     Objective      Prior to PT session, pt stretched all LE muscle groups in clinic     Patient received individual therapy to increase strength, core stabilization and motor control to improve gait ability with activities as follows:      Pt participated in neuromuscular re education x 53 minutes to address motor control, strength, ROM, and gait ability/ technique:     Gait with MARCOS external AFOs, RW and supervision to improve weight bearing and heel contact at least 100ft with supervision  Vaulting occurring every step at ~25%  Poor to fair knee flex for swing MARCOS- improving     Pre gait with RW:   Front step- 10x min A  Side step 10x, min-mod A     // bar: Static standing on foam- poor+ to fair- balance- 1 min x 2    With horizontal head turns- poor balance 1 min x 2    Tall kneeling with BUE on ball: single UE lift 10x, mod A   Leans 10x, mod A   Tall kneeling without UE support, mod-max A, 30 sec x 2     Supervised with PT tech:    Sidelying: hip flexion AAROM  With powderboard 30x  Sitting: Weighted ball toss EOM, fair balance        NP today:   Mat ex AAROM:   LTR with ball mod A  30x  Side lying hip flex w/ powder board R30x- max A  MARCOS knee/ hip flex with ball, max A R=25x + 10x + 10x, L= 25x + 20x  Tall kneeling with UE support on ball:single UE lifts- poor balance              Squats- 10x, min-mod A to perform full hip extension  Quadruped: alternate UE lift, mod-max A to maintain balance 10x  Sitting: HS curls with rollor board, min A R, YTB on L 30x  LTR with ball mod A 30x  MARCOS knee/ hip flex with ball, max A 30x             Tall kneeling with UE support on ball: forward leans, mod-max A 2 x 5  Quadruped: alternate UE lift, mod-max A to maintain balance 10x  Russian twists with 6# ball >30x              Overhead lifts with 6# ball 20-30x              6# ball toss without UE support- fair to good balance        Written Home Exercises: 5/14/18- quad stretch with strap in kaitlin test position  5/11/18- practice ab crunches and hip flexion in pool- verbally explained   Pt demo good understanding of the education provided.       Education provided re: POC, HEP     Pt has no cultural, educational or language barriers to learning provided.     Assessment   Alberto tolerated treatment well. Pt demonstrated improved L knee flexion during swing phase of gait, continues to require foot sliders with AFOs to advance foot. Pt also demonstrated improved stability in tall kneeling, but continues to require maximal assistance to perform without UE support. Pt tolerated increased duration of standing on foam with head turns.  PT may trial Lofstrand crutches (not available in clinic today) at next visit to advance gait.  PT continues to focus on improving LE and trunk motor control to improve pt's gait quality and ability. Pt can benefit from continued skilled PT to address impairments listed below to improve gait ability and continue to encourage increased return of strength and motor control to BLE/ trunk.      Pt prognosis is Good. Pt will continue to benefit from skilled outpatient physical therapy  to address the deficits listed in the problem list, provide pt/family education and to maximize pt's level of independence in the home and community environment.      Anticipated barriers to physical therapy: Distance to PT clinic        Medical necessity is demonstrated by the following IMPAIRMENTS/PROBLEM LIST:   Fall Risk - impaired balance   Weakness   Impaired muscle tone  Decreased ROM  Gait deviations   Decreased ambulation   Difficulty participating in daily activities   Requires skilled supervision to complete and progress HEP         Pt's spiritual, cultural and educational needs considered and pt agreeable to plan of care and GOALS as stated below: status as of 7/9/18   Short term goals: 6 weeks, pt agrees to goals set.   1. Pt will report performing HEP for LE/ core strengthening at home at least 2-3x/week to improve and maintain progress made in PT. Ongoing- pt reports good compliance  2. Pt will demonstrate improved MARCOS DF AAROM to 0 degrees to improve gait ability and foot clearance. Met 6/6/18- 2 deg AAROM MARCOS  3. Pt will demonstrate improved gross strength of RLE in available musculature to 2/5 to improve gait ability. Improving- see chart in objective info  4. Assess SSWS and TUG, set goals as needed. Met- new LTGs set     Long term goals: 12 weeks, pt agrees to goals set  5. Pt will demonstrate improved MARCOS AAROM DF to 5 degrees to improve foot clearance during gait. Improved- R= 2 deg, L= 10 deg  6. Pt will demonstrate improved abdominal strength to ~3+/5 to decrease back pain and improve balance and gait. same  7. Pt will demonstrate improved gait ability by ambulating 100 ft with RW and MARCOS AFOs with mod I with <25% vaulting. Improving- pt can ambulate up to 100 ft with MARCOS external AFOs with supervision and vaulting noted ~30%  8. New 7/9/18: pt will demonstrate improved gait speed by demonstrating a SSWS of 0.34 m/s with appropriate AD- 0.09 m/s with RW        Plan   Continue PT 1-2x weekly  under established Plan of Care, with treatment to include: pt education, HEP, gait training, and neuromuscular re-education/balance exercises to work towards established goals. Pt may be seen by PTA to carry out plan of care.      Kala Bajwa, PT     7/16/2018

## 2018-07-16 NOTE — PLAN OF CARE
"                               Physical Therapy Progress Note      Name: Alberto Dangelo  Clinic Number: [id number]  Diagnosis:        Encounter Diagnoses   Name Primary?    Decreased ROM of ankle      Impaired motor control Yes    Decreased strength        Physician: Kaitlyn Arreguin MD  Treatment Orders: PT Eval and Treat, PT x 1 year       Past Medical History:   Diagnosis Date    Autonomic dysfunction      Constipation      Deep vein thrombosis      Depression      GERD (gastroesophageal reflux disease)      Incomplete injury of cervical spinal cord with central cord syndrome      Neurogenic bladder      Pulmonary embolism           Precautions: standard  Visit #: 7  Date of Eval: 5/1/2018  Plan of Care Expiration: 10/16/2018         Subjective   Pt reports: pt is eager for PT, no new complaints  Pain Scale:  denies     Objective      Prior to PT session, pt stretched all LE muscle groups in clinic     Patient received individual therapy to increase strength, core stabilization and motor control to improve gait ability with activities as follows:      Pt participated in neuromuscular re education x 45 minutes to address motor control, strength, ROM, and gait ability/ technique:   Lower Extremity Strength    RLE eval RLE 6/18 RLE 7/18 LLE eval LLE 6/18 LLE 7/18   Hip Flexion: trace 2-/5 2-/5 2/5 2/5 2 to 2+/5   Hip Extension:  2/5 2+/5 2+/5 2/5 2+/5 2+/5   Hip Abduction: trace trace trace trace trace trace   Hip Adduction: 2/5 2+/5 2+/5 2/5 2+/5 2+/5   Knee Extension: 2/5 4/5 4 to 4+/5 2/5 4/5 4 to 4+/5   Knee Flexion: trace trace Trace to 2-/5 2-/5 2-/5 2-/5   Ankle Dorsiflexion: trace NT NT 2-/5 NT NT   Ankle Plantarflexion: 2-/5 NT NT 2-/5 NT NT   Ankle Inversion: NT NT NT NT NT NT   Ankle Eversion: NT NT NT NT NT NT   Abdominal strength 2+/5 NT 2+/5 NT NT NT      DF AAROM= R= 2 deg, L= 10 deg  SSWS with RW with MARCOS AFOs= 6m in 1'9"= 0.09 m/s    Gait with MARCOS external AFOs, RW and close supervision " to improve weight bearing and heel contact   Vaulting occurring every step at ~30%  Poor to fair knee flex for swing MARCOS     Pre gait with RW:   Front step- 10x min A  Side step 10x, min-mod A     // bar: Static standing on foam- poor+ to fair- balance- 1 min x 2    With horizontal head turns- poor balance 30 sec x 2    Tall kneeling with BUE on ball: single UE lift 10x, mod A   Leans 7x + 3x, mod A   Tall kneeling without UE support, mod-max A    Supervised with PT tech:    Sidelying: hip flexion AAROM  With powderboard 30x  Sitting: HS curls, min A R, YTB L, 30x    Weighted ball toss EOM, fair balance        NP today:   Mat ex AAROM:   LTR with ball mod A 30x  Side lying hip flex w/ powder board R30x- max A  MARCOS knee/ hip flex with ball, max A R=25x + 10x + 10x, L= 25x + 20x  Tall kneeling with UE support on ball:single UE lifts- poor balance              Squats- 10x, min-mod A to perform full hip extension  Quadruped: alternate UE lift, mod-max A to maintain balance 10x  Sitting: HS curls with rollor board, min A R, YTB on L 30x  LTR with ball mod A 30x  MARCOS knee/ hip flex with ball, max A 30x             Tall kneeling with UE support on ball: forward leans, mod-max A 2 x 5  Quadruped: alternate UE lift, mod-max A to maintain balance 10x  Russian twists with 6# ball >30x              Overhead lifts with 6# ball 20-30x              6# ball toss without UE support- fair to good balance        Written Home Exercises: 5/14/18- quad stretch with strap in kaitlin test position  5/11/18- practice ab crunches and hip flexion in pool- verbally explained   Pt demo good understanding of the education provided.       Education provided re: POC, HEP     Pt has no cultural, educational or language barriers to learning provided.     Assessment   Alberto tolerates treatments well. PT is addressing pt's motor control for LE, especially for knee flexion to improve swing phase of gait. Pt continues to require use of MARCOS AFOs and  foot sliders for gait practice. pt continues to demonstrate vaulting ~30% of each step to advance opposite LE. PT may trial Lofstrand crutches at next visit to advance gait.  Pt is demonstrating improved ability to weight shift to fully unweight extremity to advance. Pt can advance BLE during gait without assistance, verbal cues only. PT may trial Lofstrand crutches at next visit to advance gait. PT is also addressing standing balance to prepare for Loftstrand use. Pt is able to balance on foam unsupported for ~1 min, but requires intermittent UE support during head turns. Improved motor control for MARCOS knee flexion is improving with pt demonstrating fair L knee flexion in sitting unassisted, min A for R.  Pt can benefit from continued skilled PT to address impairments listed below to improve gait ability and continue to encourage increased return of strength and motor control to BLE/ trunk.      Pt prognosis is Good. Pt will continue to benefit from skilled outpatient physical therapy to address the deficits listed in the problem list, provide pt/family education and to maximize pt's level of independence in the home and community environment.      Anticipated barriers to physical therapy: Distance to PT clinic        Medical necessity is demonstrated by the following IMPAIRMENTS/PROBLEM LIST:   Fall Risk - impaired balance   Weakness   Impaired muscle tone  Decreased ROM  Gait deviations   Decreased ambulation   Difficulty participating in daily activities   Requires skilled supervision to complete and progress HEP         Pt's spiritual, cultural and educational needs considered and pt agreeable to plan of care and GOALS as stated below:   Short term goals: 6 weeks, pt agrees to goals set.   1. Pt will report performing HEP for LE/ core strengthening at home at least 2-3x/week to improve and maintain progress made in PT. Ongoing- pt reports good compliance  2. Pt will demonstrate improved MARCOS DF AAROM to 0  degrees to improve gait ability and foot clearance. Met 6/6/18- 2 deg AAROM MARCOS  3. Pt will demonstrate improved gross strength of RLE in available musculature to 2/5 to improve gait ability. Improving- see chart in objective info  4. Assess SSWS and TUG, set goals as needed. Met- new LTGs set     Long term goals: 12 weeks, pt agrees to goals set  5. Pt will demonstrate improved MARCOS AAROM DF to 5 degrees to improve foot clearance during gait. Improved- R= 2 deg, L= 10 deg  6. Pt will demonstrate improved abdominal strength to ~3+/5 to decrease back pain and improve balance and gait. same  7. Pt will demonstrate improved gait ability by ambulating 100 ft with RW and MARCOS AFOs with mod I with <25% vaulting. Improving- pt can ambulate up to 100 ft with MARCOS external AFOs with supervision and vaulting noted ~30%  8. New 7/9/18: pt will demonstrate improved gait speed by demonstrating a SSWS of 0.34 m/s with appropriate AD- 0.09 m/s with RW           Plan   POC extended x 12 weeks to allow for continued progress. Continue PT 1-2x weekly under established Plan of Care, with treatment to include: pt education, HEP, gait training, and neuromuscular re-education/balance exercises to work towards established goals. Pt may be seen by PTA to carry out plan of care.      Kala Bajwa, PT     7/9/2018       I certify the need for these services furnished under this plan of treatment and while under my care.  ____________________________________ Physician/Referring Practitioner   Date of Signature

## 2018-07-17 ENCOUNTER — CLINICAL SUPPORT (OUTPATIENT)
Dept: REHABILITATION | Facility: HOSPITAL | Age: 58
End: 2018-07-17
Attending: FAMILY MEDICINE
Payer: COMMERCIAL

## 2018-07-17 DIAGNOSIS — Z78.9 IMPAIRED MOTOR CONTROL: ICD-10-CM

## 2018-07-17 DIAGNOSIS — S14.129A INCOMPLETE INJURY OF CERVICAL SPINAL CORD WITH CENTRAL CORD SYNDROME: ICD-10-CM

## 2018-07-17 DIAGNOSIS — R53.1 DECREASED STRENGTH: ICD-10-CM

## 2018-07-17 PROCEDURE — 97116 GAIT TRAINING THERAPY: CPT | Mod: PN

## 2018-07-19 ENCOUNTER — CLINICAL SUPPORT (OUTPATIENT)
Dept: REHABILITATION | Facility: HOSPITAL | Age: 58
End: 2018-07-19
Attending: FAMILY MEDICINE
Payer: COMMERCIAL

## 2018-07-19 DIAGNOSIS — Z78.9 IMPAIRED MOTOR CONTROL: ICD-10-CM

## 2018-07-19 DIAGNOSIS — R53.1 DECREASED STRENGTH: ICD-10-CM

## 2018-07-19 DIAGNOSIS — S14.129A INCOMPLETE INJURY OF CERVICAL SPINAL CORD WITH CENTRAL CORD SYNDROME: ICD-10-CM

## 2018-07-19 PROCEDURE — 97116 GAIT TRAINING THERAPY: CPT | Mod: PN

## 2018-07-19 NOTE — PROGRESS NOTES
Name: Alberto Dangelo  Clinic Number: [id number]  Date of Treatment: 07/19/2018   Diagnosis:   Encounter Diagnoses   Name Primary?   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        Time in: 1440  Time Out: 1555  Total Treatment Time: 65    Subjective:    Alberto reports he felt sick last session and that he feels that could be the reason he had so many stoppages 2* increased tones. Mod I in manual w/c and mod I stretching prior to session.   Denies discomfort.     Objective:    Patient received individual therapy to increase strength, endurance, ROM, flexibility, posture and core stabilization with activities as follows:     Gait training via [employer] system for 60:30 minutes (plus set up):     Set up at 5 kg unloading. Computer-assisted robotic gait training via [employer] x 3.0 km/h. GF x 70% x 8', 50% x 28', then 30% for distance of 3001 m. No stoppages.     Continue HEP daily.    Pt demo good understanding of the education provided. Patient demonstrated good return demonstration of activities.     Assessment:     Much improved session vs last session without stoppages and good tolerance for GF drops. Decreased tones in LE's today. Cues for R LE clearance with last GF drop but improved quickly.     Pt will continue to benefit from skilled PT intervention. Medical Necessity is demonstrated by:  Requires skilled supervision to complete and progress HEP and Weakness.    Patient is making good progress towards established goals.    Plan:    Continue with established Plan of Care towards PT goals.

## 2018-07-23 ENCOUNTER — CLINICAL SUPPORT (OUTPATIENT)
Dept: REHABILITATION | Facility: HOSPITAL | Age: 58
End: 2018-07-23
Payer: COMMERCIAL

## 2018-07-23 DIAGNOSIS — Z78.9 IMPAIRED MOTOR CONTROL: Primary | ICD-10-CM

## 2018-07-23 DIAGNOSIS — M25.673 DECREASED ROM OF ANKLE: ICD-10-CM

## 2018-07-23 DIAGNOSIS — R26.9 GAIT DISTURBANCE: ICD-10-CM

## 2018-07-23 DIAGNOSIS — R53.1 DECREASED STRENGTH: ICD-10-CM

## 2018-07-23 PROCEDURE — 97112 NEUROMUSCULAR REEDUCATION: CPT | Mod: PO | Performed by: PHYSICAL THERAPIST

## 2018-07-23 NOTE — PROGRESS NOTES
Physical Therapy Progress Note      Name: Alberto Dangelo  Clinic Number: [id number]  Diagnosis:        Encounter Diagnoses   Name Primary?    Decreased ROM of ankle      Impaired motor control Yes    Decreased strength        Physician: Kaitlyn Arreguin MD  Treatment Orders: PT Eval and Treat, PT x 1 year       Past Medical History:   Diagnosis Date    Autonomic dysfunction      Constipation      Deep vein thrombosis      Depression      GERD (gastroesophageal reflux disease)      Incomplete injury of cervical spinal cord with central cord syndrome      Neurogenic bladder      Pulmonary embolism           Precautions: standard  Visit #: 9  Date of Eval: 5/1/2018  Plan of Care Expiration: 10/16/2018         Subjective   Pt reports: pt is eager for PT, no new complaints. I can feel myself getting stronger  Pain Scale:  denies     Objective      Prior to PT session, pt stretched PF muscle groups in clinic     Patient received individual therapy to increase strength, core stabilization and motor control to improve gait ability with activities as follows:      Pt participated in neuromuscular re education x 30 minutes to address motor control, strength, ROM, and gait ability/ technique:      Pre gait with RW:   Front step- 20x min A  Side step 20x, min-mod A     // bar: Static standing on foam-to fair- balance- 1 min x 2    With horizontal/ verticle head turns- poor+ balance 5-10x    Tall kneeling: Leans 7x, mod A   Tall kneeling without UE support, mod-max A, 30 sec x 2     Supervised with PT tech:    Sidelying: hip flexion AAROM  With powderboard 50x  Sitting: Weighted ball toss EOM, fair balance   HS curls with roller board OTB for L, no resistance R- 50x        NP today:   Mat ex AAROM:   Quadruped: alternate UE lift, mod-max A to maintain balance 10x  Sitting: HBIL knee/ hip flex with ball, max A 30x             Quadruped: alternate UE lift, mod-max A to maintain balance  10x  Russian twists with 6# ball >30x              Overhead lifts with 6# ball 20-30x              6# ball toss without UE support- fair to good balance  Gait with MARCOS external AFOs, RW and supervision to improve weight bearing and heel contact at least 100ft with supervision  Vaulting occurring every step at ~25%  Poor to fair knee flex for swing MARCOS- improving  Tall kneeling with BUE on ball: single UE lift 10x, mod A    Written Home Exercises: 5/14/18- quad stretch with strap in kaitlin test position  5/11/18- practice ab crunches and hip flexion in pool- verbally explained   Pt demo good understanding of the education provided.       Education provided re: POC, HEP     Pt has no cultural, educational or language barriers to learning provided.     Assessment   Alberto tolerated treatment well. Pt demonstrated improved ability to flex/ unlock R knee in standing during pre gait activities. Improved balance noted on foam. PT may trial Lofstrand crutches (not available in clinic today) at next visit to advance gait.  PT continues to focus on improving LE and trunk motor control to improve pt's gait quality and ability. Pt can benefit from continued skilled PT to address impairments listed below to improve gait ability and continue to encourage increased return of strength and motor control to BLE/ trunk.      Pt prognosis is Good. Pt will continue to benefit from skilled outpatient physical therapy to address the deficits listed in the problem list, provide pt/family education and to maximize pt's level of independence in the home and community environment.      Anticipated barriers to physical therapy: Distance to PT clinic        Medical necessity is demonstrated by the following IMPAIRMENTS/PROBLEM LIST:   Fall Risk - impaired balance   Weakness   Impaired muscle tone  Decreased ROM  Gait deviations   Decreased ambulation   Difficulty participating in daily activities   Requires skilled supervision to complete and  progress HEP         Pt's spiritual, cultural and educational needs considered and pt agreeable to plan of care and GOALS as stated below: status as of 7/9/18   Short term goals: 6 weeks, pt agrees to goals set.   1. Pt will report performing HEP for LE/ core strengthening at home at least 2-3x/week to improve and maintain progress made in PT. Ongoing- pt reports good compliance  2. Pt will demonstrate improved MARCOS DF AAROM to 0 degrees to improve gait ability and foot clearance. Met 6/6/18- 2 deg AAROM MARCOS  3. Pt will demonstrate improved gross strength of RLE in available musculature to 2/5 to improve gait ability. Improving- see chart in objective info  4. Assess SSWS and TUG, set goals as needed. Met- new LTGs set     Long term goals: 12 weeks, pt agrees to goals set  5. Pt will demonstrate improved MARCOS AAROM DF to 5 degrees to improve foot clearance during gait. Improved- R= 2 deg, L= 10 deg  6. Pt will demonstrate improved abdominal strength to ~3+/5 to decrease back pain and improve balance and gait. same  7. Pt will demonstrate improved gait ability by ambulating 100 ft with RW and MARCOS AFOs with mod I with <25% vaulting. Improving- pt can ambulate up to 100 ft with MARCOS external AFOs with supervision and vaulting noted ~30%  8. New 7/9/18: pt will demonstrate improved gait speed by demonstrating a SSWS of 0.34 m/s with appropriate AD- 0.09 m/s with RW        Plan   Continue PT 1-2x weekly under established Plan of Care, with treatment to include: pt education, HEP, gait training, and neuromuscular re-education/balance exercises to work towards established goals. Pt may be seen by PTA to carry out plan of care.      Kala Bajwa, PT     7/23/2018

## 2018-07-24 ENCOUNTER — CLINICAL SUPPORT (OUTPATIENT)
Dept: REHABILITATION | Facility: HOSPITAL | Age: 58
End: 2018-07-24
Attending: FAMILY MEDICINE
Payer: COMMERCIAL

## 2018-07-24 DIAGNOSIS — S14.129A INCOMPLETE INJURY OF CERVICAL SPINAL CORD WITH CENTRAL CORD SYNDROME: ICD-10-CM

## 2018-07-24 DIAGNOSIS — Z78.9 IMPAIRED MOTOR CONTROL: ICD-10-CM

## 2018-07-24 DIAGNOSIS — R53.1 DECREASED STRENGTH: ICD-10-CM

## 2018-07-24 DIAGNOSIS — R26.9 GAIT DISTURBANCE: ICD-10-CM

## 2018-07-24 PROCEDURE — 97116 GAIT TRAINING THERAPY: CPT | Mod: PN

## 2018-07-25 ENCOUNTER — TELEPHONE (OUTPATIENT)
Dept: FAMILY MEDICINE | Facility: CLINIC | Age: 58
End: 2018-07-25

## 2018-07-25 DIAGNOSIS — N39.0 URINARY TRACT INFECTION WITHOUT HEMATURIA, SITE UNSPECIFIED: Primary | ICD-10-CM

## 2018-07-25 RX ORDER — CIPROFLOXACIN 500 MG/1
500 TABLET ORAL EVERY 12 HOURS
Qty: 20 TABLET | Refills: 0 | Status: SHIPPED | OUTPATIENT
Start: 2018-07-25 | End: 2018-08-04

## 2018-07-25 NOTE — TELEPHONE ENCOUNTER
I sent cipro in to [facility].  If he is still taking the magnesium oxide he should not take it with the cipro-should restrict it to outside one hour before and after the cipro dose twice a day

## 2018-07-25 NOTE — TELEPHONE ENCOUNTER
----- Message from Nataliia Young sent at 7/25/2018  9:13 AM CDT -----  Contact: Patient  Type: Needs Medical Advice    Who Called: Patient  Symptoms (please be specific):  UTI, Fever  How long has patient had these symptoms:  Tuesday morning  Pharmacy name and phone #:    [id number] - [place] - [address] AT [facility] [address]  [facility] [place] [address]  Phone: [phone] Fax: [phone]      Best Call Back Number: [phone]  Additional Information: patient would like to have something called into the pharmacy for UTI, took a home test kit earlier this morning, please call back to advise

## 2018-07-25 NOTE — TELEPHONE ENCOUNTER
Patient notified Cipro sent in and take the mag ox one hour before or one hour after taking the Cipro twice a day.

## 2018-07-31 ENCOUNTER — CLINICAL SUPPORT (OUTPATIENT)
Dept: REHABILITATION | Facility: HOSPITAL | Age: 58
End: 2018-07-31
Attending: FAMILY MEDICINE
Payer: COMMERCIAL

## 2018-07-31 DIAGNOSIS — R53.1 DECREASED STRENGTH: ICD-10-CM

## 2018-07-31 DIAGNOSIS — S14.129A INCOMPLETE INJURY OF CERVICAL SPINAL CORD WITH CENTRAL CORD SYNDROME: ICD-10-CM

## 2018-07-31 DIAGNOSIS — Z78.9 IMPAIRED MOTOR CONTROL: ICD-10-CM

## 2018-07-31 PROCEDURE — 97116 GAIT TRAINING THERAPY: CPT | Mod: PN

## 2018-07-31 NOTE — PROGRESS NOTES
Name: Alberto Dangelo  [facility] Number: [id number]  Date of Treatment: 07/31/2018   Diagnosis:   Encounter Diagnoses   Name Primary?   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        Time in: 1420  Time Out: 1540  Total Treatment Time: 65    Subjective:    Alberto reports had symptoms last week of autonomic dysreflexia per pt report: feeling uneasy, having abdominal spasms, fever 1-2* (x3 days). Took urine test and determined he had infection. Treated it.  States he was feeling poorly until today but feels better now. Did not work yesterday 2* symptoms. Mod I in manual w/c and mod I stretching prior to session.     Objective:    Patient received individual therapy to increase strength, endurance, ROM, flexibility, posture and core stabilization with activities as follows:     Gait training via [employer] system for 60:33 minutes (plus set up):     Set up at 5 kg unloading. Computer-assisted robotic gait training via [employer] x 3.0 km/h. GF x 65% x 8', % for distance of 3014 m. No stoppages.     Continue HEP daily.    Pt demo good understanding of the education provided. Patient demonstrated good return demonstration of activities.     Assessment:     Able to decreased GF faster today to lower GF than last session without drag or increase in tones. Minimal feedback throughout session in swing and stance phase.     Pt will continue to benefit from skilled PT intervention. Medical Necessity is demonstrated by:  Requires skilled supervision to complete and progress HEP and Weakness.    Patient is making good progress towards established goals.    Plan:    Continue with established Plan of Care towards PT goals.

## 2018-08-01 NOTE — PROGRESS NOTES
Name: Alberto Dangelo  Clinic Number: [id number]  Date of Treatment: 7/24/2018  Diagnosis:   Encounter Diagnoses   Name Primary?   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  Patient reports their pain to be n/a/10 on a 0-10 scale with 0 being no pain and 10 being the worst pain imaginable.    Objective    Patient received individual therapy to increase endurance, flexibility, posture and core stabilization with 0 patients with activities as follows:     Alberto participated in dynamic functional therapeutic activities to improve functional performance for 67 minutes. Including: Set up with 5 kg unloading.  Pt participated in computer assisted robotic gait training via [employer] @ 3.0 kph x 60 min, 28 sec for a total distance of 2980 m.  Utilized guidance force of 70% x 12 min, decreased to 50% x 10 min. 35% x 8 min.  Completed training @ 25% guidance force.        Written Home Exercises Provided: Pt performs self stretching and other activities at home.  He also participates in PT at another clinic.    Pt demo good understanding of the education provided. Alberto demonstrated good return demonstration of activities.     Assessment:   Achieved good heel strike and toe off throughout training.  Minor adjustments to [employer] required to optimize training.      Pt will continue to benefit from skilled PT intervention. Medical Necessity is demonstrated by:  Fall Risk, Unable to participate fully in daily activities, Weakness and Impaired gait/functional mobility.  Patient is making steady progress towards established goals.    New/Revised goals: N/A      Plan:  Continue with established Plan of Care towards PT goals.

## 2018-08-02 ENCOUNTER — CLINICAL SUPPORT (OUTPATIENT)
Dept: REHABILITATION | Facility: HOSPITAL | Age: 58
End: 2018-08-02
Payer: COMMERCIAL

## 2018-08-02 DIAGNOSIS — S14.129A INCOMPLETE INJURY OF CERVICAL SPINAL CORD WITH CENTRAL CORD SYNDROME: ICD-10-CM

## 2018-08-02 DIAGNOSIS — R53.1 DECREASED STRENGTH: ICD-10-CM

## 2018-08-02 DIAGNOSIS — Z78.9 IMPAIRED MOTOR CONTROL: ICD-10-CM

## 2018-08-02 PROCEDURE — 97116 GAIT TRAINING THERAPY: CPT | Mod: PN

## 2018-08-02 NOTE — PROGRESS NOTES
"Name: Alberto Dangelo  [facility] Number: [id number]  Date of Treatment: 08/02/2018   Diagnosis:   Encounter Diagnoses   Name Primary?   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 Reports extra tones R LE today, "probably worked it too hard yesterday", describing his HEP routine in his home gym. Mod I in manual w/c with mod I stretching prior to session.     Objective:    Patient received individual therapy to increase strength, endurance, ROM, flexibility, posture and core stabilization with activities as follows:     Gait training via [employer] system for 56:09 minutes (plus set up):     Set up at 5 kg unloading. Computer-assisted robotic gait training via [employer] x 3.0 km/h. GF x 65% x 5', 70% x 13', 50% x 15', 35% x 15', then 30% for distance of 2757 m. Several stoppages. Stopped a couple of minutes early 2* need for BR break.     Continue HEP daily.    Pt demo good understanding of the education provided. Patient demonstrated good return demonstration of activities.     Assessment:     Several stoppages 2* R LE extensor tones with B LE drag. After warm up, improved gait quality without stoppages until nearing end of session.     Pt will continue to benefit from skilled PT intervention. Medical Necessity is demonstrated by:  Requires skilled supervision to complete and progress HEP and Weakness.    Patient is making good progress towards established goals.    Plan:    Continue with established Plan of Care towards PT goals.       "

## 2018-08-07 ENCOUNTER — CLINICAL SUPPORT (OUTPATIENT)
Dept: REHABILITATION | Facility: HOSPITAL | Age: 58
End: 2018-08-07
Payer: COMMERCIAL

## 2018-08-07 DIAGNOSIS — S14.129A INCOMPLETE INJURY OF CERVICAL SPINAL CORD WITH CENTRAL CORD SYNDROME: ICD-10-CM

## 2018-08-07 PROCEDURE — 97116 GAIT TRAINING THERAPY: CPT | Mod: PN

## 2018-08-07 NOTE — PROGRESS NOTES
"Name: Alberto Dangelo  [facility] Number: [id number]  Date of Treatment: 08/07/2018   Diagnosis:   Encounter Diagnosis   Name Primary?   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 "Cold." stating the cold temp in gym is increasing his LE extensor tones. * Note that gym feels colder today. Mod I stretching in PT gym prior to session. Mod I in manual w/c.    Objective:    Patient received individual therapy to increase strength, endurance, ROM, flexibility, posture and core stabilization with activities as follows:     Gait training via [employer] system for 62:30 minutes (plus set up):     Set up at 5 kg unloading. Computer-assisted robotic gait training via [employer] x 3.0 km/h. GF x 65% x 5', 50% x 25', 35% x 5', then 30% for distance of 3044 m. Several stoppages 2* LE tones.     Continue HEP daily.    Pt demo good understanding of the education provided. Patient demonstrated good return demonstration of activities.     Assessment:     Several stoppages 2* increased LE tones which improved with adjustments to strapping and pt coefficient (returned to original after warm up). Slight increase in unweighting at times to reduce LE drag.     Pt will continue to benefit from skilled PT intervention. Medical Necessity is demonstrated by:  Requires skilled supervision to complete and progress HEP and Weakness.    Patient is making good progress towards established goals.    Plan:    Continue with established Plan of Care towards PT goals.       "

## 2018-08-13 ENCOUNTER — CLINICAL SUPPORT (OUTPATIENT)
Dept: REHABILITATION | Facility: HOSPITAL | Age: 58
End: 2018-08-13
Payer: COMMERCIAL

## 2018-08-13 DIAGNOSIS — Z78.9 IMPAIRED MOTOR CONTROL: Primary | ICD-10-CM

## 2018-08-13 DIAGNOSIS — M25.673 DECREASED ROM OF ANKLE: ICD-10-CM

## 2018-08-13 DIAGNOSIS — R26.9 GAIT DISTURBANCE: ICD-10-CM

## 2018-08-13 DIAGNOSIS — S14.129A INCOMPLETE INJURY OF CERVICAL SPINAL CORD WITH CENTRAL CORD SYNDROME: ICD-10-CM

## 2018-08-13 DIAGNOSIS — R53.1 DECREASED STRENGTH: ICD-10-CM

## 2018-08-13 PROCEDURE — 97112 NEUROMUSCULAR REEDUCATION: CPT | Mod: PO | Performed by: PHYSICAL THERAPIST

## 2018-08-14 ENCOUNTER — CLINICAL SUPPORT (OUTPATIENT)
Dept: REHABILITATION | Facility: HOSPITAL | Age: 58
End: 2018-08-14
Payer: COMMERCIAL

## 2018-08-14 DIAGNOSIS — Z78.9 IMPAIRED MOTOR CONTROL: ICD-10-CM

## 2018-08-14 DIAGNOSIS — R26.9 GAIT DISTURBANCE: ICD-10-CM

## 2018-08-14 DIAGNOSIS — S14.129A INCOMPLETE INJURY OF CERVICAL SPINAL CORD WITH CENTRAL CORD SYNDROME: ICD-10-CM

## 2018-08-14 DIAGNOSIS — R53.1 DECREASED STRENGTH: ICD-10-CM

## 2018-08-14 PROCEDURE — 97116 GAIT TRAINING THERAPY: CPT | Mod: PN

## 2018-08-16 ENCOUNTER — CLINICAL SUPPORT (OUTPATIENT)
Dept: REHABILITATION | Facility: HOSPITAL | Age: 58
End: 2018-08-16
Payer: COMMERCIAL

## 2018-08-16 DIAGNOSIS — Z78.9 IMPAIRED MOTOR CONTROL: ICD-10-CM

## 2018-08-16 DIAGNOSIS — R53.1 DECREASED STRENGTH: ICD-10-CM

## 2018-08-16 DIAGNOSIS — S14.129A INCOMPLETE INJURY OF CERVICAL SPINAL CORD WITH CENTRAL CORD SYNDROME: ICD-10-CM

## 2018-08-16 PROCEDURE — 97116 GAIT TRAINING THERAPY: CPT | Mod: PN

## 2018-08-16 NOTE — PROGRESS NOTES
Name: Alberto Dangelo  Clinic Number: [id number]  Date of Treatment: 08/16/2018   Diagnosis:   Encounter Diagnoses   Name Primary?   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 Mod in manual w/c and mod I stretching prior to session. Going to [employer] football game tomorrow.     Objective:    Patient received individual therapy to increase strength, endurance, ROM, flexibility, posture and core stabilization with activities as follows:     Gait training via [employer] system for 60:37 minutes (plus set up):     Set up at 5 kg unloading. Computer-assisted robotic gait training via [employer] x 3.0 km/h. GF x 60% x 10', 50% x 20', then 30% for distance of 3001 m. No stoppages.     Continue HEP daily.    Pt demo good understanding of the education provided. Patient demonstrated good return demonstration of activities.     Assessment:     Good session without toe drag or stoppages. Continuous minimal feedback even in lower GF.     Pt will continue to benefit from skilled PT intervention. Medical Necessity is demonstrated by:  Requires skilled supervision to complete and progress HEP and Weakness.    Patient is making good progress towards established goals.    Plan:    Continue with established Plan of Care towards PT goals. \

## 2018-08-16 NOTE — PROGRESS NOTES
"Name: Alberto Dangelo  [facility] Number: [id number]  Date of Treatment: 08/14/2018   Diagnosis:   Encounter Diagnoses   Name Primary?   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        Time in: 1420  Time Out: 1530  Total Treatment Time: 67  Group Time: 0      Subjective:    Alberto reports improvement of symptoms.  Patient reports their pain to be n/a/10 on a 0-10 scale with 0 being no pain and 10 being the worst pain imaginable.  "Kala is wanting to progress me to forearm crutches"    Objective    Patient received individual therapy to increase strength, flexibility, core stabilization and gait quality with 0 patients with activities as follows:     Alberto participated in dynamic functional therapeutic activities to improve functional performance for 67 minutes. Including: Set up with 5 kg unloading.  Pt participated in computer assisted robotic gait training via [employer] @ 3.0 kph x 60 min, 10 sec for a total distance of 2998 m.  Utilized guidance force of 70% x 19 min, decreased to 50% x 6 min, decreased further to 35% x 15 min.  Completed session @ 30% (20 min).      Written Home Exercises Provided: Pt performs self stretching and core strengthening exercises at home   Pt demo good understanding of the education provided. Alberto demonstrated good return demonstration of activities.     Assessment:   Minimal toe drag noted @ each decrease in guidance force; however, no stoppage of training experienced.  No skin irritation noted following session.      Pt will continue to benefit from skilled PT intervention. Medical Necessity is demonstrated by:  Fall Risk, Unable to participate fully in daily activities, Weakness and Impaired balance and gait.     Patient is making steady progress towards established goals.    New/Revised goals: N/A      Plan:  Continue with established Plan of Care towards PT goals.   "

## 2018-08-20 ENCOUNTER — CLINICAL SUPPORT (OUTPATIENT)
Dept: REHABILITATION | Facility: HOSPITAL | Age: 58
End: 2018-08-20
Payer: COMMERCIAL

## 2018-08-20 DIAGNOSIS — Z78.9 IMPAIRED MOTOR CONTROL: Primary | ICD-10-CM

## 2018-08-20 DIAGNOSIS — R26.9 GAIT DISTURBANCE: ICD-10-CM

## 2018-08-20 DIAGNOSIS — R53.1 DECREASED STRENGTH: ICD-10-CM

## 2018-08-20 DIAGNOSIS — M25.673 DECREASED ROM OF ANKLE: ICD-10-CM

## 2018-08-20 PROCEDURE — 97112 NEUROMUSCULAR REEDUCATION: CPT | Mod: PO | Performed by: PHYSICAL THERAPIST

## 2018-08-20 NOTE — PLAN OF CARE
"                               Physical Therapy Progress Note      Name: Alberto Dangelo  Clinic Number: [id number]  Diagnosis:        Encounter Diagnoses   Name Primary?   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 I can feel myself getting stronger  Pain Scale:  denies     Objective      Prior to PT session, pt stretched PF muscle groups in clinic     Patient received individual therapy to increase strength, core stabilization and motor control to improve gait ability with activities as follows:      Pt participated in neuromuscular re education x 45 minutes to address motor control, strength, ROM, and gait ability/ technique:      SSWS with RW and MARCOS external AFOs= 6m in 1'9.5"= 0.09 m/s  Attempted gait with Lofstrand crutches, unable with min-mod A x 2    Lower Extremity Strength    RLE eval RLE 6/18 RLE 7/18 RLE 8/18 LLE eval LLE 6/18 LLE 7/18 LLE 8/18   Hip Flexion: trace 2-/5 2-/5 2/5 2/5 2/5 2 to 2+/5 2+/5   Hip Extension:  2/5 2+/5 2+/5 3-/5 2/5 2+/5 2+/5 2+/5   Hip Abduction: trace trace trace 1 to 2-/5 Trace Trace Trace 1/5   Hip Adduction: 2/5 2+/5 2+/5 NT 2/5 2+/5 2+/5 2+/5   Knee Extension: 2/5 4/5 4 to 4+/5 4/5 2/5 4/5 4 to 4+/5 4+/5   Knee Flexion: trace trace Trace to 2-/5 2-/5 2-/5 2-/5 2-/5 2-/5   Ankle Dorsiflexion: trace NT NT NT 2-/5 NT NT NT   Ankle Plantarflexion: 2-/5 NT NT NT 2-/5 NT NT NT "   Ankle Inversion: NT NT NT NT NT NT NT NT   Ankle Eversion: NT NT NT NT NT NT NT NT   Abdominal strength 2+/5 NT 2+/5 2+/5 NT NT NT NT      AAROM DF: R= 8 deg    Supervised with PT tech:    Sitting: Weighted ball toss EOM on violeta disc, fair- balance   HS curls with roller board OTB for L, no resistance R- 40x  Supine: LTR with LE on ball, strapped together- 40x   Bridging with feet on ball 40x    NP today:  Pre gait with RW:   Front step- 20x min A  Side step 20x, min-mod A     // bar: Static standing on foam-to fair- balance- 1 min x 2    With horizontal/ verticle head turns- poor+ balance 5-10x    Tall kneeling: Leans 7x, mod A   Tall kneeling without UE support, mod-max A, 30 sec x 2   Sidelying: hip flexion AAROM  With powderboard 50x  Sitting: Weighted ball toss EOM, fair balance   HS curls with roller board OTB for L, no resistance R- 50x  Mat ex AAROM:   Quadruped: alternate UE lift, mod-max A to maintain balance 10x  Sitting:              Russian twists with 6# ball >30x              Overhead lifts with 6# ball 20-30x              6# ball toss without UE support- fair to good balance  Gait with MARCOS external AFOs, RW and supervision to improve weight bearing and heel contact at least 100ft with supervision  Vaulting occurring every step at ~25%  Poor to fair knee flex for swing MARCOS- improving  Tall kneeling with BUE on ball: single UE lift 10x, mod A    Written Home Exercises: 5/14/18- quad stretch with strap in kaitlin test position  5/11/18- practice ab crunches and hip flexion in pool- verbally explained   Pt demo good understanding of the education provided.       Education provided re: POC, HEP     Pt has no cultural, educational or language barriers to learning provided.     Assessment   assessment period: 7/16/18 to 8/13/18. Pt treated 3 sessions including today. Alberto tolerates treatments well. Pt is demonstrating more consistent gait with MARCOS external AFOs and RW with decreased vaulting noted. Poor  foot clearance still impairs gait. PT attempted to address advancing gait to determine most effective AD for pt. Pt was not able to ambulate with Lofstrand crutches due to decreased balance, decreased ability to fully weight shift on single LE, and increased extensor tone in BLE. Pt demonstrates improved MARCOS DF and met LTG set. Gait velocity remains about the same as last month. Pt can benefit from continued skilled PT to address impairments listed below to improve gait ability and continue to encourage increased return of strength and motor control to BLE/ trunk.      Pt prognosis is Good. Pt will continue to benefit from skilled outpatient physical therapy to address the deficits listed in the problem list, provide pt/family education and to maximize pt's level of independence in the home and community environment.      Anticipated barriers to physical therapy: Distance to PT clinic        Medical necessity is demonstrated by the following IMPAIRMENTS/PROBLEM LIST:   Fall Risk - impaired balance   Weakness   Impaired muscle tone  Decreased ROM  Gait deviations   Decreased ambulation   Difficulty participating in daily activities   Requires skilled supervision to complete and progress HEP         Pt's spiritual, cultural and educational needs considered and pt agreeable to plan of care and GOALS as stated below: status as of 8/13/18   Short term goals: 6 weeks, pt agrees to goals set.   1. Pt will report performing HEP for LE/ core strengthening at home at least 2-3x/week to improve and maintain progress made in PT. Ongoing- pt reports good compliance  2. Pt will demonstrate improved MARCOS DF AAROM to 0 degrees to improve gait ability and foot clearance. Met 6/6/18-  3. Pt will demonstrate improved gross strength of RLE in available musculature to 2/5 to improve gait ability. Improving- see chart in objective info  4. Assess SSWS and TUG, set goals as needed. Met- new LTGs set     Long term goals: 12 weeks, pt agrees  to goals set  5. Pt will demonstrate improved MARCOS AAROM DF to 5 degrees to improve foot clearance during gait. Met 8/13/18- R= 8 deg, L= 10 deg  6. Pt will demonstrate improved abdominal strength to ~3+/5 to decrease back pain and improve balance and gait. same  7. Pt will demonstrate improved gait ability by ambulating 100 ft with RW and MARCOS AFOs with mod I with <25% vaulting. Improving- pt can ambulate up to 100 ft with MARCOS external AFOs with supervision and vaulting noted ~254%  8. New 7/9/18: pt will demonstrate improved gait speed by demonstrating a SSWS of 0.34 m/s with appropriate AD. same- 0.09 m/s with RW        Plan   Continue PT 1-2x weekly under established Plan of Care, with treatment to include: pt education, HEP, gait training, and neuromuscular re-education/balance exercises to work towards established goals. Pt may be seen by PTA to carry out plan of care.      Kala Bajwa, PT     8/13/2018

## 2018-08-20 NOTE — PROGRESS NOTES
Physical Therapy Progress Note      Name: Alberto Dangelo  Clinic Number: [id number]  Diagnosis:        Encounter Diagnoses   Name Primary?    Decreased ROM of ankle      Impaired motor control Yes    Decreased strength        Physician: Kaitlyn Arreguin MD  Treatment Orders: PT Eval and Treat, PT x 1 year       Past Medical History:   Diagnosis Date    Autonomic dysfunction      Constipation      Deep vein thrombosis      Depression      GERD (gastroesophageal reflux disease)      Incomplete injury of cervical spinal cord with central cord syndrome      Neurogenic bladder      Pulmonary embolism           Precautions: standard  Visit #: 11  Date of Eval: 5/1/2018  Plan of Care Expiration: 10/16/2018         Subjective   Pt reports: pt is eager for PT, no new complaints. I practices standing with old crutches and trying to weight shift  Pain Scale:  denies     Objective      Prior to PT session, pt stretched PF muscle groups in clinic     Patient received individual therapy to increase strength, core stabilization and motor control to improve gait ability with activities as follows:      Pt participated in neuromuscular re education x 45 minutes to address motor control, strength, ROM, and gait ability/ technique:      Supervised with PT tech:    Sitting: Russian twists with 6# ball >30x              Overhead lifts with 6# ball 20-30x              Weighted ball toss EOM on airex pad, fair- balance   HS curls with roller board OTB for L, no resistance R- 50x  Side lying hip flex w/ powder board, 50x    Pre gait with loft strands:   Front step- 4x min A    Sit>stand with Lofstrand min A  Gait training with loft strands, min-mod A x 2 + w/c follow ~6 steps x 2, sliders on feet. Max VC for technique     Gait with MARCOS external AFOs, RW and supervision to improve weight bearing and heel contact 100ft with supervision  Vaulting occurring every step at ~25%  Poor to fair knee flex  for swing MARCOS- improving    NP today:   // bar: Static standing on foam-to fair- balance- 1 min x 2    With horizontal/ verticle head turns- poor+ balance 5-10x  Tall kneeling: Leans 7x, mod A   Tall kneeling without UE support, mod-max A, 30 sec x 2   Sidelying: hip flexion AAROM  With powderboard 50x  Sitting: Weighted ball toss EOM, fair balance   HS curls with roller board OTB for L, no resistance R- 50x   LTR with LE on ball, strapped together- 40x   Bridging with feet on ball 40x  Mat ex AAROM:   Quadruped: alternate UE lift, mod-max A to maintain balance 10x  Tall kneeling with BUE on ball: single UE lift 10x, mod A    Written Home Exercises: 5/14/18- quad stretch with strap in kaitlin test position  5/11/18- practice ab crunches and hip flexion in pool- verbally explained   Pt demo good understanding of the education provided.       Education provided re: POC, HEP     Pt has no cultural, educational or language barriers to learning provided.     Assessment   Alberto tolerated treatment well. Pt demonstrated improved ability to stand with Loft strand crutches and attempt gait. Pt required assistance x 2 for balance and to advance LE. Pt had difficulty advancing LE with Loft strand crutches due to increased extensor tone in the LE.  Pt was able to demonstrate fair gait quality with RW. Pt can benefit from continued skilled PT to address impairments listed below to improve gait ability and continue to encourage increased return of strength and motor control to BLE/ trunk.      Pt prognosis is Good. Pt will continue to benefit from skilled outpatient physical therapy to address the deficits listed in the problem list, provide pt/family education and to maximize pt's level of independence in the home and community environment.      Anticipated barriers to physical therapy: Distance to PT clinic        Medical necessity is demonstrated by the following IMPAIRMENTS/PROBLEM LIST:   Fall Risk - impaired balance    Weakness   Impaired muscle tone  Decreased ROM  Gait deviations   Decreased ambulation   Difficulty participating in daily activities   Requires skilled supervision to complete and progress HEP         Pt's spiritual, cultural and educational needs considered and pt agreeable to plan of care and GOALS as stated below:   status as of 8/13/18   Short term goals: 6 weeks, pt agrees to goals set.   1. Pt will report performing HEP for LE/ core strengthening at home at least 2-3x/week to improve and maintain progress made in PT. Ongoing- pt reports good compliance  2. Pt will demonstrate improved MARCOS DF AAROM to 0 degrees to improve gait ability and foot clearance. Met 6/6/18-  3. Pt will demonstrate improved gross strength of RLE in available musculature to 2/5 to improve gait ability. Improving- see chart in objective info  4. Assess SSWS and TUG, set goals as needed. Met- new LTGs set     Long term goals: 12 weeks, pt agrees to goals set  5. Pt will demonstrate improved MARCOS AAROM DF to 5 degrees to improve foot clearance during gait. Met 8/13/18- R= 8 deg, L= 10 deg  6. Pt will demonstrate improved abdominal strength to ~3+/5 to decrease back pain and improve balance and gait. same  7. Pt will demonstrate improved gait ability by ambulating 100 ft with RW and MARCOS AFOs with mod I with <25% vaulting. Improving- pt can ambulate up to 100 ft with MARCOS external AFOs with supervision and vaulting noted ~254%  8. New 7/9/18: pt will demonstrate improved gait speed by demonstrating a SSWS of 0.34 m/s with appropriate AD. same- 0.09 m/s with RW     Plan   Continue PT 1-2x weekly under established Plan of Care, with treatment to include: pt education, HEP, gait training, and neuromuscular re-education/balance exercises to work towards established goals. Pt may be seen by PTA to carry out plan of care.      Kala Bajwa, PT     8/20/2018

## 2018-08-21 ENCOUNTER — CLINICAL SUPPORT (OUTPATIENT)
Dept: REHABILITATION | Facility: HOSPITAL | Age: 58
End: 2018-08-21
Payer: COMMERCIAL

## 2018-08-21 DIAGNOSIS — M25.673 DECREASED ROM OF ANKLE: ICD-10-CM

## 2018-08-21 DIAGNOSIS — S14.129A INCOMPLETE INJURY OF CERVICAL SPINAL CORD WITH CENTRAL CORD SYNDROME: ICD-10-CM

## 2018-08-21 DIAGNOSIS — R53.1 DECREASED STRENGTH: ICD-10-CM

## 2018-08-21 DIAGNOSIS — R26.9 GAIT DISTURBANCE: ICD-10-CM

## 2018-08-21 DIAGNOSIS — Z78.9 IMPAIRED MOTOR CONTROL: ICD-10-CM

## 2018-08-21 PROCEDURE — 97116 GAIT TRAINING THERAPY: CPT | Mod: PN

## 2018-08-22 NOTE — PROGRESS NOTES
"Name: Alberto Dangelo  [facility] Number: [id number]  Date of Treatment: 08/21/2018   Diagnosis:   Encounter Diagnoses   Name Primary?   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        Time in: 1420  Time Out: 1545  Total Treatment Time: 73  Group Time: 0      Subjective:    Alberto reports improvement of symptoms.  Patient reports their pain to be n/a/10 on a 0-10 scale with 0 being no pain and 10 being the worst pain imaginable.  Pt stated "I took a few steps with the crutches yesterday"    Objective    Patient received individual therapy to increase strength, endurance, flexibility, posture, core stabilization and gait quality with 0 patients with activities as follows:     Alberto participated in dynamic functional therapeutic activities to improve functional performance for 73 minutes. Including: Set up with 5 kg unloading.  Pt participated in computer assisted robotic gait training via [employer] @ 3.0 kph x 61 min, 39 sec for a total distance of 3007 m.  Utilized guidance force of 70% x 10 min, decreased to 50% x 13 min, again to 35% x 17 min.  Completed session @ 30%.    Pt experienced multiple stoppages.  Corrections made to position of robotic device, cuff tightness, and patient coefficient to enhance training.      Written Home Exercises Provided: N/A  Pt demo good understanding of the education provided. Alberto demonstrated good return demonstration of activities.     Assessment:   Struggled with training stoppages today due to toe drag and movement of ankle cuff during training.  Adjustments made as noted above with correction of issues causing stoppages.      Pt will continue to benefit from skilled PT intervention. Medical Necessity is demonstrated by:  Fall Risk, Unable to participate fully in daily activities, Weakness and Gait Impairment  Patient is making steady progress towards established goals.    New/Revised goals: " N/A      Plan:  Continue with established Plan of Care towards PT goals.

## 2018-08-23 ENCOUNTER — CLINICAL SUPPORT (OUTPATIENT)
Dept: REHABILITATION | Facility: HOSPITAL | Age: 58
End: 2018-08-23
Attending: FAMILY MEDICINE
Payer: COMMERCIAL

## 2018-08-23 DIAGNOSIS — S14.129A INCOMPLETE INJURY OF CERVICAL SPINAL CORD WITH CENTRAL CORD SYNDROME: ICD-10-CM

## 2018-08-23 DIAGNOSIS — Z78.9 IMPAIRED MOTOR CONTROL: ICD-10-CM

## 2018-08-23 DIAGNOSIS — R53.1 DECREASED STRENGTH: ICD-10-CM

## 2018-08-23 PROCEDURE — 97116 GAIT TRAINING THERAPY: CPT | Mod: PN

## 2018-08-23 NOTE — PROGRESS NOTES
Name: Alberto Dangelo  Clinic Number: [id number]  Date of Treatment: 08/23/2018   Diagnosis:   Encounter Diagnoses   Name Primary?   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        Time in: 1430  Time Out: 1455  Total Treatment Time: 65    Subjective:    Alberto reports no pain. Mod I in manual w/c and mod I stretching.  Did not get to try out his handicapped area seating  [place] [place] [employer] [place] since he was in a suite but plans to be there two weeks from now to see accessibility.     Objective:    Patient received individual therapy to increase strength, endurance, ROM, flexibility, posture and core stabilization with activities as follows:     Gait training via [employer] system for 60:45 minutes (plus set up):     Set up at 5 kg unloading. Computer-assisted robotic gait training via [employer] x 3.0 km/h. GF x 60% x 10', 50% x 20', 30% x 20', then 40% for distance of 2937 m. Several stoppages.     Continue HEP daily.    Pt demo good understanding of the education provided. Patient demonstrated good return demonstration of activities.     Assessment:     R>L LE drag 2* increased tones at latter part of session 2* feeling cold. Plans to wear jacket next session.     Pt will continue to benefit from skilled PT intervention. Medical Necessity is demonstrated by:  Requires skilled supervision to complete and progress HEP and Weakness.    Patient is making good progress towards established goals.    Plan:    Continue with established Plan of Care towards PT goals.

## 2018-08-27 ENCOUNTER — CLINICAL SUPPORT (OUTPATIENT)
Dept: REHABILITATION | Facility: HOSPITAL | Age: 58
End: 2018-08-27
Payer: COMMERCIAL

## 2018-08-27 DIAGNOSIS — S14.129A INCOMPLETE INJURY OF CERVICAL SPINAL CORD WITH CENTRAL CORD SYNDROME: ICD-10-CM

## 2018-08-27 DIAGNOSIS — Z78.9 IMPAIRED MOTOR CONTROL: Primary | ICD-10-CM

## 2018-08-27 DIAGNOSIS — R26.9 GAIT DISTURBANCE: ICD-10-CM

## 2018-08-27 DIAGNOSIS — R53.1 DECREASED STRENGTH: ICD-10-CM

## 2018-08-27 DIAGNOSIS — M25.673 DECREASED ROM OF ANKLE: ICD-10-CM

## 2018-08-27 PROCEDURE — 97112 NEUROMUSCULAR REEDUCATION: CPT | Mod: PO | Performed by: PHYSICAL THERAPIST

## 2018-08-27 NOTE — PROGRESS NOTES
Physical Therapy Progress Note      Name: Alberto Dagnelo  [facility] Number: [id number]  Diagnosis:        Encounter Diagnoses   Name Primary?    Decreased ROM of ankle      Impaired motor control Yes    Decreased strength        Physician: Kaitlyn Arreguin MD  Treatment Orders: PT Eval and Treat, PT x 1 year       Past Medical History:   Diagnosis Date    Autonomic dysfunction      Constipation      Deep vein thrombosis      Depression      GERD (gastroesophageal reflux disease)      Incomplete injury of cervical spinal cord with central cord syndrome      Neurogenic bladder      Pulmonary embolism           Precautions: standard  Visit #: 12  Date of Eval: 5/1/2018  Plan of Care Expiration: 10/16/2018         Subjective   Pt reports: no new complaints. Pt reported practicing weight shifts with loftstrands  Pain Scale:  denies     Objective      Prior to PT session, pt stretched extensor muscle groups in clinic     Patient received individual therapy to increase strength, core stabilization and motor control to improve gait ability with activities as follows:      Pt participated in neuromuscular re education x 45 minutes to address motor control, strength, ROM, and gait ability/ technique:      gait with loft strands:  // bar  10 ft x 2 with min-mod A, A to advance RLE (>3/4 of the way) and LLE (~1/2 way). MARCOS external AFOs in use    Crawling on mat- mod A to advance LE R>L, 3 reps    Supervised with PT tech:    Sitting: Russian twists with 6# ball >30x              sit ups from wedge with 6# ball toss 20-30x              Weighted ball toss EOM on dynadisc, fair- balance   HS curls with roller board OTB , 50x    NP today:   Front step- 4x min A  Sit>stand with Lofstrand min A   Side lying hip flex w/ powder board, 50xNP today:   Gait with MARCOS external AFOs, RW and supervision to improve weight bearing and heel contact 100ft with supervision  Vaulting occurring every step at  ~25%  Poor to fair knee flex for swing MARCOS- improving  // bar: Static standing on foam-to fair- balance- 1 min x 2    With horizontal/ verticle head turns- poor+ balance 5-10x  Tall kneeling: Leans 7x, mod A   Tall kneeling without UE support, mod-max A, 30 sec x 2   Sidelying: hip flexion AAROM  With powderboard 50x  Sitting: Weighted ball toss EOM, fair balance   HS curls with roller board OTB for L, no resistance R- 50x   LTR with LE on ball, strapped together- 40x   Bridging with feet on ball 40x  Mat ex AAROM:   Quadruped: alternate UE lift, mod-max A to maintain balance 10x  Tall kneeling with BUE on ball: single UE lift 10x, mod A    Written Home Exercises: 5/14/18- quad stretch with strap in kaitlin test position  5/11/18- practice ab crunches and hip flexion in pool- verbally explained   Pt demo good understanding of the education provided.       Education provided re: POC, HEP     Pt has no cultural, educational or language barriers to learning provided.     Assessment   Alberto tolerated treatment well. Pt demonstrated improved ambulation with Loft strand crutches & MARCOS external AFOs in parallel bars. Pt was able to advance RLE ~50% of swing and LLE ~25%. PT addressed crawling to improve pt's control of hip flexion/ pelvic mobility. Extensor tone, decreased strength, and decreased motor control limit pt's ability to independently advance LE during gait.  Pt can benefit from continued skilled PT to address impairments listed below to improve gait ability and continue to encourage increased return of strength and motor control to BLE/ trunk.      Pt prognosis is Good. Pt will continue to benefit from skilled outpatient physical therapy to address the deficits listed in the problem list, provide pt/family education and to maximize pt's level of independence in the home and community environment.      Anticipated barriers to physical therapy: Distance to PT clinic        Medical necessity is demonstrated by  the following IMPAIRMENTS/PROBLEM LIST:   Fall Risk - impaired balance   Weakness   Impaired muscle tone  Decreased ROM  Gait deviations   Decreased ambulation   Difficulty participating in daily activities   Requires skilled supervision to complete and progress HEP         Pt's spiritual, cultural and educational needs considered and pt agreeable to plan of care and GOALS as stated below:   status as of 8/13/18   Short term goals: 6 weeks, pt agrees to goals set.   1. Pt will report performing HEP for LE/ core strengthening at home at least 2-3x/week to improve and maintain progress made in PT. Ongoing- pt reports good compliance  2. Pt will demonstrate improved MARCOS DF AAROM to 0 degrees to improve gait ability and foot clearance. Met 6/6/18-  3. Pt will demonstrate improved gross strength of RLE in available musculature to 2/5 to improve gait ability. Improving- see chart in objective info  4. Assess SSWS and TUG, set goals as needed. Met- new LTGs set     Long term goals: 12 weeks, pt agrees to goals set  5. Pt will demonstrate improved MARCOS AAROM DF to 5 degrees to improve foot clearance during gait. Met 8/13/18- R= 8 deg, L= 10 deg  6. Pt will demonstrate improved abdominal strength to ~3+/5 to decrease back pain and improve balance and gait. same  7. Pt will demonstrate improved gait ability by ambulating 100 ft with RW and MARCOS AFOs with mod I with <25% vaulting. Improving- pt can ambulate up to 100 ft with MARCOS external AFOs with supervision and vaulting noted ~25%  8. New 7/9/18: pt will demonstrate improved gait speed by demonstrating a SSWS of 0.34 m/s with appropriate AD. same- 0.09 m/s with RW     Plan   Continue PT 1-2x weekly under established Plan of Care, with treatment to include: pt education, HEP, gait training, and neuromuscular re-education/balance exercises to work towards established goals. Pt may be seen by PTA to carry out plan of care.      Kala Bajwa, PT     8/27/2018

## 2018-08-28 ENCOUNTER — CLINICAL SUPPORT (OUTPATIENT)
Dept: REHABILITATION | Facility: HOSPITAL | Age: 58
End: 2018-08-28
Attending: FAMILY MEDICINE
Payer: COMMERCIAL

## 2018-08-28 DIAGNOSIS — M25.673 DECREASED ROM OF ANKLE: ICD-10-CM

## 2018-08-28 DIAGNOSIS — S14.129A INCOMPLETE INJURY OF CERVICAL SPINAL CORD WITH CENTRAL CORD SYNDROME: ICD-10-CM

## 2018-08-28 DIAGNOSIS — R53.1 DECREASED STRENGTH: ICD-10-CM

## 2018-08-28 DIAGNOSIS — R26.9 GAIT DISTURBANCE: ICD-10-CM

## 2018-08-28 DIAGNOSIS — Z78.9 IMPAIRED MOTOR CONTROL: ICD-10-CM

## 2018-08-28 PROCEDURE — 97116 GAIT TRAINING THERAPY: CPT | Mod: PN

## 2018-08-28 NOTE — PROGRESS NOTES
"Name: Alberto Dangelo  Clinic Number: [id number]  Date of Treatment: 08/28/2018   Diagnosis:   Encounter Diagnoses   Name Primary?   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  Since I go to the other clinic on Mondays and work on standing and walking with the crutches, I am really stiff and that makes this harder.  Patient reports their pain to be n/a/10 on a 0-10 scale with 0 being no pain and 10 being the worst pain imaginable.    Objective    Patient received individual therapy to increase strength, endurance, flexibility, posture, core stabilization and gait quality with 0 patients with activities as follows:     Alberto participated in dynamic functional therapeutic activities to improve functional performance for 67 minutes. Including: Set up with 5 kg unloading.  Pt participated in computer assisted robotic gait training via [employer] @ 3.0 kph x 57 min, 23 sec for a total distance of 2860 m.  Utilized guidance force of 70% x 10 min, @ 60% x 5', @ 50% x 15', @ 40% x 16%, @ 30% x 10', and competed @ 20% x 4'.      Written Home Exercises Provided: N/A  Pt demo good understanding of the education provided. Alberto demonstrated good return demonstration of activities.     Assessment:   Pt tolerated decreased guidance force to 30% but @ 20% he experienced increased toe drag due to limited ability to lift R hip.      Pt will continue to benefit from skilled PT intervention. Medical Necessity is demonstrated by:  Fall Risk, Unable to participate fully in daily activities and Impaired gait.      Patient is making steady progress towards established goals.    New/Revised goals: N/A      Plan:  Continue with established Plan of Care towards PT goals.   "

## 2018-08-30 ENCOUNTER — CLINICAL SUPPORT (OUTPATIENT)
Dept: REHABILITATION | Facility: HOSPITAL | Age: 58
End: 2018-08-30
Attending: FAMILY MEDICINE
Payer: COMMERCIAL

## 2018-08-30 DIAGNOSIS — Z78.9 IMPAIRED MOTOR CONTROL: ICD-10-CM

## 2018-08-30 DIAGNOSIS — S14.129A INCOMPLETE INJURY OF CERVICAL SPINAL CORD WITH CENTRAL CORD SYNDROME: ICD-10-CM

## 2018-08-30 DIAGNOSIS — R53.1 DECREASED STRENGTH: ICD-10-CM

## 2018-08-30 PROCEDURE — 97116 GAIT TRAINING THERAPY: CPT | Mod: PN

## 2018-08-30 NOTE — PROGRESS NOTES
Name: Alberto Dangelo  Clinic Number: [id number]  Date of Treatment: 08/30/2018   Diagnosis:   Encounter Diagnoses   Name Primary?   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 Mod I in manual w/c with mod I stretching prior to session. To go to football game in [facility]. States he may have more mm spasms on Tuesday mornings 2* soreness from his usual Monday workouts.     Objective:    Patient received individual therapy to increase strength, endurance, ROM, flexibility, posture and core stabilization  patients with activities as follows:     Gait training via [employer] system for 60:43 minutes (plus set up):     Set up at 5 kg unloading. Computer-assisted robotic gait training via [employer] x 3.0 km/h. GF x 60% x 10', 50% x 12', 30% for distance of 2993 m. One stoppage.     Continue HEP daily.    Pt demo good understanding of the education provided. Patient demonstrated good return demonstration of activities.     Assessment:     Improved performance today with consistent feedback and good tolerance for GF drops without knee buckling or increased tones.     Pt will continue to benefit from skilled PT intervention. Medical Necessity is demonstrated by:  Requires skilled supervision to complete and progress HEP and Weakness.    Patient is making good progress towards established goals.    Plan:    Continue with established Plan of Care towards PT goals.

## 2018-09-06 ENCOUNTER — CLINICAL SUPPORT (OUTPATIENT)
Dept: REHABILITATION | Facility: HOSPITAL | Age: 58
End: 2018-09-06
Payer: COMMERCIAL

## 2018-09-06 DIAGNOSIS — Z78.9 IMPAIRED MOTOR CONTROL: ICD-10-CM

## 2018-09-06 DIAGNOSIS — S14.129A INCOMPLETE INJURY OF CERVICAL SPINAL CORD WITH CENTRAL CORD SYNDROME: ICD-10-CM

## 2018-09-06 DIAGNOSIS — R53.1 DECREASED STRENGTH: ICD-10-CM

## 2018-09-06 PROCEDURE — 97116 GAIT TRAINING THERAPY: CPT | Mod: PN

## 2018-09-06 NOTE — PROGRESS NOTES
Name: Alberto Dangelo  Clinic Number: [id number]  Date of Treatment: 09/06/2018   Diagnosis:   Encounter Diagnoses   Name Primary?   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 Got his service dog back home the other day and brought him to [facility], did well with his first outing.  Mod I in manual w/c with mod I stretching prior to session    Objective:    Patient received individual therapy to increase strength, endurance, ROM, flexibility, posture and core stabilization with activities as follows:     Gait training via [employer] system for 61:46 minutes (plus set up):     Set up at 5 kg unloading. Computer-assisted robotic gait training via [employer] x 3.0 km/h. GF x 60% x 20', 50% x 5', then 30% for distance of 3052 m. Several stoppages.     Continue HEP daily.    Pt demo good understanding of the education provided. Patient demonstrated good return demonstration of activities.     Assessment:     Several stoppages 2* R>L LE tones causing LE drag which improved with strap adjustments.     Pt will continue to benefit from skilled PT intervention. Medical Necessity is demonstrated by:  Requires skilled supervision to complete and progress HEP and Weakness.    Patient is making good progress towards established goals.    Plan:    Continue with established Plan of Care towards PT goals.

## 2018-09-10 ENCOUNTER — CLINICAL SUPPORT (OUTPATIENT)
Dept: REHABILITATION | Facility: HOSPITAL | Age: 58
End: 2018-09-10
Payer: COMMERCIAL

## 2018-09-10 DIAGNOSIS — R53.1 DECREASED STRENGTH: ICD-10-CM

## 2018-09-10 DIAGNOSIS — Z78.9 IMPAIRED MOTOR CONTROL: Primary | ICD-10-CM

## 2018-09-10 DIAGNOSIS — M25.673 DECREASED ROM OF ANKLE: ICD-10-CM

## 2018-09-10 PROCEDURE — 97112 NEUROMUSCULAR REEDUCATION: CPT | Mod: PO | Performed by: PHYSICAL THERAPIST

## 2018-09-10 NOTE — PROGRESS NOTES
Physical Therapy Progress Note      Name: Alberto Dangelo  Clinic Number: [id number]  Diagnosis:        Encounter Diagnoses   Name Primary?    Decreased ROM of ankle      Impaired motor control Yes    Decreased strength        Physician: Kaitlyn Arreguin MD  Treatment Orders: PT Eval and Treat, PT x 1 year       Past Medical History:   Diagnosis Date    Autonomic dysfunction      Constipation      Deep vein thrombosis      Depression      GERD (gastroesophageal reflux disease)      Incomplete injury of cervical spinal cord with central cord syndrome      Neurogenic bladder      Pulmonary embolism           Precautions: standard  Visit #: 13  Date of Eval: 5/1/2018  Plan of Care Expiration: 10/16/2018         Subjective   Pt reports: no new complaints. Pt reported practicing weight shifts with loIpracomtrands  Pain Scale:  denies     Objective      Prior to PT session, pt stretched extensor muscle groups in clinic     Patient received individual therapy to increase strength, core stabilization and motor control to improve gait ability with activities as follows:      Pt participated in neuromuscular re education x 41 minutes to address motor control, strength, ROM, and gait ability/ technique:      gait with loft strands:  // bar  10 ft with mod A, A to advance BLE     // bar: Static standing on foam- fair- to fair balance- 1 min x 2    With horizontal/ verticle head turns-  fair- balance 30 sec x 2    Tall kneeling: Leans 10x, mod A   Tall kneeling without UE support, mod-max A, 15 sec x 3    Crawling on mat (short length) front/ back- 4 laps with mod A at BLE   Side lying hip flex w/ powder board, 3 x 20, mod A R, max A L    Supervised with PT tech:    Sitting: Weighted ball toss EOM on dynadisc, fair- balance   HS curls with roller board OTB , 50x      NP today:   Russian twists with 6# ball >30x              sit ups from wedge with 6# ball toss 20-30x  Front step- 4x min  A  Sit>stand with Lofstrand min A  Gait with MARCOS external AFOs, RW and supervision to improve weight bearing and heel contact 100ft with supervision  Vaulting occurring every step at ~25%  Poor to fair knee flex for swing MARCOS- improving   Bridging with feet on ball 40x      Written Home Exercises: 5/14/18- quad stretch with strap in kaitlin test position  5/11/18- practice ab crunches and hip flexion in pool- verbally explained   Pt demo good understanding of the education provided.       Education provided re: POC, HEP     Pt has no cultural, educational or language barriers to learning provided.     Assessment   Alberto tolerated treatment well. Pt demonstrated increased LE extensor tone when attempting to ambulate with Lofstrand crutches today.  Pt did demonstrate improved tolerance to tall kneeling position and demonstrated improved control with tall kneeling activities. Pt continues to require BUE support in tall kneeling due to decreased motor control and strength of trunk and BLE. Improved standing balance on foam noted. Extensor tone, decreased strength, and decreased motor control limit pt's ability to independently advance LE during gait.  Pt can benefit from continued skilled PT to address impairments listed below to improve gait ability and continue to encourage increased return of strength and motor control to BLE/ trunk.      Pt prognosis is Good. Pt will continue to benefit from skilled outpatient physical therapy to address the deficits listed in the problem list, provide pt/family education and to maximize pt's level of independence in the home and community environment.      Anticipated barriers to physical therapy: Distance to PT clinic        Medical necessity is demonstrated by the following IMPAIRMENTS/PROBLEM LIST:   Fall Risk - impaired balance   Weakness   Impaired muscle tone  Decreased ROM  Gait deviations   Decreased ambulation   Difficulty participating in daily activities   Requires  skilled supervision to complete and progress HEP         Pt's spiritual, cultural and educational needs considered and pt agreeable to plan of care and GOALS as stated below:   status as of 8/13/18   Short term goals: 6 weeks, pt agrees to goals set.   1. Pt will report performing HEP for LE/ core strengthening at home at least 2-3x/week to improve and maintain progress made in PT. Ongoing- pt reports good compliance  2. Pt will demonstrate improved MARCOS DF AAROM to 0 degrees to improve gait ability and foot clearance. Met 6/6/18-  3. Pt will demonstrate improved gross strength of RLE in available musculature to 2/5 to improve gait ability. Improving- see chart in objective info  4. Assess SSWS and TUG, set goals as needed. Met- new LTGs set     Long term goals: 12 weeks, pt agrees to goals set  5. Pt will demonstrate improved MARCOS AAROM DF to 5 degrees to improve foot clearance during gait. Met 8/13/18- R= 8 deg, L= 10 deg  6. Pt will demonstrate improved abdominal strength to ~3+/5 to decrease back pain and improve balance and gait. same  7. Pt will demonstrate improved gait ability by ambulating 100 ft with RW and MARCOS AFOs with mod I with <25% vaulting. Improving- pt can ambulate up to 100 ft with MARCOS external AFOs with supervision and vaulting noted ~25%  8. New 7/9/18: pt will demonstrate improved gait speed by demonstrating a SSWS of 0.34 m/s with appropriate AD. same- 0.09 m/s with RW     Plan   Continue PT 1-2x weekly under established Plan of Care, with treatment to include: pt education, HEP, gait training, and neuromuscular re-education/balance exercises to work towards established goals. Pt may be seen by PTA to carry out plan of care.      Kala Bajwa, PT     9/10/2018

## 2018-09-11 ENCOUNTER — CLINICAL SUPPORT (OUTPATIENT)
Dept: REHABILITATION | Facility: HOSPITAL | Age: 58
End: 2018-09-11
Payer: COMMERCIAL

## 2018-09-11 DIAGNOSIS — R53.1 DECREASED STRENGTH: ICD-10-CM

## 2018-09-11 DIAGNOSIS — R26.9 GAIT DISTURBANCE: ICD-10-CM

## 2018-09-11 DIAGNOSIS — S14.129A INCOMPLETE INJURY OF CERVICAL SPINAL CORD WITH CENTRAL CORD SYNDROME: ICD-10-CM

## 2018-09-11 DIAGNOSIS — M25.673 DECREASED ROM OF ANKLE: ICD-10-CM

## 2018-09-11 DIAGNOSIS — Z78.9 IMPAIRED MOTOR CONTROL: ICD-10-CM

## 2018-09-11 PROCEDURE — 97116 GAIT TRAINING THERAPY: CPT | Mod: PN

## 2018-09-13 ENCOUNTER — CLINICAL SUPPORT (OUTPATIENT)
Dept: REHABILITATION | Facility: HOSPITAL | Age: 58
End: 2018-09-13
Attending: FAMILY MEDICINE
Payer: COMMERCIAL

## 2018-09-13 DIAGNOSIS — Z78.9 IMPAIRED MOTOR CONTROL: ICD-10-CM

## 2018-09-13 DIAGNOSIS — S14.129A INCOMPLETE INJURY OF CERVICAL SPINAL CORD WITH CENTRAL CORD SYNDROME: ICD-10-CM

## 2018-09-13 DIAGNOSIS — R53.1 DECREASED STRENGTH: ICD-10-CM

## 2018-09-13 PROCEDURE — 97116 GAIT TRAINING THERAPY: CPT | Mod: PN

## 2018-09-13 NOTE — PROGRESS NOTES
Name: Alberto Dangelo  Clinic Number: [id number]  Date of Treatment: 09/13/2018   Diagnosis:   Encounter Diagnoses   Name Primary?   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        Time in: 1420  Time Out: 1530  Total Treatment Time: 65    Subjective:    Alberto reports no pain. Mod I in manual w/c with mod I stretching prior to session. Brought his service dog to therapy. Dog has been doing well with instructions. To go to football game in [place] this weekend in his handicapped seats.  Did well moving around in the stadium but hasn't brought service dog to games yet as dog is young and newly trained. Reports cont OP PT sessions with FITZ Armendariz and states he feels like he is making gains with the sessions.     Objective:    Patient received individual therapy to increase strength, endurance, ROM, flexibility, posture and core stabilization with activities as follows:     Gait training via [employer] system for 60:15 minutes (plus set up):     Set up at 5 kg unloading. Computer-assisted robotic gait training via [employer] x 3.0 km/h. GF x 60% x 10', 50% x 20', then 30% for distance of 2965 m. Several stoppages.     Continue HEP daily.    Pt demo good understanding of the education provided. Patient demonstrated good return demonstration of activities.     Assessment:     Stoppages 2* R LE spasticity which improves with strapping adjustments. Improving performance with GF drops but requires minimal unweighting temporarily to prevent excessive drag with initial drops.     Pt will continue to benefit from skilled PT intervention. Medical Necessity is demonstrated by:  Requires skilled supervision to complete and progress HEP and Weakness.    Patient is making good progress towards established goals.    Plan:    Continue with established Plan of Care towards PT goals.

## 2018-09-13 NOTE — PROGRESS NOTES
"Name: Alberto Dangelo  [facility] Number: [id number]  Date of Treatment: 09/11/2018  Diagnosis:   Encounter Diagnoses   Name Primary?   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        Time in: 1420  Time Out: 1530  Total Treatment Time: 67  Group Time: 0      Subjective:    Alberto reports "I'm stiff today".  Patient reports their pain to be n/a/10 on a 0-10 scale with 0 being no pain and 10 being the worst pain imaginable.    Objective    Patient received individual therapy to increase strength, endurance, ROM, flexibility, core stabilization, and gait quality with 0 patients with activities as follows:     Alberto participated in dynamic functional therapeutic activities to improve functional performance for 67 minutes. Including: Set up with 5 kg unloading.  Pt participated in computer assisted robotic gait training via [employer] @ 3.0 kph x 60 min, 14 sec for a total distance of 2986 m.  Utilized guidance force of 70% x 14 min, decreased to 50% x 20', completed session @ 30% guidance force.  Experienced 2 stoppages during training due to R toe drag.  .      Written Home Exercises Provided: N/A  Pt demo good understanding of the education provided. Alberto demonstrated good return demonstration of activities.     Assessment:   Pt able to complete session with 2 stoppages due to R toe drag.  Able to continue training after stoppage without adjusting guidance force.     Pt will continue to benefit from skilled PT intervention. Medical Necessity is demonstrated by:  Fall Risk, Unable to participate fully in daily activities, Weakness and Impaired gait  Patient is making steady progress towards established goals.    New/Revised goals: NA      Plan:  Continue with established Plan of Care towards PT goals.   "

## 2018-09-15 DIAGNOSIS — I95.9 HYPOTENSION, UNSPECIFIED HYPOTENSION TYPE: ICD-10-CM

## 2018-09-17 RX ORDER — MIDODRINE HYDROCHLORIDE 5 MG/1
TABLET ORAL
Qty: 720 TABLET | Refills: 1 | Status: SHIPPED | OUTPATIENT
Start: 2018-09-17 | End: 2019-02-26 | Stop reason: DRUGHIGH

## 2018-09-20 ENCOUNTER — CLINICAL SUPPORT (OUTPATIENT)
Dept: REHABILITATION | Facility: HOSPITAL | Age: 58
End: 2018-09-20
Attending: FAMILY MEDICINE
Payer: COMMERCIAL

## 2018-09-20 DIAGNOSIS — R53.1 DECREASED STRENGTH: ICD-10-CM

## 2018-09-20 DIAGNOSIS — S14.129A INCOMPLETE INJURY OF CERVICAL SPINAL CORD WITH CENTRAL CORD SYNDROME: ICD-10-CM

## 2018-09-20 DIAGNOSIS — Z78.9 IMPAIRED MOTOR CONTROL: ICD-10-CM

## 2018-09-20 PROCEDURE — 97116 GAIT TRAINING THERAPY: CPT | Mod: PN

## 2018-09-20 NOTE — PROGRESS NOTES
Name: Alberto Dangelo  [facility] Number: [id number]  Date of Treatment: 09/20/2018   Diagnosis:   Encounter Diagnoses   Name Primary?   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 States he may have increased tones 2* workout yesterday.  Mod I in manual w/c and mod I stretching prior to session.     Objective:    Patient received individual therapy to increase strength, endurance, ROM, flexibility, posture and core stabilization with activities as follows:     Gait training via [employer] system for 60:22 minutes (plus set up):     Set up at 5 kg unloading. Computer-assisted robotic gait training via [employer] x 3.0 km/h. GF x 60% x 10', 45% x 20', 30% x 15' then 25% for distance of 3008 m. No stoppages.     Continue HEP daily.    Pt demo good understanding of the education provided. Patient demonstrated good return demonstration of activities.     Assessment:     Good feedback throughout without stoppages.     Pt will continue to benefit from skilled PT intervention. Medical Necessity is demonstrated by:  Requires skilled supervision to complete and progress HEP and Weakness.    Patient is making good progress towards established goals.    Plan:    Continue with established Plan of Care towards PT goals.

## 2018-09-21 ENCOUNTER — CLINICAL SUPPORT (OUTPATIENT)
Dept: REHABILITATION | Facility: HOSPITAL | Age: 58
End: 2018-09-21
Payer: COMMERCIAL

## 2018-09-21 DIAGNOSIS — R26.9 GAIT DISTURBANCE: ICD-10-CM

## 2018-09-21 DIAGNOSIS — M25.673 DECREASED ROM OF ANKLE: ICD-10-CM

## 2018-09-21 DIAGNOSIS — Z78.9 IMPAIRED MOTOR CONTROL: Primary | ICD-10-CM

## 2018-09-21 DIAGNOSIS — R53.1 DECREASED STRENGTH: ICD-10-CM

## 2018-09-21 PROCEDURE — 97112 NEUROMUSCULAR REEDUCATION: CPT | Mod: PO | Performed by: PHYSICAL THERAPIST

## 2018-09-25 ENCOUNTER — CLINICAL SUPPORT (OUTPATIENT)
Dept: REHABILITATION | Facility: HOSPITAL | Age: 58
End: 2018-09-25
Attending: FAMILY MEDICINE
Payer: COMMERCIAL

## 2018-09-25 DIAGNOSIS — S14.129A INCOMPLETE INJURY OF CERVICAL SPINAL CORD WITH CENTRAL CORD SYNDROME: ICD-10-CM

## 2018-09-25 DIAGNOSIS — R53.1 DECREASED STRENGTH: ICD-10-CM

## 2018-09-25 DIAGNOSIS — R26.9 GAIT DISTURBANCE: ICD-10-CM

## 2018-09-25 DIAGNOSIS — M25.673 DECREASED ROM OF ANKLE: ICD-10-CM

## 2018-09-25 DIAGNOSIS — Z78.9 IMPAIRED MOTOR CONTROL: ICD-10-CM

## 2018-09-25 PROCEDURE — 97116 GAIT TRAINING THERAPY: CPT | Mod: PN

## 2018-09-27 NOTE — PLAN OF CARE
Physical Therapy Progress Note      Name: Alberto Dangelo  Clinic Number: [id number]  Diagnosis:        Encounter Diagnoses   Name Primary?    Decreased ROM of ankle      Impaired motor control Yes    Decreased strength        Physician: Kaitlyn Arreguin MD  Treatment Orders: PT Eval and Treat, PT x 1 year       Past Medical History:   Diagnosis Date    Autonomic dysfunction      Constipation      Deep vein thrombosis      Depression      GERD (gastroesophageal reflux disease)      Incomplete injury of cervical spinal cord with central cord syndrome      Neurogenic bladder      Pulmonary embolism           Precautions: standard  Visit #: 14  Date of Eval: 5/1/2018  Plan of Care Expiration: 10/16/2018         Subjective   Pt reports: no new complaints. Pt reported practicing weight shifts with [employer]  Pain Scale:  denies     Objective      Prior to PT session, pt stretched extensor muscle groups in clinic     Patient received individual therapy to increase strength, core stabilization and motor control to improve gait ability with activities as follows:      Pt participated in neuromuscular re education x 45 minutes to address motor control, strength, ROM, and gait ability/ technique:   SSWS with MARCOS external AFOs & RW= 6m in 73.8 sec= 0.01 m/s    gait with loft strands:  // bar  10 ft + 6 ft + 20 ft with mod A, A to advance BLE (R>L) with MARCOS sliders on feet    // bar: Static standing on foam- poor+ to fair- balance- 1 min x 2    With horizontal/ verticle head turns- poor to poor+- balance 30 sec x 2    Lower Extremity Strength    RLE eval RLE 6/18 RLE 7/18 RLE 8/18 RLE 9/18 LLE eval LLE 6/18 LLE 7/18 LLE 8/18 LLE 9/18   Hip Flexion: trace 2-/5 2-/5 2/5 2/5 2/5 2/5 2 to 2+/5 2+/5 2+/5   Hip Extension:  2/5 2+/5 2+/5 3-/5 2+/5 2/5 2+/5 2+/5 2+/5 3 to 3+/5   Hip Abduction: trace trace trace 1 to 2-/5 1 to 2-/5 Trace Trace Trace 1/5 2-/5   Hip Adduction: 2/5 2+/5 2+/5 NT  2+/5 2/5 2+/5 2+/5 2+/5 2+/5   Knee Extension: 2/5 4/5 4 to 4+/5 4/5 4+/5 2/5 4/5 4 to 4+/5 4+/5 4+/5   Knee Flexion: trace trace Trace to 2-/5 2-/5 2-/5 2-/5 2-/5 2-/5 2-/5 2-/5   Ankle Dorsiflexion: trace NT NT NT NT 2-/5 NT NT NT NT   Ankle Plantarflexion: 2-/5 NT NT NT NT 2-/5 NT NT NT NT   Ankle Inversion: NT NT NT NT NT NT NT NT NT NT   Ankle Eversion: NT NT NT NT NT NT NT NT NT NT   Abdominal strength 2+/5 NT 2+/5 2+/5 2+/5 NT NT NT NT NT        Supervised with PT tech:    Sitting: Weighted ball toss EOM on dynadisc, fair- balance, x 5 min with rest breaks   Russian twists with 6# ball >30x   Chest press with weighted ball on violeta disc 30x  Side lying: hip/ knee flex with powder board- 40-50x  Supine LTR on ball- 40-50x    NP today:   Tall kneeling: Leans 10x, mod A   Tall kneeling without UE support, mod-max A, 15 sec x 3  Crawling on mat (short length) front/ back- 4 laps with mod A at BLE              sit ups from wedge with 6# ball toss 20-30x  Front step- 4x min A  Sit>stand with Lofstrand min A  Gait with MARCOS external AFOs, RW and supervision to improve weight bearing and heel contact 100ft with supervision  Vaulting occurring every step at ~25%  Poor to fair knee flex for swing MARCOS- improving   Bridging with feet on ball 40x  HS curls with roller board OTB , 50x      Written Home Exercises: 5/14/18- quad stretch with strap in kaitlin test position  5/11/18- practice ab crunches and hip flexion in pool- verbally explained   Pt demo good understanding of the education provided.       Education provided re: POC, HEP     Pt has no cultural, educational or language barriers to learning provided.     Assessment   Assessment period: 8/20/18 to 9/21/18 Alberto tolerates treatments well. Pt participated in 4 PT sessions to address improving motor control and strength to improve gait ability. Pt is practicing gait with guyd crutches (advanced from RW) during PT session. Pt is able to perform gait with  loftstrand crutches inside of the parallel bars with moderate assistance to advance BLE. Pt demonstrates poor MARCOS hip flexion for terminal stance/ swing phase and poor knee flex and DF during swing phase. Pt is unable to clear toes even with use of MARCOS AFOs. Extensor tone, decreased strength, and decreased motor control limit pt's ability to independently advance LE during gait. Pt demonstrates improved strength of R knee extension, L hip extension, and L hip abd. DF ROM and abdominal is ~ same. Although abdominal strength is rated the same, pt demonstrates improved sitting balance edge of mat without UE support- fair to good. PT is also addressing standing balance on unstable surfaces to improve ability to use Lofstrand crutches vs. RW for gait. Pt can benefit from continued skilled PT to address impairments listed below to improve gait ability and continue to encourage increased return of strength and motor control to BLE/ trunk.      Pt prognosis is Good. Pt will continue to benefit from skilled outpatient physical therapy to address the deficits listed in the problem list, provide pt/family education and to maximize pt's level of independence in the home and community environment.      Anticipated barriers to physical therapy: Distance to PT clinic        Medical necessity is demonstrated by the following IMPAIRMENTS/PROBLEM LIST:   Fall Risk - impaired balance   Weakness   Impaired muscle tone  Decreased ROM  Gait deviations   Decreased ambulation   Difficulty participating in daily activities   Requires skilled supervision to complete and progress HEP         Pt's spiritual, cultural and educational needs considered and pt agreeable to plan of care and GOALS as stated below:   status as of 9/21/18   Short term goals: 6 weeks, pt agrees to goals set.   1. Pt will report performing HEP for LE/ core strengthening at home at least 2-3x/week to improve and maintain progress made in PT. met- pt reports good  compliance  2. Pt will demonstrate improved MARCOS DF AAROM to 0 degrees to improve gait ability and foot clearance. Met 6/6/18-  3. Pt will demonstrate improved gross strength of RLE in available musculature to 2/5 to improve gait ability. Improving- see chart in objective info  4. Assess SSWS and TUG, set goals as needed. Met- new LTGs set     Long term goals: 12 weeks, pt agrees to goals set  5. Pt will demonstrate improved MARCOS AAROM DF to 5 degrees to improve foot clearance during gait. Met 8/13/18- R= 8 deg, L= 10 deg  6. Pt will demonstrate improved abdominal strength to ~3+/5 to decrease back pain and improve balance and gait. same  7. Pt will demonstrate improved gait ability by ambulating 100 ft with RW and MARCOS AFOs with mod I with <25% vaulting. Improving- pt can ambulate up to 100 ft with MARCOS external AFOs with RW and supervision and vaulting noted ~25%. Increased vaulting noted with use of Lofstrand crutches.   8. New 7/9/18: pt will demonstrate improved gait speed by demonstrating a SSWS of 0.34 m/s with appropriate AD. same- 0.09 m/s with RW     Plan   Continue PT 1-2x weekly under established Plan of Care, with treatment to include: pt education, HEP, gait training, and neuromuscular re-education/balance exercises to work towards established goals. Pt may be seen by PTA to carry out plan of care.      Kala Bajwa, PT     9/21/2018

## 2018-09-28 ENCOUNTER — DOCUMENTATION ONLY (OUTPATIENT)
Dept: REHABILITATION | Facility: HOSPITAL | Age: 58
End: 2018-09-28

## 2018-10-01 ENCOUNTER — CLINICAL SUPPORT (OUTPATIENT)
Dept: REHABILITATION | Facility: HOSPITAL | Age: 58
End: 2018-10-01
Payer: COMMERCIAL

## 2018-10-01 DIAGNOSIS — Z78.9 IMPAIRED MOTOR CONTROL: ICD-10-CM

## 2018-10-01 DIAGNOSIS — R26.9 GAIT DISTURBANCE: ICD-10-CM

## 2018-10-01 DIAGNOSIS — R53.1 DECREASED STRENGTH: Primary | ICD-10-CM

## 2018-10-01 DIAGNOSIS — M25.672 DECREASED RANGE OF MOTION OF BOTH ANKLES: ICD-10-CM

## 2018-10-01 DIAGNOSIS — M25.671 DECREASED RANGE OF MOTION OF BOTH ANKLES: ICD-10-CM

## 2018-10-01 PROCEDURE — 97112 NEUROMUSCULAR REEDUCATION: CPT | Mod: PO | Performed by: PHYSICAL THERAPIST

## 2018-10-01 NOTE — PROGRESS NOTES
Physical Therapy Progress Note      Name: Alberto Dangelo  Clinic Number: [id number]  Diagnosis:           Encounter Diagnoses   Name Primary?    Decreased ROM of ankle      Impaired motor control Yes    Decreased strength        Physician: Kaitlyn Arreguin MD  Treatment Orders: PT Eval and Treat, PT x 1 year          Past Medical History:   Diagnosis Date    Autonomic dysfunction      Constipation      Deep vein thrombosis      Depression      GERD (gastroesophageal reflux disease)      Incomplete injury of cervical spinal cord with central cord syndrome      Neurogenic bladder      Pulmonary embolism           Precautions: standard  Visit #: 15  Date of Eval: 5/1/2018  Plan of Care Expiration: 10/16/2018        Subjective   Pt reports: no new complaints. Pt reported practicing weight shifts with [employer]  Pain Scale:  denies     Objective      Prior to PT session, pt stretched extensor muscle groups in clinic     Patient received individual therapy to increase strength, core stabilization and motor control to improve gait ability with activities as follows:      Pt participated in neuromuscular re education x 45 minutes to address motor control, strength, ROM, and gait ability/ technique:      gait with loft strands:  // bar  20 ft x 2 with mod A, A to advance BLE (R>L) with MARCOS sliders on feet  Able to advance LLE for ~3-4 consecutive steps     // bar: Static standing on foam- fair- balance- 1 min               With horizontal head turns- poor to poor+- balance 30 sec x 2   With vertical head turns- poor+ to fair- balance 30 sec x 2      Tall kneeling:   Leans with ball 10x, mod A   Diagonal leans with ball 10x total, mod A              Tall kneeling without UE support, mod A, 15 sec x 3  Crawling on mat (short length) front/ back- 4 laps with mod A at BLE    Supervised with PT tech:    Side lying: hip/ knee flex with powder board- 40-50x  Supine:  LTR on ball- 40-50x  Sitting: HS curls OTB with  roller board 50x   Trunk rotation with 9# ball 10x    NP today:   Sitting: Weighted ball toss EOM on dynadisc, fair- balance, x 5 min with rest breaks              Chest press with weighted ball on violeta disc 30x   sit ups from wedge with 6# ball toss 20-30x  Front step- 4x min A              Bridging with feet on ball 40x          Written Home Exercises: 5/14/18- quad stretch with strap in kaitlin test position  5/11/18- practice ab crunches and hip flexion in pool- verbally explained   Pt demo good understanding of the education provided.       Education provided re: POC, HEP     Pt has no cultural, educational or language barriers to learning provided.     Assessment   Alberto tolerated treatment well. Pt reports compliance with practicing hip control in standing at home. Pt demonstrated improved ability to perform swing phase unassisted with lofstrands for ~3-4 consecutive steps. Pt's LE extensor tone increased quickly with gait and limited RLE swing. Pt did demonstrate improved hip control in tall kneeling. Pt required moderate assist (vs. Maximal) to maintain hip position in tall kneeling. Pt also demonstrated improved control with leans with tball in tall kneeling. Improved standing balance noted with head turns. Pt can benefit from continued skilled PT to address impairments listed below to improve gait ability and continue to encourage increased return of strength and motor control to BLE/ trunk.      Pt prognosis is Good. Pt will continue to benefit from skilled outpatient physical therapy to address the deficits listed in the problem list, provide pt/family education and to maximize pt's level of independence in the home and community environment.      Anticipated barriers to physical therapy: Distance to PT clinic        Medical necessity is demonstrated by the following IMPAIRMENTS/PROBLEM LIST:   Fall Risk - impaired balance   Weakness   Impaired muscle tone  Decreased ROM  Gait deviations   Decreased  ambulation   Difficulty participating in daily activities   Requires skilled supervision to complete and progress HEP         Pt's spiritual, cultural and educational needs considered and pt agreeable to plan of care and GOALS as stated below:   status as of 9/21/18   Short term goals: 6 weeks, pt agrees to goals set.   1. Pt will report performing HEP for LE/ core strengthening at home at least 2-3x/week to improve and maintain progress made in PT. met- pt reports good compliance  2. Pt will demonstrate improved MARCOS DF AAROM to 0 degrees to improve gait ability and foot clearance. Met 6/6/18-  3. Pt will demonstrate improved gross strength of RLE in available musculature to 2/5 to improve gait ability. Improving- see chart in objective info  4. Assess SSWS and TUG, set goals as needed. Met- new LTGs set     Long term goals: 12 weeks, pt agrees to goals set  5. Pt will demonstrate improved MARCOS AAROM DF to 5 degrees to improve foot clearance during gait. Met 8/13/18- R= 8 deg, L= 10 deg  6. Pt will demonstrate improved abdominal strength to ~3+/5 to decrease back pain and improve balance and gait. same  7. Pt will demonstrate improved gait ability by ambulating 100 ft with RW and MARCOS AFOs with mod I with <25% vaulting. Improving- pt can ambulate up to 100 ft with MARCOS external AFOs with RW and supervision and vaulting noted ~25%. Increased vaulting noted with use of Lofstrand crutches.   8. New 7/9/18: pt will demonstrate improved gait speed by demonstrating a SSWS of 0.34 m/s with appropriate AD. same- 0.09 m/s with RW     Plan   Continue PT 1-2x weekly under established Plan of Care, with treatment to include: pt education, HEP, gait training, and neuromuscular re-education/balance exercises to work towards established goals. Pt may be seen by PTA to carry out plan of care.      Kala Bajwa, PT     10/1/2018

## 2018-10-02 ENCOUNTER — CLINICAL SUPPORT (OUTPATIENT)
Dept: REHABILITATION | Facility: HOSPITAL | Age: 58
End: 2018-10-02
Attending: FAMILY MEDICINE
Payer: COMMERCIAL

## 2018-10-02 DIAGNOSIS — M25.672 DECREASED RANGE OF MOTION OF BOTH ANKLES: ICD-10-CM

## 2018-10-02 DIAGNOSIS — Z78.9 IMPAIRED MOTOR CONTROL: ICD-10-CM

## 2018-10-02 DIAGNOSIS — R53.1 DECREASED STRENGTH: ICD-10-CM

## 2018-10-02 DIAGNOSIS — S14.129A INCOMPLETE INJURY OF CERVICAL SPINAL CORD WITH CENTRAL CORD SYNDROME: ICD-10-CM

## 2018-10-02 DIAGNOSIS — M25.671 DECREASED RANGE OF MOTION OF BOTH ANKLES: ICD-10-CM

## 2018-10-02 DIAGNOSIS — R26.9 GAIT DISTURBANCE: ICD-10-CM

## 2018-10-02 PROCEDURE — 97116 GAIT TRAINING THERAPY: CPT | Mod: PN

## 2018-10-03 NOTE — PROGRESS NOTES
Name: Alberto Dangelo  [facility] Number: [id number]  Date of Treatment: 10/02/2018  Diagnosis:   Encounter Diagnoses   Name Primary?   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  Patient reports their pain to be n/a/10 on a 0-10 scale with 0 being no pain and 10 being the worst pain imaginable.    Objective    Patient received individual therapy to increase strength, flexibility, posture, core stabilization and gait quality with 0 patients with activities as follows:     Alberto participated in dynamic functional therapeutic activities to improve functional performance for 67 minutes. Including: Set up with 5 kg unloading.  Pt participated in computer assisted robotic gait training via [employer] @ 3.0 kph x 60 min, 22 sec for a total distance of 2987 m.  Utilized guidance force of 65% x 9 min, decreased to 50% x 6', decreased again to 30% for 25 min.  Completed training @ 20%.       Written Home Exercises Provided: N/A  Pt demo good understanding of the education provided. Alberto demonstrated good return demonstration of activities.     Assessment:   Tolerated adjustment to guidance force with 2 episodes of stoppage due to toe drag but this was corrected with adjustments to foot straps.      Pt will continue to benefit from skilled PT intervention. Medical Necessity is demonstrated by:  Fall Risk, Unable to participate fully in daily activities, Weakness and Balance and gait impairment    Patient is making Steady progress towards established goals.    New/Revised goals: N/A      Plan:  Continue with established Plan of Care towards PT goals.

## 2018-10-04 ENCOUNTER — CLINICAL SUPPORT (OUTPATIENT)
Dept: REHABILITATION | Facility: HOSPITAL | Age: 58
End: 2018-10-04
Attending: FAMILY MEDICINE
Payer: COMMERCIAL

## 2018-10-04 DIAGNOSIS — R53.1 DECREASED STRENGTH: ICD-10-CM

## 2018-10-04 DIAGNOSIS — S14.129A INCOMPLETE INJURY OF CERVICAL SPINAL CORD WITH CENTRAL CORD SYNDROME: ICD-10-CM

## 2018-10-04 DIAGNOSIS — Z78.9 IMPAIRED MOTOR CONTROL: ICD-10-CM

## 2018-10-04 PROCEDURE — 97116 GAIT TRAINING THERAPY: CPT | Mod: PN

## 2018-10-04 NOTE — PROGRESS NOTES
Name: Alberto Dangelo  Clinic Number: [id number]  Date of Treatment: 10/04/2018   Diagnosis:   Encounter Diagnoses   Name Primary?   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 Presents in manual w/c with mod I stretching prior to session. Brought his service dog.     Objective:    Patient received individual therapy to increase strength, endurance, ROM, flexibility, posture and core stabilization with activities as follows:     Set up for lodustinmat and care handed to FITZ Hernandez for completion of session.     Assessment:     Able to unlock knees during set up to prevent hyperextension with tc.     Plan:     Completion per FITZ Seo.

## 2018-10-05 NOTE — PROGRESS NOTES
Name: Alberto Dangelo  [facility] Number: [id number]  Date of Treatment: 10/05/2018   Diagnosis: Quadriplegia  Encounter Diagnoses   Name Primary?   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  Objective    Patient received individual therapy to increase strength, endurance and core stabilization with activities as follows:     Patient participated in dynamic functional therapeutic activities to improve functional performance for 60 minutes in [employer]  Robotic gait enhancement therapy with diminishing guidance force at 5-10 minutes  Intervals to increase down to 30% with only 5#'s unloading. @ 3kph x 60 minutes    Assessment:     Pt tolerate Rx well with no untoward incidence except for occasional clonus to right ankle. Assymptomatic with VS stable. Total distance 3068 m.    Patient is making fair progress towards established goals.    Plan:  Continue with established Plan of Care towards PT goals.

## 2018-10-09 ENCOUNTER — CLINICAL SUPPORT (OUTPATIENT)
Dept: REHABILITATION | Facility: HOSPITAL | Age: 58
End: 2018-10-09
Attending: FAMILY MEDICINE
Payer: COMMERCIAL

## 2018-10-09 DIAGNOSIS — R26.9 GAIT DISTURBANCE: ICD-10-CM

## 2018-10-09 DIAGNOSIS — R53.1 DECREASED STRENGTH: ICD-10-CM

## 2018-10-09 DIAGNOSIS — M25.672 DECREASED RANGE OF MOTION OF BOTH ANKLES: ICD-10-CM

## 2018-10-09 DIAGNOSIS — M25.671 DECREASED RANGE OF MOTION OF BOTH ANKLES: ICD-10-CM

## 2018-10-09 DIAGNOSIS — Z78.9 IMPAIRED MOTOR CONTROL: ICD-10-CM

## 2018-10-09 DIAGNOSIS — S14.129A INCOMPLETE INJURY OF CERVICAL SPINAL CORD WITH CENTRAL CORD SYNDROME: ICD-10-CM

## 2018-10-09 PROCEDURE — 97116 GAIT TRAINING THERAPY: CPT | Mod: PN

## 2018-10-10 NOTE — PLAN OF CARE
Date: 10/10/2018      PHYSICAL THERAPY UPDATED PLAN OF TREATMENT    Patient name: Alberto Dangelo  Onset Date:  7/30/2010  SOC Date:  8/24/2016  Primary Diagnosis:    1. Impaired motor control     2. Decreased strength     3. 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 Home equipment includes B AFO's, shower chair, ramped entrance w support bars to swimming pool, FES bike, free weights, plyobox system.     Updated Assessment:    L-FORCE TEST           LEFT                                              RIGHT                             FLEX                  EXT                      FLEX                     EXT                HIP     3.45 Nm            29.52 Nm                 2.15 Nm              23.45 Nm             KNEE    3.7 Nm              11.87 Nm                 1.77  Nm             13.11 Nm  Gait:  Pt has progressed to gait training using forearm crutches and assistance of 1.  Continues with multiple gait deviations including impaired weight shift, decreased foot clearance, insufficient hip flexion for swing through.   Posture:  Pt stands with foot flat position, feet shoulder width apart.  Tends toward knee hyperextension and anterior thrust of pelvis with hip ext.  Increased lumbar lordosis. However, frequency of anterior pelvic tilt and hip/knee hyperextension is decreasing.  He is able to maintain standing position for short periods without UE support.      Transfers:  Sit <> stand: pt is able to achieve sit > stand using moderate UE support.  He continues with substantial anterior weight shift to insure weight is fully over his feet.  Ascent is controlled but upon reaching erect position he tends to hyperextend B knees. Once  he gains his balance with knees locked, he is able to unlock his knees.  Stand > sit requires moderate UE support but is controlled descent.    Flexibility:  Somewhat limited by tone.  Presents with tightness in B calves, B hamstrings, and B hip flexors.      Previous Short Term Goals Status:       1) Pt will stand for 10 min without excessive lumbar lordosis or increased hip/knee ext  Progressing    2) Pt will tolerate decreased guidance force to < 25% without excessive toe drag  Partially met    3) Pt will perform sit > stand with minimal to moderate UE  support  Progressing    New Short Term Goals Status:   Continue as noted above  Long Term Goal Status:   continue per plan of care 7/10/2018  1) Increase strength in B  LE as indicated by improved L-force testing  (demonstrated significant improved in hip extension strength) (Improvment noted L hip flexion, L knee flexion, R knee flexion and extension)  2)  Pt will stand > 15 min with minimal UE support. Continue  3) Pt will consistently perform safe stand pivot transfers,  Progressing  4) Pt will demonstrate improved bilateral hip flexion strength to 2/5 to improve ability to perform swing phase/ foot clearance during gait   Progressing     5) Pt will demonstrate appropriate weight shift during ambulation to limit hip hiking and excessive weight shift.  Progressing    Reasons for Recertification of Therapy:   Patient is making slow but steady progress in PT with improved LE flexibility, improved posture in standing, improved balance in standing and progression of gait to forearm crutches.  He has been able to perform gait training @ 20% guidance force but fatigues quickly at this level.      Certification Period: 10/11/2018 to 1/11/2019  Recommended Treatment Plan: 2 times per week for 3 months: Gait Training, Neuromuscular Re-ed, Orthotic Management and Training, Patient Education, Therapeutic Activites and therapeutic taping to inhibit gastrocs.    Other  Recommendations: Trial of KT tape to inhibit gastrocs        Therapist's Name: Candice Herzog, PT   Date: 10/9/2018  I CERTIFY THE NEED FOR THESE SERVICES FURNISHED UNDER THIS PLAN OF TREATMENT AND WHILE UNDER MY CARE    Physician's comments: ________________________________________________________________________________________________________________________________________________      Physician's Name: ___________________________________

## 2018-10-10 NOTE — PROGRESS NOTES
Name: Alberto Dangelo  [facility] Number: [id number]  Date of Treatment:10/09/2018  Diagnosis:   Encounter Diagnoses   Name Primary?   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  Reports increased fatigue due to limited sleep last night due to not getting home until late.  Patient reports their pain to be n/a/10 on a 0-10 scale with 0 being no pain and 10 being the worst pain imaginable.    Objective    Following set up of patient in harness and robotic orthosis, L-force test completed for POC update.      Patient received individual therapy to increase strength, endurance, flexibility, posture and core stabilization with 0 patients with activities as follows:     Alberto participated in dynamic functional therapeutic activities to improve functional performance for 67 minutes. Including: Set up with 5 kg unloading.  Pt participated in computer assisted robotic gait training via [employer] @ 3.0 kph x 62 min, 45 sec for a total distance of 3119 m.  Utilized guidance force of 65% x 10 min, decreased to 50% x 15 min, then to 35% x 15 min.  Attempted to decrease to 20%; however, pt demonstrated significant difficulty with B foot clearance/toe drag which resulted in shut off of treadmill.  Changed guidance force to 30% and patient was able to complete the session.  .      Written Home Exercises Provided: Pt participate in full work out at home for stretching and strengthening.  Pt demo good understanding of the education provided. Alberto demonstrated good return demonstration of activities.     Assessment:   Unable to perform gait training @ 20% guidance force this date.  Pt reports being fatigued this date.     Pt will continue to benefit from skilled PT intervention. Medical Necessity is demonstrated by:   Unable to participate fully in daily activities, Weakness and Gait and balance deficits.      Patient is making steady progress towards established goals.    New/Revised goals: See updated POC      Plan:  Continue with established Plan of Care towards PT goals.

## 2018-10-11 ENCOUNTER — CLINICAL SUPPORT (OUTPATIENT)
Dept: REHABILITATION | Facility: HOSPITAL | Age: 58
End: 2018-10-11
Attending: FAMILY MEDICINE
Payer: COMMERCIAL

## 2018-10-11 DIAGNOSIS — Z78.9 IMPAIRED MOTOR CONTROL: ICD-10-CM

## 2018-10-11 DIAGNOSIS — R53.1 DECREASED STRENGTH: ICD-10-CM

## 2018-10-11 DIAGNOSIS — S14.129A INCOMPLETE INJURY OF CERVICAL SPINAL CORD WITH CENTRAL CORD SYNDROME: ICD-10-CM

## 2018-10-11 PROCEDURE — 97116 GAIT TRAINING THERAPY: CPT | Mod: PN

## 2018-10-11 NOTE — PROGRESS NOTES
Name: Alberto Dangelo  Clinic Number: [id number]  Date of Treatment: 10/11/2018   Diagnosis:   Encounter Diagnoses   Name Primary?   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 Presents mod I in manual w/c with service dog. Mod I stretching prior to session.     Objective:    Patient received individual therapy to increase strength, endurance, ROM, flexibility, posture and core stabilization with activities as follows:     Gait training via [employer] system for 53:15 minutes (plus set up):     Set up at 5 kg unloading. Computer-assisted robotic gait training via [employer] x 3.0 km/h. GF x 60% x 10', 50% x 10', then 30% for distance of 2649 m. No stoppages.     Continue HEP daily.    Pt demo good understanding of the education provided. Patient demonstrated good return demonstration of activities.     Assessment:     Good session with consistent feedback and good LE mechanics. No LE drag today or spasms. Improving ability to unlock knees in standing during set up to prevent resitng on posterior knee ligaments.,     Pt will continue to benefit from skilled PT intervention. Medical Necessity is demonstrated by:  Requires skilled supervision to complete and progress HEP and Weakness.    Patient is making good progress towards established goals.    Plan:    Continue with established Plan of Care towards PT goals.

## 2018-10-16 ENCOUNTER — DOCUMENTATION ONLY (OUTPATIENT)
Dept: REHABILITATION | Facility: HOSPITAL | Age: 58
End: 2018-10-16

## 2018-10-18 ENCOUNTER — CLINICAL SUPPORT (OUTPATIENT)
Dept: REHABILITATION | Facility: HOSPITAL | Age: 58
End: 2018-10-18
Attending: FAMILY MEDICINE
Payer: COMMERCIAL

## 2018-10-18 DIAGNOSIS — R25.2 SPASM: ICD-10-CM

## 2018-10-18 DIAGNOSIS — Z78.9 IMPAIRED MOTOR CONTROL: ICD-10-CM

## 2018-10-18 DIAGNOSIS — S14.129A INCOMPLETE INJURY OF CERVICAL SPINAL CORD WITH CENTRAL CORD SYNDROME: ICD-10-CM

## 2018-10-18 DIAGNOSIS — R53.1 DECREASED STRENGTH: ICD-10-CM

## 2018-10-18 PROCEDURE — 97110 THERAPEUTIC EXERCISES: CPT | Mod: PN

## 2018-10-18 PROCEDURE — 97116 GAIT TRAINING THERAPY: CPT | Mod: PN

## 2018-10-18 RX ORDER — DIAZEPAM 5 MG/1
TABLET ORAL
Qty: 60 TABLET | Refills: 5 | Status: SHIPPED | OUTPATIENT
Start: 2018-10-18 | End: 2019-04-22 | Stop reason: SDUPTHER

## 2018-10-19 NOTE — PROGRESS NOTES
Name: Alberto Dangelo  Clinic Number: [id number]  Date of Treatment: 10/18/2018   Diagnosis:   Encounter Diagnoses   Name Primary?   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 Did legs and abs workout yesterday. Mod I in manual w/c with mod I stretching in PT gym prior to appt. Brought his service dog.     Objective:    Patient received individual therapy to increase strength, endurance, ROM, flexibility, posture and core stabilization with activities as follows:     Gait training via [employer] system for 60:02 minutes (plus set up):     Set up at 5 kg unloading. Computer-assisted robotic gait training via "[employer] x 3.0 km/h. GF x 60% x 22', then 30% for distance of 3099 m. No stoppages.     Continue HEP daily.    Pt demo good understanding of the education provided. Patient demonstrated good return demonstration of activities.     Assessment:     Good session with consistent feedback, challenged with drop in GF to 30% but no stoppages.     Pt will continue to benefit from skilled PT intervention. Medical Necessity is demonstrated by:  Requires skilled supervision to complete and progress HEP and Weakness.    Patient is making good progress towards established goals.    Plan:    Continue with established Plan of Care towards PT goals.

## 2018-10-23 ENCOUNTER — CLINICAL SUPPORT (OUTPATIENT)
Dept: REHABILITATION | Facility: HOSPITAL | Age: 58
End: 2018-10-23
Attending: FAMILY MEDICINE
Payer: COMMERCIAL

## 2018-10-23 DIAGNOSIS — M25.672 DECREASED RANGE OF MOTION OF BOTH ANKLES: ICD-10-CM

## 2018-10-23 DIAGNOSIS — Z78.9 IMPAIRED MOTOR CONTROL: ICD-10-CM

## 2018-10-23 DIAGNOSIS — M25.671 DECREASED RANGE OF MOTION OF BOTH ANKLES: ICD-10-CM

## 2018-10-23 DIAGNOSIS — S14.129A INCOMPLETE INJURY OF CERVICAL SPINAL CORD WITH CENTRAL CORD SYNDROME: ICD-10-CM

## 2018-10-23 DIAGNOSIS — R26.9 GAIT DISTURBANCE: ICD-10-CM

## 2018-10-23 DIAGNOSIS — R53.1 DECREASED STRENGTH: ICD-10-CM

## 2018-10-23 PROCEDURE — 97116 GAIT TRAINING THERAPY: CPT | Mod: PN

## 2018-10-23 NOTE — PROGRESS NOTES
Name: Alberto Dangelo  [facility] Number: [id number]  Date of Treatment: 10/23/2018   Diagnosis:   Encounter Diagnoses   Name Primary?   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  Patient reports their pain to be n/a/10 on a 0-10 scale with 0 being no pain and 10 being the worst pain imaginable.    Objective    Patient received individual therapy to increase strength, endurance, flexibility, core stabilization and gait quality with 0 patients with activities as follows:     Alberto participated in dynamic functional therapeutic activities to improve functional performance for 67 minutes. Including: Set up with 5 kg unloading.  Pt participated in computer assisted robotic gait training via [employer] @ 3.0 kph x 60 min, 13 sec for a total distance of 2993 m.  Utilized guidance force of 65% x 11 min, decreased to 50% x 5', decreased again to 30% x 34'.  Completed session (~10') @ 20%)      Written Home Exercises Provided: Pt performs self stretching prior to initiating set up.  He also participates in self stretching and strengthening ex at home.    Pt demo good understanding of the education provided. Alberto demonstrated good return demonstration of activities.     Assessment:   Reported minimal R knee discomfort due to positioning (minimal hip external rotation) which decreased during training.  Initially demonstrated difficulty with foot clearance when guidance force decreased to 20% resulting in stoppage of training.  Was able to resume training and continue until end of session @ 20% after reset.      Pt will continue to benefit from skilled PT intervention. Medical Necessity is demonstrated by:  Unable to participate fully in daily activities, Weakness and Gait disturbance, spasticity    Patient is  making steady progress towards established goals.    New/Revised goals: N/A      Plan:  Continue with established Plan of Care towards PT goals.

## 2018-10-26 ENCOUNTER — CLINICAL SUPPORT (OUTPATIENT)
Dept: REHABILITATION | Facility: HOSPITAL | Age: 58
End: 2018-10-26
Payer: COMMERCIAL

## 2018-10-26 DIAGNOSIS — M25.672 DECREASED RANGE OF MOTION OF BOTH ANKLES: ICD-10-CM

## 2018-10-26 DIAGNOSIS — R53.1 DECREASED STRENGTH: ICD-10-CM

## 2018-10-26 DIAGNOSIS — M25.671 DECREASED RANGE OF MOTION OF BOTH ANKLES: ICD-10-CM

## 2018-10-26 DIAGNOSIS — Z78.9 IMPAIRED MOTOR CONTROL: Primary | ICD-10-CM

## 2018-10-26 DIAGNOSIS — R26.9 GAIT DISTURBANCE: ICD-10-CM

## 2018-10-26 DIAGNOSIS — S14.129A INCOMPLETE INJURY OF CERVICAL SPINAL CORD WITH CENTRAL CORD SYNDROME: ICD-10-CM

## 2018-10-26 PROCEDURE — 97112 NEUROMUSCULAR REEDUCATION: CPT | Mod: PO | Performed by: PHYSICAL THERAPIST

## 2018-10-26 NOTE — PLAN OF CARE
"    Physical Therapy Progress Note      Name: Alberto Dangelo  Clinic Number: [id number]  Diagnosis:           Encounter Diagnoses   Name Primary?   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 Pt reported practicing weight shifts at home  Pain Scale:  denies     Objective      Prior to PT session, pt stretched extensor muscle groups in clinic     Patient received individual therapy to increase strength, core stabilization and motor control to improve gait ability with activities as follows:      Pt participated in neuromuscular re education x 65 minutes to address motor control, strength, ROM, and gait ability/ technique:   SSWS with MARCOS external AFOs and RW= 6m in 1'53.7"= 0.052 m/s    Lower Extremity Strength    RLE eval RLE 6/18 RLE 7/18 RLE 8/18 RLE 9/18 RLE 10/18 LLE eval LLE 6/18 LLE 7/18 LLE 8/18 LLE 9/18 LLE 10/18   Hip Flexion: trace 2-/5 2-/5 2/5 2/5 2/5 2/5 2/5 2 to 2+/5 2+/5 2+/5 2+ to 3/5   Hip Extension:  2/5 2+/5 2+/5 3-/5 2+/5 3+/5 2/5 2+/5 2+/5 2+/5 3 to 3+/5 3+ to 4-/5   Hip Abduction: trace trace trace 1 to 2-/5 1 to 2-/5 2-/5 Trace Trace Trace 1/5 2-/5 2/5   Hip Adduction: 2/5 2+/5 2+/5 NT 2+/5 3+/5 2/5 2+/5 2+/5 2+/5 2+/5 3+ to 4-/5   Knee Extension: 2/5 4/5 4 to 4+/5 4/5 4+/5  2/5 4/5 4 to 4+/5 4+/5 4+/5 NT   Knee Flexion: trace trace Trace to 2-/5 2-/5 2-/5 2-/5 2-/5 2-/5 2-/5 " 2-/5 2-/5 2-/5   Ankle Dorsiflexion: trace NT NT NT NT NT 2-/5 NT NT NT NT NT   Ankle Plantarflexion: 2-/5 NT NT NT NT NT 2-/5 NT NT NT NT NT   Ankle Inversion: NT NT NT NT NT NT NT NT NT NT NT NT   Ankle Eversion: NT NT NT NT NT NT NT NT NT NT NT NT   Abdominal strength 2+/5 NT 2+/5 2+/5 2+/5 2+/5 NT NT NT NT NT NT           gait with loft strands:  12 ft with mod A x 2- A mainly provided for advancing LE due to increased extensor tone and decreased strength  Able to advance LLE unassisted 1st 2 steps    Tall kneeling:   Leans with ball 10x, min-mod A   Diagonal leans with ball 10x total, mod A              Tall kneeling without UE support, mod A, 30 sec x 5   Kneeling squats with ball min-mod A 10x  Crawling on mat (long length) front/ back- 3 laps with mod A at BLE    Side lying: hip flex/ ext with powder board- 2 x 25, max A    NP today:   // bar: Static standing on foam- fair- balance- 1 min               With horizontal head turns- poor to poor+- balance 30 sec x 2   With vertical head turns- poor+ to fair- balance 30 sec x 2  Supervised with PT tech:    Supine:  LTR on ball- 40-50x  Sitting: HS curls OTB with roller board 50x   Trunk rotation with 9# ball 10x  Sitting: Weighted ball toss EOM on dynadisc, fair- balance, x 5 min with rest breaks              Chest press with weighted ball on violeta disc 30x   sit ups from wedge with 6# ball toss 20-30x  Front step- 4x min A              Bridging with feet on ball 40x          Written Home Exercises: 5/14/18- quad stretch with strap in kaitlin test position  5/11/18- practice ab crunches and hip flexion in pool- verbally explained   Pt demo good understanding of the education provided.       Education provided re: POC, HEP     Pt has no cultural, educational or language barriers to learning provided.     Assessment   Assessment period: 10/1/18 to 10/26/18. Pt was treated for 2 sessions due to 2 cancellations due to weather and conflicting plans. Alberto castro  treatments well. Pt is demonstrating good improvements in strength and motor control of MARCOS hip ext and hip abd. Pt continues to demonstrate poor motor control and strength of MARCOS hip flex and knee flexion limiting ability to clear feet during gait. Pt demonstrated improved gait ability with use of MARCOS loft strand crutches by ambulating outside of parallel bars. Pt required assistance x 2 to assist with preparing LE for swing phase by facilitating knee flexion and advancing LE. Pt was able to advance LLE 2x without assistance.  Gait quality with RW and MARCOS external AFOs has improved with pt demonstrating <25% vaulting during testing today. Pt ambulated with RW without sliders on feet. Increased LE extensor tone limited walking today. Although abdominal strength was graded the same, pt demonstrates a good improvement in core stability when performing activities in tall kneeling. Pt requires decreased assistance to maintain hip ext without UE support in tall kneeling. Pt noted to have continued weakness in transverse abdominals, obliques and lumbar extension. Pt can benefit from continued skilled PT to address impairments listed below to improve gait ability and continue to encourage increased return of strength and motor control to BLE/ trunk.      Pt prognosis is Good. Pt will continue to benefit from skilled outpatient physical therapy to address the deficits listed in the problem list, provide pt/family education and to maximize pt's level of independence in the home and community environment.      Anticipated barriers to physical therapy: Distance to PT clinic        Medical necessity is demonstrated by the following IMPAIRMENTS/PROBLEM LIST:   Fall Risk - impaired balance   Weakness   Impaired muscle tone  Decreased ROM  Gait deviations   Decreased ambulation   Difficulty participating in daily activities   Requires skilled supervision to complete and progress HEP         Pt's spiritual, cultural and educational  needs considered and pt agreeable to plan of care and GOALS as stated below:   status as of 10/26/18   Short term goals: 6 weeks, pt agrees to goals set.   1. Pt will report performing HEP for LE/ core strengthening at home at least 2-3x/week to improve and maintain progress made in PT. met- pt reports good compliance  2. Pt will demonstrate improved MARCOS DF AAROM to 0 degrees to improve gait ability and foot clearance. Met 6/6/18-  3. Pt will demonstrate improved gross strength of RLE in available musculature to 2/5 to improve gait ability. Improving- see chart in objective info, weakness remains in knee flexion and hip abd  4. Assess SSWS and TUG, set goals as needed. Met- new LTGs set     Long term goals: 12 weeks, pt agrees to goals set  5. Pt will demonstrate improved MARCOS AAROM DF to 5 degrees to improve foot clearance during gait. Met 8/13/18- R= 8 deg, L= 10 deg  6. Pt will demonstrate improved abdominal strength to ~3+/5 to decrease back pain and improve balance and gait. same  7. Pt will demonstrate improved gait ability by ambulating 100 ft with RW and MARCOS AFOs with mod I with <25% vaulting. Improving- pt can ambulate 12 ft with MARCOS Lofstrand crutches with MARCOS external AFOs and shoe sliders without vaulting with mod A x 2  8. New 7/9/18: pt will demonstrate improved gait speed by demonstrating a SSWS of 0.34 m/s with appropriate AD. Varied/ but improved gait quality noted- 0.05 m/s with RW and MARCOS AFOs     Plan   POC extended x 12 weeks to allow for continued progress with gait. Continue PT 1-2x weekly under established Plan of Care, with treatment to include: pt education, HEP, gait training, and neuromuscular re-education/balance exercises to work towards established goals. Pt may be seen by PTA to carry out plan of care.      Kala Bajwa, PT     10/26/2018      I certify the need for these services furnished under this plan of treatment and while under my care.  ____________________________________  Physician/Referring Practitioner   Date of Signature

## 2018-10-26 NOTE — PROGRESS NOTES
"    Physical Therapy Progress Note      Name: Alberto Dangelo  Clinic Number: [id number]  Diagnosis:           Encounter Diagnoses   Name Primary?   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 Pt reported practicing weight shifts at home  Pain Scale:  denies     Objective      Prior to PT session, pt stretched extensor muscle groups in clinic     Patient received individual therapy to increase strength, core stabilization and motor control to improve gait ability with activities as follows:      Pt participated in neuromuscular re education x 65 minutes to address motor control, strength, ROM, and gait ability/ technique:   SSWS with MARCOS external AFOs and RW= 6m in 1'53.7"= 0.052 m/s    Lower Extremity Strength    RLE eval RLE 6/18 RLE 7/18 RLE 8/18 RLE 9/18 RLE 10/18 LLE eval LLE 6/18 LLE 7/18 LLE 8/18 LLE 9/18 LLE 10/18   Hip Flexion: trace 2-/5 2-/5 2/5 2/5 2/5 2/5 2/5 2 to 2+/5 2+/5 2+/5 2+ to 3/5   Hip Extension:  2/5 2+/5 2+/5 3-/5 2+/5 3+/5 2/5 2+/5 2+/5 2+/5 3 to 3+/5 3+ to 4-/5   Hip Abduction: trace trace trace 1 to 2-/5 1 to 2-/5 2-/5 Trace Trace Trace 1/5 2-/5 2/5   Hip Adduction: 2/5 2+/5 2+/5 NT 2+/5 3+/5 2/5 2+/5 2+/5 2+/5 2+/5 3+ to 4-/5   Knee Extension: 2/5 4/5 4 to 4+/5 4/5 4+/5  2/5 4/5 4 to 4+/5 4+/5 4+/5 NT   Knee Flexion: trace trace Trace to 2-/5 2-/5 2-/5 2-/5 2-/5 2-/5 2-/5 " 2-/5 2-/5 2-/5   Ankle Dorsiflexion: trace NT NT NT NT NT 2-/5 NT NT NT NT NT   Ankle Plantarflexion: 2-/5 NT NT NT NT NT 2-/5 NT NT NT NT NT   Ankle Inversion: NT NT NT NT NT NT NT NT NT NT NT NT   Ankle Eversion: NT NT NT NT NT NT NT NT NT NT NT NT   Abdominal strength 2+/5 NT 2+/5 2+/5 2+/5 2+/5 NT NT NT NT NT NT           gait with loft strands:  12 ft with mod A x 2- A mainly provided for advancing LE due to increased extensor tone and decreased strength  Able to advance LLE unassisted 1st 2 steps    Tall kneeling:   Leans with ball 10x, min-mod A   Diagonal leans with ball 10x total, mod A              Tall kneeling without UE support, mod A, 30 sec x 5   Kneeling squats with ball min-mod A 10x  Crawling on mat (long length) front/ back- 3 laps with mod A at BLE    Side lying: hip flex/ ext with powder board- 2 x 25, max A    NP today:   // bar: Static standing on foam- fair- balance- 1 min               With horizontal head turns- poor to poor+- balance 30 sec x 2   With vertical head turns- poor+ to fair- balance 30 sec x 2  Supervised with PT tech:    Supine:  LTR on ball- 40-50x  Sitting: HS curls OTB with roller board 50x   Trunk rotation with 9# ball 10x  Sitting: Weighted ball toss EOM on dynadisc, fair- balance, x 5 min with rest breaks              Chest press with weighted ball on violeta disc 30x   sit ups from wedge with 6# ball toss 20-30x  Front step- 4x min A              Bridging with feet on ball 40x          Written Home Exercises: 5/14/18- quad stretch with strap in kaitlin test position  5/11/18- practice ab crunches and hip flexion in pool- verbally explained   Pt demo good understanding of the education provided.       Education provided re: POC, HEP     Pt has no cultural, educational or language barriers to learning provided.     Assessment   Assessment period: 10/1/18 to 10/26/18. Pt was treated for 2 sessions due to 2 cancellations due to weather and conflicting plans. Alberto castro  treatments well. Pt is demonstrating good improvements in strength and motor control of MARCOS hip ext and hip abd. Pt continues to demonstrate poor motor control and strength of MARCOS hip flex and knee flexion limiting ability to clear feet during gait. Pt demonstrated improved gait ability with use of MARCOS loft strand crutches by ambulating outside of parallel bars. Pt required assistance x 2 to assist with preparing LE for swing phase by facilitating knee flexion and advancing LE. Pt was able to advance LLE 2x without assistance.  Gait quality with RW and MARCOS external AFOs has improved with pt demonstrating <25% vaulting during testing today. Pt ambulated with RW without sliders on feet. Increased LE extensor tone limited walking today. Although abdominal strength was graded the same, pt demonstrates a good improvement in core stability when performing activities in tall kneeling. Pt requires decreased assistance to maintain hip ext without UE support in tall kneeling. Pt noted to have continued weakness in transverse abdominals, obliques and lumbar extension. Pt can benefit from continued skilled PT to address impairments listed below to improve gait ability and continue to encourage increased return of strength and motor control to BLE/ trunk.      Pt prognosis is Good. Pt will continue to benefit from skilled outpatient physical therapy to address the deficits listed in the problem list, provide pt/family education and to maximize pt's level of independence in the home and community environment.      Anticipated barriers to physical therapy: Distance to PT clinic        Medical necessity is demonstrated by the following IMPAIRMENTS/PROBLEM LIST:   Fall Risk - impaired balance   Weakness   Impaired muscle tone  Decreased ROM  Gait deviations   Decreased ambulation   Difficulty participating in daily activities   Requires skilled supervision to complete and progress HEP         Pt's spiritual, cultural and educational  needs considered and pt agreeable to plan of care and GOALS as stated below:   status as of 10/26/18   Short term goals: 6 weeks, pt agrees to goals set.   1. Pt will report performing HEP for LE/ core strengthening at home at least 2-3x/week to improve and maintain progress made in PT. met- pt reports good compliance  2. Pt will demonstrate improved MARCOS DF AAROM to 0 degrees to improve gait ability and foot clearance. Met 6/6/18-  3. Pt will demonstrate improved gross strength of RLE in available musculature to 2/5 to improve gait ability. Improving- see chart in objective info, weakness remains in knee flexion and hip abd  4. Assess SSWS and TUG, set goals as needed. Met- new LTGs set     Long term goals: 12 weeks, pt agrees to goals set  5. Pt will demonstrate improved MARCOS AAROM DF to 5 degrees to improve foot clearance during gait. Met 8/13/18- R= 8 deg, L= 10 deg  6. Pt will demonstrate improved abdominal strength to ~3+/5 to decrease back pain and improve balance and gait. same  7. Pt will demonstrate improved gait ability by ambulating 100 ft with RW and MARCOS AFOs with mod I with <25% vaulting. Improving- pt can ambulate 12 ft with MARCOS Lofstrand crutches with MARCOS external AFOs and shoe sliders without vaulting with mod A x 2  8. New 7/9/18: pt will demonstrate improved gait speed by demonstrating a SSWS of 0.34 m/s with appropriate AD. Varied/ but improved gait quality noted- 0.05 m/s with RW and MARCOS AFOs     Plan   POC extended x 12 weeks to allow for continued progress with gait. Continue PT 1-2x weekly under established Plan of Care, with treatment to include: pt education, HEP, gait training, and neuromuscular re-education/balance exercises to work towards established goals. Pt may be seen by PTA to carry out plan of care.      Kala Bajwa, PT     10/26/2018

## 2018-10-30 ENCOUNTER — CLINICAL SUPPORT (OUTPATIENT)
Dept: REHABILITATION | Facility: HOSPITAL | Age: 58
End: 2018-10-30
Attending: FAMILY MEDICINE
Payer: COMMERCIAL

## 2018-10-30 DIAGNOSIS — M25.673 DECREASED RANGE OF MOTION OF ANKLE, UNSPECIFIED LATERALITY: ICD-10-CM

## 2018-10-30 DIAGNOSIS — S14.129A INCOMPLETE INJURY OF CERVICAL SPINAL CORD WITH CENTRAL CORD SYNDROME: ICD-10-CM

## 2018-10-30 DIAGNOSIS — R53.1 DECREASED STRENGTH: ICD-10-CM

## 2018-10-30 DIAGNOSIS — R26.9 GAIT DISTURBANCE: ICD-10-CM

## 2018-10-30 DIAGNOSIS — Z78.9 IMPAIRED MOTOR CONTROL: ICD-10-CM

## 2018-10-30 PROCEDURE — 97116 GAIT TRAINING THERAPY: CPT | Mod: PN

## 2018-10-30 NOTE — PROGRESS NOTES
"Name: Alberto Dangelo  [facility] Number: [id number]  Date of Treatment: 10/30/2018   Diagnosis:   Encounter Diagnoses   Name Primary?   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  Patient reports their pain to be n/a/10 on a 0-10 scale with 0 being no pain and 10 being the worst pain imaginable.    Objective    Patient received individual therapy to increase strength, endurance, flexibility, core stabilization and gait quality with 0 patients with activities as follows:     Alberto participated in dynamic functional therapeutic activities to improve functional performance for 67 minutes. Including: Set up with 5 kg unloading.  Pt participated in computer assisted robotic gait training via [employer] @ 3.0 kph x 60 min, 25 sec for a total distance of 2946 m.  Utilized guidance force of 65% x 12 min, decreased to 50% x 3', to 30% x 35', and 20% for remainder of session.  Once guidance force decreased to 20% pt experienced significant difficulty with foot clearance, particularly on R.  Made adjustment to patient coefficient which helped for short time; however, pt continued to have difficulty with foot clearance.  Increased the unweighting, ultimately completing session with no weight bearing due to continued toe drag.  .      Written Home Exercises Provided: N/A  Pt demo good understanding of the education provided. Alberto demonstrated fair return demonstration of activities.     Assessment:   Increased toe drag today once guidance force decreased to 20%.  Able to make some adjustments to patient coefficient and unweighting to complete session.      Pt will continue to benefit from skilled PT intervention. Medical Necessity is demonstrated by:  Unable to participate fully in daily " activities, Weakness and Gait/mobility deficits.     Patient is making steady progress towards established goals.    New/Revised goals: N/A      Plan:  Continue with established Plan of Care towards PT goals.

## 2018-11-01 ENCOUNTER — CLINICAL SUPPORT (OUTPATIENT)
Dept: REHABILITATION | Facility: HOSPITAL | Age: 58
End: 2018-11-01
Attending: FAMILY MEDICINE
Payer: COMMERCIAL

## 2018-11-01 DIAGNOSIS — R53.1 DECREASED STRENGTH: ICD-10-CM

## 2018-11-01 DIAGNOSIS — Z78.9 IMPAIRED MOTOR CONTROL: ICD-10-CM

## 2018-11-01 DIAGNOSIS — S14.129A INCOMPLETE INJURY OF CERVICAL SPINAL CORD WITH CENTRAL CORD SYNDROME: ICD-10-CM

## 2018-11-01 PROCEDURE — 97116 GAIT TRAINING THERAPY: CPT | Mod: PN

## 2018-11-01 NOTE — PROGRESS NOTES
Name: Alberto Dangelo  Clinic Number: [id number]  Date of Treatment: 11/01/2018   Diagnosis:   Encounter Diagnoses   Name Primary?   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 Brought his service dog to session. Mod I in manual w/c and mod I stretching prior to session.  Went fishing yesterday morning, so he was tired during the evening. States he has increased tone today.     Objective:    Patient received individual therapy to increase strength, endurance, ROM, flexibility, posture and core stabilization with activities as follows:     Gait training via [employer] system for 61:20 minutes (plus set up):     Set up at 5 kg unloading. Computer-assisted robotic gait training via [employer] x 3.0 km/h. GF x 60% x 30', 30% for distance of 3013 m. Three stoppages.     Continue HEP daily.    Pt demo good understanding of the education provided. Patient demonstrated good return demonstration of activities.     Assessment:     Unsuccessful attempt to decreased GF to 20%, causing stoppages and increased tones.     Pt will continue to benefit from skilled PT intervention. Medical Necessity is demonstrated by:  Requires skilled supervision to complete and progress HEP and Weakness.    Patient is making good progress towards established goals.    Plan:    Continue with established Plan of Care towards PT goals.

## 2018-11-05 ENCOUNTER — DOCUMENTATION ONLY (OUTPATIENT)
Dept: REHABILITATION | Facility: HOSPITAL | Age: 58
End: 2018-11-05

## 2018-11-06 ENCOUNTER — CLINICAL SUPPORT (OUTPATIENT)
Dept: REHABILITATION | Facility: HOSPITAL | Age: 58
End: 2018-11-06
Attending: FAMILY MEDICINE
Payer: COMMERCIAL

## 2018-11-06 DIAGNOSIS — Z78.9 IMPAIRED MOTOR CONTROL: ICD-10-CM

## 2018-11-06 DIAGNOSIS — R26.9 GAIT DISTURBANCE: ICD-10-CM

## 2018-11-06 DIAGNOSIS — S14.129A INCOMPLETE INJURY OF CERVICAL SPINAL CORD WITH CENTRAL CORD SYNDROME: ICD-10-CM

## 2018-11-06 DIAGNOSIS — R53.1 DECREASED STRENGTH: ICD-10-CM

## 2018-11-06 DIAGNOSIS — M25.673 DECREASED RANGE OF MOTION OF ANKLE, UNSPECIFIED LATERALITY: ICD-10-CM

## 2018-11-06 PROCEDURE — 97116 GAIT TRAINING THERAPY: CPT | Mod: PN

## 2018-11-06 NOTE — PROGRESS NOTES
"Name: Alberto Dangelo  [facility] Number: [id number]  Date of Treatment: 11/06/2018   Diagnosis:   Encounter Diagnoses   Name Primary?   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  Patient reports their pain to be n/a/10 on a 0-10 scale with 0 being no pain and 10 being the worst pain imaginable.    Objective    Patient received individual therapy to increase strength, endurance, flexibility and core stabilization with 0 patients with activities as follows:     Alberto participated in dynamic functional therapeutic activities to improve functional performance for 67 minutes. Including: Set up with 5 kg unloading.  Pt participated in computer assisted robotic gait training via [employer] @ 3.0 kph x 60 min, 18 sec for a total distance of 2951 m.  Utilized guidance force of 65% x 12 min, decreased to 50% x 8', to 30% x 21'.  Completed session @ 20% guidance force.        Written Home Exercises Provided: N/A  Pt demo good understanding of the education provided. Alberto demonstrated fair return demonstration of activities.     Assessment:   Pt struggled with foot clearance initially when guidance force decreased to 30% but was able to correct. However, when guidance force decreased to 20% he experienced increased toe drag and increased frequency of toe drag.      Pt will continue to benefit from skilled PT intervention. Medical Necessity is demonstrated by:  Fall Risk, Unable to participate fully in daily activities, Requires skilled supervision to complete and progress HEP, Weakness and Gait/balance disturbance.     Patient is making fair progress towards established goals.    New/Revised goals: N/A      Plan:  Continue with established Plan of Care towards PT goals.   "

## 2018-11-08 ENCOUNTER — CLINICAL SUPPORT (OUTPATIENT)
Dept: REHABILITATION | Facility: HOSPITAL | Age: 58
End: 2018-11-08
Attending: FAMILY MEDICINE
Payer: COMMERCIAL

## 2018-11-08 DIAGNOSIS — S14.129A INCOMPLETE INJURY OF CERVICAL SPINAL CORD WITH CENTRAL CORD SYNDROME: ICD-10-CM

## 2018-11-08 DIAGNOSIS — R53.1 DECREASED STRENGTH: ICD-10-CM

## 2018-11-08 DIAGNOSIS — Z78.9 IMPAIRED MOTOR CONTROL: ICD-10-CM

## 2018-11-08 PROCEDURE — 97116 GAIT TRAINING THERAPY: CPT | Mod: PN

## 2018-11-08 NOTE — PROGRESS NOTES
Name: Alberto Dangelo  [facility] Number: [id number]  Date of Treatment:09/25/2018  Diagnosis:   Encounter Diagnoses   Name Primary?   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  Patient reports their pain to be n/a/10 on a0-10 scale with 0 being no pain and 10 being the worst pain imaginable.    Objective    Patient received individual therapy to increase strength, endurance, flexibility, posture, core stabilization and gait quality with 0 patients with activities as follows:     Self stretching in standing prior to set up for [employer].      Alberto participated in dynamic functional therapeutic activities to improve functional performance for 67 minutes. Including: Set up with 5 kg unloading.  Pt participated in computer assisted robotic gait training via [employer] @ 3.0 kph x 60 min, 12 sec for a total distance of 2992 m.  Utilized guidance force of 65% x 7 min, decreased to 50% for 5 min, decreased to 40% x 18 min, decreased to 30% x 15 min, and completed session @ 20% x 15 min.        Written Home Exercises Provided: N/A  Pt demo good understanding of the education provided. Alberto demonstrated good return demonstration of activities.     Assessment:   Good session with progressive decrease in guidance force without foot drag through 50 min of training at which time pt did experience toe drag resulting in stoppage of training.  Adjustment to ankle strap allowed for training to continue    Pt will continue to benefit from skilled PT intervention. Medical Necessity is demonstrated by:  Unable to participate fully in daily activities, Requires skilled supervision to complete and progress HEP, Weakness and gait impairment.      Patient is making steady progress towards established goals.    New/Revised  Technician: Matt Smith, RRT  Tech comments:  Good patient effort & cooperation.  The results of this test meet the ATS/ERS standards for acceptability and repeatability.  A bronchodilator of Ventolin HFA- 2puffs via spacer were administered.    The FVC is 3.69 L or 85%, FEV1 is 1.89 L or 57%, FEV1/FVC 51%.  No significant bronchodilator response.    Interpretation:  Spirometry shows moderately severe obstructive ventilatory defect.   goals: NA      Plan:  Continue with established Plan of Care towards PT goals.

## 2018-11-08 NOTE — PROGRESS NOTES
Name: Alberto Dangelo  [facility] Number: [id number]  Date of Treatment: 11/08/2018   Diagnosis:   Encounter Diagnoses   Name Primary?   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 Brought service dog which is working well, able to take harness off dog today to allow petting by staff with good response with re-donning harness.  Mod I in manual w/c and mod I stretching prior to session.     Objective:    Patient received individual therapy to increase strength, endurance, ROM, flexibility, posture and core stabilization with activities as follows:     Gait training via [employer] system for 59:26 minutes (plus set up):     Set up at 5 kg unloading. Computer-assisted robotic gait training via [employer] x 3/0 km/h. GF x 50% x 20', 30% x 20', 20% for distance of 2917 m. Several stoppages.     Continue HEP daily.    Pt demo good understanding of the education provided. Patient demonstrated good return demonstration of activities.     Assessment:     Several stoppages with GF at 20% but improved with restrapping and decreased weight for a couple of minutes.     Pt will continue to benefit from skilled PT intervention. Medical Necessity is demonstrated by:  Requires skilled supervision to complete and progress HEP and Weakness.    Patient is making good progress towards established goals.    Plan:    Continue with established Plan of Care towards PT goals.

## 2018-11-12 ENCOUNTER — DOCUMENTATION ONLY (OUTPATIENT)
Dept: REHABILITATION | Facility: HOSPITAL | Age: 58
End: 2018-11-12

## 2018-11-13 ENCOUNTER — CLINICAL SUPPORT (OUTPATIENT)
Dept: REHABILITATION | Facility: HOSPITAL | Age: 58
End: 2018-11-13
Attending: FAMILY MEDICINE
Payer: COMMERCIAL

## 2018-11-13 DIAGNOSIS — Z78.9 IMPAIRED MOTOR CONTROL: ICD-10-CM

## 2018-11-13 DIAGNOSIS — R53.1 DECREASED STRENGTH: ICD-10-CM

## 2018-11-13 DIAGNOSIS — S14.129A INCOMPLETE INJURY OF CERVICAL SPINAL CORD WITH CENTRAL CORD SYNDROME: ICD-10-CM

## 2018-11-13 PROCEDURE — 97116 GAIT TRAINING THERAPY: CPT | Mod: PN

## 2018-11-13 NOTE — PROGRESS NOTES
Name: Alberto Dangelo  Clinic Number: [id number]  Date of Treatment: 11/13/2018   Diagnosis:   Encounter Diagnoses   Name Primary?   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 Reports minor spasticity R LE. Mod I in manual w.c with mod I stretching prior to session. Brought his service dog.     Objective:    Patient received individual therapy to increase strength, endurance, ROM, flexibility, posture and core stabilization with activities as follows:     Gait training via [employer] system for 60:23 minutes (plus set up):     Set up at 5 kg unloading. Computer-assisted robotic gait training via [employer] x 3.0 km/h. GF x 60% x 3', 50% x 30', then 30% for distance of 2980 m. Few stoppages.     Continue HEP daily.    Pt demo good understanding of the education provided. Patient demonstrated good return demonstration of activities.     Assessment:     Few stoppages during decrease of GF with increased tones which resolve with restrapping.     Pt will continue to benefit from skilled PT intervention. Medical Necessity is demonstrated by:  Requires skilled supervision to complete and progress HEP and Weakness.    Patient is making good progress towards established goals.    Plan:    Continue with established Plan of Care towards PT goals.

## 2018-11-16 ENCOUNTER — CLINICAL SUPPORT (OUTPATIENT)
Dept: URGENT CARE | Facility: CLINIC | Age: 58
End: 2018-11-16
Payer: COMMERCIAL

## 2018-11-16 ENCOUNTER — TELEPHONE (OUTPATIENT)
Dept: FAMILY MEDICINE | Facility: CLINIC | Age: 58
End: 2018-11-16

## 2018-11-16 VITALS
OXYGEN SATURATION: 97 % | HEART RATE: 53 BPM | WEIGHT: 175 LBS | HEIGHT: 71 IN | RESPIRATION RATE: 16 BRPM | BODY MASS INDEX: 24.5 KG/M2 | DIASTOLIC BLOOD PRESSURE: 86 MMHG | TEMPERATURE: 97 F | SYSTOLIC BLOOD PRESSURE: 136 MMHG

## 2018-11-16 DIAGNOSIS — M79.675 TOE PAIN, LEFT: ICD-10-CM

## 2018-11-16 DIAGNOSIS — S92.525A CLOSED NONDISPLACED FRACTURE OF MIDDLE PHALANX OF LESSER TOE OF LEFT FOOT, INITIAL ENCOUNTER: Primary | ICD-10-CM

## 2018-11-16 PROCEDURE — 99204 OFFICE O/P NEW MOD 45 MIN: CPT | Mod: S$GLB,,, | Performed by: NURSE PRACTITIONER

## 2018-11-16 NOTE — TELEPHONE ENCOUNTER
----- Message from Andra Christensen sent at 11/16/2018 10:53 AM CST -----  Contact: Patient  Type:  Same Day Appointment Request    Caller is requesting a same day appointment.  Caller declined first available appointment listed below.      Name of Caller:  Patient  When is the first available appointment?  na  Symptoms:  Possibly 2 broken toes on left foot  Best Call Back Number:    Additional Information:  Calling to speak with the Nurse. He possibly broke 2 middle toes on left foot and wants to try and come in or at least get an order for an Xray. Please advise. Call to pod. No answer

## 2018-11-16 NOTE — PROGRESS NOTES
"Subjective:       Patient ID: Alberto Dangelo is a 58 y.o. male.    Vitals:  height is 5' 11" (1.803 m) and weight is 79.4 kg (175 lb). His oral temperature is 97.4 °F (36.3 °C). His blood pressure is 136/86 and his pulse is 53 (abnormal). His respiration is 16 and oxygen saturation is 97%.     Chief Complaint: Toe Pain (Lt 2nd and 3rd toes)    Fell out of chair 11/13/18, C/O Lt 2nd and 3rd toe pain and bruising. Pt states he hyperextended toes with fall.       Toe Pain    The incident occurred 3 to 5 days ago.       Constitution: Negative for fatigue.   HENT: Negative for facial swelling and facial trauma.    Neck: Negative for neck stiffness.   Cardiovascular: Negative for chest trauma.   Eyes: Negative for eye trauma, double vision and blurred vision.   Gastrointestinal: Negative for abdominal trauma, abdominal pain and rectal bleeding.   Genitourinary: Negative for hematuria, genital trauma and pelvic pain.   Musculoskeletal: Positive for pain, trauma, joint swelling and pain with walking. Negative for abnormal ROM of joint.   Skin: Negative for color change, wound, abrasion and laceration.   Neurological: Negative for dizziness, history of vertigo, light-headedness, coordination disturbances, altered mental status and loss of consciousness.   Hematologic/Lymphatic: Negative for history of bleeding disorder.   Psychiatric/Behavioral: Negative for altered mental status.       Objective:      Physical Exam   Constitutional: He is oriented to person, place, and time. He appears well-developed and well-nourished.   HENT:   Head: Normocephalic and atraumatic.   Eyes: Conjunctivae are normal. Pupils are equal, round, and reactive to light.   Neck: Normal range of motion. Neck supple.   Cardiovascular: Normal rate and regular rhythm.   Pulmonary/Chest: Effort normal and breath sounds normal.   Abdominal: Soft. Bowel sounds are normal.   Musculoskeletal: He exhibits edema and tenderness.   Mild edema and tenderness " with palpation and movement to left 2nd and 3rd toes with ecchymosis.    Neurological: He is alert and oriented to person, place, and time.   Skin: Skin is warm and dry.   Nursing note and vitals reviewed.      Assessment:       1. Closed nondisplaced fracture of middle phalanx of lesser toe of left foot, initial encounter    2. Toe pain, left        Plan:         Closed nondisplaced fracture of middle phalanx of lesser toe of left foot, initial encounter    Toe pain, left    pt has hard sole shoe at home.   Toes greyson taped.

## 2018-11-16 NOTE — TELEPHONE ENCOUNTER
----- Message from Ladarius Giles sent at 11/16/2018 10:52 AM CST -----  Contact: Self  Patient requesting to speak with Lillian about possible broken toes   Please call [phone] (home)

## 2018-11-27 ENCOUNTER — CLINICAL SUPPORT (OUTPATIENT)
Dept: REHABILITATION | Facility: HOSPITAL | Age: 58
End: 2018-11-27
Attending: FAMILY MEDICINE
Payer: COMMERCIAL

## 2018-11-27 DIAGNOSIS — R26.9 GAIT DISTURBANCE: ICD-10-CM

## 2018-11-27 DIAGNOSIS — Z78.9 IMPAIRED MOTOR CONTROL: ICD-10-CM

## 2018-11-27 DIAGNOSIS — R53.1 DECREASED STRENGTH: ICD-10-CM

## 2018-11-27 DIAGNOSIS — S14.129A INCOMPLETE INJURY OF CERVICAL SPINAL CORD WITH CENTRAL CORD SYNDROME: ICD-10-CM

## 2018-11-27 DIAGNOSIS — M25.673 DECREASED RANGE OF MOTION OF ANKLE, UNSPECIFIED LATERALITY: ICD-10-CM

## 2018-11-27 PROCEDURE — 97116 GAIT TRAINING THERAPY: CPT | Mod: PN

## 2018-11-27 NOTE — PROGRESS NOTES
Name: Alberto Dangelo  [facility] Number: [id number]  Date of Treatment: 11/27/2018   Diagnosis:   Encounter Diagnoses   Name Primary?   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  Has been weight bearing at home but has not been doing much walking due to the toes.  Wants to see what we can do with the Lokomat.  Patient reports their pain to be 4/10 on a 0-10 scale with 0 being no pain and 10 being the worst pain imaginable.  (toes)    Objective    Patient received individual therapy to increase endurance, ROM, flexibility, core stabilization and gait quality with 0 patients with activities as follows:     Alberto participated in dynamic functional therapeutic activities to improve functional performance for 62 minutes. Including: Set up with 35 kg unloading.  Pt participated in computer assisted robotic gait training via [employer] @ 3.0 kph x 50 min, 35 sec for a total distance of 2499 m.  Utilized guidance force of 60% x entire session. Increased unweighting due to fractured toes.  Also maintained guidance force @ 60% due to extra caution for toes.       Written Home Exercises Provided: N/A  Pt demo good understanding of the education provided. Alberto demonstrated good return demonstration of activities.     Assessment:   Pt demonstrated ability to achieve full weight bearing on L foot and was able to perform self gastroc stretching in standing without difficulty.  Increased unloading as a precaution due to recent toe fractures.  Also maintained a steady guidance force as added precaution.   Monitored patient closely for signs/symptoms of autonomic dysreflexia.  None noted.      Pt will continue to benefit from skilled PT intervention. Medical Necessity is demonstrated by:  Fall Risk, Unable to  participate fully in daily activities, Weakness and impaired balance and gait.    Patient is making fair progress towards established goals.  Regression in gait parameters today due to recent toe fractures.      New/Revised goals: N/A      Plan:  Continue with established Plan of Care towards PT goals.   Will adjust weight bearing and guidance force as patient tolerates.

## 2018-11-29 ENCOUNTER — CLINICAL SUPPORT (OUTPATIENT)
Dept: REHABILITATION | Facility: HOSPITAL | Age: 58
End: 2018-11-29
Attending: FAMILY MEDICINE
Payer: COMMERCIAL

## 2018-11-29 DIAGNOSIS — Z78.9 IMPAIRED MOTOR CONTROL: ICD-10-CM

## 2018-11-29 DIAGNOSIS — R53.1 DECREASED STRENGTH: ICD-10-CM

## 2018-11-29 DIAGNOSIS — S14.129A INCOMPLETE INJURY OF CERVICAL SPINAL CORD WITH CENTRAL CORD SYNDROME: ICD-10-CM

## 2018-11-29 PROCEDURE — 97116 GAIT TRAINING THERAPY: CPT | Mod: PN

## 2018-11-29 NOTE — PROGRESS NOTES
Name: Alberto Dangelo  [facility] Number: [id number]  Date of Treatment: 11/29/2018   Diagnosis:   Encounter Diagnoses   Name Primary?   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  Patient reports their L 2nd and 3rd toes pain to be 4/10 on a 0-10 scale with 0 being no pain and 10 being the worst pain imaginable.    Objective:    Patient received individual therapy to increase strength, endurance, ROM, flexibility, posture and core stabilization with activities as follows:     Gait training via [employer] system for 57:16 minutes (plus set up):     Set up at 35 kg unloading. Computer-assisted robotic gait training via [employer] x 3.0 km/h. GF x 60% for distance of 2824 m. Initial stoppages 2* tones which improved after warm up. .     Continue HEP daily.    Pt demo good understanding of the education provided. Patient demonstrated good return demonstration of activities.     Assessment:     Limited WB and GF to prevent AD issues. Good session otherwise with good feedback throughout.     Pt will continue to benefit from skilled PT intervention. Medical Necessity is demonstrated by:  Requires skilled supervision to complete and progress HEP and Weakness.    Patient is making good progress towards established goals.    Plan:    Continue with established Plan of Care towards PT goals.

## 2018-12-04 ENCOUNTER — CLINICAL SUPPORT (OUTPATIENT)
Dept: REHABILITATION | Facility: HOSPITAL | Age: 58
End: 2018-12-04
Attending: FAMILY MEDICINE
Payer: COMMERCIAL

## 2018-12-04 DIAGNOSIS — Z78.9 IMPAIRED MOTOR CONTROL: ICD-10-CM

## 2018-12-04 DIAGNOSIS — M25.671 DECREASED RANGE OF MOTION OF BOTH ANKLES: ICD-10-CM

## 2018-12-04 DIAGNOSIS — R26.9 GAIT DISTURBANCE: ICD-10-CM

## 2018-12-04 DIAGNOSIS — S14.129A INCOMPLETE INJURY OF CERVICAL SPINAL CORD WITH CENTRAL CORD SYNDROME: ICD-10-CM

## 2018-12-04 DIAGNOSIS — M25.672 DECREASED RANGE OF MOTION OF BOTH ANKLES: ICD-10-CM

## 2018-12-04 DIAGNOSIS — R53.1 DECREASED STRENGTH: ICD-10-CM

## 2018-12-04 PROCEDURE — 97116 GAIT TRAINING THERAPY: CPT | Mod: PN

## 2018-12-04 NOTE — PROGRESS NOTES
Name: Alberto Dangelo  Clinic Number: [id number]  Date of Treatment: 12/04/2018   Diagnosis:   Encounter Diagnoses   Name Primary?   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  Toes still sore so still need to decrease weight bearing/ Patient reports their pain to be 4/10 on a 0-10 scale with 0 being no pain and 10 being the worst pain imaginable.    Objective    Patient received individual therapy to increase strength, endurance, flexibility, core stabilization and gait stability with 0 patients with activities as follows:     Alberto participated in dynamic functional therapeutic activities to improve functional performance for 67 minutes. Including: Set up with 35 kg unloading.  Pt participated in computer assisted robotic gait training via [employer] @ 3.0 kph x 60 min, 4 sec for a total distance of 2991 m.  Utilized guidance force of 60% x 18 min then decreased to 55% for remainder of session. .      Written Home Exercises Provided: N/A  Pt demo good understanding of the education provided. Alberto demonstrated good return demonstration of activities.     Assessment:   Continued with 35 kg unloading and maintained higher guidance force due to toe injuries that have not healed yet.  However, he was able to tolerate guidance force @ 55% despite residual soreness.      Pt will continue to benefit from skilled PT intervention. Medical Necessity is demonstrated by:  Fall Risk, Unable to participate fully in daily activities, Requires skilled supervision to complete and progress HEP, Weakness and Gait disturbance  Patient is making steady progress towards established goals.    New/Revised goals: N/A      Plan:  Continue with established Plan of Care towards PT goals.

## 2018-12-06 ENCOUNTER — CLINICAL SUPPORT (OUTPATIENT)
Dept: REHABILITATION | Facility: HOSPITAL | Age: 58
End: 2018-12-06
Attending: FAMILY MEDICINE
Payer: COMMERCIAL

## 2018-12-06 DIAGNOSIS — R53.1 DECREASED STRENGTH: ICD-10-CM

## 2018-12-06 DIAGNOSIS — S14.129A INCOMPLETE INJURY OF CERVICAL SPINAL CORD WITH CENTRAL CORD SYNDROME: ICD-10-CM

## 2018-12-06 DIAGNOSIS — Z78.9 IMPAIRED MOTOR CONTROL: ICD-10-CM

## 2018-12-06 PROCEDURE — 97116 GAIT TRAINING THERAPY: CPT | Mod: PN

## 2018-12-06 NOTE — PROGRESS NOTES
Name: Alberto Dangelo  [facility] Number: [id number]  Date of Treatment: 12/06/2018   Diagnosis:   Encounter Diagnoses   Name Primary?   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  Patient reports no singinficant toe pain at present but did have earlier discomfort. Via manual w/c with mod I stretching prior to RX.     Objective:    Patient received individual therapy to increase strength, endurance, ROM, flexibility, posture and core stabilization with activities as follows:     Gait training via [employer] system for 60:07 minutes (plus set up):     Set up at 35 kg unloading. Computer-assisted robotic gait training via [email or web address] x 3.0 km/h. GF x 100% x 20' then 55% for distance of 3013 m. No stoppages.     Continue HEP daily.    Pt demo good understanding of the education provided. Patient demonstrated good return demonstration of activities.     Assessment:     Improving tolerance for WB L LE with decreased pain.     Pt will continue to benefit from skilled PT intervention. Medical Necessity is demonstrated by:  Requires skilled supervision to complete and progress HEP and Weakness.    Patient is making good progress towards established goals.    Plan:    Continue with established Plan of Care towards PT goals.

## 2018-12-11 ENCOUNTER — CLINICAL SUPPORT (OUTPATIENT)
Dept: REHABILITATION | Facility: HOSPITAL | Age: 58
End: 2018-12-11
Attending: FAMILY MEDICINE
Payer: COMMERCIAL

## 2018-12-11 DIAGNOSIS — M25.671 DECREASED RANGE OF MOTION OF BOTH ANKLES: ICD-10-CM

## 2018-12-11 DIAGNOSIS — R26.9 GAIT DISTURBANCE: ICD-10-CM

## 2018-12-11 DIAGNOSIS — Z78.9 IMPAIRED MOTOR CONTROL: ICD-10-CM

## 2018-12-11 DIAGNOSIS — S14.129A INCOMPLETE INJURY OF CERVICAL SPINAL CORD WITH CENTRAL CORD SYNDROME: ICD-10-CM

## 2018-12-11 DIAGNOSIS — M25.672 DECREASED RANGE OF MOTION OF BOTH ANKLES: ICD-10-CM

## 2018-12-11 DIAGNOSIS — R53.1 DECREASED STRENGTH: ICD-10-CM

## 2018-12-11 PROCEDURE — 97116 GAIT TRAINING THERAPY: CPT | Mod: PN

## 2018-12-12 NOTE — PROGRESS NOTES
"Name: Alberto Dangelo  [facility] Number: [id number]  Date of Treatment: 12/11/2018  Diagnosis:   Encounter Diagnoses   Name Primary?   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  Patient reports their pain to be 2/10 on a 0-10 scale with 0 being no pain and 10 being the worst pain imaginable.  "Toes are getting less sore"    Objective    Patient received individual therapy to increase strength, endurance, flexibility, core stabilization and gait quality with 0 patients with activities as follows:     Alberto participated in the following therapeutic activities to enhance strength, endurance, flexibility, core stabilization and gait quality:  Performed self stretching after arrival prior to therapeutic activity.  Then Set up with 35 kg unloading.  Pt participated in computer assisted robotic gait training via [employer] @ 3.0 kph x 60 min, 11 sec for a total distance of 2983 m.  Utilized guidance force of 70% x 10 min then decreased to 60% for the remainder of the session.  He experienced soreness/stiffness of toes on L foot initially but this resolved after a few minutes.        Written Home Exercises Provided: N/A  Pt demo good understanding of the education provided. Alberto demonstrated good return demonstration of activities.     Assessment:   Pt tolerated session without increased discomfort of toes.  He demonstrated good gait rhythm and posture throughout session.      Pt will continue to benefit from skilled PT intervention. Medical Necessity is demonstrated by:  Fall Risk, Unable to participate fully in daily activities, Weakness and Impaired gait  Patient is making steady progress towards established goals.    New/Revised goals: N/A      Plan:  Continue with established Plan of Care towards PT " goals.

## 2018-12-18 ENCOUNTER — CLINICAL SUPPORT (OUTPATIENT)
Dept: REHABILITATION | Facility: HOSPITAL | Age: 58
End: 2018-12-18
Attending: FAMILY MEDICINE
Payer: COMMERCIAL

## 2018-12-18 DIAGNOSIS — Z78.9 IMPAIRED MOTOR CONTROL: ICD-10-CM

## 2018-12-18 DIAGNOSIS — M25.672 DECREASED RANGE OF MOTION OF BOTH ANKLES: ICD-10-CM

## 2018-12-18 DIAGNOSIS — S14.129A INCOMPLETE INJURY OF CERVICAL SPINAL CORD WITH CENTRAL CORD SYNDROME: ICD-10-CM

## 2018-12-18 DIAGNOSIS — R53.1 DECREASED STRENGTH: ICD-10-CM

## 2018-12-18 DIAGNOSIS — M25.671 DECREASED RANGE OF MOTION OF BOTH ANKLES: ICD-10-CM

## 2018-12-18 DIAGNOSIS — R26.9 GAIT DISTURBANCE: ICD-10-CM

## 2018-12-18 PROCEDURE — 97116 GAIT TRAINING THERAPY: CPT | Mod: PN

## 2018-12-20 ENCOUNTER — CLINICAL SUPPORT (OUTPATIENT)
Dept: REHABILITATION | Facility: HOSPITAL | Age: 58
End: 2018-12-20
Attending: FAMILY MEDICINE
Payer: COMMERCIAL

## 2018-12-20 DIAGNOSIS — Z78.9 IMPAIRED MOTOR CONTROL: ICD-10-CM

## 2018-12-20 DIAGNOSIS — S14.129A INCOMPLETE INJURY OF CERVICAL SPINAL CORD WITH CENTRAL CORD SYNDROME: ICD-10-CM

## 2018-12-20 DIAGNOSIS — R53.1 DECREASED STRENGTH: ICD-10-CM

## 2018-12-20 PROCEDURE — 97116 GAIT TRAINING THERAPY: CPT | Mod: PN

## 2018-12-20 NOTE — PROGRESS NOTES
Name: Alberto Dangelo  [facility] Number: [id number]  Date of Treatment: 12/20/2018   Diagnosis:   Encounter Diagnoses   Name Primary?   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        Time in: 1215  Time Out: 1540  Total Treatment Time: 65    Subjective:    Alberto reports improvement of symptoms.  Patient reports no pain. States his toes are feeling better. Mod I in manual w/c and mod I stretching prior to session.     Objective:    Patient received individual therapy to increase strength, endurance, ROM, flexibility, posture and core stabilization with activities as follows:     Gait training via [employer] system for 60:24 minutes (plus set up):     Set up at 25 kg unloading. Computer-assisted robotic gait training via [employer] x 3.0 km/h. GF x 60% x 18', then 50% for distance of 3002 m. No stoppages.     Continue HEP daily.    Pt demo good understanding of the education provided. Patient demonstrated good return demonstration of activities.     Assessment:     Improving tolerance for weighting and decreased pain reported.     Pt will continue to benefit from skilled PT intervention. Medical Necessity is demonstrated by:  Requires skilled supervision to complete and progress HEP and Weakness.    Patient is making good progress towards established goals.    Plan:    Continue with established Plan of Care towards PT goals.

## 2018-12-21 NOTE — PROGRESS NOTES
"Name: Alberto Dangelo  [facility] Number: [id number]  Date of Treatment: 12/18/2018  Diagnosis:   Encounter Diagnoses   Name Primary?   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    Objective    Patient received individual therapy to increase strength, endurance, flexibility, core stabilization and gait quality with 0 patients with activities as follows:   Pt performed self stretching in standing after arriving late for appointment.      Alberto participated in dynamic functional therapeutic activities to improve functional performance for 60 minutes. Including: Set up with 25 kg unloading.  Pt participated in computer assisted robotic gait training via [employer] @ 3.0 kph x 50 min, 12 sec for a total distance of 2500 m.  Utilized guidance force of 60% x 15 min and 50% for remainder of session.      Written Home Exercises Provided: N/A  Pt demo good understanding of the education provided. Alberto demonstrated good return demonstration of activities.     Assessment:   Able to decrease unloading due to decreased discomfort of toes on L foot; able to push off of L foot with only minimal discomfort.      Pt will continue to benefit from skilled PT intervention. Medical Necessity is demonstrated by:  Fall Risk, Unable to participate fully in daily activities, Weakness and Balance Deficit/Gait disturbance  Patient is making  progress towards established goals.    New/Revised goals: N/A    Plan:  Continue with established Plan of Care towards PT goals.   "

## 2018-12-27 ENCOUNTER — CLINICAL SUPPORT (OUTPATIENT)
Dept: REHABILITATION | Facility: HOSPITAL | Age: 58
End: 2018-12-27
Payer: COMMERCIAL

## 2018-12-27 DIAGNOSIS — Z78.9 IMPAIRED MOTOR CONTROL: ICD-10-CM

## 2018-12-27 DIAGNOSIS — S14.129A INCOMPLETE INJURY OF CERVICAL SPINAL CORD WITH CENTRAL CORD SYNDROME: ICD-10-CM

## 2018-12-27 DIAGNOSIS — R53.1 DECREASED STRENGTH: ICD-10-CM

## 2018-12-27 PROCEDURE — 97116 GAIT TRAINING THERAPY: CPT | Mod: PN

## 2018-12-27 NOTE — PROGRESS NOTES
Name: Alberto Dangelo  [facility] Number: [id number]  Date of Treatment: 12/27/2018   Diagnosis:   Encounter Diagnoses   Name Primary?   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  Patient reports toes L foot improving and he is doing more of his previous work out routines at home without pain. Mod I in manual w/c with service dog. Mod I stretching prior to session.     Objective:    Patient received individual therapy to increase strength, endurance, ROM, flexibility, posture and core stabilization with activities as follows:     Gait training via [employer] system for 60:14 minutes (plus set up):     Set up at 25kg-20kg unloading. Computer-assisted robotic gait training via [employer] x 3.0 km/h. GF x 60% x 10', 50% x 23', then 40% for distance of 2982 m. Two stoppages 2* R LE spasticity.     Continue HEP daily.    Pt demo good understanding of the education provided. Patient demonstrated good return demonstration of activities.     Assessment:     Improving with progression of decreased unweighting and decreased GF with good response.     Pt will continue to benefit from skilled PT intervention. Medical Necessity is demonstrated by:  Requires skilled supervision to complete and progress HEP and Weakness.    Patient is making good progress towards established goals.    Plan:    Continue with established Plan of Care towards PT goals.

## 2019-01-03 ENCOUNTER — CLINICAL SUPPORT (OUTPATIENT)
Dept: REHABILITATION | Facility: HOSPITAL | Age: 59
End: 2019-01-03
Attending: FAMILY MEDICINE
Payer: COMMERCIAL

## 2019-01-03 DIAGNOSIS — S14.129A INCOMPLETE INJURY OF CERVICAL SPINAL CORD WITH CENTRAL CORD SYNDROME: ICD-10-CM

## 2019-01-03 DIAGNOSIS — Z78.9 IMPAIRED MOTOR CONTROL: ICD-10-CM

## 2019-01-03 DIAGNOSIS — R53.1 DECREASED STRENGTH: ICD-10-CM

## 2019-01-03 PROCEDURE — 97116 GAIT TRAINING THERAPY: CPT | Mod: PN

## 2019-01-03 NOTE — PROGRESS NOTES
Name: Alberto Dangelo  Clinic Number: [id number]  Date of Treatment: 01/03/2019   Diagnosis: No diagnosis found.    Time in: 1425  Time Out: 1540  Total Treatment Time: 65    Subjective:    Alberto reports toes are doing much better and pt is feeling back to normal. Mod I in manual w/c with mod I stretching prior to RX. Brought his service dog. No pain.     Objective:    Patient received individual therapy to increase strength, endurance, ROM, flexibility, posture and core stabilization with activities as follows:     Gait training via [employer] system for 57:40 minutes (plus set up):     Set up at 15 kg unloading. Computer-assisted robotic gait training via [employer] x 3.0 km/h. GF x 60% x 20', then 40% for distance of 2869 m. One stoppage at end 2* tones.     Continue HEP daily.    Pt demo good understanding of the education provided. Patient demonstrated good return demonstration of activities.     Assessment:     Improving, returning closer to GF and unweighting that was prior to toes injury.     Pt will continue to benefit from skilled PT intervention. Medical Necessity is demonstrated by:  Requires skilled supervision to complete and progress HEP and Weakness.    Patient is making good progress towards established goals.    Plan:    Continue with established Plan of Care towards PT goals.

## 2019-01-07 ENCOUNTER — HOSPITAL ENCOUNTER (OUTPATIENT)
Facility: HOSPITAL | Age: 59
Discharge: HOME | End: 2019-01-09
Attending: EMERGENCY MEDICINE | Admitting: HOSPITALIST
Payer: COMMERCIAL

## 2019-01-07 DIAGNOSIS — R00.0 TACHYCARDIA: ICD-10-CM

## 2019-01-07 DIAGNOSIS — D70.9 NEUTROPENIC FEVER: Primary | ICD-10-CM

## 2019-01-07 DIAGNOSIS — D61.818 PANCYTOPENIA: ICD-10-CM

## 2019-01-07 DIAGNOSIS — R50.81 NEUTROPENIC FEVER: Primary | ICD-10-CM

## 2019-01-07 LAB
ALBUMIN SERPL BCP-MCNC: 3.7 G/DL
ALP SERPL-CCNC: 61 U/L
ALT SERPL W/O P-5'-P-CCNC: 22 U/L
ANION GAP SERPL CALC-SCNC: 11 MMOL/L
AST SERPL-CCNC: 19 U/L
BACTERIA #/AREA URNS HPF: NORMAL /HPF
BASOPHILS # BLD AUTO: ABNORMAL K/UL
BASOPHILS NFR BLD: 0 %
BILIRUB SERPL-MCNC: 0.5 MG/DL
BILIRUB UR QL STRIP: NEGATIVE
BUN SERPL-MCNC: 27 MG/DL
CALCIUM SERPL-MCNC: 8.9 MG/DL
CHLORIDE SERPL-SCNC: 104 MMOL/L
CLARITY UR: CLEAR
CO2 SERPL-SCNC: 21 MMOL/L
COLOR UR: YELLOW
CREAT SERPL-MCNC: 1.3 MG/DL
DIFFERENTIAL METHOD: ABNORMAL
EOSINOPHIL # BLD AUTO: ABNORMAL K/UL
EOSINOPHIL NFR BLD: 4 %
ERYTHROCYTE [DISTWIDTH] IN BLOOD BY AUTOMATED COUNT: 12.9 %
EST. GFR  (AFRICAN AMERICAN): >60 ML/MIN/1.73 M^2
EST. GFR  (NON AFRICAN AMERICAN): >60 ML/MIN/1.73 M^2
GIANT PLATELETS BLD QL SMEAR: PRESENT
GLUCOSE SERPL-MCNC: 106 MG/DL
GLUCOSE UR QL STRIP: NEGATIVE
HCT VFR BLD AUTO: 39.7 %
HGB BLD-MCNC: 13.6 G/DL
HGB UR QL STRIP: NEGATIVE
KETONES UR QL STRIP: ABNORMAL
LACTATE SERPL-SCNC: 1.9 MMOL/L
LEUKOCYTE ESTERASE UR QL STRIP: ABNORMAL
LYMPHOCYTES # BLD AUTO: ABNORMAL K/UL
LYMPHOCYTES NFR BLD: 19 %
MCH RBC QN AUTO: 31.3 PG
MCHC RBC AUTO-ENTMCNC: 34.2 G/DL
MCV RBC AUTO: 92 FL
MICROSCOPIC COMMENT: NORMAL
MONOCYTES # BLD AUTO: ABNORMAL K/UL
MONOCYTES NFR BLD: 8 %
NEUTROPHILS NFR BLD: 61 %
NEUTS BAND NFR BLD MANUAL: 8 %
NITRITE UR QL STRIP: NEGATIVE
OVALOCYTES BLD QL SMEAR: ABNORMAL
PH UR STRIP: 6 [PH] (ref 5–8)
PLATELET # BLD AUTO: 117 K/UL
PLATELET BLD QL SMEAR: ABNORMAL
PMV BLD AUTO: 9 FL
POIKILOCYTOSIS BLD QL SMEAR: SLIGHT
POTASSIUM SERPL-SCNC: 3.5 MMOL/L
PROT SERPL-MCNC: 6.1 G/DL
PROT UR QL STRIP: NEGATIVE
RBC # BLD AUTO: 4.33 M/UL
RBC #/AREA URNS HPF: 1 /HPF (ref 0–4)
SODIUM SERPL-SCNC: 136 MMOL/L
SP GR UR STRIP: 1.02 (ref 1–1.03)
SQUAMOUS #/AREA URNS HPF: 2 /HPF
URN SPEC COLLECT METH UR: ABNORMAL
UROBILINOGEN UR STRIP-ACNC: NEGATIVE EU/DL
WBC # BLD AUTO: 1.9 K/UL
WBC #/AREA URNS HPF: 5 /HPF (ref 0–5)

## 2019-01-07 PROCEDURE — 96365 THER/PROPH/DIAG IV INF INIT: CPT

## 2019-01-07 PROCEDURE — 96375 TX/PRO/DX INJ NEW DRUG ADDON: CPT

## 2019-01-07 PROCEDURE — 81000 URINALYSIS NONAUTO W/SCOPE: CPT

## 2019-01-07 PROCEDURE — 85027 COMPLETE CBC AUTOMATED: CPT

## 2019-01-07 PROCEDURE — 93005 ELECTROCARDIOGRAM TRACING: CPT

## 2019-01-07 PROCEDURE — 83605 ASSAY OF LACTIC ACID: CPT

## 2019-01-07 PROCEDURE — 87040 BLOOD CULTURE FOR BACTERIA: CPT

## 2019-01-07 PROCEDURE — 25000003 PHARM REV CODE 250: Performed by: EMERGENCY MEDICINE

## 2019-01-07 PROCEDURE — 36415 COLL VENOUS BLD VENIPUNCTURE: CPT

## 2019-01-07 PROCEDURE — 63600175 PHARM REV CODE 636 W HCPCS: Performed by: EMERGENCY MEDICINE

## 2019-01-07 PROCEDURE — 87449 NOS EACH ORGANISM AG IA: CPT

## 2019-01-07 PROCEDURE — 96361 HYDRATE IV INFUSION ADD-ON: CPT

## 2019-01-07 PROCEDURE — 80053 COMPREHEN METABOLIC PANEL: CPT

## 2019-01-07 PROCEDURE — 87086 URINE CULTURE/COLONY COUNT: CPT

## 2019-01-07 PROCEDURE — 87088 URINE BACTERIA CULTURE: CPT

## 2019-01-07 PROCEDURE — 85007 BL SMEAR W/DIFF WBC COUNT: CPT

## 2019-01-07 PROCEDURE — 99285 EMERGENCY DEPT VISIT HI MDM: CPT | Mod: 25

## 2019-01-07 RX ADMIN — SODIUM CHLORIDE 1000 ML: 0.9 INJECTION, SOLUTION INTRAVENOUS at 10:01

## 2019-01-07 RX ADMIN — PIPERACILLIN AND TAZOBACTAM 4.5 G: 4; .5 INJECTION, POWDER, LYOPHILIZED, FOR SOLUTION INTRAVENOUS; PARENTERAL at 11:01

## 2019-01-08 PROBLEM — R65.10 SIRS (SYSTEMIC INFLAMMATORY RESPONSE SYNDROME): Status: ACTIVE | Noted: 2019-01-08

## 2019-01-08 PROBLEM — R50.81 NEUTROPENIC FEVER: Status: ACTIVE | Noted: 2019-01-08

## 2019-01-08 PROBLEM — G82.50 QUADRIPLEGIA: Status: ACTIVE | Noted: 2019-01-08

## 2019-01-08 PROBLEM — D70.9 NEUTROPENIC FEVER: Status: ACTIVE | Noted: 2019-01-08

## 2019-01-08 LAB
ALBUMIN SERPL BCP-MCNC: 3.5 G/DL
ALP SERPL-CCNC: 54 U/L
ALT SERPL W/O P-5'-P-CCNC: 20 U/L
ANION GAP SERPL CALC-SCNC: 6 MMOL/L
AST SERPL-CCNC: 17 U/L
BASOPHILS NFR BLD: 0 %
BILIRUB SERPL-MCNC: 0.7 MG/DL
BUN SERPL-MCNC: 25 MG/DL
CALCIUM SERPL-MCNC: 9.2 MG/DL
CHLORIDE SERPL-SCNC: 108 MMOL/L
CO2 SERPL-SCNC: 26 MMOL/L
CREAT SERPL-MCNC: 1.4 MG/DL
DIFFERENTIAL METHOD: ABNORMAL
EOSINOPHIL NFR BLD: 0 %
ERYTHROCYTE [DISTWIDTH] IN BLOOD BY AUTOMATED COUNT: 13.1 %
EST. GFR  (AFRICAN AMERICAN): >60 ML/MIN/1.73 M^2
EST. GFR  (NON AFRICAN AMERICAN): 56 ML/MIN/1.73 M^2
GLUCOSE SERPL-MCNC: 115 MG/DL
HCT VFR BLD AUTO: 39.1 %
HGB BLD-MCNC: 13.5 G/DL
LACTATE SERPL-SCNC: 1.1 MMOL/L
LACTATE SERPL-SCNC: 2 MMOL/L
LYMPHOCYTES NFR BLD: 18 %
MAGNESIUM SERPL-MCNC: 2.2 MG/DL
MCH RBC QN AUTO: 31.5 PG
MCHC RBC AUTO-ENTMCNC: 34.4 G/DL
MCV RBC AUTO: 92 FL
MONOCYTES NFR BLD: 9 %
NEUTROPHILS NFR BLD: 72 %
NEUTS BAND NFR BLD MANUAL: 1 %
PHOSPHATE SERPL-MCNC: 3 MG/DL
PLATELET # BLD AUTO: 128 K/UL
PLATELET BLD QL SMEAR: ABNORMAL
PMV BLD AUTO: 9.2 FL
POTASSIUM SERPL-SCNC: 4.5 MMOL/L
PROT SERPL-MCNC: 6 G/DL
RBC # BLD AUTO: 4.27 M/UL
SODIUM SERPL-SCNC: 140 MMOL/L
VANCOMYCIN SERPL-MCNC: 7.9 UG/ML
WBC # BLD AUTO: 3.2 K/UL

## 2019-01-08 PROCEDURE — 63600175 PHARM REV CODE 636 W HCPCS: Performed by: NURSE PRACTITIONER

## 2019-01-08 PROCEDURE — 85007 BL SMEAR W/DIFF WBC COUNT: CPT

## 2019-01-08 PROCEDURE — 83735 ASSAY OF MAGNESIUM: CPT

## 2019-01-08 PROCEDURE — 63600175 PHARM REV CODE 636 W HCPCS: Performed by: EMERGENCY MEDICINE

## 2019-01-08 PROCEDURE — 85027 COMPLETE CBC AUTOMATED: CPT

## 2019-01-08 PROCEDURE — G0378 HOSPITAL OBSERVATION PER HR: HCPCS

## 2019-01-08 PROCEDURE — 36415 COLL VENOUS BLD VENIPUNCTURE: CPT

## 2019-01-08 PROCEDURE — 84100 ASSAY OF PHOSPHORUS: CPT

## 2019-01-08 PROCEDURE — 83605 ASSAY OF LACTIC ACID: CPT | Mod: 91

## 2019-01-08 PROCEDURE — 80053 COMPREHEN METABOLIC PANEL: CPT

## 2019-01-08 PROCEDURE — 96376 TX/PRO/DX INJ SAME DRUG ADON: CPT

## 2019-01-08 PROCEDURE — 94761 N-INVAS EAR/PLS OXIMETRY MLT: CPT

## 2019-01-08 PROCEDURE — 25000003 PHARM REV CODE 250: Performed by: NURSE PRACTITIONER

## 2019-01-08 PROCEDURE — 80202 ASSAY OF VANCOMYCIN: CPT

## 2019-01-08 PROCEDURE — 83605 ASSAY OF LACTIC ACID: CPT

## 2019-01-08 PROCEDURE — 25000003 PHARM REV CODE 250: Performed by: HOSPITALIST

## 2019-01-08 PROCEDURE — 63600175 PHARM REV CODE 636 W HCPCS: Performed by: HOSPITALIST

## 2019-01-08 PROCEDURE — 25000003 PHARM REV CODE 250: Performed by: EMERGENCY MEDICINE

## 2019-01-08 RX ORDER — SODIUM CHLORIDE 0.9 % (FLUSH) 0.9 %
5 SYRINGE (ML) INJECTION
Status: DISCONTINUED | OUTPATIENT
Start: 2019-01-08 | End: 2019-01-09 | Stop reason: HOSPADM

## 2019-01-08 RX ORDER — POLYETHYLENE GLYCOL 3350 17 G/17G
17 POWDER, FOR SOLUTION ORAL DAILY
Status: DISCONTINUED | OUTPATIENT
Start: 2019-01-08 | End: 2019-01-09 | Stop reason: HOSPADM

## 2019-01-08 RX ORDER — ENOXAPARIN SODIUM 100 MG/ML
40 INJECTION SUBCUTANEOUS EVERY 24 HOURS
Status: DISCONTINUED | OUTPATIENT
Start: 2019-01-08 | End: 2019-01-09

## 2019-01-08 RX ORDER — ACETAMINOPHEN 325 MG/1
650 TABLET ORAL EVERY 4 HOURS PRN
Status: DISCONTINUED | OUTPATIENT
Start: 2019-01-08 | End: 2019-01-09 | Stop reason: HOSPADM

## 2019-01-08 RX ORDER — MIDODRINE HYDROCHLORIDE 2.5 MG/1
5 TABLET ORAL 2 TIMES DAILY WITH MEALS
Status: DISCONTINUED | OUTPATIENT
Start: 2019-01-08 | End: 2019-01-09 | Stop reason: HOSPADM

## 2019-01-08 RX ORDER — GLUCAGON 1 MG
1 KIT INJECTION
Status: DISCONTINUED | OUTPATIENT
Start: 2019-01-08 | End: 2019-01-09 | Stop reason: HOSPADM

## 2019-01-08 RX ORDER — VANCOMYCIN HCL IN 5 % DEXTROSE 1G/250ML
1000 PLASTIC BAG, INJECTION (ML) INTRAVENOUS
Status: DISCONTINUED | OUTPATIENT
Start: 2019-01-08 | End: 2019-01-08

## 2019-01-08 RX ORDER — SENNOSIDES 8.6 MG/1
2 TABLET ORAL DAILY
Status: DISCONTINUED | OUTPATIENT
Start: 2019-01-08 | End: 2019-01-09 | Stop reason: HOSPADM

## 2019-01-08 RX ORDER — ASPIRIN 81 MG/1
81 TABLET ORAL DAILY
Status: DISCONTINUED | OUTPATIENT
Start: 2019-01-08 | End: 2019-01-09 | Stop reason: HOSPADM

## 2019-01-08 RX ORDER — IBUPROFEN 200 MG
24 TABLET ORAL
Status: DISCONTINUED | OUTPATIENT
Start: 2019-01-08 | End: 2019-01-09 | Stop reason: HOSPADM

## 2019-01-08 RX ORDER — IBUPROFEN 200 MG
16 TABLET ORAL
Status: DISCONTINUED | OUTPATIENT
Start: 2019-01-08 | End: 2019-01-09 | Stop reason: HOSPADM

## 2019-01-08 RX ORDER — SODIUM CHLORIDE 9 MG/ML
INJECTION, SOLUTION INTRAVENOUS CONTINUOUS
Status: DISCONTINUED | OUTPATIENT
Start: 2019-01-08 | End: 2019-01-09

## 2019-01-08 RX ORDER — PANTOPRAZOLE SODIUM 40 MG/1
40 TABLET, DELAYED RELEASE ORAL DAILY
Status: DISCONTINUED | OUTPATIENT
Start: 2019-01-08 | End: 2019-01-09 | Stop reason: HOSPADM

## 2019-01-08 RX ADMIN — MIDODRINE HYDROCHLORIDE 5 MG: 2.5 TABLET ORAL at 05:01

## 2019-01-08 RX ADMIN — PIPERACILLIN SODIUM AND TAZOBACTAM SODIUM 4.5 G: 4; .5 INJECTION, POWDER, LYOPHILIZED, FOR SOLUTION INTRAVENOUS at 05:01

## 2019-01-08 RX ADMIN — SODIUM CHLORIDE: 0.9 INJECTION, SOLUTION INTRAVENOUS at 05:01

## 2019-01-08 RX ADMIN — ASPIRIN 81 MG: 81 TABLET, COATED ORAL at 08:01

## 2019-01-08 RX ADMIN — SODIUM CHLORIDE: 0.9 INJECTION, SOLUTION INTRAVENOUS at 08:01

## 2019-01-08 RX ADMIN — MIDODRINE HYDROCHLORIDE 5 MG: 2.5 TABLET ORAL at 08:01

## 2019-01-08 RX ADMIN — PIPERACILLIN SODIUM AND TAZOBACTAM SODIUM 4.5 G: 4; .5 INJECTION, POWDER, LYOPHILIZED, FOR SOLUTION INTRAVENOUS at 08:01

## 2019-01-08 RX ADMIN — PIPERACILLIN SODIUM AND TAZOBACTAM SODIUM 4.5 G: 4; .5 INJECTION, POWDER, LYOPHILIZED, FOR SOLUTION INTRAVENOUS at 12:01

## 2019-01-08 RX ADMIN — VANCOMYCIN HYDROCHLORIDE 1250 MG: 1 INJECTION, POWDER, LYOPHILIZED, FOR SOLUTION INTRAVENOUS at 05:01

## 2019-01-08 RX ADMIN — PANTOPRAZOLE SODIUM 40 MG: 40 TABLET, DELAYED RELEASE ORAL at 08:01

## 2019-01-08 RX ADMIN — VANCOMYCIN HYDROCHLORIDE 1500 MG: 1 INJECTION, POWDER, LYOPHILIZED, FOR SOLUTION INTRAVENOUS at 12:01

## 2019-01-08 NOTE — PROGRESS NOTES
01/08/19 0849   Patient Assessment/Suction   Level of Consciousness (AVPU) alert   PRE-TX-O2-ETCO2   O2 Device (Oxygen Therapy) room air   SpO2 98 %   Pulse Oximetry Type Intermittent   $ Pulse Oximetry - Multiple Charge Pulse Oximetry - Multiple

## 2019-01-08 NOTE — NURSING
Patient resting without problems. Denies pain. Has voided clear yellow urine twice. Will continue to monitor.

## 2019-01-08 NOTE — PLAN OF CARE
PCP is Dr Ortiz.  Verified insurance as [employer].  Pharmacy is [facility] on [address].  Lives with spouse.  Denies HH.  Patient goes for outpatient PT on Tues/Thurs at [facility]; patient states he uses the [facility].  Pt's person wheelchair is at bedside.  Discharge plan is home; no needs at this time.       01/08/19 1249   Discharge Assessment   Assessment Type Discharge Planning Assessment   Confirmed/corrected address and phone number on facesheet? Yes   Assessment information obtained from? Patient   Prior to hospitilization cognitive status: Alert/Oriented   Prior to hospitalization functional status: Independent;Assistive Equipment   Current cognitive status: Alert/Oriented   Current Functional Status: Independent;Assistive Equipment   Lives With spouse   Able to Return to Prior Arrangements yes   Is patient able to care for self after discharge? Yes   Patient's perception of discharge disposition home or selfcare   Readmission Within the Last 30 Days no previous admission in last 30 days   Patient currently being followed by outpatient case management? No   Patient currently receives any other outside agency services? Yes   Name and contact number of agency or person providing outside services outpatient PT at [facility]   Is it the patient/care giver preference to resume care with the current outside agency? Yes   Equipment Currently Used at Home wheelchair;shower chair;bath bench   Do you have any problems affording any of your prescribed medications? No   Is the patient taking medications as prescribed? yes   Does the patient have transportation home? Yes   Transportation Anticipated family or friend will provide   Dialysis Name and Scheduled days none   Does the patient receive services at the [facility]? No   Discharge Plan A Home   DME Needed Upon Discharge  none   Patient/Family in Agreement with Plan yes

## 2019-01-08 NOTE — NURSING
Alert and oriented. Denies pain/discomfort. Temperature normal. Instructed on plan of care. Provided with in and out catheters to go with what patient brought from home. #18 IV Heplock in place.   Will continue to monitor. IV fluids infusing as ordered.

## 2019-01-08 NOTE — SUBJECTIVE & OBJECTIVE
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      Current Outpatient Medications on File Prior to Encounter   Medication Sig    aspirin (ECOTRIN) 81 MG EC tablet Take 81 mg by mouth once daily.    bisacodyl (FLEET) 10 mg/30 mL Enem Place 10 mg rectally once daily.    diazePAM (VALIUM) 5 MG tablet TAKE 1 TABLET BY MOUTH EVERY 12 HOURS AS NEEDED FOR SPASM    magnesium oxide (MAG-OX) 400 mg tablet Take 1 tablet (400 mg total) by mouth 2 (two) times daily.    midodrine (PROAMATINE) 5 MG Tab TAKE 2 TABLETS BY MOUTH FOUR TIMES DAILY    omeprazole (PRILOSEC) 20 MG capsule TAKE 1 CAPSULE(20 MG) BY MOUTH EVERY DAY    POLYETHYLENE GLYCOL 3350 (MIRALAX ORAL) Take 1 Dose by mouth every evening.    senna (SENOKOT) 8.6 mg tablet Take 2 tablets by mouth once daily.    vitamin D 1000 units Tab Take 185 mg by mouth once daily.     Family History     Problem Relation (Age of Onset)    Cancer Mother, Father, Sister    Drug abuse Maternal Aunt, Paternal Uncle    Early death Mother, Maternal Aunt    Heart disease Father, Maternal Grandmother, Paternal Grandmother, Maternal Grandfather, Paternal Uncle    Kidney disease Paternal Uncle    No Known Problems Brother, Daughter, Son, Maternal Uncle, Paternal Aunt, Paternal Uncle    Parkinsonism Paternal Grandfather         Tobacco Use    Smoking status: Never Smoker    Smokeless tobacco: Never Used   Substance and Sexual Activity    Alcohol use: No    Drug use: No    Sexual activity: Not on file     Review of Systems   Constitutional: Positive for chills, fatigue and fever. Negative for appetite change.   HENT: Positive for congestion, postnasal drip and rhinorrhea. Negative for sore throat and trouble swallowing.    Eyes: Negative for photophobia and visual disturbance.   Respiratory: Negative for cough, shortness of breath and wheezing.    Cardiovascular: Negative for chest pain and palpitations.   Gastrointestinal: Negative for abdominal pain, diarrhea, nausea and vomiting.   Genitourinary: Negative for decreased urine volume and hematuria.        Neurogenic bladder   Musculoskeletal: Positive for arthralgias, gait problem and myalgias.   Skin: Negative for rash and wound.   Neurological: Positive for weakness. Negative for light-headedness.   Psychiatric/Behavioral: Negative for agitation and confusion.   All other systems reviewed and are negative.    Objective:     Vital Signs (Most Recent):  Temp: 98.1 °F (36.7 °C) (01/08/19 0231)  Pulse: 94 (01/08/19 0231)  Resp: 12 (01/08/19 0231)  BP: 95/60 (01/08/19 0231)  SpO2: 97 % (01/08/19 0231) Vital Signs (24h Range):  Temp:  [98.1 °F (36.7 °C)-101.9 °F (38.8 °C)] 98.1 °F (36.7 °C)  Pulse:  [] 94  Resp:  [12-20] 12  SpO2:  [93 %-97 %] 97 %  BP: ()/(54-75) 95/60     Weight: 79.4 kg (175 lb)  Body mass index is 24.41 kg/m².    Physical Exam   Constitutional: He is oriented to person, place, and time. He appears well-developed and well-nourished.   HENT:   Head: Normocephalic and atraumatic.   Eyes: Conjunctivae and EOM are normal. Pupils are equal, round, and reactive to light.   Neck: Normal range of motion. Neck supple. No JVD present.   Cardiovascular: Normal rate, regular rhythm, normal heart sounds and intact distal pulses.   No murmur heard.  Pulmonary/Chest:  Effort normal and breath sounds normal.   Abdominal: Soft. Bowel sounds are normal. He exhibits no distension. There is no tenderness.   Genitourinary:   Genitourinary Comments: deferred   Musculoskeletal: He exhibits no edema or deformity.   Neurological: He is alert and oriented to person, place, and time. He exhibits abnormal muscle tone.   Skin: Skin is warm and dry. Capillary refill takes less than 2 seconds.   Psychiatric: He has a normal mood and affect. His behavior is normal. Judgment and thought content normal.   Nursing note and vitals reviewed.        CRANIAL NERVES     CN III, IV, VI   Pupils are equal, round, and reactive to light.  Extraocular motions are normal.        Significant Labs:   CBC:   Recent Labs   Lab 01/07/19 2234   WBC 1.90*   HGB 13.6*   HCT 39.7*   *     CMP:   Recent Labs   Lab 01/07/19 2234      K 3.5      CO2 21*      BUN 27*   CREATININE 1.3   CALCIUM 8.9   PROT 6.1   ALBUMIN 3.7   BILITOT 0.5   ALKPHOS 61   AST 19   ALT 22   ANIONGAP 11   EGFRNONAA >60     Lactic Acid:   Recent Labs   Lab 01/07/19  2234 01/08/19  0215   LACTATE 1.9 2.0     Urine Studies:   Recent Labs   Lab 01/07/19 2223   COLORU Yellow   APPEARANCEUA Clear   PHUR 6.0   SPECGRAV 1.025   PROTEINUA Negative   GLUCUA Negative   KETONESU Trace*   BILIRUBINUA Negative   OCCULTUA Negative   NITRITE Negative   UROBILINOGEN Negative   LEUKOCYTESUR Trace*   RBCUA 1   WBCUA 5   BACTERIA None   SQUAMEPITHEL 2       Significant Imaging: CXR: I have reviewed all pertinent results/findings within the past 24 hours and my personal findings are:  No acute process identified.

## 2019-01-08 NOTE — CONSULTS
Date: 1/8/2019   Alberto Dangelo [id number] is a 58 y.o. male who has been consulted for vancomycin dosing.    The patient has the following labs:     Creatinine (mg/dl)  WBC Count  Serum creatinine: 1.3 mg/dL 01/07/19 2234  Estimated creatinine clearance: 67.6 mL/min  Lab Results  Component  Value  Date  WBC  1.90 (LL)  01/07/2019    Current weight is 79.4 kg (175 lb)    Pt being treated with vancomycin for bacteremia.    The patient received  1500 mg on 01/08 at 0009.    The patient will be started on vancomycin at a dose of 1000 mg every 12 hours. The vancomycin trough has been ordered for 01/09 at 1130.  Target goal is 15-20.     Patient will be followed by pharmacy for changes in renal function, toxicity, and efficacy.      Thank you for allowing us to participate in this patient's care.     Rowena Land, RadhaD

## 2019-01-08 NOTE — CONSULTS
Date: 1/8/2019   Alberto Dangelo [id number] is a 58 y.o. male who has been consulted for vancomycin dosing.    The patient has the following labs:     Creatinine (mg/dl)    WBC Count   Serum creatinine: 1.4 mg/dL 01/08/19 0701  Estimated creatinine clearance: 62.8 mL/min Lab Results   Component Value Date    WBC 3.20 (L) 01/08/2019        Current weight is 79.4 kg (175 lb)      Pt is quadraplegic.  Vanc 1500mg given 1/8/19 0009. Vancomycin level 10 hours from 1st dose was 1/8/2019 at 1014 was 7.9 mg/dL.  Target goal is 15-20 mg/dL.     The patient will be changed to a vancomycin dose of 1000 mg every 18 hours. The vancomycin trough has been ordered before 3rd dose to check for clearance.  Trough 1/9/19 at 1130.  Patient will be followed by pharmacy for changes in renal function, toxicity, and efficacy.    Thank you for allowing us to participate in this patient's care.     Dharmesh Keane, Pharmacist

## 2019-01-08 NOTE — ASSESSMENT & PLAN NOTE
No clinical focus identified.  ?viral syndrome  Consult hematology  WBC WNL 3/2018  ANC is 1311 upon admission  Pt meets SIRS criteria-IV vancomycin and IV zosyn initiated  Blood cultures collected.

## 2019-01-08 NOTE — H&P
"[facility] Medicine  History & Physical    Patient Name: Alberto Dangelo  MRN: [id number]  Admission Date: 1/7/2019  Attending Physician: Mohan Cuevas MD   Primary Care Provider: Dirk Ortiz MD         Patient information was obtained from patient, spouse/SO, past medical records and ER records.     Subjective:     Principal Problem:Neutropenic fever    Chief Complaint:   Chief Complaint   Patient presents with    Fever     hx of uti 8 months ago        HPI: Alberto Dangelo is a 58 y.o. 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 Pt has a history of a C7 burst fracture followed by a C6-T1 fusion in 2010. Reports that he has been weak all day and endorsed generalized muscle spasms after eating 4 hours ago. 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 Wife noted a temperature of 100.5 which increased to 103 after a hour. The increase in temperature prompted him to call EMS. Fever was associated with chills.  He notes the sx are similar to previous UTI.  Pt also admits to "severe cold symptoms" about 2 weeks ago.  Pt's WBC's were notably decreased upon evaluation.           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      Current Outpatient Medications on File Prior to Encounter   Medication Sig    aspirin (ECOTRIN) 81 MG EC tablet Take 81 mg by mouth once daily.    bisacodyl (FLEET) 10 mg/30 mL Enem Place 10 mg rectally once daily.    diazePAM (VALIUM) 5 MG tablet TAKE 1 TABLET BY MOUTH EVERY 12 HOURS AS NEEDED FOR SPASM    magnesium oxide (MAG-OX) 400 mg tablet Take 1 tablet (400 mg total) by mouth 2 (two) times daily.    midodrine (PROAMATINE) 5 MG Tab TAKE 2 TABLETS BY MOUTH FOUR TIMES DAILY    omeprazole (PRILOSEC) 20 MG capsule TAKE 1 CAPSULE(20 MG) BY MOUTH EVERY DAY    POLYETHYLENE GLYCOL 3350 (MIRALAX ORAL) Take 1 Dose by mouth every evening.    senna (SENOKOT) 8.6 mg tablet Take 2 tablets by mouth once daily.    vitamin D 1000 units Tab Take 185 mg by mouth once daily.     Family History     Problem Relation (Age of Onset)    Cancer Mother, Father, Sister    Drug abuse Maternal Aunt, Paternal Uncle    Early death Mother, Maternal Aunt    Heart disease Father, Maternal Grandmother, Paternal Grandmother, Maternal Grandfather, Paternal Uncle    Kidney disease Paternal Uncle    No Known Problems Brother, Daughter, Son, Maternal Uncle, Paternal Aunt, Paternal Uncle    Parkinsonism Paternal Grandfather        Tobacco Use    Smoking status: Never Smoker    Smokeless tobacco: Never Used   Substance and Sexual Activity    Alcohol use: No    Drug use: No    Sexual activity: Not on file     Review of Systems   Constitutional: Positive for chills, fatigue and fever. Negative for appetite change.   HENT: Positive for congestion, postnasal drip and rhinorrhea. Negative for sore throat and trouble swallowing.    Eyes: Negative for photophobia and visual disturbance.   Respiratory: Negative for cough, shortness of breath and wheezing.    Cardiovascular: Negative for chest pain and palpitations.   Gastrointestinal: Negative for abdominal pain, diarrhea, nausea  and vomiting.   Genitourinary: Negative for decreased urine volume and hematuria.        Neurogenic bladder   Musculoskeletal: Positive for arthralgias, gait problem and myalgias.   Skin: Negative for rash and wound.   Neurological: Positive for weakness. Negative for light-headedness.   Psychiatric/Behavioral: Negative for agitation and confusion.   All other systems reviewed and are negative.    Objective:     Vital Signs (Most Recent):  Temp: 98.1 °F (36.7 °C) (01/08/19 0231)  Pulse: 94 (01/08/19 0231)  Resp: 12 (01/08/19 0231)  BP: 95/60 (01/08/19 0231)  SpO2: 97 % (01/08/19 0231) Vital Signs (24h Range):  Temp:  [98.1 °F (36.7 °C)-101.9 °F (38.8 °C)] 98.1 °F (36.7 °C)  Pulse:  [] 94  Resp:  [12-20] 12  SpO2:  [93 %-97 %] 97 %  BP: ()/(54-75) 95/60     Weight: 79.4 kg (175 lb)  Body mass index is 24.41 kg/m².    Physical Exam   Constitutional: He is oriented to person, place, and time. He appears well-developed and well-nourished.   HENT:   Head: Normocephalic and atraumatic.   Eyes: Conjunctivae and EOM are normal. Pupils are equal, round, and reactive to light.   Neck: Normal range of motion. Neck supple. No JVD present.   Cardiovascular: Normal rate, regular rhythm, normal heart sounds and intact distal pulses.   No murmur heard.  Pulmonary/Chest: Effort normal and breath sounds normal.   Abdominal: Soft. Bowel sounds are normal. He exhibits no distension. There is no tenderness.   Genitourinary:   Genitourinary Comments: deferred   Musculoskeletal: He exhibits no edema or deformity.   Neurological: He is alert and oriented to person, place, and time. He exhibits abnormal muscle tone.   Skin: Skin is warm and dry. Capillary refill takes less than 2 seconds.   Psychiatric: He has a normal mood and affect. His behavior is normal. Judgment and thought content normal.   Nursing note and vitals reviewed.        CRANIAL NERVES     CN III, IV, VI   Pupils are equal, round, and reactive to  light.  Extraocular motions are normal.        Significant Labs:   CBC:   Recent Labs   Lab 01/07/19 2234   WBC 1.90*   HGB 13.6*   HCT 39.7*   *     CMP:   Recent Labs   Lab 01/07/19 2234      K 3.5      CO2 21*      BUN 27*   CREATININE 1.3   CALCIUM 8.9   PROT 6.1   ALBUMIN 3.7   BILITOT 0.5   ALKPHOS 61   AST 19   ALT 22   ANIONGAP 11   EGFRNONAA >60     Lactic Acid:   Recent Labs   Lab 01/07/19  2234 01/08/19  0215   LACTATE 1.9 2.0     Urine Studies:   Recent Labs   Lab 01/07/19 2223   COLORU Yellow   APPEARANCEUA Clear   PHUR 6.0   SPECGRAV 1.025   PROTEINUA Negative   GLUCUA Negative   KETONESU Trace*   BILIRUBINUA Negative   OCCULTUA Negative   NITRITE Negative   UROBILINOGEN Negative   LEUKOCYTESUR Trace*   RBCUA 1   WBCUA 5   BACTERIA None   SQUAMEPITHEL 2       Significant Imaging: CXR: I have reviewed all pertinent results/findings within the past 24 hours and my personal findings are:  No acute process identified.    Assessment/Plan:     * Neutropenic fever    No clinical focus identified.  ?viral syndrome  Consult hematology  WBC WNL 3/2018  ANC is 1311 upon admission  Pt meets SIRS criteria-IV vancomycin and IV zosyn initiated  Blood cultures collected.       SIRS (systemic inflammatory response syndrome)    Presented with elevated temp, tachycardia and decreased WBC with no source of infection identified.  IV vancomycin and IV zosyn initiated-continue  Follow blood cultures  Culture urine  Trend lactic acid       Quadriplegia    Skin survey qshift  Turn q2hr  Fall precautions  Consider PT/OT consult       GERD (gastroesophageal reflux disease)    Chronic; continue PPI         VTE Risk Mitigation (From admission, onward)        Ordered     enoxaparin injection 40 mg  Daily      01/08/19 0404     IP VTE HIGH RISK PATIENT  Once      01/08/19 0404     Place TAN hose  Until discontinued      01/08/19 0404     Place sequential compression device  Until discontinued       01/08/19 0404     Place TAN hose  Until discontinued      01/08/19 0202     Place sequential compression device  Until discontinued      01/08/19 0202      Time spent seeing patient( greater than 1/2 spent in direct contact) : 45 minutes       MARSHAL Palumbo  Department of Hospital Medicine   [facility]

## 2019-01-08 NOTE — ED NOTES
Assumed care:  Patient awake, alert and oriented x 3, skin warm and dry, in NAD with family at bedside.    Patient identifiers for Alberto Dangelo checked and correct.  LOC:  Patient is awake, alert, and aware of environment with an appropriate affect. Patient is oriented x 3 and speaking appropriately.  APPEARANCE:  Patient resting comfortably and in no acute distress. Patient is clean and well groomed, patient's clothing is properly fastened.  SKIN:  The skin is warm and dry. Patient has normal skin turgor and moist mucus membranes. Skin is intact; no bruising or breakdown noted.  MUSCULOSKELETAL:  Patient is moving all extremities well, no obvious deformities noted. Pulses intact. quadriplegia  RESPIRATORY:  Airway is open and patent. Respirations are spontaneous and non-labored with normal effort and rate.  CARDIAC:  Patient has a normal rate and rhythm. No peripheral edema noted. Capillary refill < 3 seconds.  ABDOMEN:  No distention noted.  Soft and non-tender upon palpation.  NEUROLOGICAL:  PERRL. Facial expression is symmetrical. Hand grasps are equal bilaterally. Normal sensation in all extremities when touched with finger.  Allergies reported:    Review of patient's allergies indicates:   Allergen Reactions    Codeine Other (See Comments)     Cause bp to drop    Ambien [zolpidem] Other (See Comments)     depression     OTHER NOTES:  fever

## 2019-01-08 NOTE — ASSESSMENT & PLAN NOTE
Presented with elevated temp, tachycardia and decreased WBC with no source of infection identified.  IV vancomycin and IV zosyn initiated-continue  Follow blood cultures  Culture urine  Trend lactic acid

## 2019-01-08 NOTE — HPI
"Alberto Dangelo is a 58 y.o. 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 Pt has a history of a C7 burst fracture followed by a C6-T1 fusion in 2010. Reports that he has been weak all day and endorsed generalized muscle spasms after eating 4 hours ago. 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 Wife noted a temperature of 100.5 which increased to 103 after a hour. The increase in temperature prompted him to call EMS. Fever was associated with chills.  He notes the sx are similar to previous UTI.  Pt also admits to "severe cold symptoms" about 2 weeks ago.  Pt's WBC's were notably decreased upon evaluation.         "

## 2019-01-08 NOTE — ED PROVIDER NOTES
"Encounter Date: 1/7/2019    SCRIBE #1 NOTE: I, Blaze Del Valle, am scribing for, and in the presence of, Dr. Sifuentes .       History     Chief Complaint   Patient presents with    Fever     hx of uti 8 months ago       Time seen by provider: 9:55 PM on 01/07/2019    Alberto Dangelo is a 58 y.o. 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 He has been weak all day and endorsed generalized muscle spasms after eating 4 hours ago. 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 Wife notes a febrile temperature of 100.5 which increased to 103 after a hour. The increase in temperature prompted him to call EMS. He was given Tylenol 2.5 hours ago with no relief. Fever was associated with chills and "was freezing." He then took two Ibuprofen 1.5 hours ago with no relief. He notes the sx are similar to previous UTI. Pt notes he has not urinated in 6 hours. He has not drank much, "maybe a bottle of water." Pt denies cough, sore throat, rash and sick contact. Allergens include Codeine and Ambien.       The history is provided by the patient.     Review of patient's allergies indicates:   Allergen Reactions    Codeine Other (See Comments)     Cause bp to drop    Ambien [zolpidem] Other (See Comments)     depression     Past Medical History:   Diagnosis Date    Autonomic dysfunction     Constipation     Deep vein thrombosis     Depression     GERD (gastroesophageal reflux disease)     Incomplete injury of cervical spinal cord with central cord syndrome     Neurogenic bladder     Pulmonary embolism      Past Surgical History:   Procedure Laterality Date    NECK SURGERY      SPINE SURGERY      fuse C 6 - T 1 burst C7    TONSILLECTOMY      WISDOM TOOTH EXTRACTION       Family History   Problem Relation Age of Onset    Cancer Mother         brain    Early death Mother     " Cancer Father         tumor foot     Heart disease Father     Cancer Sister         ovarian    No Known Problems Brother     No Known Problems Daughter     No Known Problems Son     Heart disease Maternal Grandmother     Heart disease Paternal Grandmother     Heart disease Maternal Grandfather     Parkinsonism Paternal Grandfather     Drug abuse Maternal Aunt     Early death Maternal Aunt     No Known Problems Maternal Uncle     No Known Problems Paternal Aunt     Drug abuse Paternal Uncle     Heart disease Paternal Uncle     Kidney disease Paternal Uncle     No Known Problems Paternal Uncle      Social History     Tobacco Use    Smoking status: Never Smoker    Smokeless tobacco: Never Used   Substance Use Topics    Alcohol use: No    Drug use: No     Review of Systems   Constitutional: Positive for chills, fatigue and fever.   HENT: Negative for congestion.    Eyes: Negative for visual disturbance.   Respiratory: Negative for wheezing.    Cardiovascular: Negative for chest pain.   Gastrointestinal: Negative for abdominal pain.   Genitourinary: Negative for dysuria.   Musculoskeletal: Positive for myalgias (spasms). Negative for joint swelling.   Skin: Negative for rash.   Neurological: Positive for weakness. Negative for syncope.   Hematological: Does not bruise/bleed easily.   Psychiatric/Behavioral: Negative for confusion.       Physical Exam     Initial Vitals [01/07/19 2150]   BP Pulse Resp Temp SpO2   111/75 100 20 (!) 101.9 °F (38.8 °C) 97 %      MAP       --         Physical Exam    Nursing note and vitals reviewed.  Constitutional: He appears well-nourished.   Quadriplegic.    HENT:   Head: Normocephalic and atraumatic.   Eyes: Conjunctivae and EOM are normal.   Neck: Normal range of motion. Neck supple. No thyroid mass present.   Cardiovascular: Regular rhythm and normal heart sounds. Tachycardia present.  Exam reveals no gallop and no friction rub.    No murmur  heard.  Pulmonary/Chest: Breath sounds normal. He has no wheezes. He has no rhonchi. He has no rales.   Abdominal: Soft. Normal appearance and bowel sounds are normal. There is no tenderness.   Neurological: He is alert and oriented to person, place, and time. He has normal strength. No cranial nerve deficit or sensory deficit.   Skin: Skin is warm and dry. No rash noted. No erythema.   Psychiatric: He has a normal mood and affect. His speech is normal. Cognition and memory are normal.         ED Course   Procedures  Labs Reviewed - No data to display  EKG Readings: (Independently Interpreted)   Tachycardic at a rate of 99 bpm. Normal axis. Normal interval. No acute ischemic changes.        Imaging Results    None          Medical Decision Making:   History:   Old Medical Records: I decided to obtain old medical records.  Clinical Tests:   Lab Tests: Ordered and Reviewed  Medical Tests: Ordered and Reviewed  ED Management:  Patient was interviewed and examined emergently.  Initial vital signs significant for tachycardia and febrile state.  Sepsis order set initiated but found to be negative for an overt source.  Very low suspicion for CNS infection.  Acute leukopenia is noted in the patient. He did report signs and symptoms consistent with an acute viral infectious process within the past 2 weeks.  He will be initiated on broad-spectrum antibiotics for empiric coverage of potential underlying infectious source but does warrant admission for further monitoring and Hematology consultation considering his acute white blood cell findings and susceptibility to infection in the outpatient setting.  Case was discussed with the accepting nurse practitioner as well as the patient and significant other.  They are all in agreement with this disposition and was transferred to an isolation bed in guarded condition.            Scribe Attestation:   Scribe #1: I performed the above scribed service and the documentation accurately  describes the services I performed. I attest to the accuracy of the note.    I, Dr. Dalton Sifuentes, personally performed the services described in this documentation. All medical record entries made by the scribe were at my direction and in my presence.  I have reviewed the chart and agree that the record reflects my personal performance and is accurate and complete. Dalton Sifuentes MD.  6:15 AM 01/08/2019             Clinical Impression:     1. Neutropenic fever    2. Tachycardia        Disposition:   Disposition: Admitted  Condition: Fair                        Dalton Sifuentes MD  01/08/19 0616

## 2019-01-09 VITALS
RESPIRATION RATE: 18 BRPM | HEART RATE: 69 BPM | HEIGHT: 71 IN | BODY MASS INDEX: 24.5 KG/M2 | DIASTOLIC BLOOD PRESSURE: 68 MMHG | TEMPERATURE: 97 F | OXYGEN SATURATION: 97 % | WEIGHT: 175 LBS | SYSTOLIC BLOOD PRESSURE: 105 MMHG

## 2019-01-09 LAB
ALBUMIN SERPL BCP-MCNC: 3.5 G/DL
ALP SERPL-CCNC: 52 U/L
ALT SERPL W/O P-5'-P-CCNC: 17 U/L
ANION GAP SERPL CALC-SCNC: 6 MMOL/L
AST SERPL-CCNC: 17 U/L
BASOPHILS # BLD AUTO: 0 K/UL
BASOPHILS NFR BLD: 0.8 %
BILIRUB SERPL-MCNC: 0.8 MG/DL
BUN SERPL-MCNC: 19 MG/DL
CALCIUM SERPL-MCNC: 9 MG/DL
CHLORIDE SERPL-SCNC: 109 MMOL/L
CO2 SERPL-SCNC: 27 MMOL/L
CREAT SERPL-MCNC: 1.5 MG/DL
DIFFERENTIAL METHOD: ABNORMAL
EOSINOPHIL # BLD AUTO: 0.2 K/UL
EOSINOPHIL NFR BLD: 5.5 %
ERYTHROCYTE [DISTWIDTH] IN BLOOD BY AUTOMATED COUNT: 13.4 %
EST. GFR  (AFRICAN AMERICAN): 59 ML/MIN/1.73 M^2
EST. GFR  (NON AFRICAN AMERICAN): 51 ML/MIN/1.73 M^2
GLUCOSE SERPL-MCNC: 109 MG/DL
HCT VFR BLD AUTO: 40 %
HGB BLD-MCNC: 13.7 G/DL
L PNEUMO AG UR QL IA: NOT DETECTED
LYMPHOCYTES # BLD AUTO: 1.6 K/UL
LYMPHOCYTES NFR BLD: 48.3 %
MAGNESIUM SERPL-MCNC: 2.2 MG/DL
MCH RBC QN AUTO: 31.7 PG
MCHC RBC AUTO-ENTMCNC: 34.4 G/DL
MCV RBC AUTO: 92 FL
MONOCYTES # BLD AUTO: 0.5 K/UL
MONOCYTES NFR BLD: 14.4 %
NEUTROPHILS # BLD AUTO: 1 K/UL
NEUTROPHILS NFR BLD: 31 %
PHOSPHATE SERPL-MCNC: 3.7 MG/DL
PLATELET # BLD AUTO: 137 K/UL
PMV BLD AUTO: 9.8 FL
POTASSIUM SERPL-SCNC: 3.8 MMOL/L
PROT SERPL-MCNC: 6.2 G/DL
RBC # BLD AUTO: 4.34 M/UL
SODIUM SERPL-SCNC: 142 MMOL/L
VANCOMYCIN TROUGH SERPL-MCNC: 7.5 UG/ML
WBC # BLD AUTO: 3.2 K/UL

## 2019-01-09 PROCEDURE — 83735 ASSAY OF MAGNESIUM: CPT

## 2019-01-09 PROCEDURE — 80202 ASSAY OF VANCOMYCIN: CPT

## 2019-01-09 PROCEDURE — G0378 HOSPITAL OBSERVATION PER HR: HCPCS

## 2019-01-09 PROCEDURE — 85060 BLOOD SMEAR INTERPRETATION: CPT | Mod: ,,, | Performed by: PATHOLOGY

## 2019-01-09 PROCEDURE — 86645 CMV ANTIBODY IGM: CPT

## 2019-01-09 PROCEDURE — 80053 COMPREHEN METABOLIC PANEL: CPT

## 2019-01-09 PROCEDURE — 86644 CMV ANTIBODY: CPT

## 2019-01-09 PROCEDURE — 63600175 PHARM REV CODE 636 W HCPCS: Performed by: HOSPITALIST

## 2019-01-09 PROCEDURE — 99244 PR OFFICE CONSULTATION,LEVEL IV: ICD-10-PCS | Mod: ,,, | Performed by: INTERNAL MEDICINE

## 2019-01-09 PROCEDURE — 84100 ASSAY OF PHOSPHORUS: CPT

## 2019-01-09 PROCEDURE — 63600175 PHARM REV CODE 636 W HCPCS: Performed by: NURSE PRACTITIONER

## 2019-01-09 PROCEDURE — 96376 TX/PRO/DX INJ SAME DRUG ADON: CPT

## 2019-01-09 PROCEDURE — 86747 PARVOVIRUS ANTIBODY: CPT

## 2019-01-09 PROCEDURE — 85025 COMPLETE CBC W/AUTO DIFF WBC: CPT

## 2019-01-09 PROCEDURE — 85060 PATHOLOGIST REVIEW: ICD-10-PCS | Mod: ,,, | Performed by: PATHOLOGY

## 2019-01-09 PROCEDURE — 99244 OFF/OP CNSLTJ NEW/EST MOD 40: CPT | Mod: ,,, | Performed by: INTERNAL MEDICINE

## 2019-01-09 PROCEDURE — 25000003 PHARM REV CODE 250: Performed by: HOSPITALIST

## 2019-01-09 PROCEDURE — 36415 COLL VENOUS BLD VENIPUNCTURE: CPT

## 2019-01-09 PROCEDURE — 94761 N-INVAS EAR/PLS OXIMETRY MLT: CPT

## 2019-01-09 PROCEDURE — 25000003 PHARM REV CODE 250: Performed by: NURSE PRACTITIONER

## 2019-01-09 PROCEDURE — 86665 EPSTEIN-BARR CAPSID VCA: CPT | Mod: 59

## 2019-01-09 PROCEDURE — 80074 ACUTE HEPATITIS PANEL: CPT

## 2019-01-09 RX ORDER — AMOXICILLIN AND CLAVULANATE POTASSIUM 875; 125 MG/1; MG/1
1 TABLET, FILM COATED ORAL 2 TIMES DAILY
Qty: 14 TABLET | Refills: 0 | Status: SHIPPED | OUTPATIENT
Start: 2019-01-09 | End: 2019-01-16

## 2019-01-09 RX ADMIN — ASPIRIN 81 MG: 81 TABLET, COATED ORAL at 08:01

## 2019-01-09 RX ADMIN — PIPERACILLIN SODIUM AND TAZOBACTAM SODIUM 4.5 G: 4; .5 INJECTION, POWDER, LYOPHILIZED, FOR SOLUTION INTRAVENOUS at 06:01

## 2019-01-09 RX ADMIN — PANTOPRAZOLE SODIUM 40 MG: 40 TABLET, DELAYED RELEASE ORAL at 08:01

## 2019-01-09 RX ADMIN — PIPERACILLIN SODIUM AND TAZOBACTAM SODIUM 4.5 G: 4; .5 INJECTION, POWDER, LYOPHILIZED, FOR SOLUTION INTRAVENOUS at 12:01

## 2019-01-09 RX ADMIN — MIDODRINE HYDROCHLORIDE 5 MG: 2.5 TABLET ORAL at 08:01

## 2019-01-09 RX ADMIN — VANCOMYCIN HYDROCHLORIDE 1250 MG: 1 INJECTION, POWDER, LYOPHILIZED, FOR SOLUTION INTRAVENOUS at 12:01

## 2019-01-09 NOTE — PROGRESS NOTES
"[facility]  Progress Note    Patient Name: Alberto Dangelo  MRN: [id number]  Patient Class: OP- Observation   Admission Date: 1/7/2019  Length of Stay: 0 days  Attending Physician: Tracie Ron MD  Primary Care Provider: Dirk Ortiz MD        Subjective:     Principal Problem:Neutropenic fever    HPI:  Alberto Dangelo is a 58 y.o. 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 Pt has a history of a C7 burst fracture followed by a C6-T1 fusion in 2010. Reports that he has been weak all day and endorsed generalized muscle spasms after eating 4 hours ago. 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 Wife noted a temperature of 100.5 which increased to 103 after a hour. The increase in temperature prompted him to call EMS. Fever was associated with chills.  He notes the sx are similar to previous UTI.  Pt also admits to "severe cold symptoms" about 2 weeks ago.  Pt's WBC's were notably decreased upon evaluation.           Hospital Course:  No notes on file    Interval History:   Pt seen/examined-feeling better-no fever all day  Anxious to dc home; denies cough , abd pain, dysuria  Concerned about wbc -informed that has increased       Review of Systems   Constitutional: Positive for chills, fatigue and fever. Negative for appetite change.   HENT: Negative for congestion, postnasal drip, rhinorrhea, sore throat and trouble swallowing.    Respiratory: Negative for shortness of breath and wheezing.    Gastrointestinal: Negative for abdominal pain, diarrhea, nausea and vomiting.   Genitourinary: Negative for decreased urine volume and hematuria.        Neurogenic bladder   Musculoskeletal: Positive for arthralgias, gait problem and myalgias.   Skin: Negative for rash and wound.   Neurological: Positive for weakness. Negative for " light-headedness.   Psychiatric/Behavioral: Negative for agitation and confusion.   All other systems reviewed and are negative.    Objective:     Vital Signs (Most Recent):  Temp: 97.4 °F (36.3 °C) (01/08/19 1957)  Pulse: 67 (01/08/19 1957)  Resp: 18 (01/08/19 1957)  BP: 126/73 (01/08/19 1957)  SpO2: (!) 94 % (01/08/19 1957) Vital Signs (24h Range):  Temp:  [97.3 °F (36.3 °C)-101.9 °F (38.8 °C)] 97.4 °F (36.3 °C)  Pulse:  [] 67  Resp:  [12-20] 18  SpO2:  [93 %-98 %] 94 %  BP: ()/(54-75) 126/73     Weight: 79.4 kg (175 lb)  Body mass index is 24.41 kg/m².    Intake/Output Summary (Last 24 hours) at 1/8/2019 2038  Last data filed at 1/8/2019 0802  Gross per 24 hour   Intake 735 ml   Output 350 ml   Net 385 ml      Physical Exam   Constitutional: He is oriented to person, place, and time. He appears well-developed and well-nourished.   HENT:   Head: Normocephalic and atraumatic.   Nose: Nose normal.   Mouth/Throat: Oropharynx is clear and moist.   Eyes: Conjunctivae and EOM are normal. No scleral icterus.   Neck: Normal range of motion. Neck supple. No JVD present.   Cardiovascular: Normal rate, regular rhythm, normal heart sounds and intact distal pulses.   No murmur heard.  Pulmonary/Chest: Effort normal and breath sounds normal.   Abdominal: Soft. Bowel sounds are normal. He exhibits no distension. There is no tenderness.   Genitourinary:   Genitourinary Comments: deferred   Musculoskeletal: He exhibits deformity. He exhibits no edema.   Neurological: He is alert and oriented to person, place, and time. He exhibits abnormal muscle tone.   Skin: Skin is warm and dry. Capillary refill takes less than 2 seconds.   Psychiatric: He has a normal mood and affect. His behavior is normal. Judgment and thought content normal.   Nursing note and vitals reviewed.      Significant Labs:   BMP:   Recent Labs   Lab 01/08/19  0701   *      K 4.5      CO2 26   BUN 25*   CREATININE 1.4   CALCIUM 9.2    MG 2.2     CBC:   Recent Labs   Lab 01/07/19  2234 01/08/19  0701   WBC 1.90* 3.20*   HGB 13.6* 13.5*   HCT 39.7* 39.1*   * 128*       Significant Imaging: I have reviewed and interpreted all pertinent imaging results/findings within the past 24 hours.   cxr-clear    Assessment/Plan:      * Neutropenic fever    No clinical focus identified.  ?viral syndrome-culture pending   Consult hematology for neutropenia -resolving quickly   WBC WNL 3/2018  ANC is 1311 upon admission  Pt meets SIRS criteria-IV vancomycin and IV zosyn initiated  Blood/urine  cultures collected.       Quadriplegia    Skin survey qshift  Turn q2hr  Fall precautions  Consider PT/OT consult       SIRS (systemic inflammatory response syndrome)    Presented with elevated temp, tachycardia and decreased WBC with no source of infection identified.  IV vancomycin and IV zosyn initiated-continue  Follow blood cultures  Culture urine  Trend lactic acid-       Autonomic dysfunction    Chronic, 2/2 neck injury past -continue prn medications and treat underlying cause        GERD (gastroesophageal reflux disease)    Chronic; continue PPI         VTE Risk Mitigation (From admission, onward)        Ordered     enoxaparin injection 40 mg  Daily      01/08/19 0404     IP VTE HIGH RISK PATIENT  Once      01/08/19 0404     Place TAN hose  Until discontinued      01/08/19 0404     Place sequential compression device  Until discontinued      01/08/19 0404     Place TAN hose  Until discontinued      01/08/19 0202     Place sequential compression device  Until discontinued      01/08/19 0202              Tracie Ron MD  Department of Hospital Medicine   [facility]

## 2019-01-09 NOTE — SUBJECTIVE & OBJECTIVE
Interval History:   Pt seen/examined-feeling better-no fever all day  Anxious to dc home; denies cough , abd pain, dysuria  Concerned about wbc -informed that has increased       Review of Systems   Constitutional: Positive for chills, fatigue and fever. Negative for appetite change.   HENT: Negative for congestion, postnasal drip, rhinorrhea, sore throat and trouble swallowing.    Respiratory: Negative for shortness of breath and wheezing.    Gastrointestinal: Negative for abdominal pain, diarrhea, nausea and vomiting.   Genitourinary: Negative for decreased urine volume and hematuria.        Neurogenic bladder   Musculoskeletal: Positive for arthralgias, gait problem and myalgias.   Skin: Negative for rash and wound.   Neurological: Positive for weakness. Negative for light-headedness.   Psychiatric/Behavioral: Negative for agitation and confusion.   All other systems reviewed and are negative.    Objective:     Vital Signs (Most Recent):  Temp: 97.4 °F (36.3 °C) (01/08/19 1957)  Pulse: 67 (01/08/19 1957)  Resp: 18 (01/08/19 1957)  BP: 126/73 (01/08/19 1957)  SpO2: (!) 94 % (01/08/19 1957) Vital Signs (24h Range):  Temp:  [97.3 °F (36.3 °C)-101.9 °F (38.8 °C)] 97.4 °F (36.3 °C)  Pulse:  [] 67  Resp:  [12-20] 18  SpO2:  [93 %-98 %] 94 %  BP: ()/(54-75) 126/73     Weight: 79.4 kg (175 lb)  Body mass index is 24.41 kg/m².    Intake/Output Summary (Last 24 hours) at 1/8/2019 2038  Last data filed at 1/8/2019 0802  Gross per 24 hour   Intake 735 ml   Output 350 ml   Net 385 ml      Physical Exam   Constitutional: He is oriented to person, place, and time. He appears well-developed and well-nourished.   HENT:   Head: Normocephalic and atraumatic.   Nose: Nose normal.   Mouth/Throat: Oropharynx is clear and moist.   Eyes: Conjunctivae and EOM are normal. No scleral icterus.   Neck: Normal range of motion. Neck supple. No JVD present.   Cardiovascular: Normal rate, regular rhythm, normal heart sounds and  intact distal pulses.   No murmur heard.  Pulmonary/Chest: Effort normal and breath sounds normal.   Abdominal: Soft. Bowel sounds are normal. He exhibits no distension. There is no tenderness.   Genitourinary:   Genitourinary Comments: deferred   Musculoskeletal: He exhibits deformity. He exhibits no edema.   Neurological: He is alert and oriented to person, place, and time. He exhibits abnormal muscle tone.   Skin: Skin is warm and dry. Capillary refill takes less than 2 seconds.   Psychiatric: He has a normal mood and affect. His behavior is normal. Judgment and thought content normal.   Nursing note and vitals reviewed.      Significant Labs:   BMP:   Recent Labs   Lab 01/08/19  0701   *      K 4.5      CO2 26   BUN 25*   CREATININE 1.4   CALCIUM 9.2   MG 2.2     CBC:   Recent Labs   Lab 01/07/19  2234 01/08/19  0701   WBC 1.90* 3.20*   HGB 13.6* 13.5*   HCT 39.7* 39.1*   * 128*       Significant Imaging: I have reviewed and interpreted all pertinent imaging results/findings within the past 24 hours.   cxr-clear

## 2019-01-09 NOTE — CONSULTS
[facility]  Hematology/Oncology  Consult Note    Patient Name: Alberto Dangelo  MRN: [id number]  Admission Date: 1/7/2019  Hospital Length of Stay: 0 days  Code Status: Full Code   Attending Provider: Tracie Ron MD  Consulting Provider: Bre Lara MD  Primary Care Physician: Dirk Ortiz MD  Principal Problem:Neutropenic fever  January 9  Consults  Subjective:     HPI:  This is a pleasant 58-year-old gentleman with a history of hypercoagulable state being pulmonary emboli and DVT who has an injury to his cervical spinal cord with central cord syndrome.  He is able to walk intermittently with a walker but most of the time is confined to his wheelchair due to his neurological injury.  He present to the emergency room with fever and chills.  Chart has been reviewed T-max at home was 103.  He has been started on Zosyn and vancomycin once he was found to have pancytopenia.Urine and blood cultures are pending     He reports no chills concurrently yet he was experiencing such approximately 2 weeks ago.  He has not been around any other sick contacts as far as he knows.  He does not take any over-the-counter nonsteroidal anti-inflammatories nor does he take any H2 blocker such as Pepcid her Tagamet.  He is tolerating pantoprazole for intermittent symptoms of GERD and is currently on Lovenox prophylaxis while in hospital    Medications:  Continuous Infusions:   sodium chloride 0.9% 125 mL/hr at 01/08/19 1742     Scheduled Meds:   aspirin  81 mg Oral Daily    enoxaparin  40 mg Subcutaneous Daily    midodrine  5 mg Oral BID WM    pantoprazole  40 mg Oral Daily    piperacillin-tazobactam (ZOSYN) IVPB  4.5 g Intravenous Q6H    polyethylene glycol  17 g Oral Daily    senna  2 tablet Oral Daily    vancomycin (VANCOCIN) IVPB  1,250 mg Intravenous Q18H     PRN Meds:acetaminophen, dextrose 50%, dextrose 50%, glucagon (human recombinant), glucose, glucose, sodium chloride 0.9%     Review of  patient's allergies indicates:   Allergen Reactions    Codeine Other (See Comments)     Cause bp to drop    Ambien [zolpidem] Other (See Comments)     depression        Past Medical History:   Diagnosis Date    Autonomic dysfunction     Constipation     Deep vein thrombosis     Depression     GERD (gastroesophageal reflux disease)     Incomplete injury of cervical spinal cord with central cord syndrome     Neurogenic bladder     Pulmonary embolism      Past Surgical History:   Procedure Laterality Date    NECK SURGERY      SPINE SURGERY      fuse C 6 - T 1 burst C7    TONSILLECTOMY      WISDOM TOOTH EXTRACTION       Family History     Problem Relation (Age of Onset)    Cancer Mother, Father, Sister    Drug abuse Maternal Aunt, Paternal Uncle    Early death Mother, Maternal Aunt    Heart disease Father, Maternal Grandmother, Paternal Grandmother, Maternal Grandfather, Paternal Uncle    Kidney disease Paternal Uncle    No Known Problems Brother, Daughter, Son, Maternal Uncle, Paternal Aunt, Paternal Uncle    Parkinsonism Paternal Grandfather        Tobacco Use    Smoking status: Never Smoker    Smokeless tobacco: Never Used   Substance and Sexual Activity    Alcohol use: No    Drug use: No    Sexual activity: Not on file       Review of Systems   GENERAL: Recent  fever, no chills , no unexpected change in weight  HEENT: No photophobia, no rhinorrhea, no sinus congestion, no tinnitus, no gingival bleeding, no oral ulcers, no sore throat no thrush  RESPIRATORY: No shortness of breath, no cough, no wheezing  CARDIOVASCULAR: No chest pain, no palpitations, no swelling of the lower extremities  GI: No abdominal pain, no dysphagia, no emesis, no diarrhea, no constipation, no heartburn,no abdominal distention, no melena, no hematochezia  : No urinary frequency,no hesitancy, no dysuria, no hematuria  RHEUM: No Arthralgias,no joint swelling  MUSCOLSKELETAL: No neck pain, no back pain, +  weakness  PSYCH:  No agitation, no change in behavior, no anxiety, no depression  ENDOCRINE: No hot,no cold intolerance        Objective:     Vital Signs (Most Recent):  Temp: 96.9 °F (36.1 °C) (01/09/19 0806)  Pulse: 60 (01/09/19 0806)  Resp: 18 (01/09/19 0806)  BP: (!) 149/90 (01/09/19 0806)  SpO2: 95 % (01/09/19 0806) Vital Signs (24h Range):  Temp:  [96 °F (35.6 °C)-97.4 °F (36.3 °C)] 96.9 °F (36.1 °C)  Pulse:  [60-68] 60  Resp:  [16-20] 18  SpO2:  [94 %-98 %] 95 %  BP: (113-149)/(66-90) 149/90     Weight: 79.4 kg (175 lb)  Body mass index is 24.41 kg/m².  Body surface area is 1.99 meters squared.      Intake/Output Summary (Last 24 hours) at 1/9/2019 0921  Last data filed at 1/9/2019 0631  Gross per 24 hour   Intake 3680.42 ml   Output --   Net 3680.42 ml       Physical Exam  Constitutional: oriented to person, place, and time.  well-developed and well-nourished.   HENT:   Head: Normocephalic and atraumatic.   Right Ear: External ear normal.   Left Ear: External ear normal.   Nose: Nose normal.   Mouth/Throat: Oropharynx is clear and moist.   Eyes: Conjunctivae and EOM are normal. Pupils are equal, round, and reactive to light.   Neck: Normal range of motion. Neck supple. No thyromegaly present.   Cardiovascular: Normal rate, regular rhythm, normal heart sounds and intact distal pulses.    No murmur heard.  Pulmonary/Chest: Effort normal and breath sounds normal.  no wheezes.  no rales.   Abdominal: Soft. Bowel sounds are normal.  no mass. There is no tenderness. There is no rebound.   Musculoskeletal: Normal range of motion.  no edema or tenderness.   Lymphadenopathy:    no cervical adenopathy.   Neurological: alert and oriented to person, place, and time. normal reflexes. No cranial nerve deficit.   Skin: Skin is warm and dry. No rash noted.   Psychiatric: normal mood and affect.   behavior is normal.   Vitals reviewed.    Significant Labs:   Lab Results   Component Value Date    WBC 3.20 (L) 01/09/2019    HGB 13.7 (L)  01/09/2019    HCT 40.0 01/09/2019    MCV 92 01/09/2019     (L) 01/09/2019           Assessment/Plan:     Active Diagnoses:    Diagnosis Date Noted POA    PRINCIPAL PROBLEM:  Neutropenic fever [D70.9, R50.81] 01/08/2019 Yes    SIRS (systemic inflammatory response syndrome) [R65.10] 01/08/2019 Yes    Quadriplegia [G82.50] 01/08/2019 Yes    Autonomic dysfunction [G90.9] 08/26/2016 Yes    GERD (gastroesophageal reflux disease) [K21.9] 08/21/2016 Yes      Problems Resolved During this Admission:       Pancytopenia of unknown etiology. Workup for possible viral etiology in place. Cont to monitor response to antimicrobials for now. Would not initiate growth factor support just yet   Cont PPI for Gerd related symptoms and would cont lovenox as well prophylactically given his history of hypercoag state     Thank you     Bre Lara MD  Hematology/Oncology  [facility]

## 2019-01-09 NOTE — ASSESSMENT & PLAN NOTE
Presented with elevated temp, tachycardia and decreased WBC with no source of infection identified.  IV vancomycin and IV zosyn initiated-continue  Follow blood cultures  Culture urine  Trend lactic acid-

## 2019-01-09 NOTE — PLAN OF CARE
Problem: Adult Inpatient Plan of Care  Goal: Plan of Care Review  Outcome: Ongoing (interventions implemented as appropriate)  Pt rested well during the night. NS infusing at 125mL/hr, antibiotics infused. Self caths q2hrs. Regular diet. Tele monitor showing normal sinus and bradycardia well asleep. Monitoring labs. Will continue to monitor.

## 2019-01-09 NOTE — PLAN OF CARE
Problem: Adult Inpatient Plan of Care  Goal: Plan of Care Review  Outcome: Ongoing (interventions implemented as appropriate)  Pt lying in bed, respirations even and unlabored, no acute distress noted.  He was out of bed with his wife today, remains fall free.  He denies pain and discomfort at this time.  He has been afebrile.  Antibiotic therapy in progress.  Side rails up times two, bed low and locked, call light within reach.

## 2019-01-09 NOTE — HOSPITAL COURSE
Patient admitted with neutropenic fever-wbc increase to 3.2, mild anemia and platelets 137 from 117. Had recent uri. No sinus or skin changes, dental problems or gi symptoms . Started on iv zosy/vanco./ivf. No further fever. Antibiotic de-escalated to Augmentin. Pt was seen by Hematology -and will fu for results of testing. Wbc remained stable   Septic work up -blood culture ngtd;   Urine culture  cxr -neg  No skin changes.  Alert, Nad, heent wnl, lungs clear, cor RRR, abd-soft, NOntender.

## 2019-01-09 NOTE — CONSULTS
Alberto Dangelo [id number] is a 58 y.o. male who has been consulted for vancomycin dosing.    The patient has the following labs:     Date Creatinine (mg/dl)    BUN WBC Count   1/9/2019 Estimated Creatinine Clearance: 58.6 mL/min (A) (based on SCr of 1.5 mg/dL (H)). Lab Results   Component Value Date    BUN 19 01/09/2019     Lab Results   Component Value Date    WBC 3.20 (L) 01/09/2019        Current weight is 79.4 kg (175 lb)    Pt is receiving vancomycin 1250 mg every 18 hours.  Vancomycin trough from 1/9 at 1140 was 7.5 mg/dL.  Target trough range is 15-20 mg/dL.   Trough was drawn on time and anticipate it is subtherapeutic. Pharmacy will increase current dose.   The patient will be changed to a vancomycin dose of 1250 mg every 12 hours. A vancomycin trough has been ordered prior to 4th dose due 1/11 at 0000.      Patient will be followed by pharmacy for changes in renal function, toxicity, and efficacy.  Thank you for allowing us to participate in this patient's care.     Vannessa Orozco

## 2019-01-09 NOTE — NURSING
Pt's medication and discharge instructions given and reviewed, understanding verbalized.  PIV and telemetry monitor removed.  Vitals stable.

## 2019-01-09 NOTE — PLAN OF CARE
01/09/19 1718   Final Note   Assessment Type Final Discharge Note   Anticipated Discharge Disposition Home

## 2019-01-09 NOTE — CONSULTS
Alberto Dangelo [id number] is a 58 y.o. male who had been consulted for vancomycin dosing.    Vancomycin has been discontinued.  Pharmacy consult for vancomycin dosing in no longer required.      Thank you for allowing us to participate in this patient's care.     Vannessa Orozco

## 2019-01-09 NOTE — ASSESSMENT & PLAN NOTE
No clinical focus identified.  ?viral syndrome-culture pending   Consult hematology for neutropenia -resolving quickly   WBC WNL 3/2018  ANC is 1311 upon admission  Pt meets SIRS criteria-IV vancomycin and IV zosyn initiated  Blood/urine  cultures collected.

## 2019-01-09 NOTE — PLAN OF CARE
01/08/19 2007   Patient Assessment/Suction   Level of Consciousness (AVPU) alert   PRE-TX-O2-ETCO2   O2 Device (Oxygen Therapy) room air   SpO2 97 %   Pulse Oximetry Type Intermittent   Pulse 68   Resp 16

## 2019-01-10 ENCOUNTER — TELEPHONE (OUTPATIENT)
Dept: MEDSURG UNIT | Facility: HOSPITAL | Age: 59
End: 2019-01-10

## 2019-01-10 ENCOUNTER — CLINICAL SUPPORT (OUTPATIENT)
Dept: REHABILITATION | Facility: HOSPITAL | Age: 59
End: 2019-01-10
Attending: FAMILY MEDICINE
Payer: COMMERCIAL

## 2019-01-10 DIAGNOSIS — S14.129A INCOMPLETE INJURY OF CERVICAL SPINAL CORD WITH CENTRAL CORD SYNDROME: ICD-10-CM

## 2019-01-10 DIAGNOSIS — R53.1 DECREASED STRENGTH: ICD-10-CM

## 2019-01-10 DIAGNOSIS — Z78.9 IMPAIRED MOTOR CONTROL: ICD-10-CM

## 2019-01-10 LAB
HAV IGM SERPL QL IA: NEGATIVE
HBV CORE IGM SERPL QL IA: NEGATIVE
HBV SURFACE AG SERPL QL IA: NEGATIVE
HCV AB SERPL QL IA: NEGATIVE
PATH REV BLD -IMP: NORMAL
PATH REV BLD -IMP: NORMAL

## 2019-01-10 PROCEDURE — 97116 GAIT TRAINING THERAPY: CPT | Mod: PN

## 2019-01-10 NOTE — PROGRESS NOTES
"Name: Alberto Dangelo  Clinic Number: [id number]  Date of Treatment: 01/10/2019   Diagnosis:   Encounter Diagnoses   Name Primary?   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        Time in: 1420  Time Out: 1540  Total Treatment Time: 65    Subjective:    Alberto reports stayed in hospital overnight a couple of days ago 2* infection. Cleared per Candice, PT to resume. Mod I in manual w/c and mod I stretching prior to session. Brought his service dog. "I feel good today."    Objective:    Patient received individual therapy to increase strength, endurance, ROM, flexibility, posture and core stabilization with activities as follows:     Gait training via [employer] system for 60:08 minutes (plus set up):     Set up at 15 kg unloading. Computer-assisted robotic gait training via [employer] x 3.0 km/h. GF x 60% x 20' then 45% for distance of 2974 m. No stoppages.     Continue HEP daily.    Pt demo good understanding of the education provided. Patient demonstrated good return demonstration of activities.     Assessment:     Good tolerance for session without stoppages. Initial tones improved with warm up.     Pt will continue to benefit from skilled PT intervention. Medical Necessity is demonstrated by:  Requires skilled supervision to complete and progress HEP and Weakness.    Patient is making good progress towards established goals.    Plan:    Continue with established Plan of Care towards PT goals.         "

## 2019-01-10 NOTE — DISCHARGE SUMMARY
"[facility]  Discharge Summary      Patient Name: Alberto Dangelo  MRN: [id number]  Admission Date: 1/7/2019  Hospital Length of Stay: 0 days  Discharge Date and Time: 1/9/2019  4:33 PM  Attending Physician: No att. providers found   Discharging Provider: Tracie Ron MD  Primary Care Provider: Dirk Ortiz MD      HPI:   Alberto Dangelo is a 58 y.o. 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 Pt has a history of a C7 burst fracture followed by a C6-T1 fusion in 2010. Reports that he has been weak all day and endorsed generalized muscle spasms after eating 4 hours ago. 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 Wife noted a temperature of 100.5 which increased to 103 after a hour. The increase in temperature prompted him to call EMS. Fever was associated with chills.  He notes the sx are similar to previous UTI.  Pt also admits to "severe cold symptoms" about 2 weeks ago.  Pt's WBC's were notably decreased upon evaluation.           * No surgery found *      Hospital Course:   Patient admitted with neutropenic fever-wbc increase to 3.2, mild anemia and platelets 137 from 117. Had recent uri. No sinus or skin changes, dental problems or gi symptoms . Started on iv zosy/vanco./ivf. No further fever. Antibiotic de-escalated to Augmentin. Pt was seen by Hematology -and will fu for results of testing. Wbc remained stable   Septic work up -blood culture ngtd;   Urine culture  cxr -neg  No skin changes.  Alert, Nad, heent wnl, lungs clear, cor RRR, abd-soft, NOntender.      Addend-I called Lab -as urine results pend-there are small colonies of gm neg/gm Pos-and had to be sub-cultured so will call patient with results. He improved on zosyn/vanc and dc on po augmentin. Will call if need to change antibiotics.     Consults:     No " new Assessment & Plan notes have been filed under this hospital service since the last note was generated.  Service: Hospital Medicine    Final Active Diagnoses:    Diagnosis Date Noted POA    PRINCIPAL PROBLEM:  Neutropenic fever [D70.9, R50.81] 01/08/2019 Yes    SIRS (systemic inflammatory response syndrome) [R65.10] 01/08/2019 Yes    Quadriplegia [G82.50] 01/08/2019 Yes    Autonomic dysfunction [G90.9] 08/26/2016 Yes    GERD (gastroesophageal reflux disease) [K21.9] 08/21/2016 Yes      Problems Resolved During this Admission:       Discharged Condition: good    Disposition: Home or Self Care    Follow Up:  Follow-up Information     Bre Lara MD In 1 week.    Specialty:  Hematology and Oncology  Why:  Pt need to call for appointment  Contact information:  [address] [place] [address] [address] TRE Alc[address]ara  [facility] [address]  [phone]                 Patient Instructions:      Diet Adult Regular     Activity as tolerated       Significant Diagnostic Studies:   Results for KRIS AZAR (MRN [id number]) as of 1/10/2019 13:43   Ref. Range 1/7/2019 22:34 1/8/2019 07:01 1/9/2019 04:52   WBC Latest Ref Range: 3.90 - 12.70 K/uL 1.90 (LL) 3.20 (L) 3.20 (L)   RBC Latest Ref Range: 4.60 - 6.20 M/uL 4.33 (L) 4.27 (L) 4.34 (L)   Hemoglobin Latest Ref Range: 14.0 - 18.0 g/dL 13.6 (L) 13.5 (L) 13.7 (L)   Hematocrit Latest Ref Range: 40.0 - 54.0 % 39.7 (L) 39.1 (L) 40.0   MCV Latest Ref Range: 82 - 98 fL 92 92 92   MCH Latest Ref Range: 27.0 - 31.0 pg 31.3 (H) 31.5 (H) 31.7 (H)   MCHC Latest Ref Range: 32.0 - 36.0 g/dL 34.2 34.4 34.4   RDW Latest Ref Range: 11.5 - 14.5 % 12.9 13.1 13.4   Platelets Latest Ref Range: 150 - 350 K/uL 117 (L) 128 (L) 137 (L)   Results for KRIS AZAR (MRN [id number]) as of 1/10/2019 13:43   Ref. Range 1/9/2019 04:52   Sodium Latest Ref Range: 136 - 145 mmol/L 142   Potassium Latest Ref Range: 3.5 - 5.1 mmol/L 3.8   Chloride Latest Ref Range: 95 - 110 mmol/L 109   CO2 Latest Ref Range: 23 - 29 mmol/L 27    Anion Gap Latest Ref Range: 8 - 16 mmol/L 6 (L)   BUN, Bld Latest Ref Range: 6 - 20 mg/dL 19   Creatinine Latest Ref Range: 0.5 - 1.4 mg/dL 1.5 (H)   eGFR if non African American Latest Ref Range: >60 mL/min/1.73 m^2 51 (A)   eGFR if African American Latest Ref Range: >60 mL/min/1.73 m^2 59 (A)   Glucose Latest Ref Range: 70 - 110 mg/dL 109   Calcium Latest Ref Range: 8.7 - 10.5 mg/dL 9.0   Phosphorus Latest Ref Range: 2.7 - 4.5 mg/dL 3.7   Magnesium Latest Ref Range: 1.6 - 2.6 mg/dL 2.2   Alkaline Phosphatase Latest Ref Range: 55 - 135 U/L 52 (L)   Total Protein Latest Ref Range: 6.0 - 8.4 g/dL 6.2   Albumin Latest Ref Range: 3.5 - 5.2 g/dL 3.5   Total Bilirubin Latest Ref Range: 0.1 - 1.0 mg/dL 0.8   AST Latest Ref Range: 10 - 40 U/L 17   ALT Latest Ref Range: 10 - 44 U/L 17   Results for KRIS AZAR (MRN [id number]) as of 1/10/2019 13:43   Ref. Range 1/7/2019 22:34 1/8/2019 02:15 1/8/2019 07:01   Lactate, Ottoniel Latest Ref Range: 0.5 - 2.2 mmol/L 1.9 2.0 1.1     Results for KRIS AZAR (MRN [id number]) as of 1/10/2019 13:43   Ref. Range 1/9/2019 11:40   Hep A IgM Unknown Negative   Hep B C IgM Unknown Negative   Hepatitis B Surface Ag Unknown Negative   Hepatitis C Ab Unknown Negative   Results for KRIS AZAR (MRN [id number]) as of 1/10/2019 13:43   Ref. Range 1/7/2019 23:16   L. pneumo. Ur Antigen Latest Ref Range: Not detected  Not detected   Results for KRIS AZAR (MRN [id number]) as of 1/10/2019 13:43   Ref. Range 1/7/2019 22:35 1/7/2019 22:38   Blood Culture, Routine Unknown No growth to date No Growth to date     Impression       No acute process.      Electronically signed by: Ladarius Marx MD  Date: 01/08/2019  Time: 08:08       Results for KRIS AZAR (MRN [id number]) as of 1/10/2019 13:43   Ref. Range 1/7/2019 22:23   Specimen UA Unknown Urine, Catheterized   Color, UA Latest Ref Range: Yellow, Straw, Trena  Yellow   pH, UA Latest Ref Range: 5.0 - 8.0  6.0   Specific Gravity, UA Latest  Ref Range: 1.005 - 1.030  1.025   Appearance, UA Latest Ref Range: Clear  Clear   Protein, UA Latest Ref Range: Negative  Negative   Glucose, UA Latest Ref Range: Negative  Negative   Ketones, UA Latest Ref Range: Negative  Trace (A)   Occult Blood UA Latest Ref Range: Negative  Negative   Nitrite, UA Latest Ref Range: Negative  Negative   Urobilinogen, UA Latest Ref Range: <2.0 EU/dL Negative   Bilirubin (UA) Latest Ref Range: Negative  Negative   Leukocytes, UA Latest Ref Range: Negative  Trace (A)   RBC, UA Latest Ref Range: 0 - 4 /hpf 1   WBC, UA Latest Ref Range: 0 - 5 /hpf 5   Bacteria, UA Latest Ref Range: None-Occ /hpf None   Squam Epithel, UA Latest Units: /hpf 2   Microscopic Comment Unknown SEE COMMENT     Urine -results remain pending -  Urine culture   Order: [id number]   Status:  Preliminary result   Visible to patient:  No (Not Released) Next appt:  Today at 02:00 PM in Outpatient Rehab (Giulia Lock PTA)   Specimen Information: Urine, Catheterized        Component 3d ago   Urine Culture, Routine Further report to follow P   Resulting Agency [facility]         Specimen Collected: 01/07/19 22:23 Last Resulted: 01/10/19 11:47 Lab Flowsheet Order Details View Encounter Lab and Collection Details Routing Result History      P=Value has a preliminary status                 Pending Diagnostic Studies:     Procedure Component Value Units Date/Time    Cytomegalovirus (Cmv) Ab, Igm [[id number]] Collected:  01/09/19 1140    Order Status:  Sent Lab Status:  In process Updated:  01/09/19 1209    Specimen:  Blood     Cytomegalovirus antibody, IgG [[id number]] Collected:  01/09/19 1140    Order Status:  Sent Lab Status:  In process Updated:  01/09/19 1738    Specimen:  Blood     Conchita-Barr Virus antibody panel [[id number]] Collected:  01/09/19 1140    Order Status:  Sent Lab Status:  In process Updated:  01/09/19 1209    Specimen:  Blood     Parvovirus B19 antibody, IgG and IgM [[id number]] Collected:  01/09/19 1140     Order Status:  Sent Lab Status:  In process Updated:  01/09/19 1203    Specimen:  Blood          Medications:  Reconciled Home Medications:      Medication List      START taking these medications    amoxicillin-clavulanate 875-125mg 875-125 mg per tablet  Commonly known as:  AUGMENTIN  Take 1 tablet by mouth 2 (two) times daily. for 7 days        CONTINUE taking these medications    aspirin 81 MG EC tablet  Commonly known as:  ECOTRIN  Take 81 mg by mouth once daily.     bisacodyl 10 mg/30 mL Enem  Commonly known as:  FLEET  Place 10 mg rectally once daily.     diazePAM 5 MG tablet  Commonly known as:  VALIUM  TAKE 1 TABLET BY MOUTH EVERY 12 HOURS AS NEEDED FOR SPASM     magnesium oxide 400 mg (241.3 mg magnesium) tablet  Commonly known as:  MAG-OX  Take 1 tablet (400 mg total) by mouth 2 (two) times daily.     midodrine 5 MG Tab  Commonly known as:  PROAMATINE  TAKE 2 TABLETS BY MOUTH FOUR TIMES DAILY     MIRALAX ORAL  Take 1 Dose by mouth every evening.     omeprazole 20 MG capsule  Commonly known as:  PRILOSEC  TAKE 1 CAPSULE(20 MG) BY MOUTH EVERY DAY     SENOKOT 8.6 mg tablet  Generic drug:  senna  Take 2 tablets by mouth once daily.     vitamin D 1000 units Tab  Commonly known as:  VITAMIN D3  Take 185 mg by mouth once daily.            Indwelling Lines/Drains at time of discharge:   Lines/Drains/Airways          None          Time spent on the discharge of patient: 37 minutes  Patient was seen and examined on the date of discharge and determined to be suitable for discharge.         Tracie Ron MD  Department of Hospital Medicine  [facility]

## 2019-01-11 LAB
BACTERIA UR CULT: NORMAL
CMV IGG SERPL QL IA: REACTIVE
CMV IGM SERPL IA-ACNC: <8 U/ML
EBV EA IGG SER-ACNC: <5 U/ML
EBV NA IGG SER-ACNC: <3 U/ML
EBV VCA IGG SER-ACNC: 152 U/ML
EBV VCA IGM SER-ACNC: <10 U/ML
PARVOVIRUS B19 ABS IGG & IGM: ABNORMAL
PARVOVIRUS B19 IGG ANTIBODY: POSITIVE
PARVOVIRUS B19 IGM ANTIBODY: NEGATIVE

## 2019-01-13 LAB
BACTERIA BLD CULT: NORMAL
BACTERIA BLD CULT: NORMAL

## 2019-01-14 ENCOUNTER — TELEPHONE (OUTPATIENT)
Dept: HEMATOLOGY/ONCOLOGY | Facility: CLINIC | Age: 59
End: 2019-01-14

## 2019-01-14 DIAGNOSIS — R50.81 NEUTROPENIC FEVER: ICD-10-CM

## 2019-01-14 DIAGNOSIS — G82.50 QUADRIPLEGIA: Primary | ICD-10-CM

## 2019-01-14 DIAGNOSIS — D70.9 NEUTROPENIC FEVER: ICD-10-CM

## 2019-01-14 NOTE — TELEPHONE ENCOUNTER
----- Message from Angelo Huang sent at 1/14/2019 11:41 AM CST -----  Contact: Patient  Type: Needs Medical Advice    Who Called:  Patient  Best Call Back Number: [phone]  Additional Information: Patient would like to schedule a hospital follow up. Please call to advise. Thanks!

## 2019-01-17 ENCOUNTER — CLINICAL SUPPORT (OUTPATIENT)
Dept: REHABILITATION | Facility: HOSPITAL | Age: 59
End: 2019-01-17
Attending: FAMILY MEDICINE
Payer: COMMERCIAL

## 2019-01-17 DIAGNOSIS — S14.129A INCOMPLETE INJURY OF CERVICAL SPINAL CORD WITH CENTRAL CORD SYNDROME: ICD-10-CM

## 2019-01-17 DIAGNOSIS — R26.9 GAIT DISTURBANCE: ICD-10-CM

## 2019-01-17 DIAGNOSIS — R53.1 DECREASED STRENGTH: ICD-10-CM

## 2019-01-17 DIAGNOSIS — Z78.9 IMPAIRED MOTOR CONTROL: ICD-10-CM

## 2019-01-17 PROCEDURE — 97116 GAIT TRAINING THERAPY: CPT | Mod: PN

## 2019-01-22 ENCOUNTER — CLINICAL SUPPORT (OUTPATIENT)
Dept: REHABILITATION | Facility: HOSPITAL | Age: 59
End: 2019-01-22
Attending: FAMILY MEDICINE
Payer: COMMERCIAL

## 2019-01-22 DIAGNOSIS — R53.1 DECREASED STRENGTH: ICD-10-CM

## 2019-01-22 DIAGNOSIS — S14.129A INCOMPLETE INJURY OF CERVICAL SPINAL CORD WITH CENTRAL CORD SYNDROME: ICD-10-CM

## 2019-01-22 DIAGNOSIS — Z78.9 IMPAIRED MOTOR CONTROL: ICD-10-CM

## 2019-01-22 PROCEDURE — 97116 GAIT TRAINING THERAPY: CPT | Mod: PN

## 2019-01-22 NOTE — PROGRESS NOTES
Name: Alberto Dangelo  [facility] Number: [id number]  Date of Treatment: 01/17/2019   Diagnosis:   Encounter Diagnoses   Name Primary?   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  Patient reports their pain to be n/a/10 on a 0-10 scale with 0 being no pain and 10 being the worst pain imaginable.    Objective    Patient received individual therapy to increase strength, endurance, flexibility, posture, core stabilization and gait quality with 0 patients with activities as follows:     Alberto participated in dynamic functional therapeutic activities to improve functional performance for 67 minute including: Set up with 15 kg unloading.  Pt participated in computer assisted robotic gait training via [employer] @ 3.0 kp x 59 min, 47 sec for a total distance of 2965 m.  Utilized guidance force of 60% x 20 min then decreased to 45% for remainder of session.  Completed L-force testing.  See POC update.     Written Home Exercises Provided: Pt performs HEP with minimal guidance  Pt demo good understanding of the education provided. Alberto demonstrated good return demonstration of activities.     Assessment:   Pt doing well with progressive gait training.  Has been able to decrease unloading as toes heal and is now back to 15% unloading.  Has yet to resume pre-toe injury unweighting level but has been able to decrease guidance force to 45%.    Pt will continue to benefit from skilled PT intervention. Medical Necessity is demonstrated by:  Fall Risk, Unable to participate fully in daily activities, Weakness and Functional mobility/gait deficits.      Patient is making steady progress towards established goals.    New/Revised goals: See updated POC.        Plan:  Continue with established Plan of Care towards PT  goals.

## 2019-01-22 NOTE — PLAN OF CARE
Date: 01/17/2019    PHYSICAL THERAPY UPDATED PLAN OF TREATMENT    Patient name: Alberto Dangelo  Onset Date:  7/30/2010  SOC Date:  8/24/2016  Primary Diagnosis:    1. Impaired motor control     2. Decreased strength     3. 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 Home equipment includes B AFO's, shower chair, ramped entrance w support bars to swimming pool, FES bike, free weights, plyobox system.     Updated Assessment:  L-FORCE TEST                                               LEFT                                                 RIGHT                             FLEX                  EXT                      FLEX                     EXT                HIP      1.91 Nm              18.32 Nm             1.11 Nm               7.48 Nm                                                             KNEE    3.00  Nm             29.70 Nm              1.45 Nm              16.52 Nm     Gait:  Pt fractured 2 toes on his L foot which interfered with gait training activities.  As a result of the fractured toes, this patient was unable to participate in weighted gait training using forearm crutches due to inability to push off.  This resulted in delay in gait training, decreased unloading (increased body weight support) and increased guidance force during Lokomat training which contributed to decreased LE strength as indicated by L-force test as noted above.     Posture:  Pt stands with foot flat position, feet less than shoulder width apart.  Tendency toward knee hyperextension and anterior thrust of pelvis with hip ext continues contributing to   Increased lumbar lordosis. He is able to maintain  standing position for short periods without UE support; however, this has been negatively affected by toe fractures.  .      Transfers:  Sit <> stand: pt is able to achieve sit > stand using minimal to moderate UE support.  He continues with substantial anterior weight shift to insure weight is fully over his feet.  Ascent is controlled but upon reaching erect position he tends to hyperextend B knees. Once he gains his balance with knees locked, he is able to unlock his knees.  Stand > sit requires moderate UE support but is controlled descent.    Flexibility:  Somewhat limited by tone.  Presents with tightness in B calves, B hamstrings, and B hip flexors.        Previous Short Term Goals Status:       1) Pt will stand for 10 min without excessive lumbar lordosis or increased hip/knee ext  Delayed by fractured toes    2) Pt will tolerate decreased guidance force to < 25% without excessive toe drag  Delayed due to fractured toes.      3) Pt will perform sit > stand with minimal to moderate UE  support  Nearly met  New Short Term Goals Status:       1) Continued as above    2) Continued as above    3) Resume gait training using forearm crutches  Long Term Goal Status:   continue as noted below.    1) Increase strength in B  LE as indicated by improved L-force testing  (demonstrated significant improved in hip extension strength) (Improvment noted L hip flexion, L knee flexion, R knee flexion and extension)  Delayed due to fractured toes thus continue  2)  Pt will stand > 15 min with minimal UE support. Delayed due to fractured toes thus continue  3) Pt will consistently perform safe stand pivot transfers,  Progressing  4) Pt will demonstrate improved bilateral hip flexion strength to 2/5 to improve ability to perform swing phase/ foot clearance during gait   Progressing     5) Pt will demonstrate appropriate weight shift during ambulation to limit hip hiking and excessive weight shift.  Progressing    Reasons for  Recertification of Therapy:     Patient is making slow but steady progress in PT; however, progress was delayed due to injury that patient sustained at home resulting in fractured toes on L foot.  This required a decrease in unweighting (increased body weight support), an increase in guidance force required, and an interruption in participation.  As his toes have nearly healed, he should be able to resume his regular therapeutic activities and progress as tolerated.    Certification Period: 01/17/2019 to 4/11/2019  Recommended Treatment Plan: 2 times per week for 12 weeks: Gait Training, Neuromuscular Re-ed, Orthotic Management and Training, Patient Education and Locomotor training  Other Recommendations: Pt should resume gait training using forearm crutches.        Therapist's Name: Candice eHrzog, PT   Date: 01/17/2019    I CERTIFY THE NEED FOR THESE SERVICES FURNISHED UNDER THIS PLAN OF TREATMENT AND WHILE UNDER MY CARE    Physician's comments: ________________________________________________________________________________________________________________________________________________      Physician's Name: ___________________________________

## 2019-01-24 ENCOUNTER — CLINICAL SUPPORT (OUTPATIENT)
Dept: REHABILITATION | Facility: HOSPITAL | Age: 59
End: 2019-01-24
Attending: FAMILY MEDICINE
Payer: COMMERCIAL

## 2019-01-24 DIAGNOSIS — R53.1 DECREASED STRENGTH: ICD-10-CM

## 2019-01-24 DIAGNOSIS — S14.129A INCOMPLETE INJURY OF CERVICAL SPINAL CORD WITH CENTRAL CORD SYNDROME: ICD-10-CM

## 2019-01-24 DIAGNOSIS — Z78.9 IMPAIRED MOTOR CONTROL: ICD-10-CM

## 2019-01-24 PROCEDURE — 97116 GAIT TRAINING THERAPY: CPT | Mod: PN

## 2019-01-25 NOTE — PROGRESS NOTES
Name: Alberto Dangelo  [facility] Number: [id number]  Date of Treatment: 1/24/2019   Diagnosis:   Encounter Diagnoses   Name Primary?   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 Mod I stretching prior to session. Via manual w/c. .    Objective:    Patient received individual therapy to increase strength, endurance, ROM, flexibility, posture and core stabilization with activities as follows:     Gait training via [employer] system for 60:51 minutes (plus set up):     Set up at 15 kg unloading. Computer-assisted robotic gait training via [email or web address] x 3.0 km/h. GF x 60% x 12', 45% x 30', then 40% for distance of 3023 m. No stoppages.     Continue HEP daily.    Pt demo good understanding of the education provided. Patient demonstrated good return demonstration of activities.     Assessment:     Initial tones R>L LE improve with warm up. Able to relax tones when he concentrates on it during lokomotor training after warm up.     Pt will continue to benefit from skilled PT intervention. Medical Necessity is demonstrated by:  Requires skilled supervision to complete and progress HEP and Weakness.    Patient is making good progress towards established goals.    Plan:    Continue with established Plan of Care towards PT goals.

## 2019-01-30 ENCOUNTER — LAB VISIT (OUTPATIENT)
Dept: LAB | Facility: HOSPITAL | Age: 59
End: 2019-01-30
Attending: INTERNAL MEDICINE
Payer: COMMERCIAL

## 2019-01-30 DIAGNOSIS — D70.9 NEUTROPENIC FEVER: ICD-10-CM

## 2019-01-30 DIAGNOSIS — R50.81 NEUTROPENIC FEVER: ICD-10-CM

## 2019-01-30 LAB
BASOPHILS # BLD AUTO: 0 K/UL
BASOPHILS NFR BLD: 0.5 %
DIFFERENTIAL METHOD: NORMAL
EOSINOPHIL # BLD AUTO: 0.1 K/UL
EOSINOPHIL NFR BLD: 1.6 %
ERYTHROCYTE [DISTWIDTH] IN BLOOD BY AUTOMATED COUNT: 13.1 %
HCT VFR BLD AUTO: 44.4 %
HGB BLD-MCNC: 14.9 G/DL
LYMPHOCYTES # BLD AUTO: 1.7 K/UL
LYMPHOCYTES NFR BLD: 22.7 %
MCH RBC QN AUTO: 31 PG
MCHC RBC AUTO-ENTMCNC: 33.6 G/DL
MCV RBC AUTO: 92 FL
MONOCYTES # BLD AUTO: 0.7 K/UL
MONOCYTES NFR BLD: 9 %
NEUTROPHILS # BLD AUTO: 5.1 K/UL
NEUTROPHILS NFR BLD: 66.2 %
PLATELET # BLD AUTO: 191 K/UL
PMV BLD AUTO: 9.9 FL
RBC # BLD AUTO: 4.81 M/UL
WBC # BLD AUTO: 7.7 K/UL

## 2019-01-30 PROCEDURE — 36415 COLL VENOUS BLD VENIPUNCTURE: CPT

## 2019-01-30 PROCEDURE — 85025 COMPLETE CBC W/AUTO DIFF WBC: CPT

## 2019-01-31 ENCOUNTER — OFFICE VISIT (OUTPATIENT)
Dept: HEMATOLOGY/ONCOLOGY | Facility: CLINIC | Age: 59
End: 2019-01-31
Payer: COMMERCIAL

## 2019-01-31 VITALS
HEIGHT: 71 IN | RESPIRATION RATE: 20 BRPM | DIASTOLIC BLOOD PRESSURE: 100 MMHG | TEMPERATURE: 99 F | BODY MASS INDEX: 24.5 KG/M2 | HEART RATE: 52 BPM | SYSTOLIC BLOOD PRESSURE: 157 MMHG | WEIGHT: 175 LBS

## 2019-01-31 DIAGNOSIS — Z86.711 HISTORY OF PULMONARY EMBOLISM: ICD-10-CM

## 2019-01-31 DIAGNOSIS — R65.10 SIRS (SYSTEMIC INFLAMMATORY RESPONSE SYNDROME): Primary | ICD-10-CM

## 2019-01-31 DIAGNOSIS — R53.1 DECREASED STRENGTH: Chronic | ICD-10-CM

## 2019-01-31 DIAGNOSIS — Z86.718 HISTORY OF DVT (DEEP VEIN THROMBOSIS): ICD-10-CM

## 2019-01-31 DIAGNOSIS — Z86.2 HISTORY OF NEUTROPENIA: ICD-10-CM

## 2019-01-31 DIAGNOSIS — K21.9 GASTROESOPHAGEAL REFLUX DISEASE, ESOPHAGITIS PRESENCE NOT SPECIFIED: ICD-10-CM

## 2019-01-31 DIAGNOSIS — G82.50 QUADRIPLEGIA: ICD-10-CM

## 2019-01-31 PROCEDURE — 99215 PR OFFICE/OUTPT VISIT, EST, LEVL V, 40-54 MIN: ICD-10-PCS | Mod: S$GLB,,, | Performed by: INTERNAL MEDICINE

## 2019-01-31 PROCEDURE — 99999 PR PBB SHADOW E&M-EST. PATIENT-LVL III: ICD-10-PCS | Mod: PBBFAC,,, | Performed by: INTERNAL MEDICINE

## 2019-01-31 PROCEDURE — 3008F PR BODY MASS INDEX (BMI) DOCUMENTED: ICD-10-PCS | Mod: CPTII,S$GLB,, | Performed by: INTERNAL MEDICINE

## 2019-01-31 PROCEDURE — 99215 OFFICE O/P EST HI 40 MIN: CPT | Mod: S$GLB,,, | Performed by: INTERNAL MEDICINE

## 2019-01-31 PROCEDURE — 3008F BODY MASS INDEX DOCD: CPT | Mod: CPTII,S$GLB,, | Performed by: INTERNAL MEDICINE

## 2019-01-31 PROCEDURE — 99999 PR PBB SHADOW E&M-EST. PATIENT-LVL III: CPT | Mod: PBBFAC,,, | Performed by: INTERNAL MEDICINE

## 2019-01-31 NOTE — PROGRESS NOTES
Cc : Hospital follow up  For pancytopenia     Alberto Dangelo is a 58 y.o.  This is a pleasant patient who is here for evaluation of pancytopenia . He was hospitalized with neutropenic fever and treated for SIRS with vancomycin. He is here today to reassess his labs and for further recs   He has a history of DVT and PE , was anticoagulated and clots dissipated He has had no further fever since discharge . 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     Neurogenic bladder     Pulmonary embolism      Past Surgical History:   Procedure Laterality Date    NECK SURGERY      SPINE SURGERY      fuse C 6 - T 1 burst C7    TONSILLECTOMY      WISDOM TOOTH EXTRACTION         Current Outpatient Medications:     aspirin (ECOTRIN) 81 MG EC tablet, Take 81 mg by mouth once daily., Disp: , Rfl:     bisacodyl (FLEET) 10 mg/30 mL Enem, Place 10 mg rectally once daily., Disp: , Rfl:     diazePAM (VALIUM) 5 MG tablet, TAKE 1 TABLET BY MOUTH EVERY 12 HOURS AS NEEDED FOR SPASM, Disp: 60 tablet, Rfl: 5    magnesium oxide (MAG-OX) 400 mg tablet, Take 1 tablet (400 mg total) by mouth 2 (two) times daily., Disp: 180 tablet, Rfl: 3    midodrine (PROAMATINE) 5 MG Tab, TAKE 2 TABLETS BY MOUTH FOUR TIMES DAILY, Disp: 720 tablet, Rfl: 1    omeprazole (PRILOSEC) 20 MG capsule, TAKE 1 CAPSULE(20 MG) BY MOUTH EVERY DAY, Disp: 90 capsule, Rfl: 3    POLYETHYLENE GLYCOL 3350 (MIRALAX ORAL), Take 1 Dose by mouth every evening., Disp: , Rfl:     senna (SENOKOT) 8.6 mg tablet, Take 2 tablets by mouth once daily., Disp: , Rfl:     vitamin D 1000 units Tab, Take 185 mg by mouth once daily., Disp: , Rfl:   Review of patient's allergies indicates:   Allergen Reactions  "   Codeine Other (See Comments)     Cause bp to drop    Ambien [zolpidem] Other (See Comments)     depression     Social History     Tobacco Use    Smoking status: Never Smoker    Smokeless tobacco: Never Used   Substance Use Topics    Alcohol use: No    Drug use: No     Family History   Problem Relation Age of Onset    Cancer Mother         brain    Early death Mother     Cancer Father         tumor foot     Heart disease Father     Cancer Sister         ovarian    No Known Problems Brother     No Known Problems Daughter     No Known Problems Son     Heart disease Maternal Grandmother     Heart disease Paternal Grandmother     Heart disease Maternal Grandfather     Parkinsonism Paternal Grandfather     Drug abuse Maternal Aunt     Early death Maternal Aunt     No Known Problems Maternal Uncle     No Known Problems Paternal Aunt     Drug abuse Paternal Uncle     Heart disease Paternal Uncle     Kidney disease Paternal Uncle     No Known Problems Paternal Uncle        CONSTITUTIONAL: No fevers, chills, night sweats, wt. loss, appetite changes  SKIN: no rashes or itching  ENT: No headaches, head trauma, vision changes, or eye pain  LYMPH NODES: None noticed   CV: No chest pain, palpitations.   RESP: No dyspnea on exertion, cough, wheezing, or hemoptysis  GI: No nausea, emesis, diarrhea, constipation, melena, hematochezia, pain.   : No dysuria, hematuria, urgency, or frequency   HEME: No easy bruising, bleeding problems  PSYCHIATRIC: No depression, anxiety, psychosis, hallucinations.  NEURO: No seizures, memory loss, dizziness or difficulty sleeping  MSK: No arthralgias or joint swelling         BP (!) 157/100   Pulse (!) 52   Temp 98.5 °F (36.9 °C)   Resp 20   Ht 5' 11" (1.803 m)   Wt 79.4 kg (175 lb)   BMI 24.41 kg/m²   Gen: NAD, A and O x3, in a wheellchair  Psych: pleasant affect, normal thought process  Eyes: Pupils round and non dilated, EOM intact  Nose: Nares patent  OP clear, " mucosa patent  Neck: suppple, no JVD, trachea midline, no palpable mass, no adenopathy  Lungs: CTAB, no wheezes, no use of accessory muscles  CV: S1S2 with RRR, No mrg  Abd: soft, NTND, + BS, No HSM, no ascites  Extr: No CCE, GAVINO, strength decreased    Skin: intact, no lesions noted  Rheum: No joint swelling    Lab Results   Component Value Date    WBC 7.70 01/30/2019    HGB 14.9 01/30/2019    HCT 44.4 01/30/2019    MCV 92 01/30/2019     01/30/2019     CMP  Sodium   Date Value Ref Range Status   01/09/2019 142 136 - 145 mmol/L Final     Potassium   Date Value Ref Range Status   01/09/2019 3.8 3.5 - 5.1 mmol/L Final     Chloride   Date Value Ref Range Status   01/09/2019 109 95 - 110 mmol/L Final     CO2   Date Value Ref Range Status   01/09/2019 27 23 - 29 mmol/L Final     Glucose   Date Value Ref Range Status   01/09/2019 109 70 - 110 mg/dL Final     BUN, Bld   Date Value Ref Range Status   01/09/2019 19 6 - 20 mg/dL Final     Creatinine   Date Value Ref Range Status   01/09/2019 1.5 (H) 0.5 - 1.4 mg/dL Final     Calcium   Date Value Ref Range Status   01/09/2019 9.0 8.7 - 10.5 mg/dL Final     Total Protein   Date Value Ref Range Status   01/09/2019 6.2 6.0 - 8.4 g/dL Final     Albumin   Date Value Ref Range Status   01/09/2019 3.5 3.5 - 5.2 g/dL Final     Total Bilirubin   Date Value Ref Range Status   01/09/2019 0.8 0.1 - 1.0 mg/dL Final     Comment:     For infants and newborns, interpretation of results should be based  on gestational age, weight and in agreement with clinical  observations.  Premature Infant recommended reference ranges:  Up to 24 hours.............<8.0 mg/dL  Up to 48 hours............<12.0 mg/dL  3-5 days..................<15.0 mg/dL  6-29 days.................<15.0 mg/dL       Alkaline Phosphatase   Date Value Ref Range Status   01/09/2019 52 (L) 55 - 135 U/L Final     AST   Date Value Ref Range Status   01/09/2019 17 10 - 40 U/L Final     ALT   Date Value Ref Range Status    01/09/2019 17 10 - 44 U/L Final     Anion Gap   Date Value Ref Range Status   01/09/2019 6 (L) 8 - 16 mmol/L Final     eGFR if    Date Value Ref Range Status   01/09/2019 59 (A) >60 mL/min/1.73 m^2 Final     eGFR if non    Date Value Ref Range Status   01/09/2019 51 (A) >60 mL/min/1.73 m^2 Final     Comment:     Calculation used to obtain the estimated glomerular filtration  rate (eGFR) is the CKD-EPI equation.          SIRS (systemic inflammatory response syndrome)    Quadriplegia    History of DVT (deep vein thrombosis)    History of pulmonary embolism    Decreased strength    History of neutropenia    Gastroesophageal reflux disease, esophagitis presence not specified    he has ebv, parvo and cmv exposure  I explained I believe his marrow was suppressed due to multiple factors, viral etiology and antibiotic suppression  He as recovered   These viruses are ubiquitous   If he develops infection he is susceptible and may need further growth factor support   For now obs alone is indicated   Cont PPI for gastritis and valium for anxiety  He may be discharged from [facility] and RTC prn   Explained tanmay hydration to prevent blood clots as well      Thank you for allowing me to evaluate and participate in the care of this pleasant patient. Please fell free to call me with any questions or concerns.    Warmly,  Bre Lara MD    This note was dictated with Dragon and briefly proofread. Please excuse any sentences that may be unclear or words misspelled

## 2019-02-05 ENCOUNTER — CLINICAL SUPPORT (OUTPATIENT)
Dept: REHABILITATION | Facility: HOSPITAL | Age: 59
End: 2019-02-05
Attending: FAMILY MEDICINE
Payer: COMMERCIAL

## 2019-02-05 DIAGNOSIS — R26.9 GAIT DISTURBANCE: ICD-10-CM

## 2019-02-05 DIAGNOSIS — M25.671 DECREASED RANGE OF MOTION OF BOTH ANKLES: ICD-10-CM

## 2019-02-05 DIAGNOSIS — S14.129A INCOMPLETE INJURY OF CERVICAL SPINAL CORD WITH CENTRAL CORD SYNDROME: ICD-10-CM

## 2019-02-05 DIAGNOSIS — M25.672 DECREASED RANGE OF MOTION OF BOTH ANKLES: ICD-10-CM

## 2019-02-05 DIAGNOSIS — R53.1 DECREASED STRENGTH: ICD-10-CM

## 2019-02-05 DIAGNOSIS — Z78.9 IMPAIRED MOTOR CONTROL: ICD-10-CM

## 2019-02-05 PROCEDURE — 97116 GAIT TRAINING THERAPY: CPT | Mod: PN

## 2019-02-05 NOTE — PROGRESS NOTES
"Name: Alberto Dangelo  [facility] Number: [id number]  Date of Treatment: 02/05/2019   Diagnosis:   Encounter Diagnoses   Name Primary?   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  I was a little concerned that I'd have a rough time today since I hadn't been here in a few days".  Patient reports their pain to be n/a/10 on a 0-10 scale with 0 being no pain and 10 being the worst pain imaginable.    Objective    Patient received individual therapy to increase strength, endurance, flexibility, core stabilization and gait/functional mobility with 0 patients with activities as follows:     Alberto participated in dynamic functional therapeutic activities to improve functional performance for 67 minutes. Including: Set up with 10 kg unloading.  Pt participated in computer assisted robotic gait training via [employer] @ 3.0 kph x 60 min, 11 sec for a total distance of 2972 m.  Utilized guidance force of 60% x 10 min, decreased to 50% x 12', then decreased to 40% x 18'.  Completed session with guidance force @ 30%.        Written Home Exercises Provided: N/A  Pt demo good understanding of the education provided. Alberto demonstrated good return demonstration of activities.     Assessment:   Tolerated decrease in unweighting to 10 kg without difficulty.  No stoppages of training noted this date as achieved consistent foot clearance throughout session.      Pt will continue to benefit from skilled PT intervention. Medical Necessity is demonstrated by:  Unable to participate fully in daily activities, Weakness and Impaired functional mobility/gait quality    Patient is making steady progress towards established goals.    New/Revised goals: N/A      Plan:  Continue with established Plan of Care towards PT goals.  " Reduce unweighting and guidance force as patient tolerates.

## 2019-02-05 NOTE — PROGRESS NOTES
Name: Alberto Dangelo  Clinic Number: [id number]  Date of Treatment: 1/22/2019   Diagnosis:   Encounter Diagnoses   Name Primary?   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 Late arrival today 2* work meetings via telephone that he couldn't get out of. Able to accommodate schedule to fit pt in.  Mod I with manual w/c. Minimal stretching prior to session 2* time constraints.,     Objective:    Patient received individual therapy to increase strength, endurance, ROM, flexibility, posture and core stabilization with activities as follows:     Gait training via [employer] system for 60:04 minutes (plus set up):     Set up at 15 kg unloading. Computer-assisted robotic gait training via [employer] x 3.0 km/h. GF x 60% x 20', then 40% for distance of 2938 m. Multiple stoppages.     Continue HEP daily.    Pt demo good understanding of the education provided. Patient demonstrated good return demonstration of activities.     Assessment:     Initial tomes and spasticity improved with mobility. Progressing with decreased GF and returning to levels as prior to toe injury.     Pt will continue to benefit from skilled PT intervention. Medical Necessity is demonstrated by:  Requires skilled supervision to complete and progress HEP and Weakness.    Patient is making good progress towards established goals.    Plan:    Continue with established Plan of Care towards PT goals.

## 2019-02-07 ENCOUNTER — CLINICAL SUPPORT (OUTPATIENT)
Dept: REHABILITATION | Facility: HOSPITAL | Age: 59
End: 2019-02-07
Attending: FAMILY MEDICINE
Payer: COMMERCIAL

## 2019-02-07 DIAGNOSIS — R53.1 DECREASED STRENGTH: ICD-10-CM

## 2019-02-07 DIAGNOSIS — Z78.9 IMPAIRED MOTOR CONTROL: ICD-10-CM

## 2019-02-07 DIAGNOSIS — S14.129A INCOMPLETE INJURY OF CERVICAL SPINAL CORD WITH CENTRAL CORD SYNDROME: ICD-10-CM

## 2019-02-07 PROCEDURE — 97116 GAIT TRAINING THERAPY: CPT | Mod: PN

## 2019-02-07 NOTE — PROGRESS NOTES
Name: Alberto Dangelo  [facility] Number: [id number]  Date of Treatment: 02/07/2019   Diagnosis:   Encounter Diagnoses   Name Primary?   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 Mod I in manual w/c with mod I stretching prior to session. Was very sore after last session.    Objective:    Patient received individual therapy to increase strength, endurance, ROM, flexibility, posture and core stabilization with activities as follows:     Gait training via [employer] system for 60:26 minutes (plus set up):     Set up at 10 kg unloading. Computer-assisted robotic gait training via [employer] x 3.0 km/h. GF x 60% x 20', % for distance of 2996 m. One stoppage.     Continue HEP daily.    Pt demo good understanding of the education provided. Patient demonstrated good return demonstration of activities.     Assessment:     Improved tolerance for progression of GF today and consistent good performance with GF drop to 30%    Pt will continue to benefit from skilled PT intervention. Medical Necessity is demonstrated by:  Requires skilled supervision to complete and progress HEP and Weakness.    Patient is making good progress towards established goals.    Plan:    Continue with established Plan of Care towards PT goals.

## 2019-02-12 ENCOUNTER — CLINICAL SUPPORT (OUTPATIENT)
Dept: REHABILITATION | Facility: HOSPITAL | Age: 59
End: 2019-02-12
Attending: FAMILY MEDICINE
Payer: COMMERCIAL

## 2019-02-12 DIAGNOSIS — R26.9 GAIT DISTURBANCE: ICD-10-CM

## 2019-02-12 DIAGNOSIS — R53.1 DECREASED STRENGTH: ICD-10-CM

## 2019-02-12 DIAGNOSIS — M25.672 DECREASED RANGE OF MOTION OF BOTH ANKLES: ICD-10-CM

## 2019-02-12 DIAGNOSIS — M25.671 DECREASED RANGE OF MOTION OF BOTH ANKLES: ICD-10-CM

## 2019-02-12 DIAGNOSIS — Z78.9 IMPAIRED MOTOR CONTROL: ICD-10-CM

## 2019-02-12 DIAGNOSIS — S14.129A INCOMPLETE INJURY OF CERVICAL SPINAL CORD WITH CENTRAL CORD SYNDROME: ICD-10-CM

## 2019-02-12 PROCEDURE — 97116 GAIT TRAINING THERAPY: CPT | Mod: PN

## 2019-02-12 NOTE — PROGRESS NOTES
"Name: Alberto Dangelo  [facility] Number: [id number]  Date of Treatment: 02/12/2019   Diagnosis:   Encounter Diagnoses   Name Primary?   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  "I've been trying to wear it out to dampen it".  Patient reports their pain to be n/a/10 on a 0-10 scale with 0 being no pain and 10 being the worst pain imaginable.    Objective    Patient received individual therapy to increase endurance, flexibility, posture, core stabilization and gait quality with 0 patients with activities as follows:     Alberto participated in dynamic functional therapeutic activities to improve functional performance for 67 minutes. Including: Set up with 10 kg unloading.  Pt participated in computer assisted robotic gait training via [employer] @ 3.0 kph x 60 min, 12 sec for a total distance of 2990 m.  Utilized guidance force of 60% x 10 min, decreased to 50% x 10 min, then to 40% x 12 min, completed session @ 30%.      Written Home Exercises Provided: Pt performs self stretching prior to initiating gait training.    Pt demo good understanding of the education provided. Alberto demonstrated good return demonstration of activities.     Assessment:   Tolerating decreased guidance force without disruption in training.      Pt will continue to benefit from skilled PT intervention. Medical Necessity is demonstrated by:  Fall Risk, Unable to participate fully in daily activities, Weakness and Gait/balance deficits    Patient is making steady progress towards established goals.    New/Revised goals: N/A      Plan:  Continue with established Plan of Care towards PT goals. Further reduce unweighting to 5kg if pt tolerates.    "

## 2019-02-14 ENCOUNTER — CLINICAL SUPPORT (OUTPATIENT)
Dept: REHABILITATION | Facility: HOSPITAL | Age: 59
End: 2019-02-14
Attending: FAMILY MEDICINE
Payer: COMMERCIAL

## 2019-02-14 DIAGNOSIS — Z78.9 IMPAIRED MOTOR CONTROL: ICD-10-CM

## 2019-02-14 DIAGNOSIS — S14.129A INCOMPLETE INJURY OF CERVICAL SPINAL CORD WITH CENTRAL CORD SYNDROME: ICD-10-CM

## 2019-02-14 DIAGNOSIS — R53.1 DECREASED STRENGTH: ICD-10-CM

## 2019-02-14 PROCEDURE — 97116 GAIT TRAINING THERAPY: CPT | Mod: PN

## 2019-02-14 NOTE — PROGRESS NOTES
Name: Alberto Dangelo  Clinic Number: [id number]  Date of Treatment: 02/14/2019   Diagnosis:   Encounter Diagnoses   Name Primary?   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 Mod I in manual w/c and mod I stretching prior to session.  Brought his service dog.     Objective:    Patient received individual therapy to increase strength, endurance, ROM, flexibility, posture and core stabilization with activities as follows:     Gait training via [employer] system for 60:37 minutes (plus set up):     Set up at 5 kg unloading. Computer-assisted robotic gait training via [employer] x 3.0 km/h. GF x 60% x 10', 50% x 10', 40% x 10', then 30% for distance of 3018 m. No stoppages.     Continue HEP daily.    Pt demo good understanding of the education provided. Patient demonstrated good return demonstration of activities.     Assessment:     No stoppages. Improving tolerance for GF drops without increased spasticity.     Pt will continue to benefit from skilled PT intervention. Medical Necessity is demonstrated by:  Requires skilled supervision to complete and progress HEP and Weakness.    Patient is making good progress towards established goals.    Plan:    Continue with established Plan of Care towards PT goals.

## 2019-02-19 ENCOUNTER — CLINICAL SUPPORT (OUTPATIENT)
Dept: REHABILITATION | Facility: HOSPITAL | Age: 59
End: 2019-02-19
Attending: FAMILY MEDICINE
Payer: COMMERCIAL

## 2019-02-19 DIAGNOSIS — R53.1 DECREASED STRENGTH: ICD-10-CM

## 2019-02-19 DIAGNOSIS — M25.671 DECREASED RANGE OF MOTION OF BOTH ANKLES: ICD-10-CM

## 2019-02-19 DIAGNOSIS — M25.672 DECREASED RANGE OF MOTION OF BOTH ANKLES: ICD-10-CM

## 2019-02-19 DIAGNOSIS — R26.9 GAIT DISTURBANCE: ICD-10-CM

## 2019-02-19 DIAGNOSIS — Z78.9 IMPAIRED MOTOR CONTROL: ICD-10-CM

## 2019-02-19 DIAGNOSIS — S14.129A INCOMPLETE INJURY OF CERVICAL SPINAL CORD WITH CENTRAL CORD SYNDROME: ICD-10-CM

## 2019-02-19 PROCEDURE — 97116 GAIT TRAINING THERAPY: CPT | Mod: PN

## 2019-02-19 NOTE — PROGRESS NOTES
"Name: Alberto Dangelo  [facility] Number: [id number]  Date of Treatment: 02/19/2019   Diagnosis:   Encounter Diagnoses   Name Primary?   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  At first I didn't realize what was happening but I got it under control last night."  Patient reports their pain to be n/a/10 on a 0-10 scale with 0 being no pain and 10 being the worst pain imaginable.    Objective    Patient received individual therapy to increase strength, endurance, flexibility, posture, core stabilization and gait quality with 0 patients with activities as follows:     Alberto participated in dynamic functional therapeutic activities to improve functional performance for 63 minutes. Including: Set up with 5 kg unloading.  Pt participated in computer assisted robotic gait training via [employer] @ 3.0 kph x 55 min, 08 sec for a total distance of 2728 m.  Utilized guidance force of 60% x 10 min, decreased to 50% x 10 min; decreased again to 35% x 12'.  Reduced again to 25% x 13'.  Attempted 15% but not able to achieve.  Completed training @ 20% x 10'    Written Home Exercises Provided: N/A  Pt demo good understanding of the education provided. Alberto demonstrated good return demonstration of activities.     Assessment:   Pt able to reduce guidance force to 20% for 10 min but was not able to perform gait training @ 15% due to foot drag on R.      Pt will continue to benefit from skilled PT intervention. Medical Necessity is demonstrated by:  Fall Risk, Unable to participate fully in daily activities, Requires skilled supervision to complete and progress HEP, Weakness and Functional mobility/gait deficits    Patient is making steady progress towards established goals.    New/Revised goals: " N/A      Plan:  Continue with established Plan of Care towards PT goals. Increase gait time @ 20% guidance force as patient tolerates.

## 2019-02-21 ENCOUNTER — CLINICAL SUPPORT (OUTPATIENT)
Dept: REHABILITATION | Facility: HOSPITAL | Age: 59
End: 2019-02-21
Attending: FAMILY MEDICINE
Payer: COMMERCIAL

## 2019-02-21 DIAGNOSIS — S14.129A INCOMPLETE INJURY OF CERVICAL SPINAL CORD WITH CENTRAL CORD SYNDROME: ICD-10-CM

## 2019-02-21 DIAGNOSIS — Z78.9 IMPAIRED MOTOR CONTROL: ICD-10-CM

## 2019-02-21 DIAGNOSIS — R53.1 DECREASED STRENGTH: ICD-10-CM

## 2019-02-21 PROCEDURE — 97116 GAIT TRAINING THERAPY: CPT | Mod: PN

## 2019-02-22 NOTE — PROGRESS NOTES
Name: Alberto Dangelo  Clinic Number: [id number]  Date of Treatment: 2/21/2019   Diagnosis:   Encounter Diagnoses   Name Primary?   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 Mod I in manual w/c and mod I stretching prior to session. Had a glass of wine last night which sometimes affects his spasticity minimally.       Objective:    Patient received individual therapy to increase strength, endurance, ROM, flexibility, posture and core stabilization with activities as follows:     Gait training via [employer] system for 46:27 minutes (plus set up):     Set up at 5 kg unloading. Computer-assisted robotic gait training via [x+1] x 3.0 km/h. GF x 50% x 12', 40% x 20', then 30% for distance of 2273 m. Several stoppages.     Continue HEP daily.    Pt demo good understanding of the education provided. Patient demonstrated good return demonstration of activities.     Assessment:     Several stoppages 2* spasticity but able to start with decreased GF. Notable L ankle DF with heelstrike without need for tight strapping.     Pt will continue to benefit from skilled PT intervention. Medical Necessity is demonstrated by:  Requires skilled supervision to complete and progress HEP and Weakness.    Patient is making good progress towards established goals.    Plan:    Continue with established Plan of Care towards PT goals.

## 2019-02-25 ENCOUNTER — PATIENT MESSAGE (OUTPATIENT)
Dept: FAMILY MEDICINE | Facility: CLINIC | Age: 59
End: 2019-02-25

## 2019-02-26 ENCOUNTER — CLINICAL SUPPORT (OUTPATIENT)
Dept: REHABILITATION | Facility: HOSPITAL | Age: 59
End: 2019-02-26
Attending: FAMILY MEDICINE
Payer: COMMERCIAL

## 2019-02-26 DIAGNOSIS — R53.1 DECREASED STRENGTH: ICD-10-CM

## 2019-02-26 DIAGNOSIS — M25.672 DECREASED RANGE OF MOTION OF BOTH ANKLES: ICD-10-CM

## 2019-02-26 DIAGNOSIS — R26.9 GAIT DISTURBANCE: ICD-10-CM

## 2019-02-26 DIAGNOSIS — M25.671 DECREASED RANGE OF MOTION OF BOTH ANKLES: ICD-10-CM

## 2019-02-26 DIAGNOSIS — S14.129A INCOMPLETE INJURY OF CERVICAL SPINAL CORD WITH CENTRAL CORD SYNDROME: ICD-10-CM

## 2019-02-26 DIAGNOSIS — Z78.9 IMPAIRED MOTOR CONTROL: ICD-10-CM

## 2019-02-26 PROCEDURE — 97116 GAIT TRAINING THERAPY: CPT | Mod: PN

## 2019-02-26 NOTE — TELEPHONE ENCOUNTER
Last visit with Dr. Lara his blood pressure was excessively high.  I would not increase the midodrine and he may need to discontinue it.  I would suggest stopping it and then recheck blood pressure, he is due for a checkup anyway

## 2019-02-27 RX ORDER — MIDODRINE HYDROCHLORIDE 10 MG/1
10 TABLET ORAL 4 TIMES DAILY
Qty: 360 TABLET | Refills: 3 | Status: SHIPPED | OUTPATIENT
Start: 2019-02-27 | End: 2019-02-28

## 2019-02-27 NOTE — TELEPHONE ENCOUNTER
Patient says when that bp was checked he had just taken Midodrine. His bp was 95/45 at home today. That's where it usually is.     Patient called back at 3:36 bp 89/49

## 2019-02-27 NOTE — PROGRESS NOTES
"Name: Alberto Dangelo  [facility] Number: [id number]  Date of Treatment: 02/26/2019   Diagnosis:   Encounter Diagnoses   Name Primary?   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  Patient reports their pain to be n/a/10 on a 0-10 scale with 0 being no pain and 10 being the worst pain imaginable.  "How can I work at home to help get the guidance force lower?"    Objective    Patient received individual therapy to increase strength, endurance, flexibility, core stabilization and gait quality with 0 patients with activities as follows:     Alberto participated in dynamic functional therapeutic activities to improve functional performance for 67 minutes. Including: Set up with 5 kg unloading.  Pt participated in computer assisted robotic gait training via [employer] @ 3.0 kph x 60 min, 27 sec for a total distance of 3012 m.  Utilized guidance force of 50% x 13 min, decreased to 40% for 18', followed by 30% x 18' and 25% x 5'.  Completed session @ 20% guidance force.       Written Home Exercises Provided: N/A  Pt demo good understanding of the education provided. Alberto demonstrated good return demonstration of activities.     Assessment:   Achieved consistent foot clearance during training.  Able to handle progressive decrease in guidance force without foot drag.    Pt will continue to benefit from skilled PT intervention. Medical Necessity is demonstrated by:  Fall Risk, Unable to participate fully in daily activities, Weakness and Gait impairment    Patient is making steady progress towards established goals.    New/Revised goals: N/A      Plan:  Continue with established Plan of Care towards PT goals. Progressively decrease guidance force as patient is able without foot drag.    "

## 2019-02-28 ENCOUNTER — CLINICAL SUPPORT (OUTPATIENT)
Dept: REHABILITATION | Facility: HOSPITAL | Age: 59
End: 2019-02-28
Attending: FAMILY MEDICINE
Payer: COMMERCIAL

## 2019-02-28 DIAGNOSIS — S14.129A INCOMPLETE INJURY OF CERVICAL SPINAL CORD WITH CENTRAL CORD SYNDROME: ICD-10-CM

## 2019-02-28 DIAGNOSIS — Z78.9 IMPAIRED MOTOR CONTROL: ICD-10-CM

## 2019-02-28 DIAGNOSIS — R53.1 DECREASED STRENGTH: ICD-10-CM

## 2019-02-28 PROCEDURE — 97116 GAIT TRAINING THERAPY: CPT | Mod: PN

## 2019-02-28 RX ORDER — MIDODRINE HYDROCHLORIDE 10 MG/1
10 TABLET ORAL 3 TIMES DAILY
Qty: 270 TABLET | Refills: 1 | Status: SHIPPED | OUTPATIENT
Start: 2019-02-28 | End: 2019-04-22 | Stop reason: SDUPTHER

## 2019-02-28 NOTE — PROGRESS NOTES
Name: Alberto Dangelo  Clinic Number: [id number]  Date of Treatment: 02/28/2019   Diagnosis:   Encounter Diagnoses   Name Primary?   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 .  Mod I in manual w/c and mod I stretching prior to session. Brought his service dog. Leaving tomorrow for a week-long trip to [place], [place] with his wife.    Objective:    Patient received individual therapy to increase strength, endurance, ROM, flexibility, posture and core stabilization with activities as follows:     Gait training via [employer] system for 60:20 minutes (plus set up):     Set up at 5 kg unloading. Computer-assisted robotic gait training via [employer] x 3.0 km/h. GF x 60% x 10', 50% x 10', 40% x 20', 30% x 10', 25% for distance of 3012 m. Multiple initial stoppages.     Continue HEP daily.    Pt demo good understanding of the education provided. Patient demonstrated good return demonstration of activities.     Assessment:     Initial stoppages 2* R>L LE spasticity which improved with warm up.     Pt will continue to benefit from skilled PT intervention. Medical Necessity is demonstrated by:  Requires skilled supervision to complete and progress HEP and Weakness.    Patient is making good progress towards established goals.    Plan:    Continue with established Plan of Care towards PT goals.

## 2019-03-12 ENCOUNTER — CLINICAL SUPPORT (OUTPATIENT)
Dept: REHABILITATION | Facility: HOSPITAL | Age: 59
End: 2019-03-12
Payer: COMMERCIAL

## 2019-03-12 DIAGNOSIS — S14.129A INCOMPLETE INJURY OF CERVICAL SPINAL CORD WITH CENTRAL CORD SYNDROME: ICD-10-CM

## 2019-03-12 DIAGNOSIS — M25.672 DECREASED RANGE OF MOTION OF BOTH ANKLES: ICD-10-CM

## 2019-03-12 DIAGNOSIS — Z78.9 IMPAIRED MOTOR CONTROL: ICD-10-CM

## 2019-03-12 DIAGNOSIS — R53.1 DECREASED STRENGTH: ICD-10-CM

## 2019-03-12 DIAGNOSIS — M25.671 DECREASED RANGE OF MOTION OF BOTH ANKLES: ICD-10-CM

## 2019-03-12 DIAGNOSIS — R26.9 GAIT DISTURBANCE: ICD-10-CM

## 2019-03-12 PROCEDURE — 97116 GAIT TRAINING THERAPY: CPT | Mod: PN

## 2019-03-12 NOTE — PROGRESS NOTES
Name: Alberto Dangelo  [facility] Number: [id number]  Date of Treatment: 03/12/2019   Diagnosis:   Encounter Diagnoses   Name Primary?   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  Patient reports their pain to be n/a/10 on a 0-10 scale with 0 being no pain and 10 being the worst pain imaginable.    Objective    Patient received individual therapy to increase strength, endurance, flexibility, core stabilization and gait quality with 0 patients with activities as follows:     Alberto participated in dynamic functional therapeutic activities to improve functional performance for 67 minutes. Including: Set up with 5 kg unloading.  Pt participated in computer assisted robotic gait training via [employer] @ 3.0 kph x 61 min, 96 sec for a total distance of 3037 m.  Utilized guidance force of 60% x 14 min, decreased to 45% x 9'; decreased to 35% x 15 '; to 25% x 20', completed training @ 20%.      Written Home Exercises Provided: Pt participating in HEP  Pt demo good understanding of the education provided. Alberto demonstrated good return demonstration of activities.     Assessment:   Minimal disruption of training due to occasional toe drag.      Pt will continue to benefit from skilled PT intervention. Medical Necessity is demonstrated by:  Weakness and Impaired functional mobility/gait    Patient is making steady progress towards established goals.    New/Revised goals: N/A      Plan:  Continue with established Plan of Care towards PT goals. Progressively decrease guidance force as patient tolerates.

## 2019-03-14 ENCOUNTER — CLINICAL SUPPORT (OUTPATIENT)
Dept: REHABILITATION | Facility: HOSPITAL | Age: 59
End: 2019-03-14
Attending: FAMILY MEDICINE
Payer: COMMERCIAL

## 2019-03-14 DIAGNOSIS — Z78.9 IMPAIRED MOTOR CONTROL: ICD-10-CM

## 2019-03-14 DIAGNOSIS — R53.1 DECREASED STRENGTH: ICD-10-CM

## 2019-03-14 DIAGNOSIS — S14.129A INCOMPLETE INJURY OF CERVICAL SPINAL CORD WITH CENTRAL CORD SYNDROME: ICD-10-CM

## 2019-03-14 PROCEDURE — 97116 GAIT TRAINING THERAPY: CPT | Mod: PN

## 2019-03-15 NOTE — PROGRESS NOTES
Name: Alberto Dangelo  Clinic Number: [id number]  Date of Treatment: 03/15/2019   Diagnosis:   Encounter Diagnoses   Name Primary?   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 Mod I in manual w/c and mod I stretching prior to session. Brought his service dog. Had trip to [place] last week. Able to take 2-3 steps at home each day without his rw. Plans to buy a new walker which is described as a posterior walker.     Objective:    Patient received individual therapy to increase strength, endurance, ROM, flexibility, posture and core stabilization with activities as follows:     Gait training via [employer] system for 64:66 minutes (plus set up):     Set up at 5 kg unloading. Computer-assisted robotic gait training via [employer] x 3.0 km/h. GF x 50% x 10', 40% x 15', then 30% for distance of 3203 m.  Stoppages 2* Algoregot mechanical errors.      Continue HEP daily.    Pt demo good understanding of the education provided. Patient demonstrated good return demonstration of activities.     Assessment:     Improved spasticity today with decreased strapping tightness required L foot until fatigued at 30% GF.     Pt will continue to benefit from skilled PT intervention. Medical Necessity is demonstrated by:  Requires skilled supervision to complete and progress HEP and Weakness.    Patient is making good progress towards established goals.    Plan:    Continue with established Plan of Care towards PT goals.

## 2019-03-19 ENCOUNTER — CLINICAL SUPPORT (OUTPATIENT)
Dept: REHABILITATION | Facility: HOSPITAL | Age: 59
End: 2019-03-19
Attending: FAMILY MEDICINE
Payer: COMMERCIAL

## 2019-03-19 DIAGNOSIS — R53.1 DECREASED STRENGTH: ICD-10-CM

## 2019-03-19 DIAGNOSIS — M25.671 DECREASED RANGE OF MOTION OF BOTH ANKLES: ICD-10-CM

## 2019-03-19 DIAGNOSIS — S14.129A INCOMPLETE INJURY OF CERVICAL SPINAL CORD WITH CENTRAL CORD SYNDROME: ICD-10-CM

## 2019-03-19 DIAGNOSIS — M25.672 DECREASED RANGE OF MOTION OF BOTH ANKLES: ICD-10-CM

## 2019-03-19 DIAGNOSIS — Z78.9 IMPAIRED MOTOR CONTROL: ICD-10-CM

## 2019-03-19 PROCEDURE — 97116 GAIT TRAINING THERAPY: CPT | Mod: PN

## 2019-03-19 NOTE — PROGRESS NOTES
"Name: Alberto Dangelo  Clinic Number: [id number]  Date of Treatment: 03/19/2019   Diagnosis:   Encounter Diagnoses   Name Primary?   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        Time in: 1412  Time Out: 1540  Total Treatment Time: 70  Group Time: 0      Subjective:    Alberto reports "I overstretched my hamstrings Sunday and I was really sore but I'm better now.".  Patient reports their pain to be n/a/10 on a 0-10 scale with 0 being no pain and 10 being the worst pain imaginable.    Objective    Patient received individual therapy to increase strength, endurance, flexibility, posture, core stabilization and gait quality with 0 patients with activities as follows:     Alberto participated in dynamic functional therapeutic activities to improve functional performance for 67 minutes. Including: Set up with 5 kg unloading.  Pt participated in computer assisted robotic gait training via [employer] @ 3.0 kph x 59 min, 54 sec for a total distance of 2956 m.  Utilized guidance force of 50% x 14 min, decreased to 40% x 12', then to 30% x 20 min.  Attempted to decrease to 20% but pt experienced stoppage of unit.  Restarted @ 25% guidance force and completed session (x 13 min) .      Written Home Exercises Provided: Pt participates in self stretching at home and in clinic prior to session.    Pt demo good understanding of the education provided. Alberto demonstrated good return demonstration of activities.     Assessment:   Struggled to achieve smooth gait training initially due to tone; however, with increased unweighting during first 5 min were able to achieve appropriate training for remaining session.   Pt observed to perform sit>stand transfer utilizing arm rests of w/c to push up instead of pulling himself up utilizing parallel bars.      Pt will continue to benefit from skilled PT intervention. Medical Necessity is demonstrated by:  Fall " Risk, Unable to participate fully in daily activities, Weakness and Gait/functional mobility deficits.    Patient is making good progress towards established goals.    New/Revised goals: N/A      Plan:  Continue with established Plan of Care towards PT goals. Encourage increased utilization of pushing up from w/c for sit > stand transfers.

## 2019-03-21 ENCOUNTER — CLINICAL SUPPORT (OUTPATIENT)
Dept: REHABILITATION | Facility: HOSPITAL | Age: 59
End: 2019-03-21
Attending: FAMILY MEDICINE
Payer: COMMERCIAL

## 2019-03-21 DIAGNOSIS — Z78.9 IMPAIRED MOTOR CONTROL: ICD-10-CM

## 2019-03-21 DIAGNOSIS — S14.129A INCOMPLETE INJURY OF CERVICAL SPINAL CORD WITH CENTRAL CORD SYNDROME: ICD-10-CM

## 2019-03-21 DIAGNOSIS — R53.1 DECREASED STRENGTH: ICD-10-CM

## 2019-03-21 PROCEDURE — 97116 GAIT TRAINING THERAPY: CPT | Mod: PN

## 2019-03-22 NOTE — PROGRESS NOTES
Name: Alberto Dangelo  Clinic Number: [id number]  Date of Treatment: 03/22/2019   Diagnosis:   Encounter Diagnoses   Name Primary?   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 Brought his service dog. Mod I in manual w/c with mod I stretching prior to session.      Objective:    Patient received individual therapy to increase strength, endurance, ROM, flexibility, posture and core stabilization with activities as follows:     Gait training via [employer] system for 59:31 minutes (plus set up):     Set up at 5 kg unloading. Computer-assisted robotic gait training via [employer] x 3.0 km/h. GF x 50% x 10', 40% x 12', 30% x 15', dropped to 20% but stoppages until increased to 25%. 20% for last 2 mins of session but machine stopped 2* potentiometer issues. Gait training for distance of 2945 m.      Continue HEP daily.    Pt demo good understanding of the education provided. Patient demonstrated good return demonstration of activities.     Assessment:     Aforementioned issues with limited tolerance of GF drops below 25% and mechanical errors; otherwise, pt is improving.     Pt will continue to benefit from skilled PT intervention. Medical Necessity is demonstrated by:  Requires skilled supervision to complete and progress HEP and Weakness.    Patient is making good progress towards established goals.    Plan:    Continue with established Plan of Care towards PT goals.

## 2019-03-26 ENCOUNTER — CLINICAL SUPPORT (OUTPATIENT)
Dept: REHABILITATION | Facility: HOSPITAL | Age: 59
End: 2019-03-26
Attending: FAMILY MEDICINE
Payer: COMMERCIAL

## 2019-03-26 DIAGNOSIS — R53.1 DECREASED STRENGTH: ICD-10-CM

## 2019-03-26 DIAGNOSIS — M25.671 DECREASED RANGE OF MOTION OF BOTH ANKLES: ICD-10-CM

## 2019-03-26 DIAGNOSIS — Z78.9 IMPAIRED MOTOR CONTROL: ICD-10-CM

## 2019-03-26 DIAGNOSIS — R26.9 GAIT DISTURBANCE: ICD-10-CM

## 2019-03-26 DIAGNOSIS — M25.672 DECREASED RANGE OF MOTION OF BOTH ANKLES: ICD-10-CM

## 2019-03-26 DIAGNOSIS — S14.129A INCOMPLETE INJURY OF CERVICAL SPINAL CORD WITH CENTRAL CORD SYNDROME: ICD-10-CM

## 2019-03-26 PROCEDURE — 97110 THERAPEUTIC EXERCISES: CPT | Mod: PN

## 2019-03-29 NOTE — PROGRESS NOTES
Name: Alberto Dangelo  [facility] Number: [id number]  Date of Treatment: 03/26/2019   Diagnosis:   Encounter Diagnoses   Name Primary?   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        Time in: 1455  Time Out: 1555  Total Treatment Time: 35  Group Time: 0      Subjective:    Alberto reports increased quad fatigue after session.  Patient reports their pain to be n/a/10 on a 0-10 scale with 0 being no pain and 10 being the worst pain imaginable.    Objective    Patient received individual therapy to increase strength, flexibility and gait quality with 0 patients with activities as follows:     Pt set up with 5 kg unloading.  Set up on Lokomat Attempted lokomat training without success due to equipment difficulties.  Continued session as manual gait training over [place] treadmill @ 0.2 kph x 7 min with A to advance each LE, verbal cues to achieve smooth gait pattern.  Pt performed minisquats (supported to prevent excessive flexion) 2x15, standing hip/knee flexion 2x10, standing hip abd 2x10.  Manual assistance required for hip/knee raises and hip abd.  Discontinued activity due to increased LE fatigue.      Written Home Exercises Provided: N/A    Pt demo good understanding of the education provided. Alberto demonstrated fair return demonstration of activities.     Assessment:   Extensor tone limits active participation in standing activities.      Pt will continue to benefit from skilled PT intervention. Medical Necessity is demonstrated by:  Fall Risk, Unable to participate fully in daily activities, Requires skilled supervision to complete and progress HEP, Weakness and Functional mobility/gait deficits.    Patient is making fair progress towards established goals.    New/Revised goals: N/A      Plan:  Continue with established Plan of Care towards PT goals.  Resume Lokomat when able.

## 2019-04-02 ENCOUNTER — CLINICAL SUPPORT (OUTPATIENT)
Dept: REHABILITATION | Facility: HOSPITAL | Age: 59
End: 2019-04-02
Attending: FAMILY MEDICINE
Payer: COMMERCIAL

## 2019-04-02 DIAGNOSIS — M25.671 DECREASED RANGE OF MOTION OF BOTH ANKLES: ICD-10-CM

## 2019-04-02 DIAGNOSIS — M25.672 DECREASED RANGE OF MOTION OF BOTH ANKLES: ICD-10-CM

## 2019-04-02 DIAGNOSIS — R53.1 DECREASED STRENGTH: ICD-10-CM

## 2019-04-02 DIAGNOSIS — Z78.9 IMPAIRED MOTOR CONTROL: ICD-10-CM

## 2019-04-02 DIAGNOSIS — R26.9 GAIT DISTURBANCE: ICD-10-CM

## 2019-04-02 DIAGNOSIS — S14.129A INCOMPLETE INJURY OF CERVICAL SPINAL CORD WITH CENTRAL CORD SYNDROME: ICD-10-CM

## 2019-04-02 PROCEDURE — 97116 GAIT TRAINING THERAPY: CPT | Mod: PN

## 2019-04-08 NOTE — PROGRESS NOTES
"Name: Alberto Dangelo  [facility] Number: [id number]  Date of Treatment: 04/02/2019   Diagnosis:   Encounter Diagnoses   Name Primary?   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  Patient reports their pain to be n/a/10 on a 0-10 scale with 0 being no pain and 10 being the worst pain imaginable.    Objective    Patient received individual therapy to increase strength, endurance, flexibility, core stabilization and gait quality with 0 patients with activities as follows:     Alberto participated in dynamic functional therapeutic activities to improve functional performance for 67 minutes. Including: Set up with 5 kg unloading.  Pt participated in computer assisted robotic gait training via [employer] @ 3.0 kph x 60 min, 11 sec for a total distance of 2998 m.  Utilized guidance force of 50% x 15 min, decreased to 40% for 10 min, then to 30% for 15 min. Completed session @ 20% guidance force.        Written Home Exercises Provided: Pt participates in full HEP.  Also performs self stretching upon arriving in clinic.    Pt demo good understanding of the education provided. Alberto demonstrated good return demonstration of activities.     Assessment:   Occasional, minimal toe drag during session but no stoppage due to toe drag.  Also no stoppage due to excess tone.  Pt noted to perform sit<>stand with use of UE to push up from w/c as opposed to pulling himself up on bars.      Pt will continue to benefit from skilled PT intervention. Medical Necessity is demonstrated by:  Fall Risk, Unable to participate fully in daily activities, Weakness and Impaired gait/functional mobility.    Patient is making slow but steady progress towards established goals.    New/Revised goals: N/A      Plan:  Continue with " established Plan of Care towards PT goals. POC update due next week.

## 2019-04-09 ENCOUNTER — PATIENT OUTREACH (OUTPATIENT)
Dept: ADMINISTRATIVE | Facility: HOSPITAL | Age: 59
End: 2019-04-09

## 2019-04-11 ENCOUNTER — CLINICAL SUPPORT (OUTPATIENT)
Dept: REHABILITATION | Facility: HOSPITAL | Age: 59
End: 2019-04-11
Attending: FAMILY MEDICINE
Payer: COMMERCIAL

## 2019-04-11 DIAGNOSIS — S14.129A INCOMPLETE INJURY OF CERVICAL SPINAL CORD WITH CENTRAL CORD SYNDROME: ICD-10-CM

## 2019-04-11 DIAGNOSIS — Z78.9 IMPAIRED MOTOR CONTROL: ICD-10-CM

## 2019-04-11 DIAGNOSIS — R53.1 DECREASED STRENGTH: ICD-10-CM

## 2019-04-11 PROCEDURE — 97116 GAIT TRAINING THERAPY: CPT | Mod: PN

## 2019-04-12 NOTE — PROGRESS NOTES
Name: Alberto Dangelo  [facility] Number: [id number]  Date of Treatment: 4/11/2019   Diagnosis:   Encounter Diagnoses   Name Primary?   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        Time in: 1310  Time Out: 1445  Total Treatment Time: 65    Subjective:    Alberto reports no complaints. Early time today per PTA request. Mod I with manual w/c and mod I stretching prior to session.     Objective:    Patient received individual therapy to increase strength, endurance, ROM, flexibility, posture and core stabilization with activities as follows:     Gait training via [employer] system for 56:35 minutes (plus set up):     Set up at 5 kg unloading. Computer-assisted robotic gait training via [employer] x 3.0 km/h. GF x 50% x 10', 40% x 10', 35% x 15', 30% x 10', then 20% for distance of 2787 m. Several stoppages when dropping GF to 20%.     Continue HEP daily.    Pt demo good understanding of the education provided. Patient demonstrated good return demonstration of activities.     Assessment:     Stoppages when dropping GF to 20% but improved with time. Stopped session after approx 10 min 2* BR break.     Pt will continue to benefit from skilled PT intervention. Medical Necessity is demonstrated by:  Requires skilled supervision to complete and progress HEP and Weakness.    Patient is making good progress towards established goals.    Plan:    Continue with established Plan of Care towards PT goals.

## 2019-04-15 ENCOUNTER — DOCUMENTATION ONLY (OUTPATIENT)
Dept: FAMILY MEDICINE | Facility: CLINIC | Age: 59
End: 2019-04-15

## 2019-04-15 NOTE — PROGRESS NOTES
Pre-Visit Chart Review  For Appointment Scheduled on 4-22-19    Health Maintenance Due   Topic Date Due    Pneumococcal Vaccine (Highest Risk) (1 of 3 - PCV13) 03/04/1981    Colonoscopy  03/04/2012

## 2019-04-15 NOTE — PLAN OF CARE
Date: 04/11/2019    PHYSICAL THERAPY UPDATED PLAN OF TREATMENT    Patient name: Alberto Dangelo  Onset Date:  07/03/2010  SOC Date:  08/24/2016  Primary Diagnosis:    1. Impaired motor control     2. Decreased strength     3. 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 Home equipment includes B AFO's, shower chair, ramped entrance w support bars to swimming pool, FES bike, free weights, plyobox system.     Updated Assessment:  L-FORCE TEST                                               LEFT                                                 RIGHT                             FLEX                  EXT                      FLEX                     EXT                HIP      6.9 Nm               9.45Nm                 1.95 Nm               5.25 Nm                                                             KNEE    6.10  Nm             19.64 Nm              1.59 Nm              17.67 Nm      Gait:  Pt is able to tolerate up to 1 hour on Lokomat @ 5 kg unloading and 3.0 kph with guidance force progressively reduced over the course of treatment to 20%.  He continues to ambulate some at home using rolling walker with significant difficulty advancing his LE due to excess tone.    Posture:  Pt stands with foot flat position, feet less than shoulder width apart.  Tendency toward knee hyperextension and anterior thrust of pelvis with hip ext continues contributing to   Increased lumbar lordosis; however, he can correct this for short periods of time with verbal and tactile cues.  This does result in increased difficulty maintaining balance. He is able to maintain standing  position for short periods without UE support.    He does not demonstrate adequate ankle responses to correct for balance challenges  Transfers:  Sit <> stand: pt is able to achieve sit > stand using minimal UE support but this is inconsitent.  He continues with substantial anterior weight shift to insure weight is fully over his feet.  Ascent is controlled but upon reaching erect position he tends to hyperextend B knees. Once he gains his balance with knees locked, he is able to unlock his knees.  Stand > sit requires moderate UE support but is controlled descent.    Flexibility:  Somewhat limited by tone.  Continues to deomstrate tightness in B calves, B hamstrings, and B hip flexors.      Previous Short Term Goals Status:     1) Pt will stand for 10 min without excessive lumbar lordosis or increased hip/knee ext  Progressing     2) Pt will tolerate decreased guidance force to < 25% without excessive toe drag  MET      3) Pt will perform sit > stand with minimal to moderate UE  support  Progressing (inconsistent)    New Short Term Goals Status:   #1, 3 continue, 2) Resume ambulation using forearm crutches for household use,   Long Term Goal Status:   continue per plan of care dated 1/17/2019.   1) Increase strength in B  LE as indicated by improved L-force testing  Progressing   2)  Pt will stand > 15 min with minimal UE support. Progressing   3) Pt will consistently perform safe stand pivot transfers,  Progressing   4) Pt will demonstrate improved bilateral hip flexion strength to 2/5 to improve ability to perform swing phase/ foot clearance during gait    Progressing     5) Pt will demonstrate appropriate weight shift during ambulation to limit hip hiking and excessive weight shift.  Progressing    Reasons for Recertification of Therapy:   Pt is making inconsistent progress due to fluctuations in tone caused by fatigue, soreness, etc.  He has been able to resume his usual Lokomat activity as his fractured toes  healed.  He performs self stretching but requires use of Lokomat for substantial gait training.      Certification Period: 04/12/2019 to 7/5/2019  Recommended Treatment Plan: 2 times per week for 12 weeks: Gait Training, Manual Therapy, Neuromuscular Re-ed, Patient Education, Therapeutic Activites, Therapeutic Exercise and Locomotor training  Other Recommendations: Pt to continue HEP         Therapist's Name: Candice Herzog, PT   Date: 04/11/2019  I CERTIFY THE NEED FOR THESE SERVICES FURNISHED UNDER THIS PLAN OF TREATMENT AND WHILE UNDER MY CARE    Physician's comments: ________________________________________________________________________________________________________________________________________________      Physician's Name: ___________________________________

## 2019-04-16 ENCOUNTER — CLINICAL SUPPORT (OUTPATIENT)
Dept: REHABILITATION | Facility: HOSPITAL | Age: 59
End: 2019-04-16
Attending: FAMILY MEDICINE
Payer: COMMERCIAL

## 2019-04-16 DIAGNOSIS — R53.1 DECREASED STRENGTH: ICD-10-CM

## 2019-04-16 DIAGNOSIS — Z78.9 IMPAIRED MOTOR CONTROL: ICD-10-CM

## 2019-04-16 DIAGNOSIS — M25.671 DECREASED RANGE OF MOTION OF BOTH ANKLES: ICD-10-CM

## 2019-04-16 DIAGNOSIS — R26.9 GAIT DISTURBANCE: ICD-10-CM

## 2019-04-16 DIAGNOSIS — S14.129A INCOMPLETE INJURY OF CERVICAL SPINAL CORD WITH CENTRAL CORD SYNDROME: ICD-10-CM

## 2019-04-16 DIAGNOSIS — M25.672 DECREASED RANGE OF MOTION OF BOTH ANKLES: ICD-10-CM

## 2019-04-16 PROCEDURE — 97116 GAIT TRAINING THERAPY: CPT | Mod: PN

## 2019-04-16 NOTE — PROGRESS NOTES
"Name: Alberto Dangelo  [facility] Number: [id number]  Date of Treatment: 04/16/2019   Diagnosis:   Encounter Diagnoses   Name Primary?   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    Objective    Patient received individual therapy to increase strength, ROM, flexibility, core stabilization and gait quality/functional mobility with 0 patients with activities as follows:     Alberto participated in dynamic functional therapeutic activities to improve functional performance for 67 minutes. Including: Set up with 5 kg unloading.  Pt participated in computer assisted robotic gait training via [employer] @ 3.0 kph x 60 min, 22 sec for a total distance of 2998 m.  Utilized guidance force of 50% x 15 min, decreased to 35% x 10', decreased again to 25% for 20 min.  Reduced guidance force to 20% without increased toe drag and received gait training @ 20% x 5'.  As pt exhibited no toe drag @ 20% reduced guidance force to 15% for remaining session (9 min).      Written Home Exercises Provided:   Discussed options for improving ankle ROM, enhancing heel strike and terminal stance and reducing knee hyperextension during mid stance. Pt continues to perform home program  Pt demo good understanding of the education provided. Alberto demonstrated good return demonstration of activities.     Assessment:   Able to reduce guidance force to 15% without toe drag this date.  Minimal knee flexion during stance phase noted B.    Pt will continue to benefit from skilled PT intervention. Medical Necessity is demonstrated by:  Fall Risk, Unable to participate fully in daily activities, Weakness, limited " ankle ROM, and impaired functional mobility/gait.  Pt may benefit from manual therapy and serial casting to ankles to enhance ankle ROM and encourage knee flexion during gait.    Patient is making fair progress towards established goals.    New/Revised goals: N/A      Plan:  Continue with established Plan of Care towards PT goals.  Discuss options of manual therapy in conjunction with serial casting.  Assess soft tissue and joint mobility of feet/ankles and address restrictions as needed.

## 2019-04-18 ENCOUNTER — CLINICAL SUPPORT (OUTPATIENT)
Dept: REHABILITATION | Facility: HOSPITAL | Age: 59
End: 2019-04-18
Attending: FAMILY MEDICINE
Payer: COMMERCIAL

## 2019-04-18 DIAGNOSIS — M25.672 DECREASED RANGE OF MOTION OF BOTH ANKLES: ICD-10-CM

## 2019-04-18 DIAGNOSIS — R53.1 DECREASED STRENGTH: ICD-10-CM

## 2019-04-18 DIAGNOSIS — S14.129A INCOMPLETE INJURY OF CERVICAL SPINAL CORD WITH CENTRAL CORD SYNDROME: ICD-10-CM

## 2019-04-18 DIAGNOSIS — M25.671 DECREASED RANGE OF MOTION OF BOTH ANKLES: ICD-10-CM

## 2019-04-18 DIAGNOSIS — Z78.9 IMPAIRED MOTOR CONTROL: ICD-10-CM

## 2019-04-18 DIAGNOSIS — R26.9 GAIT DISTURBANCE: ICD-10-CM

## 2019-04-18 PROCEDURE — 97116 GAIT TRAINING THERAPY: CPT | Mod: PN

## 2019-04-18 NOTE — PROGRESS NOTES
"Name: Alberto Dangelo  [facility] Number: [id number]  Date of Treatment: 04/18/2019   Diagnosis:   Encounter Diagnoses   Name Primary?   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  Patient reports their pain to be n/a/10 on a 0-10 scale with 0 being no pain and 10 being the worst pain imaginable.    Objective    Patient received individual therapy to increase strength, endurance, flexibility, core stabilization and gait quality with 0 patients with activities as follows:     Alberto participated in dynamic functional therapeutic activities to improve functional performance for 67 minutes. Including: Set up with 5 kg unloading.  Pt participated in computer assisted robotic gait training via [employer] @ 3.0 kph x 60 min, 52 sec for a total distance of 3024 m.  Utilized guidance force of 50% x 16 min, decreased to 35% x 10', reduced again to 25% x 14'.  Completed session @ 20%.  Attempted to decrease to 15% but pt demonstrated significant foot drag B causing safety cut off.  Was able to restart @ 20% and complete training (9').      Written Home Exercises Provided: Participates in HEP  Pt demo good understanding of the education provided. Alberto demonstrated good return demonstration of activities.     Assessment:   Decreased foot clearance @ 15% guidance force. Required completion of training @ 20%    Pt will continue to benefit from skilled PT intervention. Medical Necessity is demonstrated by:  Fall Risk, Unable to participate fully in daily activities, Weakness and Impaired gait/functional mobility  Patient is making fair progress towards established goals.    New/Revised goals: NA      Plan:  Continue with established Plan of Care towards PT goals. Continue to try to decrease " guidance force as patient tolerates.

## 2019-04-20 DIAGNOSIS — K21.9 GASTROESOPHAGEAL REFLUX DISEASE, ESOPHAGITIS PRESENCE NOT SPECIFIED: ICD-10-CM

## 2019-04-22 ENCOUNTER — LAB VISIT (OUTPATIENT)
Dept: LAB | Facility: HOSPITAL | Age: 59
End: 2019-04-22
Attending: FAMILY MEDICINE
Payer: COMMERCIAL

## 2019-04-22 ENCOUNTER — OFFICE VISIT (OUTPATIENT)
Dept: FAMILY MEDICINE | Facility: CLINIC | Age: 59
End: 2019-04-22
Attending: FAMILY MEDICINE
Payer: COMMERCIAL

## 2019-04-22 VITALS
HEART RATE: 76 BPM | DIASTOLIC BLOOD PRESSURE: 80 MMHG | SYSTOLIC BLOOD PRESSURE: 110 MMHG | BODY MASS INDEX: 24.01 KG/M2 | WEIGHT: 171.5 LBS | TEMPERATURE: 98 F | RESPIRATION RATE: 12 BRPM | OXYGEN SATURATION: 98 % | HEIGHT: 71 IN

## 2019-04-22 DIAGNOSIS — Z00.00 ENCOUNTER FOR PREVENTIVE HEALTH EXAMINATION: ICD-10-CM

## 2019-04-22 DIAGNOSIS — G90.9 AUTONOMIC DYSFUNCTION: ICD-10-CM

## 2019-04-22 DIAGNOSIS — R25.2 SPASM: ICD-10-CM

## 2019-04-22 DIAGNOSIS — E83.42 HYPOMAGNESEMIA: ICD-10-CM

## 2019-04-22 DIAGNOSIS — S14.129A INCOMPLETE INJURY OF CERVICAL SPINAL CORD WITH CENTRAL CORD SYNDROME: ICD-10-CM

## 2019-04-22 DIAGNOSIS — Z12.11 COLON CANCER SCREENING: ICD-10-CM

## 2019-04-22 DIAGNOSIS — Z00.00 ENCOUNTER FOR PREVENTIVE HEALTH EXAMINATION: Primary | ICD-10-CM

## 2019-04-22 DIAGNOSIS — Z86.718 HISTORY OF DVT (DEEP VEIN THROMBOSIS): ICD-10-CM

## 2019-04-22 DIAGNOSIS — Z23 IMMUNIZATION DUE: ICD-10-CM

## 2019-04-22 DIAGNOSIS — Z12.5 PROSTATE CANCER SCREENING: ICD-10-CM

## 2019-04-22 DIAGNOSIS — Z86.711 HISTORY OF PULMONARY EMBOLISM: ICD-10-CM

## 2019-04-22 LAB
BASOPHILS # BLD AUTO: 0.06 K/UL (ref 0–0.2)
BASOPHILS NFR BLD: 1.1 % (ref 0–1.9)
COMPLEXED PSA SERPL-MCNC: 1.6 NG/ML (ref 0–4)
DIFFERENTIAL METHOD: ABNORMAL
EOSINOPHIL # BLD AUTO: 0.1 K/UL (ref 0–0.5)
EOSINOPHIL NFR BLD: 2.6 % (ref 0–8)
ERYTHROCYTE [DISTWIDTH] IN BLOOD BY AUTOMATED COUNT: 12.5 % (ref 11.5–14.5)
HCT VFR BLD AUTO: 46.6 % (ref 40–54)
HGB BLD-MCNC: 15.8 G/DL (ref 14–18)
IMM GRANULOCYTES # BLD AUTO: 0.03 K/UL (ref 0–0.04)
IMM GRANULOCYTES NFR BLD AUTO: 0.6 % (ref 0–0.5)
LYMPHOCYTES # BLD AUTO: 1.6 K/UL (ref 1–4.8)
LYMPHOCYTES NFR BLD: 30.7 % (ref 18–48)
MCH RBC QN AUTO: 31.2 PG (ref 27–31)
MCHC RBC AUTO-ENTMCNC: 33.9 G/DL (ref 32–36)
MCV RBC AUTO: 92 FL (ref 82–98)
MONOCYTES # BLD AUTO: 0.5 K/UL (ref 0.3–1)
MONOCYTES NFR BLD: 8.4 % (ref 4–15)
NEUTROPHILS # BLD AUTO: 3 K/UL (ref 1.8–7.7)
NEUTROPHILS NFR BLD: 56.6 % (ref 38–73)
NRBC BLD-RTO: 0 /100 WBC
PLATELET # BLD AUTO: 181 K/UL (ref 150–350)
PMV BLD AUTO: 12 FL (ref 9.2–12.9)
RBC # BLD AUTO: 5.07 M/UL (ref 4.6–6.2)
WBC # BLD AUTO: 5.34 K/UL (ref 3.9–12.7)

## 2019-04-22 PROCEDURE — 99999 PR PBB SHADOW E&M-EST. PATIENT-LVL III: ICD-10-PCS | Mod: PBBFAC,,, | Performed by: FAMILY MEDICINE

## 2019-04-22 PROCEDURE — 83735 ASSAY OF MAGNESIUM: CPT

## 2019-04-22 PROCEDURE — 99999 PR PBB SHADOW E&M-EST. PATIENT-LVL III: CPT | Mod: PBBFAC,,, | Performed by: FAMILY MEDICINE

## 2019-04-22 PROCEDURE — 84153 ASSAY OF PSA TOTAL: CPT

## 2019-04-22 PROCEDURE — 85025 COMPLETE CBC W/AUTO DIFF WBC: CPT

## 2019-04-22 PROCEDURE — 99396 PR PREVENTIVE VISIT,EST,40-64: ICD-10-PCS | Mod: 25,S$GLB,, | Performed by: FAMILY MEDICINE

## 2019-04-22 PROCEDURE — 99396 PREV VISIT EST AGE 40-64: CPT | Mod: 25,S$GLB,, | Performed by: FAMILY MEDICINE

## 2019-04-22 PROCEDURE — 90670 PNEUMOCOCCAL CONJUGATE VACCINE 13-VALENT LESS THAN 5YO & GREATER THAN: ICD-10-PCS | Mod: S$GLB,,, | Performed by: FAMILY MEDICINE

## 2019-04-22 PROCEDURE — 80061 LIPID PANEL: CPT

## 2019-04-22 PROCEDURE — 80053 COMPREHEN METABOLIC PANEL: CPT

## 2019-04-22 PROCEDURE — 90670 PCV13 VACCINE IM: CPT | Mod: S$GLB,,, | Performed by: FAMILY MEDICINE

## 2019-04-22 PROCEDURE — 90471 IMMUNIZATION ADMIN: CPT | Mod: S$GLB,,, | Performed by: FAMILY MEDICINE

## 2019-04-22 PROCEDURE — 90471 PNEUMOCOCCAL CONJUGATE VACCINE 13-VALENT LESS THAN 5YO & GREATER THAN: ICD-10-PCS | Mod: S$GLB,,, | Performed by: FAMILY MEDICINE

## 2019-04-22 PROCEDURE — 36415 COLL VENOUS BLD VENIPUNCTURE: CPT | Mod: PO

## 2019-04-22 RX ORDER — DIAZEPAM 5 MG/1
TABLET ORAL
Qty: 60 TABLET | Refills: 5 | Status: SHIPPED | OUTPATIENT
Start: 2019-04-22 | End: 2019-11-08 | Stop reason: SDUPTHER

## 2019-04-22 RX ORDER — LANOLIN ALCOHOL/MO/W.PET/CERES
400 CREAM (GRAM) TOPICAL 2 TIMES DAILY
Qty: 180 TABLET | Refills: 3 | Status: SHIPPED | OUTPATIENT
Start: 2019-04-22 | End: 2020-05-22 | Stop reason: SDUPTHER

## 2019-04-22 RX ORDER — MIDODRINE HYDROCHLORIDE 10 MG/1
10 TABLET ORAL 3 TIMES DAILY
Qty: 270 TABLET | Refills: 1 | Status: SHIPPED | OUTPATIENT
Start: 2019-04-22 | End: 2019-11-18 | Stop reason: SDUPTHER

## 2019-04-22 RX ORDER — OMEPRAZOLE 20 MG/1
CAPSULE, DELAYED RELEASE ORAL
Qty: 90 CAPSULE | Refills: 0 | Status: SHIPPED | OUTPATIENT
Start: 2019-04-22 | End: 2019-06-10 | Stop reason: SDUPTHER
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
## 2019-04-23 ENCOUNTER — CLINICAL SUPPORT (OUTPATIENT)
Dept: REHABILITATION | Facility: HOSPITAL | Age: 59
End: 2019-04-23
Attending: FAMILY MEDICINE
Payer: COMMERCIAL

## 2019-04-23 DIAGNOSIS — M25.671 DECREASED RANGE OF MOTION OF BOTH ANKLES: ICD-10-CM

## 2019-04-23 DIAGNOSIS — S14.129A INCOMPLETE INJURY OF CERVICAL SPINAL CORD WITH CENTRAL CORD SYNDROME: ICD-10-CM

## 2019-04-23 DIAGNOSIS — R26.9 GAIT DISTURBANCE: ICD-10-CM

## 2019-04-23 DIAGNOSIS — R53.1 DECREASED STRENGTH: ICD-10-CM

## 2019-04-23 DIAGNOSIS — M25.672 DECREASED RANGE OF MOTION OF BOTH ANKLES: ICD-10-CM

## 2019-04-23 DIAGNOSIS — Z78.9 IMPAIRED MOTOR CONTROL: ICD-10-CM

## 2019-04-23 LAB
ALBUMIN SERPL BCP-MCNC: 4.5 G/DL (ref 3.5–5.2)
ALP SERPL-CCNC: 70 U/L (ref 55–135)
ALT SERPL W/O P-5'-P-CCNC: 38 U/L (ref 10–44)
ANION GAP SERPL CALC-SCNC: 12 MMOL/L (ref 8–16)
AST SERPL-CCNC: 30 U/L (ref 10–40)
BILIRUB SERPL-MCNC: 0.7 MG/DL (ref 0.1–1)
BUN SERPL-MCNC: 22 MG/DL (ref 6–20)
CALCIUM SERPL-MCNC: 10.3 MG/DL (ref 8.7–10.5)
CHLORIDE SERPL-SCNC: 103 MMOL/L (ref 95–110)
CHOLEST SERPL-MCNC: 206 MG/DL (ref 120–199)
CHOLEST/HDLC SERPL: 3.6 {RATIO} (ref 2–5)
CO2 SERPL-SCNC: 25 MMOL/L (ref 23–29)
CREAT SERPL-MCNC: 1.1 MG/DL (ref 0.5–1.4)
EST. GFR  (AFRICAN AMERICAN): >60 ML/MIN/1.73 M^2
EST. GFR  (NON AFRICAN AMERICAN): >60 ML/MIN/1.73 M^2
GLUCOSE SERPL-MCNC: 82 MG/DL (ref 70–110)
HDLC SERPL-MCNC: 57 MG/DL (ref 40–75)
HDLC SERPL: 27.7 % (ref 20–50)
LDLC SERPL CALC-MCNC: 118.8 MG/DL (ref 63–159)
MAGNESIUM SERPL-MCNC: 2.2 MG/DL (ref 1.6–2.6)
NONHDLC SERPL-MCNC: 149 MG/DL
POTASSIUM SERPL-SCNC: 4 MMOL/L (ref 3.5–5.1)
PROT SERPL-MCNC: 7.8 G/DL (ref 6–8.4)
SODIUM SERPL-SCNC: 140 MMOL/L (ref 136–145)
TRIGL SERPL-MCNC: 151 MG/DL (ref 30–150)

## 2019-04-23 PROCEDURE — 97116 GAIT TRAINING THERAPY: CPT | Mod: PN

## 2019-04-23 NOTE — PROGRESS NOTES
"Name: Alberto Dangelo  [facility] Number: [id number]  Date of Treatment: 04/23/2019   Diagnosis:   Encounter Diagnoses   Name Primary?   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  I can get better heel strike and toe off.  It's getting better".  Patient reports their pain to be n/a/10 on a 0-10 scale with 0 being no pain and 10 being the worst pain imaginable.    Objective    Patient received individual therapy to increase strength, endurance, flexibility, core stabilization and gait quality with 0 patients with activities as follows:     Alberto participated in dynamic functional therapeutic activities to improve functional performance for 67 minutes. Including: Set up with 5 kg unloading.  Pt participated in computer assisted robotic gait training via [employer] @ 3.0 kph x 60 min, 37 sec for a total distance of 3004 m.  Utilized guidance force of 50% x 15 min, decreased to 35% x 10 min, decreased to 25% x 15 min, and 20% for remainder of session.  After 10 min @ 20% guidance force, attempted to decrease to 15%; however, pt demonstrated increased foot drag with decreased foot clearance B.  Thus returned to 20%.  Attempted to decrease speed and guidance force together without success.  Completed training @ 20% guidance force and 3.0 kph.        Written Home Exercises Provided: Pt participates in HEP   Pt demo good understanding of the education provided. Alberto demonstrated good return demonstration of activities.     Assessment:   Pt unable to tolerate guidance force decrease to 15% without foot drag.     Pt will continue to benefit from skilled PT intervention. Medical Necessity is demonstrated by:  Fall Risk, Unable to participate fully in daily activities, Weakness and Impaired gait " and functional mobility.      Patient is making steady progress towards established goals.    New/Revised goals: N/A      Plan:  Continue with established Plan of Care towards PT goals. Strive to decrease guidance force as patient tolerates.

## 2019-04-29 ENCOUNTER — LAB VISIT (OUTPATIENT)
Dept: LAB | Facility: HOSPITAL | Age: 59
End: 2019-04-29
Attending: FAMILY MEDICINE
Payer: COMMERCIAL

## 2019-04-29 DIAGNOSIS — Z12.11 COLON CANCER SCREENING: ICD-10-CM

## 2019-04-29 LAB — HEMOCCULT STL QL IA: NEGATIVE

## 2019-04-29 PROCEDURE — 82274 ASSAY TEST FOR BLOOD FECAL: CPT

## 2019-04-30 ENCOUNTER — CLINICAL SUPPORT (OUTPATIENT)
Dept: REHABILITATION | Facility: HOSPITAL | Age: 59
End: 2019-04-30
Attending: FAMILY MEDICINE
Payer: COMMERCIAL

## 2019-04-30 DIAGNOSIS — R53.1 DECREASED STRENGTH: ICD-10-CM

## 2019-04-30 DIAGNOSIS — S14.129A INCOMPLETE INJURY OF CERVICAL SPINAL CORD WITH CENTRAL CORD SYNDROME: ICD-10-CM

## 2019-04-30 DIAGNOSIS — R26.9 GAIT DISTURBANCE: ICD-10-CM

## 2019-04-30 DIAGNOSIS — Z78.9 IMPAIRED MOTOR CONTROL: ICD-10-CM

## 2019-04-30 DIAGNOSIS — M25.671 DECREASED RANGE OF MOTION OF BOTH ANKLES: ICD-10-CM

## 2019-04-30 DIAGNOSIS — M25.672 DECREASED RANGE OF MOTION OF BOTH ANKLES: ICD-10-CM

## 2019-04-30 PROCEDURE — 97116 GAIT TRAINING THERAPY: CPT | Mod: PN

## 2019-04-30 NOTE — PROGRESS NOTES
Name: Alberto Dangelo  [facility] Number: [id number]  Date of Treatment: 04/30/2019   Diagnosis:   Encounter Diagnoses   Name Primary?   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  Patient reports their pain to be n/a/10 on a 0-10 scale with 0 being no pain and 10 being the worst pain imaginable.    Objective    Patient received individual therapy to increase strength, endurance, flexibility, core stabilization and gait quality with 0 patients with activities as follows:     Alberto participated in dynamic functional therapeutic activities to improve functional performance for 67 minutes. Including: Set up with 5 kg unloading.  Pt participated in computer assisted robotic gait training via [employer] @ 3.0 kph x 60 min, 13 sec for a total distance of 2986 m.  Utilized guidance force of 50% x 15 min, decreased to 35% x 17 min, decreased to 25% x 8 min.  Completed session @ 20% guidance force.  After 10 min @ 20% guidance force, attempted to decrease to 15%; however, pt demonstrated increased foot drag resulting in stoppage of treadmill.  Resumed @ 20% to complete session.      Written Home Exercises Provided: N/A this date.    Pt demo good understanding of the education provided. Alberto demonstrated good return demonstration of activities.     Assessment:   Unable to decrease to 15% guidance force without excessive foot drag.  Pt reporting increased walking activity at home.    Pt will continue to benefit from skilled PT intervention. Medical Necessity is demonstrated by:  Fall Risk, Unable to participate fully in daily activities, Weakness and Gait/functional mobility deficits.     Patient is making steady progress towards established goals.    New/Revised goals: N/A      Plan:  Continue with established  Plan of Care towards PT goals. Continue attempts to decrease guidance force during gait training activities.

## 2019-05-07 ENCOUNTER — CLINICAL SUPPORT (OUTPATIENT)
Dept: REHABILITATION | Facility: HOSPITAL | Age: 59
End: 2019-05-07
Attending: FAMILY MEDICINE
Payer: COMMERCIAL

## 2019-05-07 DIAGNOSIS — S14.129A INCOMPLETE INJURY OF CERVICAL SPINAL CORD WITH CENTRAL CORD SYNDROME: Primary | ICD-10-CM

## 2019-05-07 PROCEDURE — 97116 GAIT TRAINING THERAPY: CPT | Mod: PN

## 2019-05-07 NOTE — PROGRESS NOTES
Name: Alberto Dangelo  [facility] Number: [id number]  Date of Treatment: 05/07/2019   Diagnosis:   Encounter Diagnosis   Name Primary?   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 Has been working on gait pattern at home with rw and heel-toe emphasis, which is causing more LE fatigue and tone. Manual w/c and brought his service dog.     Objective:    Patient received individual therapy to increase strength, endurance, ROM, flexibility, posture and core stabilization with activities as follows:     Gait training via [employer] system for 56:26 minutes (plus set up):     Set up at 5 kg unloading. Computer-assisted robotic gait training via [employer] x 3.0 km/h. GF x 50% x 15', 35% x 15', 30% x 6', then increased to 45% for distance of 2769 m. Several stoppages.     Continue HEP daily.    Pt demo good understanding of the education provided. Patient demonstrated good return demonstration of activities.     Assessment:     Increased tones L>R LE, which is unusual for this pt during lokomat session, R usually has more tones than L. Pt attributes it to increased home gait training with emphasis on heel-toe pattern.     Pt will continue to benefit from skilled PT intervention. Medical Necessity is demonstrated by:  Requires skilled supervision to complete and progress HEP and Weakness.    Patient is making good progress towards established goals.    Plan:    Continue with established Plan of Care towards PT goals.

## 2019-05-09 ENCOUNTER — CLINICAL SUPPORT (OUTPATIENT)
Dept: REHABILITATION | Facility: HOSPITAL | Age: 59
End: 2019-05-09
Attending: FAMILY MEDICINE
Payer: COMMERCIAL

## 2019-05-09 DIAGNOSIS — R53.1 DECREASED STRENGTH: ICD-10-CM

## 2019-05-09 DIAGNOSIS — S14.129A INCOMPLETE INJURY OF CERVICAL SPINAL CORD WITH CENTRAL CORD SYNDROME: ICD-10-CM

## 2019-05-09 DIAGNOSIS — R26.9 GAIT DISTURBANCE: ICD-10-CM

## 2019-05-09 DIAGNOSIS — Z78.9 IMPAIRED MOTOR CONTROL: ICD-10-CM

## 2019-05-09 DIAGNOSIS — M25.672 DECREASED RANGE OF MOTION OF BOTH ANKLES: ICD-10-CM

## 2019-05-09 DIAGNOSIS — M25.671 DECREASED RANGE OF MOTION OF BOTH ANKLES: ICD-10-CM

## 2019-05-09 PROCEDURE — 97116 GAIT TRAINING THERAPY: CPT | Mod: PN

## 2019-05-10 NOTE — PROGRESS NOTES
"Name: Alberto Dangelo  [facility] Number: [id number]  Date of Treatment: 05/09/2019   Diagnosis:   Encounter Diagnoses   Name Primary?   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        Time in: 1420  Time Out: 1545  Total Treatment Time: 67  Group Time: 0      Subjective:    Alberto reports "It feels like it's getting easier, like my walking is getting better".  Patient reports their pain to be n/a/10 on a 0-10 scale with 0 being no pain and 10 being the worst pain imaginable.    Objective    Patient received individual therapy to increase endurance, flexibility, core stabilization and gait quality with 0 patients with activities as follows:     Pt performed self stretching to gastrocs (in standing), hip flexors (in standing), and hip flexors in supine prior to training.      Alberto participated in dynamic functional therapeutic activities to improve functional performance for 67 minutes. Including: Set up with 5 kg unloading.  Pt participated in computer assisted robotic gait training via [employer] @ 3.0 kph x 60 min, 13 sec for a total distance of 2974 m.  Utilized guidance force of 50% x 10 min, decreased to 35% x 20 min, and decreased again to 25% x 10 min.  Guidance force decreased to 20%.  Patient was able to decrease guidance force to 15% x 10 min but required an increase in unweighting to > 15 kg in order to prevent excessive foot drag.  .      Written Home Exercises Provided: Discussed equipment for home use.    Pt demo good understanding of the education provided. Alberto demonstrated good return demonstration of activities.     Assessment:   Demonstrated increased toe drag when initially decreased to 15% guidance force @ 5 kg unloading; however, was able to complete gait training @ 15% guidance force but with increased unweightning.    Pt will continue to benefit from skilled PT intervention. Medical Necessity is demonstrated by: "  Fall Risk, Unable to participate fully in daily activities, Requires skilled supervision to complete and progress HEP, Weakness, Edema and continued continued gait/functional mobility deficits.      Patient is making steady progress towards established goals.    New/Revised goals: N/A      Plan:  Continue with established Plan of Care towards PT goals.   Progress as tolerated.

## 2019-05-14 ENCOUNTER — CLINICAL SUPPORT (OUTPATIENT)
Dept: REHABILITATION | Facility: HOSPITAL | Age: 59
End: 2019-05-14
Attending: FAMILY MEDICINE
Payer: COMMERCIAL

## 2019-05-14 DIAGNOSIS — M25.672 DECREASED RANGE OF MOTION OF BOTH ANKLES: ICD-10-CM

## 2019-05-14 DIAGNOSIS — R26.9 GAIT DISTURBANCE: ICD-10-CM

## 2019-05-14 DIAGNOSIS — Z78.9 IMPAIRED MOTOR CONTROL: ICD-10-CM

## 2019-05-14 DIAGNOSIS — M25.671 DECREASED RANGE OF MOTION OF BOTH ANKLES: ICD-10-CM

## 2019-05-14 DIAGNOSIS — R53.1 DECREASED STRENGTH: ICD-10-CM

## 2019-05-14 DIAGNOSIS — S14.129A INCOMPLETE INJURY OF CERVICAL SPINAL CORD WITH CENTRAL CORD SYNDROME: ICD-10-CM

## 2019-05-14 PROCEDURE — 97116 GAIT TRAINING THERAPY: CPT | Mod: PN

## 2019-05-14 NOTE — PROGRESS NOTES
"[id number]  Date of Treatment: 05/14/2019   Diagnosis:   Encounter Diagnoses   Name Primary?   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        Time in: 1410  Time Out: 1535  Total Treatment Time: 67  Group Time: 0      Subjective:    Alberto reports "I've been hitting the leg work hard.  I spend 2 hours on the Nu[place]step yesterday"..  Patient reports their pain to be 0/10 on a 0-10 scale with 0 being no pain and 10 being the worst pain imaginable.    Objective    Patient received individual therapy to increase strength, endurance, flexibility, core stabilization and gait quality with 0 patients with activities as follows:     Alberto participated in dynamic functional therapeutic activities to improve functional performance for 67 minutes. Including: Set up with 5 kg unloading.  Pt participated in computer assisted robotic gait training via [employer] @ 3.0 kph x 60 min, 14 sec for a total distance of 2989 m.  Utilized guidance force of 50% x 11 min, decreased to 35% x 19 min, and decreased again to 25% x 2.5 min.  Completed session (27.5 min) @ 20%.  1 episode of toe drag noted during initial training but correction to foot strap resolved issue.        Written Home Exercises Provided: N/A  Pt demo good understanding of the education provided. Alberto demonstrated good return demonstration of activities.     Assessment:   Good training achieved.  Able to achieve B heel strike and toe off.  Minor adjustments needed to sustain consistent heel/toe gait  Pattern.      Pt will continue to benefit from skilled PT intervention. Medical Necessity is demonstrated by:  Fall Risk, Unable to participate fully in daily activities, Requires skilled supervision to complete and progress HEP, Weakness and Impaired gait/functional mobility.      Patient is making steady progress towards established goals.    New/Revised " goals: N/A      Plan:  Continue with established Plan of Care towards PT goals.

## 2019-05-21 ENCOUNTER — CLINICAL SUPPORT (OUTPATIENT)
Dept: REHABILITATION | Facility: HOSPITAL | Age: 59
End: 2019-05-21
Attending: FAMILY MEDICINE
Payer: COMMERCIAL

## 2019-05-21 DIAGNOSIS — Z78.9 IMPAIRED MOTOR CONTROL: ICD-10-CM

## 2019-05-21 DIAGNOSIS — M25.671 DECREASED RANGE OF MOTION OF BOTH ANKLES: ICD-10-CM

## 2019-05-21 DIAGNOSIS — R26.9 GAIT DISTURBANCE: ICD-10-CM

## 2019-05-21 DIAGNOSIS — R53.1 DECREASED STRENGTH: ICD-10-CM

## 2019-05-21 DIAGNOSIS — M25.672 DECREASED RANGE OF MOTION OF BOTH ANKLES: ICD-10-CM

## 2019-05-21 DIAGNOSIS — S14.129A INCOMPLETE INJURY OF CERVICAL SPINAL CORD WITH CENTRAL CORD SYNDROME: ICD-10-CM

## 2019-05-21 PROCEDURE — 97116 GAIT TRAINING THERAPY: CPT | Mod: PN

## 2019-05-21 NOTE — PROGRESS NOTES
"Name: Alberto Dangelo  [facility] Number: [id number]  Date of Treatment: 05/21/2019   Diagnosis:   Encounter Diagnoses   Name Primary?   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        Time in: 1400  Time Out: 1528  Total Treatment Time: 67  Group Time: 0      Subjective:    Alberto reports "It feels like my foot is turned out and it's putting pressure on my knee".  Patient reports their pain to be n/a/10 on a 0-10 scale with 0 being no pain and 10 being the worst pain imaginable.    Objective    Patient received individual therapy to increase strength, endurance, flexibility, core stabilization and gait quality with 0 patients with activities as follows:     Alberto participated in dynamic functional therapeutic activities to improve functional performance for 67 minutes. Including: Set up with 5 kg unloading.  Pt participated in computer assisted robotic gait training via [employer] @ 3.0 kph x 60 min, 11 sec for a total distance of 2982 m.  Utilized guidance force of 50% x 12 min then decreased to 35% for 13 min, reduced guidance force again to 25% x 10 min.  Completed session @ 20% guidance force.        Written Home Exercises Provided: Pt performs self stretching prior to gait training and is independent in HEP.    Pt demo good understanding of the education provided. Alberto demonstrated good return demonstration of activities.     Assessment:   Pt experienced some discomfort in his R LE after initial set up due to feeling as if R lower leg was twisted.  Correction to position completed with relief of discomfort reported.      Pt will continue to benefit from skilled PT intervention. Medical Necessity is demonstrated by:  Fall Risk, Unable to participate fully in daily activities, Weakness and Functional mobility/gait deficits.      Patient is making steady progress towards established goals.    New/Revised goals: " N/A      Plan:  Continue with established Plan of Care towards PT goals. Progress activities as tolerated.

## 2019-05-23 ENCOUNTER — CLINICAL SUPPORT (OUTPATIENT)
Dept: REHABILITATION | Facility: HOSPITAL | Age: 59
End: 2019-05-23
Attending: FAMILY MEDICINE
Payer: COMMERCIAL

## 2019-05-23 DIAGNOSIS — Z78.9 IMPAIRED MOTOR CONTROL: ICD-10-CM

## 2019-05-23 DIAGNOSIS — S14.129A INCOMPLETE INJURY OF CERVICAL SPINAL CORD WITH CENTRAL CORD SYNDROME: ICD-10-CM

## 2019-05-23 DIAGNOSIS — R53.1 DECREASED STRENGTH: ICD-10-CM

## 2019-05-23 PROCEDURE — 97116 GAIT TRAINING THERAPY: CPT | Mod: PN

## 2019-05-23 NOTE — PROGRESS NOTES
Name: Alberto Dangelo  [facility] Number: [id number]  Date of Treatment: 05/23/2019   Diagnosis:   Encounter Diagnoses   Name Primary?   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        Time in: 1430  Time Out: 1530  Total Treatment Time: 60    Subjective:    Alberto reports no pain. Mod I in manual w/c and mod I stretching prior to session. Brought his service dog.     Objective:     Gait training via [employer] system for 46:55 minutes (plus set up):     Set up at 5 kg unloading. Computer-assisted robotic gait training via [employer] x 3.0 km/h. GF x 50% x 15', 35% x 13', 25% x 10', then 10% for distance of 2292 m. Several stoppages with initial set up and last stop 2* pt stating he needed to end session to use restroom.     Continue HEP daily.    Pt demo good understanding of the education provided. Patient demonstrated good return demonstration of activities.     Assessment:     Aforementioned issues, along with strap adjustments requires for smooth gait training. Fatigued after session with increased LE tones.     Pt will continue to benefit from skilled PT intervention. Medical Necessity is demonstrated by:  Requires skilled supervision to complete and progress HEP and Weakness.    Patient is making good progress towards established goals.    Plan:    Continue with established Plan of Care towards PT goals.

## 2019-05-28 ENCOUNTER — CLINICAL SUPPORT (OUTPATIENT)
Dept: REHABILITATION | Facility: HOSPITAL | Age: 59
End: 2019-05-28
Attending: FAMILY MEDICINE
Payer: COMMERCIAL

## 2019-05-28 DIAGNOSIS — M25.671 DECREASED RANGE OF MOTION OF BOTH ANKLES: ICD-10-CM

## 2019-05-28 DIAGNOSIS — R53.1 DECREASED STRENGTH: ICD-10-CM

## 2019-05-28 DIAGNOSIS — Z78.9 IMPAIRED MOTOR CONTROL: ICD-10-CM

## 2019-05-28 DIAGNOSIS — M25.672 DECREASED RANGE OF MOTION OF BOTH ANKLES: ICD-10-CM

## 2019-05-28 DIAGNOSIS — R26.9 GAIT DISTURBANCE: ICD-10-CM

## 2019-05-28 DIAGNOSIS — G82.50 QUADRIPLEGIA: ICD-10-CM

## 2019-05-28 DIAGNOSIS — S14.129A INCOMPLETE INJURY OF CERVICAL SPINAL CORD WITH CENTRAL CORD SYNDROME: ICD-10-CM

## 2019-05-28 PROCEDURE — 97116 GAIT TRAINING THERAPY: CPT | Mod: PN

## 2019-05-30 ENCOUNTER — CLINICAL SUPPORT (OUTPATIENT)
Dept: REHABILITATION | Facility: HOSPITAL | Age: 59
End: 2019-05-30
Attending: FAMILY MEDICINE
Payer: COMMERCIAL

## 2019-05-30 DIAGNOSIS — Z78.9 IMPAIRED MOTOR CONTROL: ICD-10-CM

## 2019-05-30 DIAGNOSIS — R53.1 DECREASED STRENGTH: ICD-10-CM

## 2019-05-30 DIAGNOSIS — S14.129A INCOMPLETE INJURY OF CERVICAL SPINAL CORD WITH CENTRAL CORD SYNDROME: ICD-10-CM

## 2019-05-30 PROCEDURE — 97116 GAIT TRAINING THERAPY: CPT | Mod: PN

## 2019-05-30 NOTE — PROGRESS NOTES
Name: Alberto Dangelo  Clinic Number: [id number]  Date of Treatment: 05/30/2019   Diagnosis:   Encounter Diagnoses   Name Primary?   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 Mod I in manual w/c and brought his service dog. Mod I stretching prior to session.  States he recently got into his pool at home for the first time since last year and reports he could feel a significant improvement in his gait abilities in the pool.     Objective:    Patient received individual therapy to increase strength, endurance, ROM, flexibility, posture and core stabilization with activities as follows:     Gait training via [employer] system for 60:30 minutes (plus set up):     Set up at 5 kg unloading. Computer-assisted robotic gait training via [employer] x 3.0 km/h. GF x 50% x 18', then 40% for distance of 2974 m. Multiple stoppages.     Continue HEP daily.    Pt demo good understanding of the education provided. Patient demonstrated good return demonstration of activities.     Assessment:     Multiple stoppages 2* pt stating he was cold, which caused LE tones and spasticity. Improved toward end of session.     Pt will continue to benefit from skilled PT intervention. Medical Necessity is demonstrated by:  Requires skilled supervision to complete and progress HEP and Weakness.    Patient is making good progress towards established goals.    Plan:    Continue with established Plan of Care towards PT goals.

## 2019-05-31 NOTE — PROGRESS NOTES
Name: Alberto Dangelo  Clinic Number: [id number]  Date of Treatment: 05/28/2019   Diagnosis:   Encounter Diagnoses   Name Primary?   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  Pt states that he got into the swimming pool for the first time of the season this weekend and he was able to advance B LE utilizing hip and knee flexion (L more easily than R) and to push off/swing LE through without significant circumduction.  Patient reports their pain to be n/a/10 on a 0-10 scale with 0 being no pain and 10 being the worst pain imaginable.    Objective    Patient received individual therapy to increase strength, endurance, flexibility, core stabilization and gait quality with 0 patients with activities as follows:     Alberto participated in dynamic functional therapeutic activities to improve functional performance for 67 minutes. Including: Set up with 5 kg unloading.  Pt participated in computer assisted robotic gait training via [employer] @ 3.0 kph x 56 min, 16 sec for a total distance of 2787 m.  Utilized guidance force of 50% x 10 min, decreased to 35% x 10', decreased to 25% x 20 min.  Completed training @ 20% guidance force.  Required monitoring for correct push off, heel strike, knee ext.  Upon decreasing GF to 20% pt experienced significant toe drag resulting in stoppage of unit.  Adjusted ankle position and restarted training.  Intermittent adjustment to ankle and hip position made to maximize posture for gait training.       Written Home Exercises Provided: Pt participates in self stretching and strengthening program  Pt demo good understanding of the education provided. Alberto demonstrated good and fair return demonstration of activities.     Assessment:   Difficulty transitioning to 20%  guidance force noted this date resulting in interruption of gait training.  Corrected with repositioning of ankles and hips.      Pt will continue to benefit from skilled PT intervention. Medical Necessity is demonstrated by:  Fall Risk, Unable to participate fully in daily activities, Weakness and Functional mobility/gait deficits.      Patient is making fair progress towards established goals.    New/Revised goals: N/A      Plan:  Continue with established Plan of Care towards PT goals. Progress as tolerated.

## 2019-06-03 ENCOUNTER — PATIENT MESSAGE (OUTPATIENT)
Dept: FAMILY MEDICINE | Facility: CLINIC | Age: 59
End: 2019-06-03

## 2019-06-04 ENCOUNTER — CLINICAL SUPPORT (OUTPATIENT)
Dept: REHABILITATION | Facility: HOSPITAL | Age: 59
End: 2019-06-04
Attending: FAMILY MEDICINE
Payer: COMMERCIAL

## 2019-06-04 DIAGNOSIS — S14.129A INCOMPLETE INJURY OF CERVICAL SPINAL CORD WITH CENTRAL CORD SYNDROME: ICD-10-CM

## 2019-06-04 DIAGNOSIS — R26.9 GAIT DISTURBANCE: ICD-10-CM

## 2019-06-04 DIAGNOSIS — M25.672 DECREASED RANGE OF MOTION OF BOTH ANKLES: ICD-10-CM

## 2019-06-04 DIAGNOSIS — M25.671 DECREASED RANGE OF MOTION OF BOTH ANKLES: ICD-10-CM

## 2019-06-04 DIAGNOSIS — R53.1 DECREASED STRENGTH: ICD-10-CM

## 2019-06-04 DIAGNOSIS — Z78.9 IMPAIRED MOTOR CONTROL: ICD-10-CM

## 2019-06-04 DIAGNOSIS — G82.50 QUADRIPLEGIA: ICD-10-CM

## 2019-06-04 PROCEDURE — 97116 GAIT TRAINING THERAPY: CPT | Mod: PN

## 2019-06-04 NOTE — PROGRESS NOTES
"Name: Alberto Dangelo  [facility] Number: [id number]  Date of Treatment: 06/04/2019   Diagnosis:   Encounter Diagnoses   Name Primary?   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        Time in: 1420  Time Out: 1540  Total Treatment Time: 67  Group Time: 0      Subjective:    Alberto reports "Did some swimming this weekend. Didn't have any problems"  Patient reports their pain to be n/a/10 on a 0-10 scale with 0 being no pain and 10 being the worst pain imaginable.    Objective    Patient received individual therapy to increase strength, endurance, flexibility, core stabilization and gait quality with 0 patients with activities as follows:     Alberto participated in dynamic functional therapeutic activities to improve functional performance for 67 minutes. Including: Set up with 5 kg unloading.  Pt participated in computer assisted robotic gait training via [employer]  @ 3.0 kph x 61 min, 15 sec for a total distance of 3028 m.  Utilized guidance force of 50% x 10 min, decreased to 35% x 10', decreased again to 25% x 10'.  Upon initial decrease to 20% pt experienced significant toe drag resulting in stoppage of training.  Required adjustment of foot straps to continue training @ 20% guidance force for a total of 26 min.  For the final 5 min of training, reduced patient coefficient to 48 and guidance force to 15%.  Required increased unweighting to continue training @ 15% guidance force.        Written Home Exercises Provided: N/A   Pt demo good understanding of the education provided. Alberto demonstrated good return demonstration of activities.     Assessment:   Able to decrease gait training time @ 25% guidance force to 10' but pt initially had difficulty due to increased toe drag.  Able to make appropriate adjustment and continue training @ 20%.  With increased unweighting and decreased patient coefficient, patient was able to perform gait " training @ 15% guidance force without significant toe drag.      Pt will continue to benefit from skilled PT intervention. Medical Necessity is demonstrated by:  Fall Risk, Unable to participate fully in daily activities, Weakness, excess tone and impaired gait/functional mobility  Patient is making fair progress towards established goals.    New/Revised goals: n/a      Plan:  Continue with established Plan of Care towards PT goals. Incrementally decrease guidance force for training as patient is able.  Try to reduce guidance force to 15% earlier in gait training to assess effect of fatigue on activity.

## 2019-06-05 ENCOUNTER — TELEPHONE (OUTPATIENT)
Dept: FAMILY MEDICINE | Facility: CLINIC | Age: 59
End: 2019-06-05

## 2019-06-05 NOTE — TELEPHONE ENCOUNTER
[facility] called - [phone] x [phone] - gave authorization for them to continue furnishing catheter supplies for patient.

## 2019-06-06 ENCOUNTER — CLINICAL SUPPORT (OUTPATIENT)
Dept: REHABILITATION | Facility: HOSPITAL | Age: 59
End: 2019-06-06
Attending: FAMILY MEDICINE
Payer: COMMERCIAL

## 2019-06-06 DIAGNOSIS — R53.1 DECREASED STRENGTH: ICD-10-CM

## 2019-06-06 DIAGNOSIS — S14.129A INCOMPLETE INJURY OF CERVICAL SPINAL CORD WITH CENTRAL CORD SYNDROME: ICD-10-CM

## 2019-06-06 DIAGNOSIS — Z78.9 IMPAIRED MOTOR CONTROL: ICD-10-CM

## 2019-06-06 PROCEDURE — 97116 GAIT TRAINING THERAPY: CPT | Mod: PN

## 2019-06-06 NOTE — PROGRESS NOTES
Name: Alberto Dangelo  Clinic Number: [id number]  Date of Treatment: 06/06/2019   Diagnosis:   Encounter Diagnoses   Name Primary?   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 Presents in manual w/c mod I with service dog. Mod I stretching prior to session. States he and his wife will be traveling in the next couple of months and will need to CX a few appts.     Objective:    Patient received individual therapy to increase strength, endurance, ROM, flexibility, posture and core stabilization with activities as follows:     Gait training via [employer] system for 55:21 minutes (plus set up):     Set up at 5 kg unloading. Computer-assisted robotic gait training via [employer] x 3.0 km/h. GF x 50% x 18' then 40% for distance of 2756 m. No stoppages.     Continue HEP daily.    Pt demo good understanding of the education provided. Patient demonstrated good return demonstration of activities.     Assessment:     Strong tones B LE's with initial gait and difficulty decreasing GF today 2* LE fatigue. Great effort throughout sesison with consistent feedback via monitor.     Pt will continue to benefit from skilled PT intervention. Medical Necessity is demonstrated by:  Requires skilled supervision to complete and progress HEP and Weakness.    Patient is making good progress towards established goals.    Plan:    Continue with established Plan of Care towards PT goals. \

## 2019-06-10 ENCOUNTER — TELEPHONE (OUTPATIENT)
Dept: FAMILY MEDICINE | Facility: CLINIC | Age: 59
End: 2019-06-10

## 2019-06-10 DIAGNOSIS — K21.9 GASTROESOPHAGEAL REFLUX DISEASE, ESOPHAGITIS PRESENCE NOT SPECIFIED: ICD-10-CM

## 2019-06-10 RX ORDER — OMEPRAZOLE 20 MG/1
CAPSULE, DELAYED RELEASE ORAL
Qty: 90 CAPSULE | Refills: 3 | Status: SHIPPED | OUTPATIENT
Start: 2019-06-10 | End: 2020-07-06

## 2019-06-10 NOTE — TELEPHONE ENCOUNTER
----- Message from Gladis Zambrano sent at 6/10/2019  8:17 AM CDT -----  Contact: [facility]  [facility] calling to follow up to see if a prescription request was received for catheter supplies and the pt has requested that gloves be added. Can be reached at [phone]

## 2019-06-11 ENCOUNTER — CLINICAL SUPPORT (OUTPATIENT)
Dept: REHABILITATION | Facility: HOSPITAL | Age: 59
End: 2019-06-11
Attending: FAMILY MEDICINE
Payer: COMMERCIAL

## 2019-06-11 DIAGNOSIS — M25.672 DECREASED RANGE OF MOTION OF BOTH ANKLES: ICD-10-CM

## 2019-06-11 DIAGNOSIS — M25.671 DECREASED RANGE OF MOTION OF BOTH ANKLES: ICD-10-CM

## 2019-06-11 DIAGNOSIS — R53.1 DECREASED STRENGTH: ICD-10-CM

## 2019-06-11 DIAGNOSIS — S14.129A INCOMPLETE INJURY OF CERVICAL SPINAL CORD WITH CENTRAL CORD SYNDROME: ICD-10-CM

## 2019-06-11 DIAGNOSIS — R26.9 GAIT DISTURBANCE: ICD-10-CM

## 2019-06-11 DIAGNOSIS — Z78.9 IMPAIRED MOTOR CONTROL: ICD-10-CM

## 2019-06-11 PROCEDURE — 97116 GAIT TRAINING THERAPY: CPT | Mod: PN

## 2019-06-17 NOTE — PROGRESS NOTES
"Name: Alberto Dangelo  [facility] Number: [id number]  Date of Treatment: 06/11/2019   Diagnosis:   Encounter Diagnoses   Name Primary?   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  Patient reports their pain to be n/a/10 on a 0-10 scale with 0 being no pain and 10 being the worst pain imaginable.  "I've been stiff"     Objective    Patient received individual therapy to increase strength, endurance, flexibility, posture, core stabilization and gait quality with 0 patients with activities as follows:   Pt performed independent stretching of B calves, hip flexors prior to initiating session.    Alberto participated in dynamic functional therapeutic activities to improve functional performance for 67 minutes. Including: Set up with 5 kg unloading.  Pt participated in computer assisted robotic gait training via [employer] @ 3.0 kph x 60 min, 12 sec for a total distance of 2985 m.  Utilized guidance force of 50% x 10' min, decreased to 35% x 10'.  Completed training @ 15% guidance force but with increased unweighting to ~ 15 kg due to increased and excessive toe drag upon decreasing to 15%.  .      Written Home Exercises Provided: N/A  Pt demo good understanding of the education provided. Alberto demonstrated good return demonstration of activities.     Assessment:   Pt able to continue gait training @ 15% guidance force but required increased unweighting to prevent excessive foot drag B.      Pt will continue to benefit from skilled PT intervention. Medical Necessity is demonstrated by:  Fall Risk, Weakness and Impaired gait and functional mobility activities.      Patient is making fair progress towards established goals.    New/Revised goals: N/A      Plan:  Continue with established Plan of Care towards PT " goals. Continue to work toward decreased guidance force as patient tolerates.  Adjust unweighting as needed to insure adequate foot clearance B.

## 2019-06-18 ENCOUNTER — CLINICAL SUPPORT (OUTPATIENT)
Dept: REHABILITATION | Facility: HOSPITAL | Age: 59
End: 2019-06-18
Attending: FAMILY MEDICINE
Payer: COMMERCIAL

## 2019-06-18 DIAGNOSIS — S14.129A INCOMPLETE INJURY OF CERVICAL SPINAL CORD WITH CENTRAL CORD SYNDROME: ICD-10-CM

## 2019-06-18 DIAGNOSIS — Z78.9 IMPAIRED MOTOR CONTROL: ICD-10-CM

## 2019-06-18 DIAGNOSIS — R53.1 DECREASED STRENGTH: ICD-10-CM

## 2019-06-18 PROCEDURE — 97116 GAIT TRAINING THERAPY: CPT | Mod: PN

## 2019-06-18 NOTE — PROGRESS NOTES
Name: Alberto Dangelo  Clinic Number: [id number]  Date of Treatment: 06/18/2019   Diagnosis:   Encounter Diagnoses   Name Primary?   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        Time in: 1405  Time Out: 1540  Total Treatment Time: 65    Subjective:    Alberto reports no pain. Mod I in manual w/c and stretching prior to session. Brought his service dog. Relaxing weekend with his parents and later pool party for his grandson.     Objective:    Patient received individual therapy to increase strength, endurance, ROM, flexibility, posture and core stabilization with activities as follows:     Gait training via [employer] system for 60:38 minutes (plus set up):     Set up at 5 kg unloading. Computer-assisted robotic gait training via [employer] x 3.0 km/h. GF x 50% x 13', 35% x 9, then 15% for distance of 3012 m. One stoppage.     Continue HEP daily.    Pt demo good understanding of the education provided. Patient demonstrated good return demonstration of activities.     Assessment:     Initial LE spasticity with decreased GF to 15% but improved with mobility.     Pt will continue to benefit from skilled PT intervention. Medical Necessity is demonstrated by:  Requires skilled supervision to complete and progress HEP and Weakness.    Patient is making good progress towards established goals.    Plan:    Continue with established Plan of Care towards PT goals.

## 2019-07-02 ENCOUNTER — CLINICAL SUPPORT (OUTPATIENT)
Dept: REHABILITATION | Facility: HOSPITAL | Age: 59
End: 2019-07-02
Attending: FAMILY MEDICINE
Payer: COMMERCIAL

## 2019-07-02 DIAGNOSIS — S14.129A INCOMPLETE INJURY OF CERVICAL SPINAL CORD WITH CENTRAL CORD SYNDROME: ICD-10-CM

## 2019-07-02 DIAGNOSIS — M25.671 DECREASED RANGE OF MOTION OF BOTH ANKLES: ICD-10-CM

## 2019-07-02 DIAGNOSIS — Z78.9 IMPAIRED MOTOR CONTROL: ICD-10-CM

## 2019-07-02 DIAGNOSIS — R53.1 DECREASED STRENGTH: ICD-10-CM

## 2019-07-02 DIAGNOSIS — R26.9 GAIT DISTURBANCE: ICD-10-CM

## 2019-07-02 DIAGNOSIS — M25.672 DECREASED RANGE OF MOTION OF BOTH ANKLES: ICD-10-CM

## 2019-07-02 PROCEDURE — 97116 GAIT TRAINING THERAPY: CPT | Mod: PN

## 2019-07-09 ENCOUNTER — CLINICAL SUPPORT (OUTPATIENT)
Dept: REHABILITATION | Facility: HOSPITAL | Age: 59
End: 2019-07-09
Attending: FAMILY MEDICINE
Payer: COMMERCIAL

## 2019-07-09 DIAGNOSIS — M25.671 DECREASED RANGE OF MOTION OF BOTH ANKLES: ICD-10-CM

## 2019-07-09 DIAGNOSIS — M25.672 DECREASED RANGE OF MOTION OF BOTH ANKLES: ICD-10-CM

## 2019-07-09 DIAGNOSIS — R53.1 DECREASED STRENGTH: ICD-10-CM

## 2019-07-09 DIAGNOSIS — Z78.9 IMPAIRED MOTOR CONTROL: ICD-10-CM

## 2019-07-09 DIAGNOSIS — S14.129A INCOMPLETE INJURY OF CERVICAL SPINAL CORD WITH CENTRAL CORD SYNDROME: ICD-10-CM

## 2019-07-09 DIAGNOSIS — R26.9 GAIT DISTURBANCE: ICD-10-CM

## 2019-07-09 DIAGNOSIS — G82.50 QUADRIPLEGIA: ICD-10-CM

## 2019-07-09 PROCEDURE — 97116 GAIT TRAINING THERAPY: CPT | Mod: PN

## 2019-07-09 NOTE — PROGRESS NOTES
Name: Alberto Dangelo  [facility] Number: [id number]  Date of Treatment: 07/02/2019  Diagnosis:   Encounter Diagnoses   Name Primary?   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        Time in: 1410  Time Out: 1540  Total Treatment Time: 67  Group Time: 0      Subjective:    Alberto reports improvement of symptoms.  Patient reports their pain to be n/a/10 on a 0-10 scale with 0 being no pain and 10 being the worst pain imaginable.    Objective    Patient received individual therapy to increase strength, endurance, flexibility, core stabilization and gait quality with 0 patients with activities as follows:     Alberto participated in dynamic functional therapeutic activities to improve functional performance for 67 minutes. Including: Set up with 5 kg unloading.  Pt participated in computer assisted robotic gait training via [employer] @ 3.0 kph x 60 min, 12 sec for a total distance of 2992 m.  Utilized guidance force of 50% x 10 min, decreased to 35% for 15 min.  Completed training @ 15% guidance force with increased unweighting.  Prior to gait training, completed L-force testing for POC update.       Written Home Exercises Provided: N/A  Pt demo good understanding of the education provided. Alberto demonstrated good return demonstration of activities.     Assessment:   Good session.  No stoppages due to toe drag or excess tone experienced this date. Increased tone affecting performance on L-force testing.  However, pt demonstrates decreased difficulty with sit <> stand transfers.  He is able to ambulate short distance using walker.  However, extensor tone inhibits smooth advancing of LE during swing phase.      Pt will continue to benefit from skilled PT intervention. Medical Necessity is demonstrated by:  Fall Risk, Unable to participate fully in daily activities, Requires skilled supervision to complete and progress HEP, Weakness, Excess  Extensor Tone, and Impaired functional mobility/gait.  .    Patient is making fair progress towards established goals.    New/Revised goals: See updated POC      Plan:  Continue with established Plan of Care towards PT goals. Progress gait training and balance activities as patient tolerates.

## 2019-07-09 NOTE — PROGRESS NOTES
"Name: Alberto Dangelo  [facility] Number: [id number]  Date of Treatment: 07/09/2019   Diagnosis:   Encounter Diagnoses   Name Primary?   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  "I really worked my squats yesterday and with the work I did on here today, I am really feeling it".  Patient reports their pain to be n/a/10 on a 0-10 scale with 0 being no pain and 10 being the worst pain imaginable.    Objective    Pt performed self stretching of hips, knees and calves prior to start of PT session.     Patient received individual therapy to increase strength, endurance, flexibility, posture, core stabilization and gait quality with 0 patients with activities as follows:     Alberto participated in dynamic functional therapeutic activities to improve functional performance for 67 minutes. Including: Set up with 5 kg unloading.  Pt participated in computer assisted robotic gait training via [employer] @ 3.0 kph x 60 min, 08 sec for a total distance of 2995 m.  Utilized guidance force of 50% x 11 min, decreased to 35% x 9 min.  Completed session @ 10% guidance force with increased unweighting.        Written Home Exercises Provided: N/A  Pt demo good understanding of the education provided. Alberto demonstrated good return demonstration of activities.     Assessment:   Pt is able to complete gait training @ 10% guidance force; however, increased unweighting is required to prevent excessive toe drag and stoppage of training.      Pt will continue to benefit from skilled PT intervention. Medical Necessity is demonstrated by:  Fall Risk, Unable to participate fully in daily activities, Weakness and Impaired functional mobility/gait..    Patient is making fair progress towards established goals.    New/Revised goals: " N/A      Plan:  Continue with established Plan of Care towards PT goals. Continue with decreased guidance force in conjunction with increased unweighting.  Will slowly reduce unweighting as patient tolerates.

## 2019-07-11 NOTE — PLAN OF CARE
Date: 07/02/2019    PHYSICAL THERAPY UPDATED PLAN OF TREATMENT    Patient name: Alberto Dangelo  Onset Date:  07/03/2010  SOC Date:  08/24/2016  Primary Diagnosis:    1. Impaired motor control     2. Decreased strength     3. 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 Home equipment includes B AFO's, shower chair, ramped entrance w support bars to swimming pool, FES bike, free weights, plyobox system.      Updated Assessment:                                            LEFT                                                 RIGHT                             FLEX                  EXT                      FLEX                     EXT                HIP      0.74 Nm             20.16 Nm              2.68 Nm               8.16 Nm                                                             KNEE     7.1  Nm              25.7 Nm                2.47 Nm              23.73 Nm      Gait:  Pt is able to tolerate up to 1 hour on [employer] @ 5 kg unloading and 3.0 kph with guidance force progressively reduced over the course of treatment to 15%.  He continues to ambulate some at home using rolling walker with significant difficulty advancing his LE due to excess tone.  He can manage to take a few steps with minimal UE support  Posture:  Pt stands with foot flat position, feet less than shoulder width apart.  Continues with knee hyperextension and anterior pelvic tilt/increased lumbar lordosis; however, he can correct this for short periods of time.  He is able to maintain standing position for short periods without UE support.  He lacks adequate ankle responses to correct for  balance challenges in standing  Transfers:  Sit <> stand: pt is able to achieve sit > stand using minimal UE support.  He continues with substantial anterior weight shift to insure weight is fully over his feet.  Ascent is controlled but upon reaching erect position he tends to hyperextend B knees. Once he gains his balance with knees locked, he is able to unlock his knees.  Stand > sit continues to require moderate UE support but is controlled descent.    Flexibility:  Somewhat limited by tone.  Continues to demonstrate tightness in B calves, B hamstrings, and B hip flexors.       Previous Short Term Goals Status:    1) Pt will stand for 10 min without excessive lumbar lordosis or increased hip/knee ext  Continues to progress              2) Pt will tolerate decreased guidance force to < 25% without excessive toe drag  MET but requires increased unweighting               3) Pt will perform sit > stand with minimal to moderate UE  support  MET    4) Resume ambulation using forearm crutches for household use,  Progressing but inconsistent     New Short Term Goals Status:      1) continue   2) Modified:  Pt will tolerate decreased guidance force to < 20% while utilizing 10-15 kg unweighting without excessive toe drag   3) MET   4) Modified:  Resume ambulation using forearm crutches for household use at least 60% of the time    Long Term Goal Status:   continue per previous plan of care.   1) Increase strength in B  LE as indicated by improved L-force testing  Progressing            2)  Pt will stand > 15 min with minimal UE support. Progressing            3) Pt will consistently perform safe stand pivot transfers,  Progressing            4) Pt will demonstrate improved bilateral hip flexion strength to 2/5 to improve ability to perform swing phase/ foot clearance during gait  Nearly met              5) Pt will demonstrate appropriate weight shift during ambulation to limit hip hiking and excessive weight shift.   Progressing    Reasons for Recertification of Therapy:   Pt is making slow but steady progress with improved LE flexibility, improved LE strength, increased gait tolerance, and improved functional mobility.  He should continue to make slow but steady gains with skilled PT intervention to address remaining deficits.      Certification Period: 07/08/2019 to 10/4/2019  Recommended Treatment Plan: 2 times per week for 12 weeks: Gait Training, Neuromuscular Re-ed, Patient Education, Therapeutic Activites, Therapeutic Exercise and Locomotor training  Other Recommendations: N/A        Therapist's Name: Candice Herzog, PT   Date: 07/02/2019  I CERTIFY THE NEED FOR THESE SERVICES FURNISHED UNDER THIS PLAN OF TREATMENT AND WHILE UNDER MY CARE    Physician's comments: ________________________________________________________________________________________________________________________________________________      Physician's Name: ___________________________________

## 2019-07-16 ENCOUNTER — CLINICAL SUPPORT (OUTPATIENT)
Dept: REHABILITATION | Facility: HOSPITAL | Age: 59
End: 2019-07-16
Attending: FAMILY MEDICINE
Payer: COMMERCIAL

## 2019-07-16 DIAGNOSIS — Z78.9 IMPAIRED MOTOR CONTROL: ICD-10-CM

## 2019-07-16 DIAGNOSIS — S14.129A INCOMPLETE INJURY OF CERVICAL SPINAL CORD WITH CENTRAL CORD SYNDROME: ICD-10-CM

## 2019-07-16 DIAGNOSIS — R53.1 DECREASED STRENGTH: ICD-10-CM

## 2019-07-16 PROCEDURE — 97116 GAIT TRAINING THERAPY: CPT | Mod: PN

## 2019-07-16 NOTE — PROGRESS NOTES
Dear Ludy,    Your recent test results are attached.      You were prescribed cefdinir.  Bacteria is susceptible to antibiotic prescribed.      If you have any questions please feel free to contact [phone] or myself via [employer].    Sincerely,  Sarahi Stafford, CNP   Name: Alberto Dangelo  Clinic Number: [id number]  Date of Treatment: 07/16/2019   Diagnosis:   Encounter Diagnoses   Name Primary?   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 Mod I in manual w/c and brought his service dog.  Mod I stretching prior to session.     Objective:    Patient received individual therapy to increase strength, endurance, ROM, flexibility, posture and core stabilization with activities as follows:     Gait training via [employer] system for 64:10 minutes (plus set up):     Set up at 5 kg unloading. Computer-assisted robotic gait training via [employer] x 3.0 km/h. GF x 50% x 10', 35% x 15', then 15% for distance of 3006 m. No stoppages.     Continue HEP daily.    Pt demo good understanding of the education provided. Patient demonstrated good return demonstration of activities.     Assessment:     Improving tolerance for GF drops with decreased foot drag and stoppages.     Pt will continue to benefit from skilled PT intervention. Medical Necessity is demonstrated by:  Requires skilled supervision to complete and progress HEP and Weakness.    Patient is making good progress towards established goals.    Plan:    Continue with established Plan of Care towards PT goals.

## 2019-07-18 ENCOUNTER — CLINICAL SUPPORT (OUTPATIENT)
Dept: REHABILITATION | Facility: HOSPITAL | Age: 59
End: 2019-07-18
Attending: FAMILY MEDICINE
Payer: COMMERCIAL

## 2019-07-18 DIAGNOSIS — Z78.9 IMPAIRED MOTOR CONTROL: ICD-10-CM

## 2019-07-18 DIAGNOSIS — S14.129A INCOMPLETE INJURY OF CERVICAL SPINAL CORD WITH CENTRAL CORD SYNDROME: ICD-10-CM

## 2019-07-18 DIAGNOSIS — R53.1 DECREASED STRENGTH: ICD-10-CM

## 2019-07-18 PROCEDURE — 97116 GAIT TRAINING THERAPY: CPT | Mod: PN

## 2019-07-18 NOTE — PROGRESS NOTES
Name: Alberto Dangelo  [facility] Number: [id number]  Date of Treatment: 07/18/2019   Diagnosis:   Encounter Diagnoses   Name Primary?   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 Via manual w/c with service dog. Mod I stretching prior to session.     Objective:    Patient received individual therapy to increase strength, endurance, ROM, flexibility, posture and core stabilization with activities as follows:     Gait training via [employer] system for 60:52 minutes (plus set up):     Set up at 5 kg unloading. Computer-assisted robotic gait training via [email or web address] x 3.0 km/h. GF x 50% x 15', 30% x 19', then 15% for distance of 3006 m. No stoppages.     Continue HEP daily.    Pt demo good understanding of the education provided. Patient demonstrated good return demonstration of activities.     Assessment:     Improved performance at decreased GF today, able to tolerate quick drop to 30%.     Pt will continue to benefit from skilled PT intervention. Medical Necessity is demonstrated by:  Requires skilled supervision to complete and progress HEP and Weakness.    Patient is making good progress towards established goals.    Plan:    Continue with established Plan of Care towards PT goals.

## 2019-07-23 ENCOUNTER — CLINICAL SUPPORT (OUTPATIENT)
Dept: REHABILITATION | Facility: HOSPITAL | Age: 59
End: 2019-07-23
Attending: FAMILY MEDICINE
Payer: COMMERCIAL

## 2019-07-23 DIAGNOSIS — R53.1 DECREASED STRENGTH: ICD-10-CM

## 2019-07-23 DIAGNOSIS — S14.129A INCOMPLETE INJURY OF CERVICAL SPINAL CORD WITH CENTRAL CORD SYNDROME: ICD-10-CM

## 2019-07-23 DIAGNOSIS — Z78.9 IMPAIRED MOTOR CONTROL: ICD-10-CM

## 2019-07-23 PROCEDURE — 97116 GAIT TRAINING THERAPY: CPT | Mod: PN

## 2019-07-23 NOTE — PROGRESS NOTES
Name: Alberto Dangelo  Clinic Number: [id number]  Date of Treatment: 07/23/2019   Diagnosis:   Encounter Diagnoses   Name Primary?   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 Worked out on estim bike x 2 hours yesterday. Mod I in manual w.c and mod I stretching in PT gym prior to appt. Brought his service dog.      Objective:    Patient received individual therapy to increase strength, endurance, ROM, flexibility, posture and core stabilization with activities as follows:   Gait training via [employer] system for 60:17 minutes (plus set up):     Set up at 5 kg unloading. Computer-assisted robotic gait training via [employer] x 3.0 km/h. GF x 50% for distance of 2988 m. No stoppages.     Continue HEP daily.    Pt demo good understanding of the education provided. Patient demonstrated good return demonstration of activities.     Assessment:     Improved tolerance for GF drops with decreased foot drag.     Pt will continue to benefit from skilled PT intervention. Medical Necessity is demonstrated by:  Requires skilled supervision to complete and progress HEP and Weakness.    Patient is making good progress towards established goals.    Plan:    Continue with established Plan of Care towards PT goals.

## 2019-07-25 ENCOUNTER — CLINICAL SUPPORT (OUTPATIENT)
Dept: REHABILITATION | Facility: HOSPITAL | Age: 59
End: 2019-07-25
Attending: FAMILY MEDICINE
Payer: COMMERCIAL

## 2019-07-25 DIAGNOSIS — S14.129A INCOMPLETE INJURY OF CERVICAL SPINAL CORD WITH CENTRAL CORD SYNDROME: ICD-10-CM

## 2019-07-25 DIAGNOSIS — Z78.9 IMPAIRED MOTOR CONTROL: ICD-10-CM

## 2019-07-25 DIAGNOSIS — R53.1 DECREASED STRENGTH: ICD-10-CM

## 2019-07-25 PROCEDURE — 97116 GAIT TRAINING THERAPY: CPT | Mod: PN

## 2019-07-25 NOTE — PROGRESS NOTES
Name: Alberto Dangelo  Clinic Number: [id number]  Date of Treatment: 07/25/2019   Diagnosis:   Encounter Diagnoses   Name Primary?   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 Mod I in manual w/c with service dog and mod I stretching prior to session. Improved soreness since last session.     Objective:    Patient received individual therapy to increase strength, endurance, ROM, flexibility, posture and core stabilization with activities as follows:     Gait training via [employer] system for 60:24 minutes (plus set up):     Set up at 5 kg unloading. Computer-assisted robotic gait training via [employer] x 3.0 km/h. GF x 50% x 15', 30% x 15', then 15% for distance of 2993 m. No stoppages.     Continue HEP daily.    Pt demo good understanding of the education provided. Patient demonstrated good return demonstration of activities.     Assessment:     Improving tolerance for GF drops and decreasing fatigue after session. Consistent feedback for hip and knee during both swing and stance phases throughout session.     Pt will continue to benefit from skilled PT intervention. Medical Necessity is demonstrated by:  Requires skilled supervision to complete and progress HEP and Weakness.    Patient is making good progress towards established goals.    Plan:    Continue with established Plan of Care towards PT goals.

## 2019-07-30 ENCOUNTER — CLINICAL SUPPORT (OUTPATIENT)
Dept: REHABILITATION | Facility: HOSPITAL | Age: 59
End: 2019-07-30
Attending: FAMILY MEDICINE
Payer: COMMERCIAL

## 2019-07-30 DIAGNOSIS — Z78.9 IMPAIRED MOTOR CONTROL: ICD-10-CM

## 2019-07-30 DIAGNOSIS — S14.129A INCOMPLETE INJURY OF CERVICAL SPINAL CORD WITH CENTRAL CORD SYNDROME: ICD-10-CM

## 2019-07-30 DIAGNOSIS — R53.1 DECREASED STRENGTH: ICD-10-CM

## 2019-07-30 PROCEDURE — 97116 GAIT TRAINING THERAPY: CPT | Mod: PN

## 2019-07-30 NOTE — PROGRESS NOTES
Name: Alberto Dangelo  Clinic Number: [id number]  Date of Treatment: 07/30/2019   Diagnosis:   Encounter Diagnoses   Name Primary?   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 States she has been performing rigorous ex routine at home to include e-stim bike, NuStep, swimming, etc. Mod I in manual w/c with service dog. Mod I stretching prior to session. states today is the 9 year anniversary of his SCI. Discussed events with positive outlook.     Objective:    Patient received individual therapy to increase strength, endurance, ROM, flexibility, posture and core stabilization with activities as follows:     Gait training via [employer] system for 60:16 minutes (plus set up):     Set up at 5 kg unloading. Computer-assisted robotic gait training via [employer] x 3.0 km/h. GF x 40% x 22', 30% x 10', then 15% for distance of 2998 m. No stoppages.     Continue HEP daily.    Pt demo good understanding of the education provided. Patient demonstrated good return demonstration of activities.     Assessment:     Able to tolerate decreased starting GF to 40% today with good performance. LE fatigue after session.     Pt will continue to benefit from skilled PT intervention. Medical Necessity is demonstrated by:  Requires skilled supervision to complete and progress HEP and Weakness.    Patient is making good progress towards established goals.    Plan:    Continue with established Plan of Care towards PT goals.

## 2019-08-01 ENCOUNTER — CLINICAL SUPPORT (OUTPATIENT)
Dept: REHABILITATION | Facility: HOSPITAL | Age: 59
End: 2019-08-01
Attending: FAMILY MEDICINE
Payer: COMMERCIAL

## 2019-08-01 DIAGNOSIS — R53.1 DECREASED STRENGTH: ICD-10-CM

## 2019-08-01 DIAGNOSIS — Z78.9 IMPAIRED MOTOR CONTROL: ICD-10-CM

## 2019-08-01 DIAGNOSIS — M25.671 DECREASED RANGE OF MOTION OF BOTH ANKLES: ICD-10-CM

## 2019-08-01 DIAGNOSIS — S14.129A INCOMPLETE INJURY OF CERVICAL SPINAL CORD WITH CENTRAL CORD SYNDROME: ICD-10-CM

## 2019-08-01 DIAGNOSIS — M25.672 DECREASED RANGE OF MOTION OF BOTH ANKLES: ICD-10-CM

## 2019-08-01 DIAGNOSIS — R26.9 GAIT DISTURBANCE: ICD-10-CM

## 2019-08-01 PROCEDURE — 97116 GAIT TRAINING THERAPY: CPT | Mod: PN

## 2019-08-05 NOTE — PROGRESS NOTES
"Name: Alberto Dangelo  [facility] Number: [id number]  Date of Treatment: 08/01/2019  Diagnosis:   Encounter Diagnoses   Name Primary?   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  Don't know why.  I did the Nu-step before I came and that usually wakes me up."  Patient reports their pain to be n/a/10 on a 0-10 scale with 0 being no pain and 10 being the worst pain imaginable.    Objective    Patient received individual therapy to increase strength, endurance, flexibility, posture, core stabilization and gait quality with 0 patients with activities as follows:     Alberto participated in dynamic functional therapeutic activities to improve functional performance for 67 minutes. Including: Set up with 5 kg unloading.  Pt participated in computer assisted robotic gait training via [employer] @ 3.0 kph x 59 min, 20 sec for a total distance of 2923 m.  Utilized guidance force of 40% x 20 min, decreased to 30% x 20'.  Completed training (19 min, 20 sec) @ 15% guidance force but with increased unweighting.  . Adjustment to patient coefficient in order to improve gait pattern.  (increased coefficient)        Written Home Exercises Provided: Pt continues to participate in HEP to facilitate activities in therapy  Pt demo good understanding of the education provided. Alberto demonstrated good return demonstration of activities.     Assessment:   Once guidance force decreased to 15% pt demonstrated significant increase in difficulty with clearing feet, particularly his RLE.  Pt experienced 3 stoppages of training once guidance force was decreased to 15% but was able to resume once reset.      Pt will continue to benefit from skilled PT intervention. Medical Necessity is demonstrated by:  Fall Risk, Unable to participate " fully in daily activities, Requires skilled supervision to complete and progress HEP, Weakness and Gait deficit  Patient is making fair progress towards established goals.    New/Revised goals: N/A      Plan:  Continue with established Plan of Care towards PT goals. Continue to reduce guidance force as patient is able to enhance effects of training.  Adjust pt coefficient as needed to optimize gait training.

## 2019-08-06 ENCOUNTER — TELEPHONE (OUTPATIENT)
Dept: REHABILITATION | Facility: HOSPITAL | Age: 59
End: 2019-08-06

## 2019-08-06 ENCOUNTER — CLINICAL SUPPORT (OUTPATIENT)
Dept: REHABILITATION | Facility: HOSPITAL | Age: 59
End: 2019-08-06
Attending: FAMILY MEDICINE
Payer: COMMERCIAL

## 2019-08-06 DIAGNOSIS — S14.129A INCOMPLETE INJURY OF CERVICAL SPINAL CORD WITH CENTRAL CORD SYNDROME: ICD-10-CM

## 2019-08-06 DIAGNOSIS — R53.1 DECREASED STRENGTH: ICD-10-CM

## 2019-08-06 DIAGNOSIS — Z78.9 IMPAIRED MOTOR CONTROL: ICD-10-CM

## 2019-08-06 PROCEDURE — 97116 GAIT TRAINING THERAPY: CPT | Mod: PN

## 2019-08-06 NOTE — PROGRESS NOTES
Name: Alberto Dangelo  [facility] Number: [id number]  Date of Treatment: 08/06/2019   Diagnosis:   Encounter Diagnoses   Name Primary?   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 Mod I with manual w/c with service dog. Mod I stretching prior to session.     Objective:    Patient received individual therapy to increase strength, endurance, ROM, flexibility, posture and core stabilization with activities as follows:     Gait training via [employer] system for 60:32 minutes (plus set up):     Set up at 5 kg unloading. Computer-assisted robotic gait training via [employer] x 3.0 km/h. GF x 40% x 10', 30% x 20', 15% for distance of 2987 m. One stoppage with initial increase in weight during latter part of session.     Continue HEP daily.    Pt demo good understanding of the education provided. Patient demonstrated good return demonstration of activities.     Assessment:     Consistent tolerance for lower initial GF without stoppages. Only stoppage 2* increased weighting toward end of session.     Pt will continue to benefit from skilled PT intervention. Medical Necessity is demonstrated by:  Requires skilled supervision to complete and progress HEP and Weakness.    Patient is making good progress towards established goals.    Plan:    Continue with established Plan of Care towards PT goals.

## 2019-08-08 ENCOUNTER — CLINICAL SUPPORT (OUTPATIENT)
Dept: REHABILITATION | Facility: HOSPITAL | Age: 59
End: 2019-08-08
Attending: FAMILY MEDICINE
Payer: COMMERCIAL

## 2019-08-08 DIAGNOSIS — S14.129A INCOMPLETE INJURY OF CERVICAL SPINAL CORD WITH CENTRAL CORD SYNDROME: ICD-10-CM

## 2019-08-08 DIAGNOSIS — Z78.9 IMPAIRED MOTOR CONTROL: ICD-10-CM

## 2019-08-08 DIAGNOSIS — R53.1 DECREASED STRENGTH: ICD-10-CM

## 2019-08-08 PROCEDURE — 97116 GAIT TRAINING THERAPY: CPT | Mod: PN

## 2019-08-13 ENCOUNTER — CLINICAL SUPPORT (OUTPATIENT)
Dept: REHABILITATION | Facility: HOSPITAL | Age: 59
End: 2019-08-13
Attending: FAMILY MEDICINE
Payer: COMMERCIAL

## 2019-08-13 DIAGNOSIS — R53.1 DECREASED STRENGTH: ICD-10-CM

## 2019-08-13 DIAGNOSIS — S14.129A INCOMPLETE INJURY OF CERVICAL SPINAL CORD WITH CENTRAL CORD SYNDROME: ICD-10-CM

## 2019-08-13 DIAGNOSIS — Z78.9 IMPAIRED MOTOR CONTROL: ICD-10-CM

## 2019-08-13 PROCEDURE — 97116 GAIT TRAINING THERAPY: CPT | Mod: PN

## 2019-08-13 NOTE — PROGRESS NOTES
Name: Alberto Dangelo  Clinic Number: [id number]  Date of Treatment: 08/13/2019   Diagnosis:   Encounter Diagnoses   Name Primary?   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 Mod I in manual w/c with service dog. Mod I stretching prior to session. Worked out on his NuStep at home before coming to [facility].     Objective:    Patient received individual therapy to increase strength, endurance, ROM, flexibility, posture and core stabilization with activities as follows:     Gait training via [employer] system for 63:47 minutes (plus set up):     Set up at 5 kg unloading. Computer-assisted robotic gait training via [employer] x 3.0 km/h. GF x 40% x 13', 30% x 17', then 15% for distance of 3169 m. One stoppages.     Continue HEP daily.    Pt demo good understanding of the education provided. Patient demonstrated good return demonstration of activities.     Assessment:     Improved session with decreased tones and increased fluidity of movement.     Pt will continue to benefit from skilled PT intervention. Medical Necessity is demonstrated by:  Requires skilled supervision to complete and progress HEP and Weakness.    Patient is making good progress towards established goals.    Plan:    Continue with established Plan of Care towards PT goals.

## 2019-08-15 ENCOUNTER — CLINICAL SUPPORT (OUTPATIENT)
Dept: REHABILITATION | Facility: HOSPITAL | Age: 59
End: 2019-08-15
Attending: FAMILY MEDICINE
Payer: COMMERCIAL

## 2019-08-15 DIAGNOSIS — S14.129A INCOMPLETE INJURY OF CERVICAL SPINAL CORD WITH CENTRAL CORD SYNDROME: ICD-10-CM

## 2019-08-15 DIAGNOSIS — M25.671 DECREASED RANGE OF MOTION OF BOTH ANKLES: ICD-10-CM

## 2019-08-15 DIAGNOSIS — Z78.9 IMPAIRED MOTOR CONTROL: ICD-10-CM

## 2019-08-15 DIAGNOSIS — R53.1 DECREASED STRENGTH: ICD-10-CM

## 2019-08-15 DIAGNOSIS — R26.9 GAIT DISTURBANCE: ICD-10-CM

## 2019-08-15 DIAGNOSIS — M25.672 DECREASED RANGE OF MOTION OF BOTH ANKLES: ICD-10-CM

## 2019-08-15 PROCEDURE — 97116 GAIT TRAINING THERAPY: CPT | Mod: PN

## 2019-08-15 NOTE — PROGRESS NOTES
"Name: Alberto Dangelo  [facility] Number: [id number]  Date of Treatment: 08/15/2019   Diagnosis:   Encounter Diagnoses   Name Primary?   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  I was out late last night".  Patient reports their pain to be n/a/10 on a 0-10 scale with 0 being no pain and 10 being the worst pain imaginable.    Objective    Patient received individual therapy to increase strength, endurance, flexibility, posture, core stabilization, and gait quality with 0 patients with activities as follows:     Alberto participated in dynamic functional therapeutic activities to improve functional performance for 50 minutes. Including: Set up with 5 kg unloading.  Pt participated in computer assisted robotic gait training via [employer] @ 3.0 kph x 35 min, 25 sec for a total distance of 1759 m.  Utilized guidance force of 40% x 13 min then decreased to 30% for the remainder of the session.  Pt requested to end session early due to needing to use the restroom.  Did not want to resume session.  .      Written Home Exercises Provided: Pt performs self stretching prior to initiating gait activity and is active in HEP.    Pt demo good understanding of the education provided. Alberto demonstrated good return demonstration of activities.     Assessment:   Unable to complete full session today due to personal needs.  Prior to discontinuing session, pt demonstrated good toe off but minimally delayed heel strike.  May need to adjust patient coefficient to improve.      Pt will continue to benefit from skilled PT intervention. Medical Necessity is demonstrated by:  Fall Risk, Unable to participate fully in daily activities, Weakness and Functional Mobility/Gait Deficits  Patient is making steady progress towards established " goals.    New/Revised goals: N/A      Plan:  Continue with established Plan of Care towards PT goals. Adjust patient coefficient to optimize gait effort, adjust guidance force as patient tolerates.

## 2019-08-20 ENCOUNTER — CLINICAL SUPPORT (OUTPATIENT)
Dept: REHABILITATION | Facility: HOSPITAL | Age: 59
End: 2019-08-20
Attending: FAMILY MEDICINE
Payer: COMMERCIAL

## 2019-08-20 DIAGNOSIS — R26.9 GAIT DISTURBANCE: ICD-10-CM

## 2019-08-20 DIAGNOSIS — M25.672 DECREASED RANGE OF MOTION OF BOTH ANKLES: ICD-10-CM

## 2019-08-20 DIAGNOSIS — S14.129A INCOMPLETE INJURY OF CERVICAL SPINAL CORD WITH CENTRAL CORD SYNDROME: ICD-10-CM

## 2019-08-20 DIAGNOSIS — M25.671 DECREASED RANGE OF MOTION OF BOTH ANKLES: ICD-10-CM

## 2019-08-20 DIAGNOSIS — R53.1 DECREASED STRENGTH: ICD-10-CM

## 2019-08-20 DIAGNOSIS — Z78.9 IMPAIRED MOTOR CONTROL: ICD-10-CM

## 2019-08-20 PROCEDURE — 97116 GAIT TRAINING THERAPY: CPT | Mod: PN

## 2019-08-20 NOTE — PROGRESS NOTES
Name: Alberto Dangelo  [facility] Number: [id number]  Date of Treatment: 08/20/2019   Diagnosis:   Encounter Diagnoses   Name Primary?   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  Patient reports their pain to be n/a/10 on a 0-10 scale with 0 being no pain and 10 being the worst pain imaginable.    Objective    Patient received individual therapy to increase strength, endurance, flexibility, posture, core stabilization and gait quality with 0 patients with activities as follows:   Independent stretching prior to treatment (20 min)     Alberto participated in dynamic functional therapeutic activities to improve functional performance for 67 minutes. Including: Set up with 5 kg unloading.  Pt participated in computer assisted robotic gait training via [employer] @ 3.0 kph x 60 min, 28 sec for a total distance of 3011 m.  Utilized guidance force of 40% x 13 min, decreased to 30% x 12 min.  Completed session @ 15% guidance force without interruption.        Written Home Exercises Provided: Pt regularly participates in HEP for flexibility, strength, and core stabilization.    Pt demo good understanding of the education provided. Alberto demonstrated good return demonstration of activities.     Assessment:   Able to complete gait training @ 15% guidance force without adjusting unweighting.  Demonstrated good foot clearance throughout session.      Pt will continue to benefit from skilled PT intervention. Medical Necessity is demonstrated by:  Fall Risk, Unable to participate fully in daily activities, Weakness and Impaired functional mobility/gait.   Patient is making steady progress towards established goals.    New/Revised goals: N/A      Plan:  Continue with established Plan of Care towards PT goals. Decrease  guidance force to 10% as patient tolerates.

## 2019-08-22 ENCOUNTER — CLINICAL SUPPORT (OUTPATIENT)
Dept: REHABILITATION | Facility: HOSPITAL | Age: 59
End: 2019-08-22
Attending: FAMILY MEDICINE
Payer: COMMERCIAL

## 2019-08-22 DIAGNOSIS — Z78.9 IMPAIRED MOTOR CONTROL: ICD-10-CM

## 2019-08-22 DIAGNOSIS — R53.1 DECREASED STRENGTH: ICD-10-CM

## 2019-08-22 DIAGNOSIS — S14.129A INCOMPLETE INJURY OF CERVICAL SPINAL CORD WITH CENTRAL CORD SYNDROME: ICD-10-CM

## 2019-08-22 PROCEDURE — 97116 GAIT TRAINING THERAPY: CPT | Mod: PN

## 2019-08-22 NOTE — PROGRESS NOTES
Name: Alberto Dangelo  Clinic Number: [id number]  Date of Treatment: 08/22/2019   Diagnosis:   Encounter Diagnoses   Name Primary?   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 Mod I stretching prior to session and mod I in manual w/c with service dog. Spoke with [employer] rep before session re: new LE assistive devices.     Objective:    Patient received individual therapy to increase strength, endurance, ROM, flexibility, posture and core stabilization with activities as follows:     Gait training via [employer] system for 60:32 minutes (plus set up):     Set up at 5 kg unloading. Computer-assisted robotic gait training via [employer] x 3.0 km/h. GF x 40% x 18', 30% x 15', 10% for distance of 2998 m. One stoppage with attempt to .     Continue HEP daily.    Pt demo good understanding of the education provided. Patient demonstrated good return demonstration of activities.     Assessment:     Good tolerance for GF drops until attempted drop to 10%, which caused toe drag    Pt will continue to benefit from skilled PT intervention. Medical Necessity is demonstrated by:  Requires skilled supervision to complete and progress HEP and Weakness.    Patient is making good progress towards established goals.    Plan:    Continue with established Plan of Care towards PT goals.

## 2019-08-27 ENCOUNTER — CLINICAL SUPPORT (OUTPATIENT)
Dept: REHABILITATION | Facility: HOSPITAL | Age: 59
End: 2019-08-27
Attending: FAMILY MEDICINE
Payer: COMMERCIAL

## 2019-08-27 DIAGNOSIS — Z78.9 IMPAIRED MOTOR CONTROL: ICD-10-CM

## 2019-08-27 DIAGNOSIS — R53.1 DECREASED STRENGTH: ICD-10-CM

## 2019-08-27 DIAGNOSIS — S14.129A INCOMPLETE INJURY OF CERVICAL SPINAL CORD WITH CENTRAL CORD SYNDROME: ICD-10-CM

## 2019-08-27 PROCEDURE — 97116 GAIT TRAINING THERAPY: CPT | Mod: PN

## 2019-08-27 NOTE — PROGRESS NOTES
Name: Alberto Dangelo  Clinic Number: [id number]  Date of Treatment: 08/27/2019   Diagnosis:   Encounter Diagnoses   Name Primary?   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 Mod I in manual w/c and mod I stretching prior to session. Did not bring service dog 2* dog is at [employer].  Worked on standing balance yesterday in his // bars at home while standing in [employer]irex and juggling soft ball.     Objective:    Patient received individual therapy to increase strength, endurance, ROM, flexibility, posture and core stabilization with activities as follows:     Gait training via [employer] system for 60:31 minutes (plus set up):     Set up at 5 kg unloading. Computer-assisted robotic gait training via [employer] x 3.0 km/h. GF x 40% x 13', 30% x 17', then 15% for distance of 2973 m. Two stoppages.     Continue HEP daily.    Pt demo good understanding of the education provided. Patient demonstrated good return demonstration of activities.     Assessment:     Initial stoppages with GF decrease to 15% but improved with strap adjustments and momentary decreased WB.     Pt will continue to benefit from skilled PT intervention. Medical Necessity is demonstrated by:  Requires skilled supervision to complete and progress HEP and Weakness.    Patient is making good progress towards established goals.    Plan:    Continue with established Plan of Care towards PT goals.

## 2019-09-13 ENCOUNTER — CLINICAL SUPPORT (OUTPATIENT)
Dept: REHABILITATION | Facility: HOSPITAL | Age: 59
End: 2019-09-13
Attending: FAMILY MEDICINE
Payer: COMMERCIAL

## 2019-09-13 DIAGNOSIS — R26.9 GAIT DISTURBANCE: ICD-10-CM

## 2019-09-13 DIAGNOSIS — M25.671 DECREASED RANGE OF MOTION OF BOTH ANKLES: ICD-10-CM

## 2019-09-13 DIAGNOSIS — M25.672 DECREASED RANGE OF MOTION OF BOTH ANKLES: ICD-10-CM

## 2019-09-13 DIAGNOSIS — Z78.9 IMPAIRED MOTOR CONTROL: ICD-10-CM

## 2019-09-13 DIAGNOSIS — S14.129A INCOMPLETE INJURY OF CERVICAL SPINAL CORD WITH CENTRAL CORD SYNDROME: ICD-10-CM

## 2019-09-13 DIAGNOSIS — R53.1 DECREASED STRENGTH: ICD-10-CM

## 2019-09-13 PROCEDURE — 97116 GAIT TRAINING THERAPY: CPT | Mod: PN

## 2019-09-17 ENCOUNTER — CLINICAL SUPPORT (OUTPATIENT)
Dept: REHABILITATION | Facility: HOSPITAL | Age: 59
End: 2019-09-17
Attending: FAMILY MEDICINE
Payer: COMMERCIAL

## 2019-09-17 DIAGNOSIS — S14.129A INCOMPLETE INJURY OF CERVICAL SPINAL CORD WITH CENTRAL CORD SYNDROME: ICD-10-CM

## 2019-09-17 DIAGNOSIS — M25.671 DECREASED RANGE OF MOTION OF BOTH ANKLES: ICD-10-CM

## 2019-09-17 DIAGNOSIS — R26.9 GAIT DISTURBANCE: ICD-10-CM

## 2019-09-17 DIAGNOSIS — Z78.9 IMPAIRED MOTOR CONTROL: ICD-10-CM

## 2019-09-17 DIAGNOSIS — M25.672 DECREASED RANGE OF MOTION OF BOTH ANKLES: ICD-10-CM

## 2019-09-17 DIAGNOSIS — R53.1 DECREASED STRENGTH: ICD-10-CM

## 2019-09-17 PROCEDURE — 97116 GAIT TRAINING THERAPY: CPT | Mod: PN

## 2019-09-17 NOTE — PROGRESS NOTES
Name: Alberto Dangelo  [facility] Number: [id number]  Date of Treatment: 09/17/2019   Diagnosis:   Encounter Diagnoses   Name Primary?   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  Patient reports their pain to be n/a/10 on a 0-10 scale with 0 being no pain and 10 being the worst pain imaginable.    Objective    Patient received individual therapy to increase strength, endurance, posture, core stabilization and gait quality with 0 patients with activities as follows:     Performed self stretching independently prior to session    Alberto participated in dynamic functional therapeutic activities to improve functional performance for 67 minutes. Including: Set up with 5 kg unloading.  Pt participated in computer assisted robotic gait training via [employer] @ 3.0 kph x 60 min, 15 sec for a total distance of 2984 m.  Utilized guidance force of 40% x 15 min, decreased to 30% x 25', and completed session @ 20% guidance force.        Written Home Exercises Provided: N/a    Pt demo good understanding of the education provided. Alberto demonstrated good return demonstration of activities.     Assessment:   Tolerated guidance force @ 20% without foot drag but unable to decrease to 15% on this date.      Pt will continue to benefit from skilled PT intervention. Medical Necessity is demonstrated by:  Fall Risk, Unable to participate fully in daily activities, Weakness and Impaired gait/functional mobility  Patient is making steady progress towards established goals.    New/Revised goals: N/A      Plan:  Continue with established Plan of Care towards PT goals.  Strive to decrease guidance force throughout sessions with goal of achieving 10% guidance force.

## 2019-09-17 NOTE — PROGRESS NOTES
"Name: Alberto Dangelo  [facility] Number: [id number]  Date of Treatment: 09/13/2019   Diagnosis:   Encounter Diagnoses   Name Primary?   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  I haven't been able to do as much while I was traveling".  Patient reports their pain to be n/a/10 on a 0-10 scale with 0 being no pain and 10 being the worst pain imaginable.    Objective    Patient received individual therapy to increase strength, endurance, flexibility, core stabilization and gait quality with 0 patients with activities as follows:   Self stretching prior to set up x 10' (No charge as done independently)    Alberto participated in dynamic functional therapeutic activities to improve functional performance for 67 minutes. Including: Set up with 5 kg unloading.  Pt participated in computer assisted robotic gait training via [employer] @ 3.0 kph x 60 min, 10 sec for a total distance of 2996 m.  Utilized guidance force of 40% x 19 min, decreased to 30% x 24', completed @ 20%.      Written Home Exercises Provided: N/A  Pt demo good understanding of the education provided. Alberto demonstrated good return demonstration of activities.     Assessment:   Pt achieved good heel strike and toe off throughout gait training activity.  Fatigued following activity.      Pt will continue to benefit from skilled PT intervention. Medical Necessity is demonstrated by:  Fall Risk, Unable to participate fully in daily activities, Weakness and Gait/functional mobility deficits.      Patient is making limited progress towards established goals.    New/Revised goals: N/A      Plan:  Resume PT as per POC update.  Continue with PT activities to work toward established PT goals.   "

## 2019-09-24 ENCOUNTER — CLINICAL SUPPORT (OUTPATIENT)
Dept: REHABILITATION | Facility: HOSPITAL | Age: 59
End: 2019-09-24
Attending: FAMILY MEDICINE
Payer: COMMERCIAL

## 2019-09-24 DIAGNOSIS — S14.129A INCOMPLETE INJURY OF CERVICAL SPINAL CORD WITH CENTRAL CORD SYNDROME: ICD-10-CM

## 2019-09-24 DIAGNOSIS — M25.672 DECREASED RANGE OF MOTION OF BOTH ANKLES: ICD-10-CM

## 2019-09-24 DIAGNOSIS — M25.671 DECREASED RANGE OF MOTION OF BOTH ANKLES: ICD-10-CM

## 2019-09-24 DIAGNOSIS — Z78.9 IMPAIRED MOTOR CONTROL: ICD-10-CM

## 2019-09-24 DIAGNOSIS — R26.9 GAIT DISTURBANCE: ICD-10-CM

## 2019-09-24 DIAGNOSIS — R53.1 DECREASED STRENGTH: ICD-10-CM

## 2019-09-24 PROCEDURE — 97116 GAIT TRAINING THERAPY: CPT | Mod: PN

## 2019-09-24 NOTE — PROGRESS NOTES
Name: Alberto Dangelo  [facility] Number: [id number]  Date of Treatment: 09/24/2019   Diagnosis:   Encounter Diagnoses   Name Primary?   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  Patient reports their pain to be n/a/10 on a 0-10 scale with 0 being no pain and 10 being the worst pain imaginable.    Objective    Patient received individual therapy to increase strength, endurance, flexibility, core stabilization and gait quality/functional mobility with 0 patients with activities as follows:     Alberto performed self stretching prior to initiation of gait training activity    Patient participated in dynamic functional therapeutic activities to improve functional performance for 67 minutes. Including: Set up with 5 kg unloading.  Pt participated in computer assisted robotic gait training via [employer] @ 3.0 kph x 60 min, 17 sec for a total distance of 3002 m.  Utilized guidance force of 40% x 15 min, decreased to 30% for 20', and 20% for 23 min.  Attempted to decrease guidance force to 15%.  Resulted in increased toe drag.  Increased unweighting to complete session x 2 min.      Written Home Exercises Provided: N/A  Pt demo good understanding of the education provided. Alberto demonstrated good return demonstration of activities.     Assessment:   Able to achieve gait training @ 15% for short period but required increased unweighting to complete activity.      Pt will continue to benefit from skilled PT intervention. Medical Necessity is demonstrated by:  Fall Risk, Unable to participate fully in daily activities and Impaired functional mobility/gait    Patient is making slow progress towards established goals.    New/Revised goals: N/A      Plan:  Continue with established Plan of Care towards PT goals.   Progressively decrease guidance force as patient tolerates.

## 2019-09-26 ENCOUNTER — CLINICAL SUPPORT (OUTPATIENT)
Dept: REHABILITATION | Facility: HOSPITAL | Age: 59
End: 2019-09-26
Attending: FAMILY MEDICINE
Payer: COMMERCIAL

## 2019-09-26 DIAGNOSIS — Z78.9 IMPAIRED MOTOR CONTROL: ICD-10-CM

## 2019-09-26 DIAGNOSIS — S14.129A INCOMPLETE INJURY OF CERVICAL SPINAL CORD WITH CENTRAL CORD SYNDROME: ICD-10-CM

## 2019-09-26 DIAGNOSIS — R53.1 DECREASED STRENGTH: ICD-10-CM

## 2019-09-26 PROCEDURE — 97116 GAIT TRAINING THERAPY: CPT | Mod: PN

## 2019-09-26 NOTE — PROGRESS NOTES
Name: Alberto Dangelo  Clinic Number: [id number]  Date of Treatment: 09/26/2019   Diagnosis:   Encounter Diagnoses   Name Primary?   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 Busy day with work. Brought his service dog. Mod I in manual w/c and mod I stretching prior to session.     Objective:    Patient received individual therapy to increase strength, endurance, ROM, flexibility, posture and core stabilization with activities as follows:     Gait training via [employer] system for 60:59 minutes (plus set up):     Set up at 5 kg unloading. Computer-assisted robotic gait training via [employer] x 3.0 km/h. GF x 40% x 10', 35% x 27', then 20% for distance of 3017 m. One stoppage 2* strapping issues.     Continue HEP daily.    Pt demo good understanding of the education provided. Patient demonstrated good return demonstration of activities.     Assessment:     VC for improved B LE clearance when GF dropped to 20% and increased knee extn during stance.     Pt will continue to benefit from skilled PT intervention. Medical Necessity is demonstrated by:  Requires skilled supervision to complete and progress HEP and Weakness.    Patient is making good progress towards established goals.    Plan:    Continue with established Plan of Care towards PT goals.

## 2019-10-01 ENCOUNTER — CLINICAL SUPPORT (OUTPATIENT)
Dept: REHABILITATION | Facility: HOSPITAL | Age: 59
End: 2019-10-01
Attending: FAMILY MEDICINE
Payer: COMMERCIAL

## 2019-10-01 DIAGNOSIS — R26.9 GAIT DISTURBANCE: ICD-10-CM

## 2019-10-01 DIAGNOSIS — Z78.9 IMPAIRED MOTOR CONTROL: ICD-10-CM

## 2019-10-01 DIAGNOSIS — M25.671 DECREASED RANGE OF MOTION OF BOTH ANKLES: ICD-10-CM

## 2019-10-01 DIAGNOSIS — S14.129A INCOMPLETE INJURY OF CERVICAL SPINAL CORD WITH CENTRAL CORD SYNDROME: ICD-10-CM

## 2019-10-01 DIAGNOSIS — M25.672 DECREASED RANGE OF MOTION OF BOTH ANKLES: ICD-10-CM

## 2019-10-01 DIAGNOSIS — R53.1 DECREASED STRENGTH: ICD-10-CM

## 2019-10-01 PROCEDURE — 97116 GAIT TRAINING THERAPY: CPT | Mod: PN

## 2019-10-01 NOTE — PROGRESS NOTES
"Name: Alberto Dangelo  [facility] Number: [id number]  Date of Treatment: 10/01/2019   Diagnosis:   Encounter Diagnoses   Name Primary?   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        Time in: 1420  Time Out: 1550  Total Treatment Time: 67  Group Time: 0      Subjective:    Alberto reports "I was on vacation for a bit.  I was beginning to wonder if my legs had atrophied".  Patient reports their pain to be n/a/10 on a 0-10 scale with 0 being no pain and 10 being the worst pain imaginable.    Objective    Patient received individual therapy to increase strength, endurance, flexibility, core stabilization and gait quality with 0 patients with activities as follows:     Alberto participated in dynamic functional therapeutic activities to improve functional performance for 75 minutes. Including: Set up with 5 kg unloading.  Pt participated in computer assisted robotic gait training via [employer] @ 3.0 kph x 60 min, 24 sec for a total distance of 2957 m.  Utilized guidance force of 40% x 16 min, decreased to 30% guidance force x 14', then again to 20% x 10'.  Completed session @ 15% guidance force but with increased unweighting. .      Written Home Exercises Provided: N/A  Pt demo good understanding of the education provided. Alberto demonstrated good return demonstration of activities.     Assessment:    When attempting to decrease guidance force to 15% pt demonstrated increased toe drag that required increased unweighting to prevent stoppage of gait training activities.      Pt will continue to benefit from skilled PT intervention. Medical Necessity is demonstrated by:  Fall Risk, Weakness and Impaired gait/functional mobility.      Patient is making steady progress towards established goals.    New/Revised goals: N/A this date.       Plan:  Continue with established Plan of Care towards PT goals. Progressive decrease in guidance for for gait " training.

## 2019-10-03 ENCOUNTER — CLINICAL SUPPORT (OUTPATIENT)
Dept: REHABILITATION | Facility: HOSPITAL | Age: 59
End: 2019-10-03
Attending: FAMILY MEDICINE
Payer: COMMERCIAL

## 2019-10-03 DIAGNOSIS — S14.129A INCOMPLETE INJURY OF CERVICAL SPINAL CORD WITH CENTRAL CORD SYNDROME: ICD-10-CM

## 2019-10-03 DIAGNOSIS — Z78.9 IMPAIRED MOTOR CONTROL: ICD-10-CM

## 2019-10-03 DIAGNOSIS — R53.1 DECREASED STRENGTH: ICD-10-CM

## 2019-10-03 PROCEDURE — 97116 GAIT TRAINING THERAPY: CPT | Mod: PN

## 2019-10-03 NOTE — PROGRESS NOTES
Name: Alberto Dangelo  [facility] Number: [id number]  Date of Treatment: 10/03/2019   Diagnosis:   Encounter Diagnoses   Name Primary?   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   Patient presents mod I in manual w/c with service dog. Mod I stretches prior to session. Did not work out yesterday, so he feels like he has more spasticity.     Objective:    Patient received individual therapy to increase strength, endurance, ROM, flexibility, posture and core stabilization with activities as follows:     Gait training via [employer] system for 60:31 minutes (plus set up):     Set up at 5 kg unloading. Computer-assisted robotic gait training via [employer] x 3.0 km/h. GF x 40% X 13', 30% X 17', 20% for distance of 2930 m. Several stoppages related to strapping issues.     Continue HEP daily.    Pt demo good understanding of the education provided. Patient demonstrated good return demonstration of activities.     Assessment:     Improved tones after session.     Pt will continue to benefit from skilled PT intervention. Medical Necessity is demonstrated by:  Requires skilled supervision to complete and progress HEP and Weakness.    Patient is making good progress towards established goals.    Plan:    Continue with established Plan of Care towards PT goals.

## 2019-10-08 ENCOUNTER — CLINICAL SUPPORT (OUTPATIENT)
Dept: REHABILITATION | Facility: HOSPITAL | Age: 59
End: 2019-10-08
Attending: FAMILY MEDICINE
Payer: COMMERCIAL

## 2019-10-08 DIAGNOSIS — S14.129A INCOMPLETE INJURY OF CERVICAL SPINAL CORD WITH CENTRAL CORD SYNDROME: ICD-10-CM

## 2019-10-08 DIAGNOSIS — R26.9 GAIT DISTURBANCE: ICD-10-CM

## 2019-10-08 DIAGNOSIS — Z78.9 IMPAIRED MOTOR CONTROL: ICD-10-CM

## 2019-10-08 DIAGNOSIS — M25.672 DECREASED RANGE OF MOTION OF BOTH ANKLES: ICD-10-CM

## 2019-10-08 DIAGNOSIS — M25.671 DECREASED RANGE OF MOTION OF BOTH ANKLES: ICD-10-CM

## 2019-10-08 DIAGNOSIS — R53.1 DECREASED STRENGTH: ICD-10-CM

## 2019-10-08 PROCEDURE — 97116 GAIT TRAINING THERAPY: CPT | Mod: PN

## 2019-10-08 NOTE — PROGRESS NOTES
"Name: Alberto Dangelo  [facility] Number: [id number]  Date of Treatment: 10/08/2019   Diagnosis:   Encounter Diagnoses   Name Primary?   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        Time in: 1418  Time Out: 1545  Total Treatment Time: 70  Group Time: 0      Subjective:    Alberto reports "I think I had some atrophy while I was gone but I'm working hard to regain the muscle".  Patient reports their pain to be n/a/10 on a 0-10 scale with 0 being no pain and 10 being the worst pain imaginable.    Objective    Patient received individual therapy to increase strength, endurance, flexibility, core stabilization and gait quality with 0 patients with activities as follows:     Alberto participated in dynamic functional therapeutic activities to improve functional performance for 67 minutes. Including: Set up with 5 kg unloading.  Pt participated in computer assisted robotic gait training via [employer] @ 3.0 kph x 63 min, 20 sec for a total distance of 3134 m.  Utilized guidance force of 40% x 15 min, decreased to 30% x 20 min, and completed session @ 20% guidance force.        Written Home Exercises Provided: N/A  Pt demo good understanding of the education provided. Alberto demonstrated good return demonstration of activities.     Assessment:   Difficulty achieving smooth gait training due to occasional toe drag (x ~ 5 min).  Once guidance force decreased to 20%, pt experienced additional toe drag which required restart of training.      Pt will continue to benefit from skilled PT intervention. Medical Necessity is demonstrated by:  Fall Risk, Weakness and functional mobility/gait dysfunction.      Patient is making steady progress towards established goals.    New/Revised goals: No      Plan:  Continue with established Plan of Care towards PT goals. Strive to reduce guidance force without increased toe drag.    "

## 2019-10-10 ENCOUNTER — CLINICAL SUPPORT (OUTPATIENT)
Dept: REHABILITATION | Facility: HOSPITAL | Age: 59
End: 2019-10-10
Attending: FAMILY MEDICINE
Payer: COMMERCIAL

## 2019-10-10 DIAGNOSIS — Z78.9 IMPAIRED MOTOR CONTROL: ICD-10-CM

## 2019-10-10 DIAGNOSIS — R53.1 DECREASED STRENGTH: ICD-10-CM

## 2019-10-10 DIAGNOSIS — S14.129A INCOMPLETE INJURY OF CERVICAL SPINAL CORD WITH CENTRAL CORD SYNDROME: ICD-10-CM

## 2019-10-10 PROCEDURE — 97116 GAIT TRAINING THERAPY: CPT | Mod: PN

## 2019-10-10 NOTE — PROGRESS NOTES
Name: Alberto Dangelo  Clinic Number: [id number]  Date of Treatment: 10/10/2019   Diagnosis:   Encounter Diagnoses   Name Primary?   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        Time in: 1420  Time Out: 1540  Total Treatment Time: 65    Subjective:    Alberto reports no complaints. Will be keeping his grandson this weekend. Mod I in manual with manual w/c and mod I stretching prier to session.  Brought his service dog.     Objective:    Patient received individual therapy to increase strength, endurance, ROM, flexibility, posture and core stabilization with activities as follows:     Gait training via [employer] system for 63:30 minutes (plus set up):     Set up at 5 kg unloading. Computer-assisted robotic gait training via [employer] x 3.0 km/h. GF x 40 x 9', 20% x 20', then 15% (with slight decrease in unweighting) for distance of 3369 m. No stoppages.     Continue HEP daily.    Pt demo good understanding of the education provided. Patient demonstrated good return demonstration of activities.     Assessment:     Improving tolerance for decreased in GF as well as increase in speed.     Pt will continue to benefit from skilled PT intervention. Medical Necessity is demonstrated by:  Requires skilled supervision to complete and progress HEP and Weakness.    Patient is making good progress towards established goals.    Plan:    Continue with established Plan of Care towards PT goals.

## 2019-10-15 ENCOUNTER — CLINICAL SUPPORT (OUTPATIENT)
Dept: REHABILITATION | Facility: HOSPITAL | Age: 59
End: 2019-10-15
Attending: FAMILY MEDICINE
Payer: COMMERCIAL

## 2019-10-15 DIAGNOSIS — Z78.9 IMPAIRED MOTOR CONTROL: ICD-10-CM

## 2019-10-15 DIAGNOSIS — R26.9 GAIT DISTURBANCE: ICD-10-CM

## 2019-10-15 DIAGNOSIS — R53.1 DECREASED STRENGTH: ICD-10-CM

## 2019-10-15 DIAGNOSIS — M25.671 DECREASED RANGE OF MOTION OF BOTH ANKLES: ICD-10-CM

## 2019-10-15 DIAGNOSIS — S14.129A INCOMPLETE INJURY OF CERVICAL SPINAL CORD WITH CENTRAL CORD SYNDROME: ICD-10-CM

## 2019-10-15 DIAGNOSIS — M25.672 DECREASED RANGE OF MOTION OF BOTH ANKLES: ICD-10-CM

## 2019-10-15 PROCEDURE — 97116 GAIT TRAINING THERAPY: CPT | Mod: PN

## 2019-10-15 NOTE — PROGRESS NOTES
"  Physical Therapy Daily Treatment Note     Name: Alberto Dangelo  Clinic Number: [id number]    Therapy Diagnosis:   Encounter Diagnoses   Name Primary?   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  He was compliant with home exercise program.  Response to previous treatment: Increased fatigue following session  Functional change: Increased standing tolerance    Pain: 0/10  Location: N/A    Objective     Efren participated in gait training to improve functional mobility and safety for 67  minutes, including:  Set up with 5 kg unloading.  Pt participated in computer assisted robotic gait training via [employer] @ 3.2 kph x 60 min, 17 sec for a total distance of 3184 m.  Utilized guidance force of 40% x 15 min, decreased to 30% x 15 min and completed session @ 20%.      Home Exercises Provided and Patient Education Provided     Education provided:   - Instructed pt in postural correction in standing (decreased anterior pelvic tilt, decreased hyperextension of knees)    Written Home Exercises Provided: Patient instructed to cont prior HEP.  Exercises were reviewed and Efren was able to demonstrate them prior to the end of the session.  Efren demonstrated good  understanding of the education provided.     See EMR under Pt performs independent HEP as previously instructed.  for exercises provided for self stretching and core " stabilization.    Assessment     Pt able to perform complete training session @ 3.2 kph (increased from 3.0 kph).  Able to decrease guidance force to 20% without increasing unweighting.    Efren is progressing slowly towards his goals.   Pt prognosis is Fair.     Pt will continue to benefit from skilled outpatient physical therapy to address the deficits listed in the problem list box on initial evaluation, provide pt/family education and to maximize pt's level of independence in the home and community environment.     Pt's spiritual, cultural and educational needs considered and pt agreeable to plan of care and goals.     Anticipated barriers to physical therapy: Extensor tone    Goals:   Short Term Goals:   1) Pt will stand for 10 min without excessive lumbar lordosis or increased hip/knee ext  (able to stand with minimal to moderate excess lordosis)  Progressing                2) Tolerate decreased guidance force to 15% for at least 20 min of training with < 15 kg of unweighting without excessive toe drag  Slowly progressing              3) Patient will perform sit > stand from wheelchair with minimal UE support 5 of 10 trials  Slowly progressing  Long Term Goal Status:   modified:  10/8/2019             1) Increase strength in B  LE as indicated by improved L-force testing  No significant change noted this period  Minimal progress            2)  Pt will stand > 15 min with minimal UE support. MET            3) Pt will consistently perform safe stand pivot transfers with minimal UE support (modified),  Progressing            4) Pt will demonstrate improved bilateral hip flexion strength to 2/5 to improve ability to perform swing phase/ foot clearance during gait  Slowly progressing              5) Pt will demonstrate appropriate weight shift during ambulation to limit hip hiking and excessive weight shift.  Slowly progressing      Plan     Progressive decrease in guidance force.  Will continue training @ 3.2 kph  as unable to increase sadaf due to limitation of equipment.      Candice Herzog, PT

## 2019-10-17 ENCOUNTER — CLINICAL SUPPORT (OUTPATIENT)
Dept: REHABILITATION | Facility: HOSPITAL | Age: 59
End: 2019-10-17
Attending: FAMILY MEDICINE
Payer: COMMERCIAL

## 2019-10-17 DIAGNOSIS — R53.1 DECREASED STRENGTH: ICD-10-CM

## 2019-10-17 DIAGNOSIS — S14.129A INCOMPLETE INJURY OF CERVICAL SPINAL CORD WITH CENTRAL CORD SYNDROME: ICD-10-CM

## 2019-10-17 DIAGNOSIS — Z78.9 IMPAIRED MOTOR CONTROL: ICD-10-CM

## 2019-10-17 PROCEDURE — 97116 GAIT TRAINING THERAPY: CPT | Mod: PN

## 2019-10-17 NOTE — PROGRESS NOTES
Name: Alberto Dangelo  Clinic Number: [id number]  Date of Treatment: 10/17/2019   Diagnosis:   Encounter Diagnoses   Name Primary?   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 Kept his grandson over the weekend.  Mod I in manual w/c with service dog and mod I stretching prior to session.     Objective:    Patient received individual therapy to increase strength, endurance, ROM, flexibility, posture and core stabilization with activities as follows:     Gait training via [employer] system for 60:24 minutes (plus set up):     Set up at 5 kg unloading. Computer-assisted robotic gait training via getupp x 3.0 km/h. GF x 40% x 15', 30% x 15', 20% x 20', then 15% for distance of 3184 m. No stoppages.     Continue HEP daily.    Pt demo good understanding of the education provided. Patient demonstrated good return demonstration of activities.     Assessment:     Improving tolerance for GF drops without stoppages.     Pt will continue to benefit from skilled PT intervention. Medical Necessity is demonstrated by:  Requires skilled supervision to complete and progress HEP and Weakness.    Patient is making good progress towards established goals.    Plan:    Continue with established Plan of Care towards PT goals.

## 2019-10-22 ENCOUNTER — CLINICAL SUPPORT (OUTPATIENT)
Dept: REHABILITATION | Facility: HOSPITAL | Age: 59
End: 2019-10-22
Attending: FAMILY MEDICINE
Payer: COMMERCIAL

## 2019-10-22 DIAGNOSIS — M25.671 DECREASED RANGE OF MOTION OF BOTH ANKLES: ICD-10-CM

## 2019-10-22 DIAGNOSIS — S14.129A INCOMPLETE INJURY OF CERVICAL SPINAL CORD WITH CENTRAL CORD SYNDROME: ICD-10-CM

## 2019-10-22 DIAGNOSIS — M25.672 DECREASED RANGE OF MOTION OF BOTH ANKLES: ICD-10-CM

## 2019-10-22 DIAGNOSIS — Z78.9 IMPAIRED MOTOR CONTROL: ICD-10-CM

## 2019-10-22 DIAGNOSIS — R53.1 DECREASED STRENGTH: ICD-10-CM

## 2019-10-22 PROCEDURE — 97116 GAIT TRAINING THERAPY: CPT | Mod: PN

## 2019-10-22 NOTE — PLAN OF CARE
Date: 10/8/2019    PHYSICAL THERAPY UPDATED PLAN OF TREATMENT    Patient name: Alberto Dangelo  Onset Date:  07/03/2010  SOC Date:  08/24/2016  Primary Diagnosis:    1. Impaired motor control     2. Decreased strength     3. 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 Decreased range of motion of both ankles     5. Gait disturbance       Treatment Diagnosis:  Neurologic impairment due to SCI  Certification Period:  07/08/2019 to 10/4/2019  Precautions:  Fall risk  Visits from SOC:  See EMR  Functional Level Prior to SOC:  Ambulates household distances w RW with significant difficulty, main means of mobility is manual w/c.Pt is able to drive w hand controls. Home equipment includes B AFO's, shower chair, ramped entrance w support bars to swimming pool, FES bike, free weights, plyobox system.     Updated Assessment:    L-force test:            LEFT                                                 RIGHT                             FLEX                  EXT                      FLEX                     EXT                HIP      2.06 Nm             23.74 Nm               1.2 Nm               11 .6 Nm                                                             KNEE     1.58  Nm             7.56 Nm                0.39 Nm             16.34 Nm   Gait:  Pt is able to tolerate up to 1 hour on Lokomat @ 5 kg unloading and 3.2 kph with guidance force progressively reduced over the course of treatment to 20% without having to adjust unweighting.  He continues to ambulate some at home using rolling walker with significant difficulty advancing his LE due to excess tone.  He ambulates using forearm crutches while in parallel bars.  He can manage to take a few steps with minimal UE support  Posture:  Pt stands with foot flat position, feet less than shoulder width apart.  Minimal to moderate knee hyperextension and moderate anterior pelvic tilt/increased lumbar lordosis; however, he can correct  this for short periods of time.  He is able to maintain standing position for short periods without UE support.  He continues to lack adequate ankle responses to correct for balance challenges in standing   Balance:  Pt is able to  parallel bars on airex for short period of time.  He is also able to balance for 20-30 sec on flat side of Bosu.    Transfers:  Sit <> stand: pt is able to achieve sit > stand from higher surface using minimal UE support.  He requires increased UE support with sit>stand on lower surfaces.  He continues with substantial anterior weight shift to insure weight is fully over his feet.  Ascent is controlled but upon reaching erect position he tends to hyperextend B knees. Once he gains his balance with knees locked, he is able to unlock his knees.  Stand > sit continues to require moderate UE support but is controlled descent.    Flexibility:  Somewhat limited by tone.  Continues to demonstrate moderate tightness in B calves, B hamstrings, and B hip flexors.    Function:  Pt reports he is able to stand 15 min with UE support.  Demonstrates the ability to perform stand stand pivot transfer with moderate UE support.       Previous Short Term Goals Status:      1) Pt will stand for 10 min without excessive lumbar lordosis or increased hip/knee ext  (able to stand with minimal to moderate excess lordosis)  Progressing                2) Pt will tolerate decreased guidance force to < 20% while utilizing 10-15 kg unweighting without excessive toe drag  Progressing              3) Pt will perform sit > stand with minimal to moderate UE  support  MET              4) Resume ambulation using forearm crutches for household use at least 60% of the time,  Has not addressed due to fear of falling                                    New Short Term Goals Status:      1) Continue   2) Tolerate decreased guidance force to 15% for at least 20 min of training with < 15 kg of unweighting without excessive  toe drag   3) Patient will perform sit > stand from wheelchair with minimal UE support 5 of 10 trials     Long Term Goal Status:   modified:  10/8/2019    1) Increase strength in B  LE as indicated by improved L-force testing  No significant change noted this period            2)  Pt will stand > 15 min with minimal UE support. MET            3) Pt will consistently perform safe stand pivot transfers with minimal UE support (modified),  Progressing            4) Pt will demonstrate improved bilateral hip flexion strength to 2/5 to improve ability to perform swing phase/ foot clearance during gait  Slowly progressing              5) Pt will demonstrate appropriate weight shift during ambulation to limit hip hiking and excessive weight shift.  Slowly progressing       Reasons for Recertification of Therapy:   Pt continues to make slow gains in functional mobility and would benefit from continuation of skilled PT services to address remaining deficits.      Certification Period: 10/8/2019 to 1/28/2020  Recommended Treatment Plan: 2 times per week for 16 weeks: Neuromuscular Re-ed, Orthotic Management and Training, Patient Education, Therapeutic Activites, Therapeutic Exercise and Lokomotor training.    Other Recommendations: N/A        Therapist's Name: Candice Herzog, PT   Date: 10/8/2019    I CERTIFY THE NEED FOR THESE SERVICES FURNISHED UNDER THIS PLAN OF TREATMENT AND WHILE UNDER MY CARE    Physician's comments: ________________________________________________________________________________________________________________________________________________      Physician's Name: ___________________________________

## 2019-10-22 NOTE — PROGRESS NOTES
"  Physical Therapy Daily Treatment Note     Name: Alberto Dangelo  Clinic Number: [id number]    Therapy Diagnosis:   Encounter Diagnoses   Name Primary?   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      Physician: Kaitlyn Arreguin MD    Visit Date: 10/22/2019    Physician Orders: Eval and treat  Medical Diagnosis: S14.129A (ICD-10-CM) - Central cord syndrome at unspecified level of cervical spinal cord  Evaluation Date: 8/26/2016  Authorization Period Expiration: 12/31/2019  Plan of Care Certification Period: 1/28/2020  Visit #/Visits authorized: 37/ 60     Time In: 1420  Time Out: 1540  Total Billable Time: 67 minutes    Precautions: Fall and potential for autonomic dysreflexia    Subjective     Pt reports: "I should try to walk more at home with the crutches but I am afraid of falling   He was compliant with home exercise program.  Response to previous treatment: increased fatigue and decreased extensor tone.    Functional change: No significant change since last session.      Pain: 0/10      Objective     Efren participated in gait training to improve functional mobility and safety for 67  minutes, including:   Set up with 5 kg unloading.  Pt participated in computer assisted robotic gait training via [employer] @ 3.2 kph x 60 min, 53 sec for a total distance of 3217 m.  Utilized guidance force of 40% x 16 min, decreased to 30% x 17 min; 20% x 13 min and completed training @ 15% with minimal increase in unweighting to prevent toe drag.         Home Exercises Provided and Patient Education Provided     Education provided:   - stretch hip flexors    Written Home Exercises Provided: Pt participates in exercise program established in previous program.  Exercises were reviewed and Efren was able to demonstrate them prior to the end of the session.  Efren demonstrated good  understanding of the education provided.     See EMR under N/A for " exercises provided N/A.    Assessment     Pt demonstrated intermittent interruptions in training due to intermittent toe drag particularly at transition to lower guidance force.  Able to decrease to 15% guidance force with slight increase in unweighting compared to when training @ 20% guidance force.    Efren is progressing slowly towards his goals.   Pt prognosis is Fair.     Pt will continue to benefit from skilled outpatient physical therapy to address the deficits listed in the problem list box on initial evaluation, provide pt/family education and to maximize pt's level of independence in the home and community environment.     Pt's spiritual, cultural and educational needs considered and pt agreeable to plan of care and goals.     Anticipated barriers to physical therapy: excess extensor tone.      Goals: Short Term Goals:   1) Pt will stand for 10 min without excessive lumbar lordosis or increased hip/knee ext  (able to stand with minimal to moderate excess lordosis)  Progressing                2) Tolerate decreased guidance force to 15% for at least 20 min of training with < 15 kg of unweighting without excessive toe drag  Progressing               3) Patient will perform sit > stand from wheelchair with minimal UE support 5 of 10 trials  Not assessed this date.    Long Term Goal Status:   modified:  10/8/2019             1) Increase strength in B  LE as indicated by improved L-force testing  Not assessed this date.             2)  Pt will stand > 15 min with minimal UE support. MET (not addressed this date as has been met)            3) Pt will consistently perform safe stand pivot transfers with minimal UE support (modified),  Not assessed this date.             4) Pt will demonstrate improved bilateral hip flexion strength to 2/5 to improve ability to perform swing phase/ foot clearance during gait  Slowly progressing              5) Pt will demonstrate appropriate weight shift during ambulation to limit  hip hiking and excessive weight shift.  Slowly progressing      Plan     Continue to work toward decreased guidance force without decreasing unweighting.      Candice Herzog, PT

## 2019-10-24 ENCOUNTER — CLINICAL SUPPORT (OUTPATIENT)
Dept: REHABILITATION | Facility: HOSPITAL | Age: 59
End: 2019-10-24
Attending: FAMILY MEDICINE
Payer: COMMERCIAL

## 2019-10-24 DIAGNOSIS — S14.129A INCOMPLETE INJURY OF CERVICAL SPINAL CORD WITH CENTRAL CORD SYNDROME: ICD-10-CM

## 2019-10-24 DIAGNOSIS — Z78.9 IMPAIRED MOTOR CONTROL: ICD-10-CM

## 2019-10-24 DIAGNOSIS — R53.1 DECREASED STRENGTH: ICD-10-CM

## 2019-10-24 PROCEDURE — 97116 GAIT TRAINING THERAPY: CPT | Mod: PN

## 2019-10-25 NOTE — PROGRESS NOTES
Name: Alberto Dangelo  Clinic Number: [id number]  Date of Treatment: 10/24/2019  Diagnosis:   Encounter Diagnoses   Name Primary?   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 Mod I in manual w/c with his service dog and mod I stretching prior to session.     Objective:    Patient received individual therapy to increase strength, endurance, ROM, flexibility, posture and core stabilization with activities as follows:     Gait training via [employer] system for 60:36 minutes (plus set up):     Set up at 5 kg unloading. Computer-assisted robotic gait training via [employer] x 3.2 km/h. GF x 40% x 15', 30% x 15', then 15% for distance of 3164 m. Several stoppages.     Continue HEP daily.    Pt demo good understanding of the education provided. Patient demonstrated good return demonstration of activities.     Assessment:     Several stoppages 2* increased LE tones R>L, pt stating it was because he felt colder today that usual.     Pt will continue to benefit from skilled PT intervention. Medical Necessity is demonstrated by:  Requires skilled supervision to complete and progress HEP and Weakness.    Patient is making good progress towards established goals.    Plan:    Continue with established Plan of Care towards PT goals.

## 2019-10-29 ENCOUNTER — CLINICAL SUPPORT (OUTPATIENT)
Dept: REHABILITATION | Facility: HOSPITAL | Age: 59
End: 2019-10-29
Attending: FAMILY MEDICINE
Payer: COMMERCIAL

## 2019-10-29 DIAGNOSIS — R26.9 GAIT DISTURBANCE: ICD-10-CM

## 2019-10-29 DIAGNOSIS — M25.671 DECREASED RANGE OF MOTION OF BOTH ANKLES: ICD-10-CM

## 2019-10-29 DIAGNOSIS — R53.1 DECREASED STRENGTH: ICD-10-CM

## 2019-10-29 DIAGNOSIS — Z78.9 IMPAIRED MOTOR CONTROL: ICD-10-CM

## 2019-10-29 DIAGNOSIS — S14.129A INCOMPLETE INJURY OF CERVICAL SPINAL CORD WITH CENTRAL CORD SYNDROME: ICD-10-CM

## 2019-10-29 DIAGNOSIS — M25.672 DECREASED RANGE OF MOTION OF BOTH ANKLES: ICD-10-CM

## 2019-10-29 PROCEDURE — 97116 GAIT TRAINING THERAPY: CPT | Mod: PN

## 2019-10-31 NOTE — PROGRESS NOTES
"  Physical Therapy Daily Treatment Note     Name: Alberto Dangelo  Clinic Number: [id number]    Therapy Diagnosis:   No diagnosis found.  Physician: Kaitlyn Arreguin MD    Visit Date: 10/29/2019    Physician Orders: Eval and treat  Medical Diagnosis: S14.129A (ICD-10-CM) - Central cord syndrome at unspecified level of cervical spinal cord  Evaluation Date: 8/26/2016  Authorization Period Expiration: 12/31/2019  Plan of Care Certification Period: 1/28/2020  Visit #/Visits authorized: 39/ 60     Time In: 1415  Time Out: 1530  Total Billable Time: 67 minutes    Precautions: Fall and potential for autonomic dysreflexia    Subjective     Pt reports: "I'm a little tight today"     He was compliant with home exercise program.  Response to previous treatment: decreased extensor tone.    Functional change: Decreased UE support required for standing activities  .      Pain: 0/10      Objective     Efren participated in gait training to improve functional mobility and safety for 67  minutes, including:   Set up with 5 kg unloading.  Pt participated in computer assisted robotic gait training via [employer] @ 3.2 kph x 60 min, 18 sec for a total distance of 3180 m.  Utilized guidance force of 40% x 12 min, decreased to 30% x 12 min; 20% x 26 min and completed training @ 15% with minimal increase in unweighting to prevent toe drag.  Pt noted to have 1 episode of excessive toe drag resulting in stoppage of training.  Was able to restart without difficulty and complete session.  Prior to stoppage, patient was noted to have 3 episodes of L toe drag that he was able to correct, thus preventing stoppage of training.         Home Exercises Provided and Patient Education Provided     Education provided:   - stretch hip flexors and calves    Written Home Exercises Provided: Pt participates in exercise program established in previous program.  Exercises were reviewed and Efren was able to demonstrate them prior to the end of the session.  Efren " demonstrated good  understanding of the education provided.     See EMR under N/A for exercises provided N/A.    Assessment     Pt demonstrated 1 interruption in training due to excessive toe drag particularly at transition to lower guidance force.  He was able to correct toe drag initially after transitioning to lower guidance force but did ultimately require restart due to excessive toe drag.  Able to decrease to 15% guidance force with slight increase in unweighting compared to when training @ 20% guidance force.    Efren is progressing slowly towards his goals.   Pt prognosis is Fair.     Pt will continue to benefit from skilled outpatient physical therapy to address the deficits listed in the problem list box on initial evaluation, provide pt/family education and to maximize pt's level of independence in the home and community environment.     Pt's spiritual, cultural and educational needs considered and pt agreeable to plan of care and goals.     Anticipated barriers to physical therapy: excess extensor tone.      Goals: Short Term Goals:   1) Pt will stand for 10 min without excessive lumbar lordosis or increased hip/knee ext  (able to stand with minimal to moderate excess lordosis)  Progressing                2) Tolerate decreased guidance force to 15% for at least 20 min of training with < 15 kg of unweighting without excessive toe drag  Progressing               3) Patient will perform sit > stand from wheelchair with minimal UE support 5 of 10 trials  Not assessed this date.    Long Term Goal Status:   modified:  10/8/2019             1) Increase strength in B  LE as indicated by improved L-force testing  Not assessed this date.             2)  Pt will stand > 15 min with minimal UE support. MET (not addressed this date as has been met)            3) Pt will consistently perform safe stand pivot transfers with minimal UE support (modified),  Not assessed this date.             4) Pt will demonstrate  improved bilateral hip flexion strength to 2/5 to improve ability to perform swing phase/ foot clearance during gait  Slowly progressing              5) Pt will demonstrate appropriate weight shift during ambulation to limit hip hiking and excessive weight shift.  Slowly progressing      Plan     Decrease time spent training @ 40%, 30% and 20% guidance force in order to allow more time training at lower guidance force in effort to further reduce guidance force.      Candice Herzog, PT

## 2019-11-05 ENCOUNTER — CLINICAL SUPPORT (OUTPATIENT)
Dept: REHABILITATION | Facility: HOSPITAL | Age: 59
End: 2019-11-05
Attending: FAMILY MEDICINE
Payer: COMMERCIAL

## 2019-11-05 DIAGNOSIS — Z78.9 IMPAIRED MOTOR CONTROL: ICD-10-CM

## 2019-11-05 DIAGNOSIS — S14.129A INCOMPLETE INJURY OF CERVICAL SPINAL CORD WITH CENTRAL CORD SYNDROME: ICD-10-CM

## 2019-11-05 DIAGNOSIS — R53.1 DECREASED STRENGTH: ICD-10-CM

## 2019-11-05 PROCEDURE — 97116 GAIT TRAINING THERAPY: CPT | Mod: PN

## 2019-11-05 NOTE — PROGRESS NOTES
Physical Therapy Daily Treatment Note     Name: Alberto Dangelo  Clinic Number: [id number]    Therapy Diagnosis:   Encounter Diagnoses   Name Primary?   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    Functional change: Decreased UE support required for standing activities  .      Pain: 0/10      Objective     Efren participated in gait training to improve functional mobility and safety for 60:01  minutes, including:   Set up with 5 kg unloading.  Pt participated in computer assisted robotic gait training via [employer] x 60 min, 18 sec for a total distance of 3171 m.  Utilized guidance force of 40% x 15 min, decreased to 30% x 15 min; then 20%.  Three stoppages 2* excessive L toe drag, which is most common on pt's R, and L LE spasticity notable during these times; however, pt able to return to same GF without increased unweghting today.      Home Exercises Provided and Patient Education Provided     Education provided:   -Continue HEP daily.    Written Home Exercises Provided: Pt participates in exercise program established in previous program.    See EMR under N/A for exercises provided N/A.    Assessment     Aforementioned deviations with gait training 2* increased LE tones which improve with mobility.  Fatigued post session. Pt did  standing stretches prior to session and is noted to have decreased lumbar lordosis than previous sessions with excessive lumbar lordosis.     Pt prognosis is Fair.     Pt will continue to benefit from skilled outpatient physical therapy to address the deficits listed in the problem list box on initial evaluation, provide pt/family education and to maximize pt's level of independence in the home and community environment.     Pt's spiritual, cultural and educational needs considered and pt agreeable to plan of care and goals.     Anticipated barriers to physical therapy: excess extensor tone.      Goals: Short Term Goals:   1) Pt will stand for 10 min without excessive lumbar lordosis or increased hip/knee ext  (able to stand with minimal to moderate excess lordosis)  Progressing                2) Tolerate decreased guidance force to 15% for at least 20 min of training with < 15 kg of unweighting without excessive toe drag  Progressing               3) Patient will perform sit > stand from wheelchair with minimal UE support 5 of 10 trials  Not assessed this date.    Long Term Goal Status:   modified:  10/8/2019             1) Increase strength in B  LE as indicated by improved L-force testing  Not assessed this date.             2)  Pt will stand > 15 min with minimal UE support. MET (not addressed this date as has been met)            3) Pt will consistently perform safe stand pivot transfers with minimal UE support (modified),  Not assessed this date.             4) Pt will demonstrate improved bilateral hip flexion strength to 2/5 to improve ability to perform swing phase/ foot clearance during gait  Slowly progressing              5) Pt will demonstrate appropriate weight shift during ambulation to limit hip hiking and excessive weight shift.  Slowly progressing      Plan     Continue per POC, progressing as appropriate.      Giulia Lock, PTA

## 2019-11-07 ENCOUNTER — CLINICAL SUPPORT (OUTPATIENT)
Dept: REHABILITATION | Facility: HOSPITAL | Age: 59
End: 2019-11-07
Attending: FAMILY MEDICINE
Payer: COMMERCIAL

## 2019-11-07 DIAGNOSIS — S14.129A INCOMPLETE INJURY OF CERVICAL SPINAL CORD WITH CENTRAL CORD SYNDROME: ICD-10-CM

## 2019-11-07 DIAGNOSIS — Z78.9 IMPAIRED MOTOR CONTROL: ICD-10-CM

## 2019-11-07 DIAGNOSIS — R53.1 DECREASED STRENGTH: ICD-10-CM

## 2019-11-07 PROCEDURE — 97116 GAIT TRAINING THERAPY: CPT | Mod: PN

## 2019-11-07 NOTE — PROGRESS NOTES
Name: Alberto Dangelo  Clinic Number: [id number]  Date of Treatment: 11/07/2019   Diagnosis:   Encounter Diagnoses   Name Primary?   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 Mod I in manual w/c with mod I stretching prior to session. Brought his service dog.     Objective:    Patient received individual therapy to increase strength, endurance, ROM, flexibility, posture and core stabilization with activities as follows:     Gait training via [employer] system for 62:46 minutes (plus set up):     Set up at 5 kg unloading. Computer-assisted robotic gait training via tok tok tok x 3.2 km/h. GF x 40% x 15', 30% x 15', 20% x 15', 15% for distance of 3326 m. Two stoppages with initial drop to 15% GF.     Continue HEP daily.    Pt demo good understanding of the education provided. Patient demonstrated good return demonstration of activities.     Assessment:     Improving tolerance for GF drops.     Pt will continue to benefit from skilled PT intervention. Medical Necessity is demonstrated by:  Requires skilled supervision to complete and progress HEP and Weakness.    Patient is making good progress towards established goals.    Plan:    Continue with established Plan of Care towards PT goals.       
Consent: The patient's consent was obtained including but not limited to risks of crusting, scabbing, blistering, scarring, darker or lighter pigmentary change, recurrence, incomplete removal and infection.
Include Z78.9 (Other Specified Conditions Influencing Health Status) As An Associated Diagnosis?: No
Medical Necessity Clause: This procedure was medically necessary because the lesions that were treated were:
Medical Necessity Information: It is in your best interest to select a reason for this procedure from the list below. All of these items fulfill various CMS LCD requirements except the new and changing color options.
Post-Care Instructions: I reviewed with the patient in detail post-care instructions. Patient is to wear sunprotection, and avoid picking at any of the treated lesions. Pt may apply Vaseline to crusted or scabbing areas.
Detail Level: Zone
Number Of Freeze-Thaw Cycles: 1 freeze-thaw cycle

## 2019-11-08 DIAGNOSIS — R25.2 SPASM: ICD-10-CM

## 2019-11-09 RX ORDER — DIAZEPAM 5 MG/1
TABLET ORAL
Qty: 60 TABLET | Refills: 0 | Status: SHIPPED | OUTPATIENT
Start: 2019-11-09 | End: 2020-01-08 | Stop reason: SDUPTHER

## 2019-11-12 ENCOUNTER — CLINICAL SUPPORT (OUTPATIENT)
Dept: REHABILITATION | Facility: HOSPITAL | Age: 59
End: 2019-11-12
Attending: FAMILY MEDICINE
Payer: COMMERCIAL

## 2019-11-12 DIAGNOSIS — Z78.9 IMPAIRED MOTOR CONTROL: ICD-10-CM

## 2019-11-12 DIAGNOSIS — M25.671 DECREASED RANGE OF MOTION OF BOTH ANKLES: ICD-10-CM

## 2019-11-12 DIAGNOSIS — M25.672 DECREASED RANGE OF MOTION OF BOTH ANKLES: ICD-10-CM

## 2019-11-12 DIAGNOSIS — R26.9 GAIT DISTURBANCE: ICD-10-CM

## 2019-11-12 DIAGNOSIS — S14.129A INCOMPLETE INJURY OF CERVICAL SPINAL CORD WITH CENTRAL CORD SYNDROME: ICD-10-CM

## 2019-11-12 DIAGNOSIS — R53.1 DECREASED STRENGTH: ICD-10-CM

## 2019-11-12 PROCEDURE — 97116 GAIT TRAINING THERAPY: CPT | Mod: PN

## 2019-11-12 NOTE — PROGRESS NOTES
"  Physical Therapy Daily Treatment Note     Name: Alberto Dangelo  Clinic Number: [id number]    Therapy Diagnosis:   Encounter Diagnoses   Name Primary?   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  I can lift my left toe.  I can move my right one just a little."     He was compliant with home exercise program.  Response to previous treatment: decreased extensor tone.    Functional change: Decreased difficulty with sit > stand transfer.   .      Pain: 2/10      Objective     Efren participated in gait training to improve functional mobility and safety for 67  minutes, including:   Set up with 5 kg unloading.  Pt participated in computer assisted robotic gait training via [email or web address] @ 3.2 kph x 60 min, 16 sec for a total distance of 3180 m.  Utilized guidance force of 40% x 15 min, decreased to 30% x 15 min; 20% x 15 min and completed training @ 15% with no increase in unweighting needed to prevent toe drag.  No episodes of excessive toe drag resulting in stoppage of training.       Home Exercises Provided and Patient Education Provided     Education provided:   - stretch hip flexors and calves    Written Home Exercises Provided: Pt participates in exercise program established in previous program.  Gait activities were reviewed and Efren was able to demonstrate them " prior to the end of the session.  Efren demonstrated good  understanding of the education provided.     See EMR under N/A for exercises provided N/A.    Assessment     Patient able to complete entire session without toe drag and did not require increased unweighting to achieve.    Efren is progressing slowly towards his goals.   Pt prognosis is Fair.     Pt will continue to benefit from skilled outpatient physical therapy to address the deficits listed in the problem list box on initial evaluation, provide pt/family education and to maximize pt's level of independence in the home and community environment.     Pt's spiritual, cultural and educational needs considered and pt agreeable to plan of care and goals.     Anticipated barriers to physical therapy: excess extensor tone.      Goals: Short Term Goals:   1) Pt will stand for 10 min without excessive lumbar lordosis or increased hip/knee ext  (able to stand with minimal to moderate excess lordosis)  Progressing                2) Tolerate decreased guidance force to 15% for at least 20 min of training with < 15 kg of unweighting without excessive toe drag  Progressing               3) Patient will perform sit > stand from wheelchair with minimal UE support 5 of 10 trials  Not assessed this date.    Long Term Goal Status:   modified:  10/8/2019             1) Increase strength in B  LE as indicated by improved L-force testing  Not assessed this date.             2)  Pt will stand > 15 min with minimal UE support. MET (not addressed this date as has been met)            3) Pt will consistently perform safe stand pivot transfers with minimal UE support (modified),  Not assessed this date.             4) Pt will demonstrate improved bilateral hip flexion strength to 2/5 to improve ability to perform swing phase/ foot clearance during gait  Slowly progressing              5) Pt will demonstrate appropriate weight shift during ambulation to limit hip hiking and excessive  weight shift.  Slowly progressing      Plan     Continue to decrease time spent training @ 40%, 30% and 20% guidance force in order to allow more time training at lower guidance force in effort to further reduce guidance force.      Candice Herzog, PT

## 2019-11-14 ENCOUNTER — CLINICAL SUPPORT (OUTPATIENT)
Dept: REHABILITATION | Facility: HOSPITAL | Age: 59
End: 2019-11-14
Attending: FAMILY MEDICINE
Payer: COMMERCIAL

## 2019-11-14 DIAGNOSIS — Z78.9 IMPAIRED MOTOR CONTROL: ICD-10-CM

## 2019-11-14 DIAGNOSIS — S14.129A INCOMPLETE INJURY OF CERVICAL SPINAL CORD WITH CENTRAL CORD SYNDROME: ICD-10-CM

## 2019-11-14 DIAGNOSIS — R53.1 DECREASED STRENGTH: ICD-10-CM

## 2019-11-14 PROCEDURE — 97116 GAIT TRAINING THERAPY: CPT | Mod: PN

## 2019-11-14 NOTE — PROGRESS NOTES
Name: Alberto Dangelo  Clinic Number: [id number]  Date of Treatment: 11/14/2019   Diagnosis:   Encounter Diagnoses   Name Primary?   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 Late arrival but able to take pt at this time. Mod I in manual w/c with service dog. Mod I stretching prior to session.     Objective:    Patient received individual therapy to increase strength, endurance, ROM, flexibility, posture and core stabilization with activities as follows:     Gait training via [employer] system for 60:53 minutes (plus set up):     Set up at 5 kg unloading. Computer-assisted robotic gait training via [employer] x 3.2 km/h. GF x 40% x 10', 30% x 10', 20% x 25', then 15% for distance of 3201 m. Three stoppages.     Continue HEP daily.    Pt demo good understanding of the education provided. Patient demonstrated good return demonstration of activities.     Assessment:     Improving transitions to decreased GF at earlier intervals and no stoppages until decreased to 20%.     Pt will continue to benefit from skilled PT intervention. Medical Necessity is demonstrated by:  Requires skilled supervision to complete and progress HEP and Weakness.    Patient is making good progress towards established goals.    Plan:    Continue with established Plan of Care towards PT goals.

## 2019-11-18 RX ORDER — MIDODRINE HYDROCHLORIDE 10 MG/1
TABLET ORAL
Qty: 90 TABLET | Refills: 0 | Status: SHIPPED | OUTPATIENT
Start: 2019-11-18 | End: 2019-11-22 | Stop reason: SDUPTHER

## 2019-11-19 ENCOUNTER — DOCUMENTATION ONLY (OUTPATIENT)
Dept: FAMILY MEDICINE | Facility: CLINIC | Age: 59
End: 2019-11-19

## 2019-11-19 ENCOUNTER — CLINICAL SUPPORT (OUTPATIENT)
Dept: REHABILITATION | Facility: HOSPITAL | Age: 59
End: 2019-11-19
Attending: FAMILY MEDICINE
Payer: COMMERCIAL

## 2019-11-19 DIAGNOSIS — Z78.9 IMPAIRED MOTOR CONTROL: ICD-10-CM

## 2019-11-19 DIAGNOSIS — M25.672 DECREASED RANGE OF MOTION OF BOTH ANKLES: ICD-10-CM

## 2019-11-19 DIAGNOSIS — M25.671 DECREASED RANGE OF MOTION OF BOTH ANKLES: ICD-10-CM

## 2019-11-19 DIAGNOSIS — R26.9 GAIT DISTURBANCE: ICD-10-CM

## 2019-11-19 DIAGNOSIS — S14.129A INCOMPLETE INJURY OF CERVICAL SPINAL CORD WITH CENTRAL CORD SYNDROME: ICD-10-CM

## 2019-11-19 DIAGNOSIS — R53.1 DECREASED STRENGTH: ICD-10-CM

## 2019-11-19 PROCEDURE — 97116 GAIT TRAINING THERAPY: CPT | Mod: PN

## 2019-11-19 NOTE — PROGRESS NOTES
"  Physical Therapy Daily Treatment Note     Name: Alberto Dangelo  Clinic Number: [id number]    Therapy Diagnosis:   Encounter Diagnoses   Name Primary?   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  Don't know why"  He was compliant with home exercise program.  Response to previous treatment: decreased extensor tone.    Functional change: Decreased anterior pelvic tilt with standing.      Pain: 2/10      Objective     Efren participated in gait training to improve functional mobility and safety for 67  minutes, including:   Set up with 5 kg unloading.  Pt participated in computer assisted robotic gait training via [employer] @ 3.2 kph x 60 min, 21 sec for a total distance of 3178 m.  Utilized guidance force of 40% x 12 min, decreased to 30% x 13 min; 20% x 14 min and completed training @ 15% with minimal increase in unweighting needed to prevent toe drag.  Three episodes of excessive toe drag resulting in stoppage of training but able to restart after increasing unweighting to 10-15 kg.        Home Exercises Provided and Patient Education Provided     Education provided:   - Continue to emphasize stretching of  hip flexors and calves    Written Home Exercises Provided: Pt participates in exercise program established in previous program.  Gait activities were reviewed and Efren was able to demonstrate them " prior to the end of the session.  Efren demonstrated good  understanding of the education provided.     See EMR under N/A for exercises provided N/A.    Assessment     Patient demonstrated 1 episode of balance disturbance during static standing but was able to correct with UE support.  Increased incidence of toe drag this date resulting in increased unweighting.    Efren is progressing slowly towards his goals.   Pt prognosis is Fair.     Pt will continue to benefit from skilled outpatient physical therapy to address the deficits listed in the problem list box on initial evaluation, provide pt/family education and to maximize pt's level of independence in the home and community environment.     Pt's spiritual, cultural and educational needs considered and pt agreeable to plan of care and goals.     Anticipated barriers to physical therapy: excess extensor tone.      Goals: Short Term Goals:   1) Pt will stand for 10 min without excessive lumbar lordosis or increased hip/knee ext  (able to stand with minimal to moderate excess lordosis)  Progressing                2) Tolerate decreased guidance force to 15% for at least 20 min of training with < 15 kg of unweighting without excessive toe drag  Nearly met              3) Patient will perform sit > stand from wheelchair with minimal UE support 5 of 10 trials  Minimal progress.    Long Term Goal Status:   modified:  10/8/2019             1) Increase strength in B  LE as indicated by improved L-force testing  Not assessed this date.             2)  Pt will stand > 15 min with minimal UE support. MET (not addressed this date as has been met)            3) Pt will consistently perform safe stand pivot transfers with minimal UE support (modified),  Minimal progress             4) Pt will demonstrate improved bilateral hip flexion strength to 2/5 to improve ability to perform swing phase/ foot clearance during gait  Slowly progressing              5) Pt will demonstrate  appropriate weight shift during ambulation to limit hip hiking and excessive weight shift.  Slowly progressing      Plan     Continue to decrease time spent training @ 40%, 30% and 20% guidance force in order to allow more time training at lower guidance force in effort to further reduce guidance force with unweighting @ 5kg.      Candice Herzog, PT

## 2019-11-19 NOTE — PROGRESS NOTES
Pre-Visit Chart Review  For Appointment Scheduled on 11-22-19    Health Maintenance Due   Topic Date Due    Pneumococcal Vaccine (Highest Risk) (2 of 3 - PPSV23) 06/17/2019

## 2019-11-21 ENCOUNTER — CLINICAL SUPPORT (OUTPATIENT)
Dept: REHABILITATION | Facility: HOSPITAL | Age: 59
End: 2019-11-21
Attending: FAMILY MEDICINE
Payer: COMMERCIAL

## 2019-11-21 DIAGNOSIS — S14.129A INCOMPLETE INJURY OF CERVICAL SPINAL CORD WITH CENTRAL CORD SYNDROME: ICD-10-CM

## 2019-11-21 DIAGNOSIS — Z78.9 IMPAIRED MOTOR CONTROL: ICD-10-CM

## 2019-11-21 DIAGNOSIS — R53.1 DECREASED STRENGTH: ICD-10-CM

## 2019-11-21 PROCEDURE — 97116 GAIT TRAINING THERAPY: CPT | Mod: PN

## 2019-11-21 NOTE — PROGRESS NOTES
Name: Alberto Dangelo  Clinic Number: [id number]  Date of Treatment: 11/21/2019   Diagnosis:   Encounter Diagnoses   Name Primary?   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 Mod I in manual w/c with service dog. Mod I stretching prior to session. Leaving Tues for a trip to [place] to see family,.     Objective:    Patient received individual therapy to increase strength, endurance, ROM, flexibility, posture and core stabilization with activities as follows:     Gait training via [employer] system for 60:23 minutes (plus set up):     Set up at 5 kg unloading. Computer-assisted robotic gait training via [employer] x 3.2 km/h. GF x 40% x 15', 30% x 35', then 20% for distance of 3189 m. Three stoppages.     Continue HEP daily.    Pt demo good understanding of the education provided. Patient demonstrated good return demonstration of activities.     Assessment:     Strong tones B LE's today with initial set up and with decreasing GF.     Pt will continue to benefit from skilled PT intervention. Medical Necessity is demonstrated by:  Requires skilled supervision to complete and progress HEP and Weakness.    Patient is making good progress towards established goals.    Plan:    Continue with established Plan of Care towards PT goals.

## 2019-11-22 ENCOUNTER — OFFICE VISIT (OUTPATIENT)
Dept: FAMILY MEDICINE | Facility: CLINIC | Age: 59
End: 2019-11-22
Attending: FAMILY MEDICINE
Payer: COMMERCIAL

## 2019-11-22 VITALS
TEMPERATURE: 98 F | WEIGHT: 171.94 LBS | DIASTOLIC BLOOD PRESSURE: 48 MMHG | HEART RATE: 75 BPM | HEIGHT: 71 IN | BODY MASS INDEX: 24.07 KG/M2 | OXYGEN SATURATION: 97 % | RESPIRATION RATE: 16 BRPM | SYSTOLIC BLOOD PRESSURE: 64 MMHG

## 2019-11-22 DIAGNOSIS — R25.2 SPASM: ICD-10-CM

## 2019-11-22 DIAGNOSIS — I95.1 ORTHOSTATIC HYPOTENSION: Primary | ICD-10-CM

## 2019-11-22 DIAGNOSIS — S14.129A INCOMPLETE INJURY OF CERVICAL SPINAL CORD WITH CENTRAL CORD SYNDROME: ICD-10-CM

## 2019-11-22 DIAGNOSIS — G90.9 AUTONOMIC DYSFUNCTION: ICD-10-CM

## 2019-11-22 DIAGNOSIS — G82.50 QUADRIPLEGIA: ICD-10-CM

## 2019-11-22 PROCEDURE — 90686 IIV4 VACC NO PRSV 0.5 ML IM: CPT | Mod: S$GLB,,, | Performed by: FAMILY MEDICINE

## 2019-11-22 PROCEDURE — 99999 PR PBB SHADOW E&M-EST. PATIENT-LVL IV: ICD-10-PCS | Mod: PBBFAC,,, | Performed by: FAMILY MEDICINE

## 2019-11-22 PROCEDURE — 99999 PR PBB SHADOW E&M-EST. PATIENT-LVL IV: CPT | Mod: PBBFAC,,, | Performed by: FAMILY MEDICINE

## 2019-11-22 PROCEDURE — 90471 FLU VACCINE (QUAD) GREATER THAN OR EQUAL TO 3YO PRESERVATIVE FREE IM: ICD-10-PCS | Mod: S$GLB,,, | Performed by: FAMILY MEDICINE

## 2019-11-22 PROCEDURE — 90686 FLU VACCINE (QUAD) GREATER THAN OR EQUAL TO 3YO PRESERVATIVE FREE IM: ICD-10-PCS | Mod: S$GLB,,, | Performed by: FAMILY MEDICINE

## 2019-11-22 PROCEDURE — 90471 IMMUNIZATION ADMIN: CPT | Mod: S$GLB,,, | Performed by: FAMILY MEDICINE

## 2019-11-22 PROCEDURE — 99214 PR OFFICE/OUTPT VISIT, EST, LEVL IV, 30-39 MIN: ICD-10-PCS | Mod: 25,S$GLB,, | Performed by: FAMILY MEDICINE

## 2019-11-22 PROCEDURE — 3008F PR BODY MASS INDEX (BMI) DOCUMENTED: ICD-10-PCS | Mod: CPTII,S$GLB,, | Performed by: FAMILY MEDICINE

## 2019-11-22 PROCEDURE — 3008F BODY MASS INDEX DOCD: CPT | Mod: CPTII,S$GLB,, | Performed by: FAMILY MEDICINE

## 2019-11-22 PROCEDURE — 99214 OFFICE O/P EST MOD 30 MIN: CPT | Mod: 25,S$GLB,, | Performed by: FAMILY MEDICINE

## 2019-11-22 RX ORDER — MIDODRINE HYDROCHLORIDE 10 MG/1
TABLET ORAL
Qty: 270 TABLET | Refills: 3 | Status: SHIPPED | OUTPATIENT
Start: 2019-11-22 | End: 2020-01-08

## 2019-11-22 RX ORDER — DIAZEPAM 5 MG/1
TABLET ORAL
Qty: 60 TABLET | Refills: 0 | OUTPATIENT
Start: 2019-11-22

## 2019-11-22 NOTE — PROGRESS NOTES
Subjective:       Patient ID: Alberto Dangelo is a 59 y.o. male.    Chief Complaint: Hypertension    59-year-old male with incomplete cervical cord injury and quadriplegia comes in for re-evaluation of blood pressure and use of ProAmatine.  The patient took it early this morning at 8:00 a.m. and has not had it since that time.  He takes normally 10 mg 3 times daily.  Blood pressure generally does not exceed 125/80 and he is frequently much lower than that on the full dose ProAmatine.  He withheld his mid day dose today so that we could assess baseline status.    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  No date: Neurogenic bladder  No date: Pulmonary embolism    Past Surgical History:  No date: NECK SURGERY  No date: SPINE SURGERY      Comment:  fuse C 6 - T 1 burst C7  No date: TONSILLECTOMY  No date: WISDOM TOOTH EXTRACTION    Current Outpatient Medications on File Prior to Visit:  aspirin (ECOTRIN) 81 MG EC tablet, Take 81 mg by mouth once daily., Disp: , Rfl:   bisacodyl (FLEET) 10 mg/30 mL Enem, Place 10 mg rectally once daily., Disp: , Rfl:   diazePAM (VALIUM) 5 MG tablet, TAKE 1 TABLET BY MOUTH EVERY 12 HOURS AS NEEDED FOR SPASM, Disp: 60 tablet, Rfl: 0  magnesium oxide (MAG-OX) 400 mg (241.3 mg magnesium) tablet, Take 1 tablet (400 mg total) by mouth 2 (two) times daily., Disp: 180 tablet, Rfl: 3  omeprazole (PRILOSEC) 20 MG capsule, TAKE 1 CAPSULE(20 MG) BY MOUTH EVERY DAY, Disp: 90 capsule, Rfl: 3  POLYETHYLENE GLYCOL 3350 (MIRALAX ORAL), Take 1 Dose by mouth every evening., Disp: , Rfl:   senna (SENOKOT) 8.6 mg tablet, Take 2 tablets by mouth once daily., Disp: , Rfl:   vitamin D 1000 units Tab, Take 185 mg by mouth once daily., Disp: , Rfl:   midodrine (PROAMATINE) 10 MG tablet, TAKE 1  TABLET(10 MG) BY MOUTH THREE TIMES DAILY, Disp: 90 tablet, Rfl: 0    No current facility-administered medications on file prior to visit.         Review of Systems   Respiratory: Negative for chest tightness and shortness of breath.    Cardiovascular: Negative for chest pain and palpitations.   Neurological: Positive for dizziness and light-headedness. Negative for tremors and headaches.       Objective:      Physical Exam   Constitutional: He appears well-developed and well-nourished. No distress.   Low normal blood pressure seated in relaxed with significant orthostatic drop in blood pressure  Normal weight with a BMI 23.9 is up 0.4 lb from his April 22, 2019 visit   Cardiovascular: Normal rate, regular rhythm and normal heart sounds. Exam reveals no gallop and no friction rub.   No murmur heard.  Pulmonary/Chest: Effort normal and breath sounds normal. No stridor. No respiratory distress. He has no wheezes. He has no rales. He exhibits no tenderness.   Skin: He is not diaphoretic.   Nursing note and vitals reviewed.      Assessment:       1. Orthostatic hypotension    2. 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 Autonomic dysfunction    4. Quadriplegia        Plan:       1. Orthostatic hypotension  - midodrine (PROAMATINE) 10 MG tablet; TAKE 1 TABLET(10 MG) BY MOUTH THREE TIMES DAILY  Dispense: 270 tablet; Refill: 3    2. 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 Autonomic dysfunction    4. Quadriplegia

## 2019-12-10 ENCOUNTER — CLINICAL SUPPORT (OUTPATIENT)
Dept: REHABILITATION | Facility: HOSPITAL | Age: 59
End: 2019-12-10
Payer: COMMERCIAL

## 2019-12-10 DIAGNOSIS — R26.9 GAIT DISTURBANCE: ICD-10-CM

## 2019-12-10 DIAGNOSIS — S14.129A INCOMPLETE INJURY OF CERVICAL SPINAL CORD WITH CENTRAL CORD SYNDROME: ICD-10-CM

## 2019-12-10 DIAGNOSIS — R53.1 DECREASED STRENGTH: ICD-10-CM

## 2019-12-10 DIAGNOSIS — Z78.9 IMPAIRED MOTOR CONTROL: ICD-10-CM

## 2019-12-10 PROCEDURE — 97116 GAIT TRAINING THERAPY: CPT | Mod: PN

## 2019-12-12 ENCOUNTER — CLINICAL SUPPORT (OUTPATIENT)
Dept: REHABILITATION | Facility: HOSPITAL | Age: 59
End: 2019-12-12
Payer: COMMERCIAL

## 2019-12-12 DIAGNOSIS — Z78.9 IMPAIRED MOTOR CONTROL: ICD-10-CM

## 2019-12-12 DIAGNOSIS — S14.129A INCOMPLETE INJURY OF CERVICAL SPINAL CORD WITH CENTRAL CORD SYNDROME: ICD-10-CM

## 2019-12-12 DIAGNOSIS — R53.1 DECREASED STRENGTH: ICD-10-CM

## 2019-12-12 PROCEDURE — 97116 GAIT TRAINING THERAPY: CPT | Mod: PN

## 2019-12-12 NOTE — PROGRESS NOTES
Name: Alberto Dangelo  [facility] Number: [id number]  Date of Treatment: 12/12/2019   Diagnosis:   Encounter Diagnoses   Name Primary?   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 Mod I in manual w/c with service dog. Mod I stretching prior to session. No problems with trip to [place].     Objective:    Patient received individual therapy to increase strength, endurance, ROM, flexibility, posture and core stabilization with activities as follows:     Gait training via [employer] system for 62:02 minutes (plus set up):     Set up at 5 kg unloading. Computer-assisted robotic gait training via [employer] x 3.2 km/h. GF x 40% x 15', 30% x 15', 20% x 20', then 15% for distance of 3282 m. One stoppage with initial set up.    Continue HEP daily.    Pt demo good understanding of the education provided. Patient demonstrated good return demonstration of activities.     Assessment:     Strong tones B LE's today with initial set up and with decreasing GF but did not require unweighting to continuous gait training.     Pt will continue to benefit from skilled PT intervention. Medical Necessity is demonstrated by:  Requires skilled supervision to complete and progress HEP and Weakness.    Patient is making good progress towards established goals.    Plan:    Continue with established Plan of Care towards PT goals.

## 2019-12-15 NOTE — PROGRESS NOTES
"  Physical Therapy Daily Treatment Note     Name: Alberto Dangelo  Clinic Number: [id number]    Therapy Diagnosis:   Encounter Diagnoses   Name Primary?   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      Physician: Kaitlyn Arreguin MD    Visit Date: 12/10/2019    Physician Orders: Eval and treat  Medical Diagnosis: S14.129A (ICD-10-CM) - Central cord syndrome at unspecified level of cervical spinal cord  Evaluation Date: 8/26/2016  Authorization Period Expiration: 12/31/2019  Plan of Care Certification Period: 1/28/2020  Visit #/Visits authorized: 44/ 60     Time In: 1412  Time Out: 1535  Total Billable Time: 67 minutes    Precautions: Fall and potential for autonomic dysreflexia    Subjective     Pt reports: "I'm a little tight since we got back"  He was compliant with home exercise program.  Response to previous treatment: decreased extensor tone.    Functional change: No significant change since last session.        Pain: 2/10      Objective     Efren participated in gait training to improve functional mobility and safety for 67  minutes, including:   Set up with 5 kg unloading.  Pt participated in computer assisted robotic gait training via [employer] @ 3.2 kph x 60 min, 16 sec for a total distance of 3198 m.  Utilized guidance force of 40% x 13 min, decreased to 30% x 12 min; 20% for remainder of session.  No episodes of toe drag noted during session.         Home Exercises Provided and Patient Education Provided     Education provided:   - Continue to emphasize stretching of  hip flexors and calves    Written Home Exercises Provided: Pt participates in exercise program established in previous program.  Gait activities were reviewed and Efren was able to demonstrate them prior to the end of the session.  Efren demonstrated good  understanding of the education provided.     See EMR under N/A for exercises provided N/A.    Assessment "     Patient demonstrated no toe drag throughout session today.  No loss of balance during set up.      Efren is progressing slowly towards his goals.   Pt prognosis is Fair.     Pt will continue to benefit from skilled outpatient physical therapy to address the deficits listed in the problem list box on initial evaluation, provide pt/family education and to maximize pt's level of independence in the home and community environment.     Pt's spiritual, cultural and educational needs considered and pt agreeable to plan of care and goals.     Anticipated barriers to physical therapy: excess extensor tone.      Goals: Short Term Goals:   1) Pt will stand for 10 min without excessive lumbar lordosis or increased hip/knee ext  (able to stand with minimal to moderate excess lordosis)  Progressing                2) Tolerate decreased guidance force to 15% for at least 20 min of training with < 15 kg of unweighting without excessive toe drag  Nearly met              3) Patient will perform sit > stand from wheelchair with minimal UE support 5 of 10 trials  Minimal progress.    Long Term Goal Status:   modified:  10/8/2019             1) Increase strength in B  LE as indicated by improved L-force testing  Not assessed this date.             2)  Pt will stand > 15 min with minimal UE support. MET (not addressed this date as has been met)            3) Pt will consistently perform safe stand pivot transfers with minimal UE support (modified),  Minimal progress             4) Pt will demonstrate improved bilateral hip flexion strength to 2/5 to improve ability to perform swing phase/ foot clearance during gait  Slowly progressing              5) Pt will demonstrate appropriate weight shift during ambulation to limit hip hiking and excessive weight shift.  Slowly progressing      Plan     Decrease time spent training @ 40%, 30% and 20% guidance force in order to allow more time training at lower guidance force in effort to further  reduce guidance force with unweighting @ 5kg as patient tone allows.  .      Candice Herzog, PT

## 2019-12-19 ENCOUNTER — CLINICAL SUPPORT (OUTPATIENT)
Dept: REHABILITATION | Facility: HOSPITAL | Age: 59
End: 2019-12-19
Attending: FAMILY MEDICINE
Payer: COMMERCIAL

## 2019-12-19 DIAGNOSIS — R53.1 DECREASED STRENGTH: ICD-10-CM

## 2019-12-19 DIAGNOSIS — Z78.9 IMPAIRED MOTOR CONTROL: ICD-10-CM

## 2019-12-19 DIAGNOSIS — S14.129A INCOMPLETE INJURY OF CERVICAL SPINAL CORD WITH CENTRAL CORD SYNDROME: ICD-10-CM

## 2019-12-19 PROCEDURE — 97116 GAIT TRAINING THERAPY: CPT | Mod: PN

## 2019-12-19 NOTE — PROGRESS NOTES
Name: Alberto Dangelo  [facility] Number: [id number]  Date of Treatment: 12/19/2019   Diagnosis:   Encounter Diagnoses   Name Primary?   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 Mod I in manual w/c with service dog. Mod I stretching prior to session. Took his family  To the [employer] ride in [place] Tuesday night without problems.     Objective:    Patient received individual therapy to increase strength, endurance, ROM, flexibility, posture and core stabilization with activities as follows:     Gait training via [employer] system for 60:30 minutes (plus set up):     Set up at 5 kg unloading. Computer-assisted robotic gait training via [email or web address] x 3.2 km/h. GF x 40% x 15', 30% x 23', 20% x 17', then 15% for distance of 3173 m.     Continue HEP daily.     Pt demo good understanding of the education provided. Patient demonstrated good return demonstration of activities.     Assessment:     Cont with good response with transitions to lower GF without needs for unweighting.     Pt will continue to benefit from skilled PT intervention. Medical Necessity is demonstrated by:  Requires skilled supervision to complete and progress HEP and Weakness.    Patient is making good progress towards established goals.    Plan:    Continue with established Plan of Care towards PT goals.

## 2019-12-23 ENCOUNTER — CLINICAL SUPPORT (OUTPATIENT)
Dept: REHABILITATION | Facility: HOSPITAL | Age: 59
End: 2019-12-23
Payer: COMMERCIAL

## 2019-12-23 DIAGNOSIS — R53.1 DECREASED STRENGTH: ICD-10-CM

## 2019-12-23 DIAGNOSIS — S14.129A INCOMPLETE INJURY OF CERVICAL SPINAL CORD WITH CENTRAL CORD SYNDROME: ICD-10-CM

## 2019-12-23 DIAGNOSIS — Z78.9 IMPAIRED MOTOR CONTROL: ICD-10-CM

## 2019-12-23 PROCEDURE — 97116 GAIT TRAINING THERAPY: CPT | Mod: PN

## 2019-12-23 NOTE — PROGRESS NOTES
Name: Alberto Dangelo  Clinic Number: [id number]  Date of Treatment: 12/23/2019   Diagnosis:   Encounter Diagnoses   Name Primary?   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 Mod I in manual w/c with service dog. Mod I stretching prior to session. Ordered new gym equipment and waiting for its arrival.     Objective:    Patient received individual therapy to increase strength, endurance, ROM, flexibility, posture and core stabilization with activities as follows:     Gait training via [employer] system for 62:56 minutes (plus set up):     Set up at 5 kg unloading. Computer-assisted robotic gait training via [employer] x 3.2 km/h. GF x 35% x 15', 25% x 23', 20% for distance of 3322 m.     Continue HEP daily.     Pt demo good understanding of the education provided. Patient demonstrated good return demonstration of activities.     Assessment:     Cont with good response with transitions to lower GF without needs for unweighting. Starting at decreased GF.     Pt will continue to benefit from skilled PT intervention. Medical Necessity is demonstrated by:  Requires skilled supervision to complete and progress HEP and Weakness.    Patient is making good progress towards established goals.    Plan:    Continue with established Plan of Care towards PT goals.

## 2019-12-26 ENCOUNTER — CLINICAL SUPPORT (OUTPATIENT)
Dept: REHABILITATION | Facility: HOSPITAL | Age: 59
End: 2019-12-26
Attending: FAMILY MEDICINE
Payer: COMMERCIAL

## 2019-12-26 DIAGNOSIS — S14.129A INCOMPLETE INJURY OF CERVICAL SPINAL CORD WITH CENTRAL CORD SYNDROME: ICD-10-CM

## 2019-12-26 DIAGNOSIS — R53.1 DECREASED STRENGTH: ICD-10-CM

## 2019-12-26 DIAGNOSIS — Z78.9 IMPAIRED MOTOR CONTROL: ICD-10-CM

## 2019-12-26 PROCEDURE — 97116 GAIT TRAINING THERAPY: CPT | Mod: PN

## 2019-12-26 NOTE — PROGRESS NOTES
Name: Alberto Dangelo  Clinic Number: [id number]  Date of Treatment: 12/26/2019   Diagnosis:   Encounter Diagnoses   Name Primary?   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 Mod I in manual w/c with service dog. Mod I stretching prior to session. Had a good [place] with son's party and then quiet evening with his wife without adverse events.     Objective:    Patient received individual therapy to increase strength, endurance, ROM, flexibility, posture and core stabilization with activities as follows:     Gait training via [employer] system for 60:39 minutes (plus set up):     Set up at 5 kg unloading. Computer-assisted robotic gait training via [employer] x 3.2 km/h. GF x 35% x 15', 25% x 15', 20% for distance of 3210 m.     Continue HEP daily.     Pt demo good understanding of the education provided. Patient demonstrated good return demonstration of activities.     Assessment:     Cont with good response with transitions to lower GF without needs for unweighting. Starting at decreased GF consistently without stoppage. Attempt at 15% GF unsuccessful as feet drag and requires unweighting.     Pt will continue to benefit from skilled PT intervention. Medical Necessity is demonstrated by:  Requires skilled supervision to complete and progress HEP and Weakness.    Patient is making good progress towards established goals.    Plan:    Continue with established Plan of Care towards PT goals.

## 2019-12-31 ENCOUNTER — CLINICAL SUPPORT (OUTPATIENT)
Dept: REHABILITATION | Facility: HOSPITAL | Age: 59
End: 2019-12-31
Attending: FAMILY MEDICINE
Payer: COMMERCIAL

## 2019-12-31 DIAGNOSIS — Z78.9 IMPAIRED MOTOR CONTROL: ICD-10-CM

## 2019-12-31 DIAGNOSIS — M25.672 DECREASED RANGE OF MOTION OF BOTH ANKLES: ICD-10-CM

## 2019-12-31 DIAGNOSIS — R53.1 DECREASED STRENGTH: ICD-10-CM

## 2019-12-31 DIAGNOSIS — M25.671 DECREASED RANGE OF MOTION OF BOTH ANKLES: ICD-10-CM

## 2019-12-31 DIAGNOSIS — R26.9 GAIT DISTURBANCE: ICD-10-CM

## 2019-12-31 DIAGNOSIS — S14.129A INCOMPLETE INJURY OF CERVICAL SPINAL CORD WITH CENTRAL CORD SYNDROME: ICD-10-CM

## 2019-12-31 PROCEDURE — 97116 GAIT TRAINING THERAPY: CPT | Mod: PN

## 2019-12-31 NOTE — PROGRESS NOTES
"  Physical Therapy Daily Treatment Note     Name: Alberto Dangelo  Clinic Number: [id number]    Therapy Diagnosis:   Encounter Diagnoses   Name Primary?   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      Physician: Drik Ortiz MD  Visit Date: 12/31/2019    Physician Orders: Eval and treat  Medical Diagnosis: S14.129A (ICD-10-CM) - Central cord syndrome at unspecified level of cervical spinal cord  Evaluation Date: 8/26/2016  Authorization Period Expiration: 12/31/2019  Plan of Care Certification Period: 1/28/2020  Visit #/Visits authorized: 49/ 60     Time In: 1406  Time Out: 1540  Total Billable Time: 67 minutes    Precautions: Fall and potential for autonomic dysreflexia    Subjective     Pt reports: "If I didn't do something like this regularly I would get more sick and have more health problems"  He was compliant with home exercise program.  Response to previous treatment: decreased extensor tone.    Functional change: Increased tolerance for standing activities.     Pain: 0/10 "As a spinal cord patient, I always deal with some kind of pain. Lately, it's been more in my hips because they are tight"        Objective     Efren participated in gait training to improve functional mobility and safety for 67  minutes, including:   Set up with 5 kg unloading.  Pt participated in computer assisted robotic gait training via [employer] @ 3.2 kph x  min, 28 sec for a total distance of 3182 m.  Utilized guidance force of 35% x 10 min, then paused training for testing.  Resumed training @ 35% for an additional 5 min then decreased to 25% x 15 min; decreased to 20% for 14 minutes.  Completed session @ 15% guidance force but required decreased unweighting to complete session @ 15% due to toe drag.         Home Exercises Provided and Patient Education Provided     Education provided:   - Continue to emphasize stretching of  hip flexors and " calves    Written Home Exercises Provided: Pt participates in exercise program established in previous program.  Gait activities were reviewed and Efren was able to demonstrate them prior to the end of the session.  Efren demonstrated good  understanding of the education provided.     See EMR under N/A for exercises provided N/A.    Assessment     Patient demonstrated toe drag when guidance force reduced to 15% but was able to be accommodated for using minimal decrease in unweighting.       Efren is progressing slowly towards his goals.   Pt prognosis is Fair.     Pt will continue to benefit from skilled outpatient physical therapy to address the deficits listed in the problem list box on initial evaluation, provide pt/family education and to maximize pt's level of independence in the home and community environment.     Pt's spiritual, cultural and educational needs considered and pt agreeable to plan of care and goals.     Anticipated barriers to physical therapy: excess extensor tone.      Goals: See POC update    Plan     Continue to decrease guidance force as patient tolerates.  Trial of decreasing guidance force to 15 % earlier in session to attempt training @ this level prior to fatigue.       Candice Herzog, PT

## 2020-01-02 ENCOUNTER — CLINICAL SUPPORT (OUTPATIENT)
Dept: REHABILITATION | Facility: HOSPITAL | Age: 60
End: 2020-01-02
Attending: FAMILY MEDICINE
Payer: COMMERCIAL

## 2020-01-02 DIAGNOSIS — Z78.9 IMPAIRED MOTOR CONTROL: ICD-10-CM

## 2020-01-02 DIAGNOSIS — R53.1 DECREASED STRENGTH: ICD-10-CM

## 2020-01-02 DIAGNOSIS — S14.129A INCOMPLETE INJURY OF CERVICAL SPINAL CORD WITH CENTRAL CORD SYNDROME: ICD-10-CM

## 2020-01-02 PROCEDURE — 97116 GAIT TRAINING THERAPY: CPT | Mod: PN

## 2020-01-02 NOTE — PROGRESS NOTES
"  Physical Therapy Daily Treatment Note     Name: Alberto Dangelo  Clinic Number: [id number]    Therapy Diagnosis:   Encounter Diagnoses   Name Primary?   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 Has been performing rigid HEP and awaiting new workout machine. "tight"  He was compliant with home exercise program.  Response to previous treatment: decreased extensor tone.    Functional change: Increased tolerance for standing activities.     Pain: 0/10      Objective     Efren participated in gait training to improve functional mobility and safety for 60:39  minutes, including:   Set up with 5 kg unloading.  Pt participated in computer assisted robotic gait training via [employer] @ 3.2 kph x  min, 28 sec for a total distance of 3176 m.  Utilized guidance force of 35% x 15 min, 25% x 15 min; decreased to 15% with increased unweighting.    Home Exercises Provided and Patient Education Provided     Education provided:   - Continue to emphasize stretching of  hip flexors and calves    Written Home Exercises Provided: Pt participates in exercise program established in previous program.  Gait activities were reviewed and Efren was able to demonstrate them prior to the end of the session.  Efren demonstrated good  understanding of the education provided.     See EMR under N/A for exercises provided N/A.    Assessment     Patient " demonstrated toe drag with decreased GF to 15% but improved with increased unweighting temporarily.     Efren is progressing slowly towards his goals.   Pt prognosis is Fair.     Pt will continue to benefit from skilled outpatient physical therapy to address the deficits listed in the problem list box on initial evaluation, provide pt/family education and to maximize pt's level of independence in the home and community environment.     Pt's spiritual, cultural and educational needs considered and pt agreeable to plan of care and goals.     Anticipated barriers to physical therapy: excess extensor tone.      Goals: See POC update    Plan     Continue per POC, progressing as appropriate.      Giulia Lock, PTA

## 2020-01-02 NOTE — PLAN OF CARE
"  Outpatient Therapy Updated Plan of Care     Visit Date: 12/31/2019    Name: Alberto Dangelo  Clinic Number: [id number]    Therapy Diagnosis:   Encounter Diagnoses   Name Primary?   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 Dirk Ortiz MD    Physician Orders: Eval and treat  Medical Diagnosis: S14.129A (ICD-10-CM) - Central cord syndrome at unspecified level of cervical spinal cord  Evaluation Date: 8/26/2016  Current Certification Period: 10/8/2019 to 1/28/2020  Authorization Period Expiration: 12/31/2019  Plan of Care Expiration: 1/28/2020  Visit #/Visits authorized: 44/ 60     Precautions: Fall  Functional Level Prior to Evaluation:  Ambulates household distances w RW with significant difficulty, main means of mobility is manual w/c.Pt is able to drive w hand controls. Home equipment includes B AFO's, shower chair, ramped entrance w support bars to swimming pool, FES bike, free weights, plyobox system.        Subjective     Update: "If I didn't do this (Lokomat) I'd have more problems with illness, skin breakdown and such"    Objective     Update:   L-force test:                                             LEFT                                                 RIGHT                             FLEX                  EXT                      FLEX                     EXT                HIP      5.6 Nm             26.96 Nm               1.52 Nm                7.09 Nm                                                             KNEE     5.56  Nm          26.33 Nm                0.37 Nm             23.06 Nm   Gait:  Pt is able to tolerate up to 1 hour on Lokomat @ 5 kg unloading and 3.2 kph with guidance force progressively reduced over the course of treatment to 20% without having to adjust unweighting and has reduced guidance force to 15% but with " minimally increased unweighting.  He continues to ambulate some at home using rolling walker with significant difficulty advancing his LE due to excess tone.  He continues to ambulate using forearm crutches while in parallel bars at home due to fear of falling.  He can manage to take a few steps with minimal UE support  Posture:  Pt stands with foot flat position, feet less than shoulder width apart.  Minimal to moderate knee hyperextension and moderate anterior pelvic tilt/increased lumbar lordosis; however, he can correct this for short periods of time.  He is able to maintain standing position for short periods without UE support.  He lacks appropriate balance responses in ankles to adjust for balance challenges.  He is able to use his UE to correct balance when support is available   Balance: Sitting balance unimpaired.  Standing static balance:  Fair  Requires intermittent UE support.  He is able to stand for short period without UE support but demonstrates increased anterior pelvic tilt and knee hyperextension.  He can maintain static standing balance with support of 1 UE.  Dynamic standing balance.  Patient requires support of 1 UE for dynamic standing activities.  .    Transfers:  Sit <> stand: pt is able to achieve sit > stand from higher surface using minimal UE support.  He continues to require increased UE support with sit>stand on lower surfaces.  He continues to require substantial anterior weight shift to insure weight is fully over his feet.  Ascent is controlled but upon reaching erect position he tends to hyperextend B knees. Once he gains his balance with knees locked, he is able to unlock his knees.  Stand > sit continues to require moderate UE support but is controlled descent.  Stand pivot transfers moderate UE support required.    Flexibility:  Somewhat limited by tone.  Continues to demonstrate moderate tightness in B calves, B hamstrings, and B hip flexors.    Function:  Pt reports he is  able to stand 15 min with UE support.  Demonstrates the ability to perform stand stand pivot transfer with moderate UE support.  Patient is able to ambulate a few steps with minimal UE support.  Otherwise he does require substantial UE support due to tone.  He reports no issues with skin breakdown.    Assessment     Update: Patient continues to make slow but steady gains in LE strength as indicated by increased L-force readings (except R knee flexion which remained stable).  He is able to perform sit<>stand transfers with less UE support but continues to require significant UE support.  He is also able to balance himself in standing for brief periods but requires UE support to accept challenges and to participate in UE activities while standing.  His balance is negatively affected by increased extensor tone in B LE.                                                  Previous Short Term Goals Status:                 1) Pt will stand for 10 min without excessive lumbar lordosis or increased hip/knee ext  (able to stand with minimal to moderate excess lordosis)  Progressing                2) Tolerate decreased guidance force to 15% for at least 20 min of training with < 15 kg of unweighting without excessive toe drag  Progressing              3) Patient will perform sit > stand from wheelchair with minimal UE support 5 of 10 trials  Progressing              4) Resume ambulation using forearm crutches for household use at least 60% of the time,  Minimal progress     New Short Term Goals Status:      1) Pt will stand for 10 min without excessive lumbar lordosis or increased hip/knee ext  (able to stand with minimal to moderate excess lordosis)  Continue              2) Tolerate decreased guidance force to 15% for at least 20 min of training with < 15 kg of unweighting without excessive toe drag  Continue              3) Patient will perform sit > stand from wheelchair with minimal UE support 5 of 10 trials  Continue               4) Resume ambulation using forearm crutches for household use at least 60% of the time, Continue    Long Term Goal Status:   continue per updated plan of care dated 10/8/2019.     1) Increase strength in B  LE as indicated by improved L-force testing  Progressing            2)  Pt will stand > 15 min with minimal UE support. MET            3) Pt will consistently perform safe stand pivot transfers with minimal UE support ,  Progressing            4) Pt will demonstrate improved bilateral hip flexion strength to 2/5 to improve ability to perform swing phase/ foot clearance during gait  Slowly progressing              5) Pt will demonstrate appropriate weight shift during ambulation to limit hip hiking and excessive weight shift.  Slowly progressing    Reasons for Recertification of Therapy:   Patient continues to make slow gains in PT with improved LE flexibility, improved strength, and improved balance.  However, he continues to demonstrate limitations in hip flexor and ankle flexibility B, limited LE strength R more so than L, and impaired balance (dynamic more so than static).  He is unable to participate in adequate functional gait activities at home as he fatigues quickly when using assistive devices.  He is able to utilize the [employer] for sustained gait activities with variable assistance and work on improving balance, coordination, and arm swing without risk of fall.      Plan     Updated Certification Period: 12/31/2019 to 4/22/2020  Recommended Treatment Plan: 2 times per week for 16 weeks: Gait Training, Neuromuscular Re-ed, Patient Education, Therapeutic Activites, Therapeutic Exercise and Locomotor training  Other Recommendations: Pt to continue his home exercise routine to supplement activities in clinic.      Candice Herzog, PT  12/31/2019      I CERTIFY THE NEED FOR THESE SERVICES FURNISHED UNDER THIS PLAN OF TREATMENT AND WHILE UNDER MY CARE    Physician's comments:        Physician's Signature:  ___________________________________________________

## 2020-01-07 ENCOUNTER — CLINICAL SUPPORT (OUTPATIENT)
Dept: REHABILITATION | Facility: HOSPITAL | Age: 60
End: 2020-01-07
Attending: FAMILY MEDICINE
Payer: COMMERCIAL

## 2020-01-07 DIAGNOSIS — Z78.9 IMPAIRED MOTOR CONTROL: ICD-10-CM

## 2020-01-07 DIAGNOSIS — M25.671 DECREASED RANGE OF MOTION OF BOTH ANKLES: ICD-10-CM

## 2020-01-07 DIAGNOSIS — R26.9 GAIT DISTURBANCE: ICD-10-CM

## 2020-01-07 DIAGNOSIS — R53.1 DECREASED STRENGTH: ICD-10-CM

## 2020-01-07 DIAGNOSIS — M25.672 DECREASED RANGE OF MOTION OF BOTH ANKLES: ICD-10-CM

## 2020-01-07 DIAGNOSIS — S14.129A INCOMPLETE INJURY OF CERVICAL SPINAL CORD WITH CENTRAL CORD SYNDROME: ICD-10-CM

## 2020-01-07 PROCEDURE — 97116 GAIT TRAINING THERAPY: CPT | Mod: PN

## 2020-01-07 NOTE — PROGRESS NOTES
"  Physical Therapy Daily Treatment Note     Name: Alberto Dangelo  Clinic Number: [id number]    Therapy Diagnosis:   Encounter Diagnoses   Name Primary?   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      Physician: Dirk Ortiz MD  Visit Date: 1/7/2020    Physician Orders: Eval and treat  Medical Diagnosis: S14.129A (ICD-10-CM) - Central cord syndrome at unspecified level of cervical spinal cord  Evaluation Date: 8/26/2016  Authorization Period Expiration: 12/31/2020  Plan of Care Certification Period: 12/31/2019 to 4/22/2020  Visit #/Visits authorized: 2/ 60     Time In: 1350  Time Out: 1540  Total Billable Time: 67 minutes    Precautions: Fall and potential for autonomic dysreflexia    Subjective     Pt reports: "I'm having more tone today than usual."  He was compliant with home exercise program.  Response to previous treatment: Increased fatigue following activity.    Functional change: No notable change following last session.     Pain: 0/10 "Sometimes I get really bad pain but I just deal with it.  I don't like to take medication."        Objective     Patient performed independent stretching to B calves in standing and to B hip flexors and hip rotators in supine prior to therapy session.    Efren participated in gait training to improve functional mobility and safety for 67  minutes, including:   Set up with 5 kg unloading.  Pt participated in computer assisted robotic gait training via [employer] @ 3.2 kph x  60 min, 14 sec for a total distance of 3190 m.  Utilized guidance force of 40% x 10 min, decreased to 20% x 7 min.  Attempted to decrease guidance force to 15% while remaining @ 5 kg unweighting; however, pt experienced toe drag causing stoppage of training.  Increased unweighting to 10-15 kg and completed session @ 15% guidance force. No further incidence of toe drag noted.            Home Exercises Provided and Patient " Education Provided     Education provided:   - Continue to emphasize stretching of  hip flexors and calves    Written Home Exercises Provided: Pt participates in exercise program established in previous program.  Gait activities were reviewed and Efren was able to demonstrate them prior to the end of the session.  Efren demonstrated good  understanding of the education provided.     See EMR under N/A for exercises provided N/A.    Assessment     Patient demonstrated improved ability to tolerate decrease in guidance force to 15% with use of 10-15 kg unweighting and with shifting decrease in guidance force to earlier in session.      Efren is slowly progressing toward his goals.   Pt prognosis is Fair.     Pt will continue to benefit from skilled outpatient physical therapy to address the deficits listed in the problem list box on initial evaluation, provide pt/family education and to maximize pt's level of independence in the home and community environment.     Pt's spiritual, cultural and educational needs considered and pt agreeable to plan of care and goals.     Anticipated barriers to physical therapy: excess extensor tone.      Goals:    New Short Term Goals Status:                 1) Pt will stand for 10 min without excessive lumbar lordosis or increased hip/knee ext  (able to stand with minimal to moderate excess lordosis)  Progressing              2) Tolerate decreased guidance force to 15% for at least 20 min of training with < 15 kg of unweighting without excessive toe drag  Progressing              3) Patient will perform sit > stand from wheelchair with minimal UE support 5 of 10 trials  Progressing              4) Resume ambulation using forearm crutches for household use at least 60% of the time, Not progressing  Long Term Goal Status:   continue per updated plan of care dated 12/31/2019.              1) Increase strength in B  LE as indicated by improved L-force testing  Progressing            2)  Pt will  stand > 15 min with minimal UE support. MET            3) Pt will consistently perform safe stand pivot transfers with minimal UE support ,  Progressing            4) Pt will demonstrate improved bilateral hip flexion strength to 2/5 to improve ability to perform swing phase/ foot clearance during gait  Slowly progressing              5) Pt will demonstrate appropriate weight shift during ambulation to limit hip hiking and excessive weight shift.  Slowly progressing    Plan     Continue to decrease guidance force earlier in session with use of increased unweighting.  Then decrease unweighting as patient tolerates.       Candice Herzog, PT

## 2020-01-08 DIAGNOSIS — R25.2 SPASM: ICD-10-CM

## 2020-01-08 DIAGNOSIS — I95.1 ORTHOSTATIC HYPOTENSION: ICD-10-CM

## 2020-01-08 RX ORDER — MIDODRINE HYDROCHLORIDE 10 MG/1
TABLET ORAL
Qty: 90 TABLET | Refills: 5 | Status: SHIPPED | OUTPATIENT
Start: 2020-01-08 | End: 2020-08-05

## 2020-01-08 RX ORDER — DIAZEPAM 5 MG/1
5 TABLET ORAL EVERY 12 HOURS PRN
Qty: 60 TABLET | Refills: 2 | Status: SHIPPED | OUTPATIENT
Start: 2020-01-08 | End: 2020-07-22

## 2020-01-09 ENCOUNTER — CLINICAL SUPPORT (OUTPATIENT)
Dept: REHABILITATION | Facility: HOSPITAL | Age: 60
End: 2020-01-09
Attending: FAMILY MEDICINE
Payer: COMMERCIAL

## 2020-01-09 DIAGNOSIS — Z78.9 IMPAIRED MOTOR CONTROL: ICD-10-CM

## 2020-01-09 DIAGNOSIS — S14.129A INCOMPLETE INJURY OF CERVICAL SPINAL CORD WITH CENTRAL CORD SYNDROME: ICD-10-CM

## 2020-01-09 DIAGNOSIS — R53.1 DECREASED STRENGTH: ICD-10-CM

## 2020-01-09 PROCEDURE — 97116 GAIT TRAINING THERAPY: CPT | Mod: PN,CQ

## 2020-01-09 NOTE — PROGRESS NOTES
Physical Therapy Daily Treatment Note     Name: Alberto Dangelo  Clinic Number: [id number]    Therapy Diagnosis:   Encounter Diagnoses   Name Primary?   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      Physician: Dirk Ortiz MD  Visit Date: 1/9/2020    Physician Orders: Eval and treat  Medical Diagnosis: S14.129A (ICD-10-CM) - Central cord syndrome at unspecified level of cervical spinal cord  Evaluation Date: 8/26/2016  Authorization Period Expiration: 12/31/2019  Plan of Care Certification Period: 1/28/2020  Visit #/Visits authorized: 52/ 60     Time In: 1420  Time Out: 1545  Total Billable Time: 65 minutes    Precautions: Fall and potential for autonomic dysreflexia    Subjective     Pt reports: no complaints. Mod I stretching prior to session. Mod I in manual w/c with service dog.   He was compliant with home exercise program.  Response to previous treatment: decreased extensor tone.    Functional change: Increased tolerance for standing activities.     Pain: 0/10      Objective     Efren participated in gait training to improve functional mobility and safety for 60:34 minutes, including:   Set up with 5 kg unloading.  Pt participated in computer assisted robotic gait training via [employer] [address] [place] [address]  for a total distance of 3205 m.  Utilized guidance force of 35% x 15 min, 25% x 15 min; decreased to 15%.    Home Exercises Provided and Patient Education Provided     Education provided:   - Continue to emphasize stretching of  hip flexors and calves    Written Home Exercises Provided: Pt participates in exercise program established in previous program.  Gait activities were reviewed and Efren was able to demonstrate them prior to the end of the session.  Efren demonstrated good  understanding of the education provided.     See EMR under N/A for exercises provided N/A.    Assessment     Improving tolerance for GF drops without need for  increased unweighting.    Efren is progressing slowly towards his goals.   Pt prognosis is Fair.     Pt will continue to benefit from skilled outpatient physical therapy to address the deficits listed in the problem list box on initial evaluation, provide pt/family education and to maximize pt's level of independence in the home and community environment.     Pt's spiritual, cultural and educational needs considered and pt agreeable to plan of care and goals.     Anticipated barriers to physical therapy: excess extensor tone.      Goals: See POC update    Plan     Continue per POC, progressing as appropriate.      Giulia Lock, PTA

## 2020-01-23 ENCOUNTER — CLINICAL SUPPORT (OUTPATIENT)
Dept: REHABILITATION | Facility: HOSPITAL | Age: 60
End: 2020-01-23
Attending: FAMILY MEDICINE
Payer: COMMERCIAL

## 2020-01-23 DIAGNOSIS — Z78.9 IMPAIRED MOTOR CONTROL: ICD-10-CM

## 2020-01-23 DIAGNOSIS — R53.1 DECREASED STRENGTH: ICD-10-CM

## 2020-01-23 DIAGNOSIS — S14.129A INCOMPLETE INJURY OF CERVICAL SPINAL CORD WITH CENTRAL CORD SYNDROME: ICD-10-CM

## 2020-01-23 PROCEDURE — 97116 GAIT TRAINING THERAPY: CPT | Mod: PN,CQ

## 2020-01-23 NOTE — PROGRESS NOTES
Physical Therapy Daily Treatment Note     Name: Alberto Dangelo  Clinic Number: [id number]    Therapy Diagnosis:   Encounter Diagnoses   Name Primary?   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 Mod I stretching prior to session. Mod I in manual w/c with service dog.   He was compliant with home exercise program.  Response to previous treatment: decreased extensor tone.    Functional change: Increased tolerance for standing activities.     Pain: 0/10      Objective     Efren participated in gait training to improve functional mobility and safety for 60:30 minutes, including:   Set up with 5 kg unloading.  Pt participated in computer assisted robotic gait training via [employer] [address] [place] [address]  for a total distance of 3189 m.  Utilized guidance force of 35% x 10 min, 25% x 10 min; 20% x 10, decreased to 15%.    Home Exercises Provided and Patient Education Provided     Education provided:   - Continue to emphasize stretching of  hip flexors and calves    Written Home Exercises Provided: Pt participates in exercise program established in previous program.  Gait activities were reviewed and Efren was able to demonstrate them prior to the end of the session.  Efren demonstrated good  understanding of the education provided.     See EMR under N/A for exercises provided N/A.    Assessment     Improving tolerance for GF drops  without need for increased unweighting. Initial R clonus. Improved tones with mobility.     Efren is progressing slowly towards his goals.   Pt prognosis is Fair.     Pt will continue to benefit from skilled outpatient physical therapy to address the deficits listed in the problem list box on initial evaluation, provide pt/family education and to maximize pt's level of independence in the home and community environment.     Pt's spiritual, cultural and educational needs considered and pt agreeable to plan of care and goals.     Anticipated barriers to physical therapy: excess extensor tone.      Goals: See POC update    Plan     Continue per POC, progressing as appropriate.      Giulia Lock, PTA

## 2020-01-28 ENCOUNTER — CLINICAL SUPPORT (OUTPATIENT)
Dept: REHABILITATION | Facility: HOSPITAL | Age: 60
End: 2020-01-28
Attending: FAMILY MEDICINE
Payer: COMMERCIAL

## 2020-01-28 DIAGNOSIS — Z78.9 IMPAIRED MOTOR CONTROL: ICD-10-CM

## 2020-01-28 DIAGNOSIS — M25.671 DECREASED RANGE OF MOTION OF BOTH ANKLES: ICD-10-CM

## 2020-01-28 DIAGNOSIS — S14.129A INCOMPLETE INJURY OF CERVICAL SPINAL CORD WITH CENTRAL CORD SYNDROME: ICD-10-CM

## 2020-01-28 DIAGNOSIS — R53.1 DECREASED STRENGTH: ICD-10-CM

## 2020-01-28 DIAGNOSIS — M25.672 DECREASED RANGE OF MOTION OF BOTH ANKLES: ICD-10-CM

## 2020-01-28 DIAGNOSIS — R26.9 GAIT DISTURBANCE: ICD-10-CM

## 2020-01-28 PROCEDURE — 97116 GAIT TRAINING THERAPY: CPT | Mod: PN

## 2020-01-28 NOTE — PROGRESS NOTES
"  Physical Therapy Daily Treatment Note     Name: Alberto Dangelo  Clinic Number: [id number]    Therapy Diagnosis:   Encounter Diagnoses   Name Primary?   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  He was compliant with home exercise program.  Response to previous treatment: Decreased stiffness following session.    Functional change: Gradual improvement of safety with transfers.      Pain: 0/10  Location: N/A    Objective     Efren participated in gait training to improve functional mobility and safety for 67  minutes, including:  Set up with 5 kg unloading.  Pt participated in computer assisted robotic gait training via [employer] @ 3.2 kph x 60 min, 13 sec for a total distance of 3181 m.  Utilized guidance force of 35% x 10 min, decreased to 25% x 11', decreased again to 20% x 9'. Completed session @ 15% guidance force but required minimal increase in unweighting.  Demonstrated 2 episodes of stoppage of trainin due to L toe drag, the other due to B toe drag which required increased unweighting to correct.        Home Exercises Provided and Patient Education Provided     Education provided:   - Increase standing/walking activities at home as tolerated.      Written Home Exercises Provided: Patient has extensive HEP as instructed at prior clinics and is active in participation.  Exercises were discussed and Efren was able to verbalize activities " prior to the end of the session.  Efren demonstrated good  understanding of the education provided.     See EMR under N/A for exercises provided N/A.    Assessment     Patient demonstrated good tolerance for decreased guidance force to 15% as he was able to complete 30 min of gait training @ 15% guidance force with only 1 episode of toe drag, requiring minimal increase in unweighting.      Efren is progressing slowly towards his goals.   Pt prognosis is Fair.     Pt will continue to benefit from skilled outpatient physical therapy to address the deficits listed in the problem list box on initial evaluation, provide pt/family education and to maximize pt's level of independence in the home and community environment.     Pt's spiritual, cultural and educational needs considered and pt agreeable to plan of care and goals.     Anticipated barriers to physical therapy: None    Goals:  Short Term Goals:   1) Pt will stand for 10 min without excessive lumbar lordosis or increased hip/knee ext  (able to stand with minimal to moderate excess lordosis)  Minimal progress              2) Tolerate decreased guidance force to 15% for at least 20 min of training with < 15 kg of unweighting without excessive toe drag  Met 1/28/2020              3) Patient will perform sit > stand from wheelchair with minimal UE support 5 of 10 trials  Progressing-Continue              4) Resume ambulation using forearm crutches for household use at least 60% of the time, Not Met-Continue    Long Term Goal Status:   continue per updated plan of care dated 10/8/2019.              1) Increase strength in B  LE as indicated by improved L-force testing  Progressing            2)  Pt will stand > 15 min with minimal UE support. MET            3) Pt will consistently perform safe stand pivot transfers with minimal UE support ,  Progressing            4) Pt will demonstrate improved bilateral hip flexion strength to 2/5 to improve ability to perform swing  phase/ foot clearance during gait  Slowly progressing              5) Pt will demonstrate appropriate weight shift during ambulation to limit hip hiking and excessive weight shift.  Slowly progressing    Plan     Trial earlier reduction of guidance force (decrease to 15-20% at/near 10 min training) to reduce fatigue and possibly reduce toe drag at this level.      Candice Herzog, PT

## 2020-02-04 ENCOUNTER — CLINICAL SUPPORT (OUTPATIENT)
Dept: REHABILITATION | Facility: HOSPITAL | Age: 60
End: 2020-02-04
Attending: FAMILY MEDICINE
Payer: COMMERCIAL

## 2020-02-04 DIAGNOSIS — R53.1 DECREASED STRENGTH: ICD-10-CM

## 2020-02-04 DIAGNOSIS — S14.129A INCOMPLETE INJURY OF CERVICAL SPINAL CORD WITH CENTRAL CORD SYNDROME: ICD-10-CM

## 2020-02-04 DIAGNOSIS — R26.9 GAIT DISTURBANCE: ICD-10-CM

## 2020-02-04 DIAGNOSIS — Z78.9 IMPAIRED MOTOR CONTROL: ICD-10-CM

## 2020-02-04 PROBLEM — M25.673 DECREASED ROM OF ANKLE: Status: RESOLVED | Noted: 2018-05-10 | Resolved: 2020-02-04

## 2020-02-04 PROCEDURE — 97116 GAIT TRAINING THERAPY: CPT | Mod: PN

## 2020-02-04 NOTE — PROGRESS NOTES
"  Physical Therapy Daily Treatment Note     Name: Alberto Dangelo  Clinic Number: [id number]    Therapy Diagnosis:   Encounter Diagnoses   Name Primary?   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  I did my stretches early this morning."  He was compliant with home exercise program.  Response to previous treatment: Decreased muscle tightness.    Functional change: Did a lot of walking with the walker today but legs are really stiff (don't bend)    Pain: 0/10  Location: N/A    Objective     Efren participated in gait training to improve functional mobility and safety for 67  minutes, including:   Set up with 5 kg unloading.  Pt participated in computer assisted robotic gait training via [employer] @ 3.2 kph x 59 min, 59 sec for a total distance of 3162 m.  Utilized guidance force of 35% x 10 min, then decreased to 15% for remainder of session but with minimally increased unloading.  .      Home Exercises Provided and Patient Education Provided     Education provided:   - Patient to continue usual activities but to participate in home exercise program more frequently when possible.      Written Home Exercises Provided: Patient instructed to cont prior HEP.  Exercises were reviewed and Efren was able to demonstrate them prior to the end of the session.  Efren demonstrated good  understanding of the education " provided.     See EMR under N/A  for exercises provided N/A.    Assessment     Efren was able to tolerate reduction of guidance force to 15% after 10 min of training @ 35% with minor increase in unweighting required to prevent toe drag.  He did experience 1 episode of toe drag resulting in interruption of training when initially transitioned to 15% guidance force.        Efren is progressing slowly towards his goals.   Pt prognosis is Fair.     Pt will continue to benefit from skilled outpatient physical therapy to address the deficits listed in the problem list box on initial evaluation, provide pt/family education and to maximize pt's level of independence in the home and community environment.     Pt's spiritual, cultural and educational needs considered and pt agreeable to plan of care and goals.     Anticipated barriers to physical therapy: Extensor tone    Goals:   Short Term Goals:                 1) Pt will stand for 10 min without excessive lumbar lordosis or increased hip/knee ext  (able to stand with minimal to moderate excess lordosis)  Not met              2) Tolerate decreased guidance force to 15% for at least 20 min of training with < 15 kg of unweighting without excessive toe drag  Progressing              3) Patient will perform sit > stand from wheelchair with minimal UE support 5 of 10 trials  Progressing              4) Resume ambulation using forearm crutches for household use at least 60% of the time,  Not met     Long Term Goal:   continue per updated plan of care dated 10/8/2019.              1) Increase strength in B  LE as indicated by improved L-force testing  Not tested this date            2)  Pt will stand > 15 min with minimal UE support. MET            3) Pt will consistently perform safe stand pivot transfers with minimal UE support ,  Progressing            4) Pt will demonstrate improved bilateral hip flexion strength to 2/5 to improve ability to perform swing phase/ foot clearance  during gait  Slowly progressing              5) Pt will demonstrate appropriate weight shift during ambulation to limit hip hiking and excessive weight shift.  Slowly progressing    Plan     Continue with decreased guidance force while decreasing unweighting as tolerated.      Candice Herzog, PT

## 2020-02-06 ENCOUNTER — CLINICAL SUPPORT (OUTPATIENT)
Dept: REHABILITATION | Facility: HOSPITAL | Age: 60
End: 2020-02-06
Attending: FAMILY MEDICINE
Payer: COMMERCIAL

## 2020-02-06 DIAGNOSIS — S14.129A INCOMPLETE INJURY OF CERVICAL SPINAL CORD WITH CENTRAL CORD SYNDROME: ICD-10-CM

## 2020-02-06 DIAGNOSIS — R53.1 DECREASED STRENGTH: ICD-10-CM

## 2020-02-06 DIAGNOSIS — Z78.9 IMPAIRED MOTOR CONTROL: ICD-10-CM

## 2020-02-06 PROCEDURE — 97116 GAIT TRAINING THERAPY: CPT | Mod: PN,CQ

## 2020-02-06 NOTE — PROGRESS NOTES
Physical Therapy Daily Treatment Note     Name: Alberto Dangelo  Clinic Number: [id number]    Therapy Diagnosis:   Encounter Diagnoses   Name Primary?   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 Mod I stretching prior to session. Mod I in manual w/c with service dog.   He was compliant with home exercise program.  Response to previous treatment: decreased extensor tone.    Functional change: Increased tolerance for standing activities.     Pain: 0/10    Objective     Efren participated in gait training to improve functional mobility and safety for 61:03 minutes, including:   Set up with 5 kg unloading.  Pt participated in computer assisted robotic gait training via [employer] @ [address] [place] [address]  for a total distance of 3221 m.  Utilized guidance force of 35% x 10' then decreased to 15%.     Home Exercises Provided and Patient Education Provided     Education provided:   Educated pt that he/she may feel soreness after session.      Written Home Exercises Provided: Pt participates in exercise program established in previous program.  Gait activities were reviewed and Efren was able to demonstrate them prior to the end of the session.  Efren demonstrated good  understanding of the education provided.     See EMR under N/A for exercises provided N/A.    Assessment     Two stoppages with decreased GF to 15% which improved with slight  unweighting.     Efren is progressing slowly towards his goals.   Pt prognosis is Fair.     Pt will continue to benefit from skilled outpatient physical therapy to address the deficits listed in the problem list box on initial evaluation, provide pt/family education and to maximize pt's level of independence in the home and community environment.     Pt's spiritual, cultural and educational needs considered and pt agreeable to plan of care and goals.     Anticipated barriers to physical therapy: excess extensor tone.      Goals:   Short Term Goals:              1) Pt will stand for 10 min without excessive lumbar lordosis or increased hip/knee ext  (able to stand with minimal to moderate excess lordosis)  Minimal progress              2) Tolerate decreased guidance force to 15% for at least 20 min of training with < 15 kg of unweighting without excessive toe drag  Met 1/28/2020              3) Patient will perform sit > stand from wheelchair with minimal UE support 5 of 10 trials  Progressing-Continue              4) Resume ambulation using forearm crutches for household use at least 60% of the time, Not Met-Continue     Long Term Goal Status:   continue per updated plan of care dated 10/8/2019.              1) Increase strength in B  LE as indicated by improved L-force testing  Progressing            2)  Pt will stand > 15 min with minimal UE support. MET            3) Pt will consistently perform safe stand pivot transfers with minimal UE support ,  Progressing            4) Pt will demonstrate improved bilateral hip flexion strength to 2/5 to improve ability to perform swing phase/ foot clearance during gait  Slowly progressing              5) Pt will demonstrate appropriate weight shift during ambulation to limit hip hiking and excessive weight shift.  Slowly progressing    Plan     Continue per POC, progressing as appropriate.      Giulia Lock, PTA

## 2020-02-11 ENCOUNTER — CLINICAL SUPPORT (OUTPATIENT)
Dept: REHABILITATION | Facility: HOSPITAL | Age: 60
End: 2020-02-11
Attending: FAMILY MEDICINE
Payer: COMMERCIAL

## 2020-02-11 DIAGNOSIS — M25.672 DECREASED RANGE OF MOTION OF BOTH ANKLES: ICD-10-CM

## 2020-02-11 DIAGNOSIS — R53.1 DECREASED STRENGTH: ICD-10-CM

## 2020-02-11 DIAGNOSIS — M25.671 DECREASED RANGE OF MOTION OF BOTH ANKLES: ICD-10-CM

## 2020-02-11 DIAGNOSIS — R26.9 GAIT DISTURBANCE: ICD-10-CM

## 2020-02-11 DIAGNOSIS — S14.129A INCOMPLETE INJURY OF CERVICAL SPINAL CORD WITH CENTRAL CORD SYNDROME: ICD-10-CM

## 2020-02-11 DIAGNOSIS — Z78.9 IMPAIRED MOTOR CONTROL: ICD-10-CM

## 2020-02-11 PROCEDURE — 97116 GAIT TRAINING THERAPY: CPT | Mod: PN

## 2020-02-11 NOTE — PROGRESS NOTES
".temp    Physical Therapy Daily Treatment Note     Name: Alberto Dangelo  Clinic Number: [id number]    Therapy Diagnosis:   Encounter Diagnoses   Name Primary?   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  I did the exercise board with my arms and the Nu[employer] for and hour."  He was compliant with home exercise program.  Response to previous treatment: Decreased muscle tightness.    Functional change: Good exercise tolerance    Pain: 0/10  Location: N/A    Objective     Efren participated in gait training to improve functional mobility and safety for 67  minutes, including:   Set up with 5 kg unloading.  Pt participated in computer assisted robotic gait training via [employer] @ 3.2 kph x 60 min, 18 sec for a total distance of 3198 m.  Utilized guidance force of 35% x 10 min, then decreased to 15% for remainder of session without adjusting amount of unloading.        Home Exercises Provided and Patient Education Provided     Education provided:   - Patient to continue usual activities but to participate in home exercise program more frequently when possible.      Written Home Exercises Provided: Patient instructed to cont prior HEP.  Exercises were reviewed and Efren was able to demonstrate them prior to the end of the session.  Efren demonstrated good  understanding of the education provided.     See EMR under N/A  for exercises provided " N/A.    Assessment     Efren was able to tolerate reduction of guidance force to 15% after 10 min of training @ 35% without increase in unweighting.  No toe drag noted.  Efren is progressing slowly towards his goals. Intermittently demonstrated increased force generated @ knees as noted on feedback graph but was unable to sustain > 30-45 sec at a time.     Pt prognosis is Fair.     Pt will continue to benefit from skilled outpatient physical therapy to address the deficits listed in the problem list box on initial evaluation, provide pt/family education and to maximize pt's level of independence in the home and community environment.     Pt's spiritual, cultural and educational needs considered and pt agreeable to plan of care and goals.     Anticipated barriers to physical therapy: Extensor tone    Goals:   Short Term Goals:   2/11/2020              1) Pt will stand for 10 min without excessive lumbar lordosis or increased hip/knee ext  (able to stand with minimal to moderate excess lordosis)  Not met              2) Tolerate decreased guidance force to 15% for at least 20 min of training with < 15 kg of unweighting without excessive toe drag  MET  2/11/2020              3) Patient will perform sit > stand from wheelchair with minimal UE support 5 of 10 trials  Progressing              4) Resume ambulation using forearm crutches for household use at least 60% of the time,  Not met     Long Term Goal:   2/11/2020            1) Increase strength in B  LE as indicated by improved L-force testing  Not tested this date            2)  Pt will stand > 15 min with minimal UE support. MET            3) Pt will consistently perform safe stand pivot transfers with minimal UE support ,  Progressing            4) Pt will demonstrate improved bilateral hip flexion strength to 2/5 to improve ability to perform swing phase/ foot clearance during gait  Slowly progressing              5) Pt will demonstrate appropriate weight shift  during ambulation to limit hip hiking and excessive weight shift.  Slowly progressing   Added 6) Tolerate decreased guidance force to 10% for > 75% of training time at < 10 kg unweighting without increased toe drag (added 2/11/2020)    Plan     Continue with decreased guidance force while decreasing unweighting as tolerated.      Candice Herzog, PT

## 2020-02-13 ENCOUNTER — CLINICAL SUPPORT (OUTPATIENT)
Dept: REHABILITATION | Facility: HOSPITAL | Age: 60
End: 2020-02-13
Attending: FAMILY MEDICINE
Payer: COMMERCIAL

## 2020-02-13 DIAGNOSIS — Z78.9 IMPAIRED MOTOR CONTROL: ICD-10-CM

## 2020-02-13 DIAGNOSIS — R53.1 DECREASED STRENGTH: ICD-10-CM

## 2020-02-13 DIAGNOSIS — S14.129A INCOMPLETE INJURY OF CERVICAL SPINAL CORD WITH CENTRAL CORD SYNDROME: ICD-10-CM

## 2020-02-13 PROCEDURE — 97116 GAIT TRAINING THERAPY: CPT | Mod: PN,CQ

## 2020-02-13 NOTE — PROGRESS NOTES
Physical Therapy Daily Treatment Note     Name: Alberto Dangelo  Clinic Number: [id number]    Therapy Diagnosis:   Encounter Diagnoses   Name Primary?   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 Mod I stretching prior to session. Mod I in manual w/c with service dog.   He was compliant with home exercise program.  Response to previous treatment: decreased extensor tone.    Functional change: Increased tolerance for standing activities.     Pain: 0/10    Objective     Efren participated in gait training to improve functional mobility and safety for 60:26 minutes, including:   Set up with 5 kg unloading.  Pt participated in computer assisted robotic gait training via [employer] [address] [place] [address]  for a total distance of 3189 m.  Utilized guidance force of 35% x 14' then decreased to 15%.     Home Exercises Provided and Patient Education Provided     Education provided:   Educated pt that he/she may feel soreness after session.      Written Home Exercises Provided: Pt participates in exercise program established in previous program.  Gait activities were reviewed and Efren was able to demonstrate them prior to the end of the session.  Efren demonstrated good  understanding of the education provided.     See EMR under N/A for exercises provided N/A.    Assessment     One stoppage with decreased GF to 15% which improved with slight  unweighting.     Efren is progressing slowly towards his goals.   Pt prognosis is Fair.     Pt will continue to benefit from skilled outpatient physical therapy to address the deficits listed in the problem list box on initial evaluation, provide pt/family education and to maximize pt's level of independence in the home and community environment.     Pt's spiritual, cultural and educational needs considered and pt agreeable to plan of care and goals.     Anticipated barriers to physical therapy: excess extensor tone.      Goals:   Short Term Goals:              1) Pt will stand for 10 min without excessive lumbar lordosis or increased hip/knee ext  (able to stand with minimal to moderate excess lordosis)  Minimal progress              2) Tolerate decreased guidance force to 15% for at least 20 min of training with < 15 kg of unweighting without excessive toe drag  Met 1/28/2020              3) Patient will perform sit > stand from wheelchair with minimal UE support 5 of 10 trials  Progressing-Continue              4) Resume ambulation using forearm crutches for household use at least 60% of the time, Not Met-Continue     Long Term Goal Status:   continue per updated plan of care dated 10/8/2019.              1) Increase strength in B  LE as indicated by improved L-force testing  Progressing            2)  Pt will stand > 15 min with minimal UE support. MET            3) Pt will consistently perform safe stand pivot transfers with minimal UE support ,  Progressing            4) Pt will demonstrate improved bilateral hip flexion strength to 2/5 to improve ability to perform swing phase/ foot clearance during gait  Slowly progressing              5) Pt will demonstrate appropriate weight shift during ambulation to limit hip hiking and excessive weight shift.  Slowly progressing    Plan     Continue per POC, progressing as appropriate.      Giulia Lock, PTA

## 2020-02-18 ENCOUNTER — CLINICAL SUPPORT (OUTPATIENT)
Dept: REHABILITATION | Facility: HOSPITAL | Age: 60
End: 2020-02-18
Attending: FAMILY MEDICINE
Payer: COMMERCIAL

## 2020-02-18 DIAGNOSIS — R53.1 DECREASED STRENGTH: ICD-10-CM

## 2020-02-18 DIAGNOSIS — M25.60 LIMITED JOINT RANGE OF MOTION (ROM): ICD-10-CM

## 2020-02-18 DIAGNOSIS — S14.129A INCOMPLETE INJURY OF CERVICAL SPINAL CORD WITH CENTRAL CORD SYNDROME: ICD-10-CM

## 2020-02-18 DIAGNOSIS — Z78.9 IMPAIRED MOTOR CONTROL: ICD-10-CM

## 2020-02-18 DIAGNOSIS — R26.9 GAIT DISTURBANCE: ICD-10-CM

## 2020-02-18 PROCEDURE — 97116 GAIT TRAINING THERAPY: CPT | Mod: PN

## 2020-02-18 NOTE — PROGRESS NOTES
".temp    Physical Therapy Daily Treatment Note     Name: Alberto Dangelo  Clinic Number: [id number]    Therapy Diagnosis:   Encounter Diagnoses   Name Primary?   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  Response to previous treatment: Decreased muscle tightness.  Functional change: 1 episode of toe drag during gait training requiring restart of activity.      Pain: 0/10  Location: N/A    Objective     Patient performed self stretching after arrival but prior to gait training.    Efren participated in gait training to improve functional mobility and safety for 67  minutes, including:   Set up with 5 kg unloading.  Pt participated in computer assisted robotic gait training via [employer] @ 3.2 kph x 60 min, 14 sec for a total distance of 3166 m.  Utilized guidance force of 35% x 11 min, then decreased to 15% for remainder of session with minor increase in unloading and adjustment to R ankle DF required to prevent toe drag on R.        Home Exercises Provided and Patient Education Provided     Education provided:   - Patient to continue usual activities but to participate in home exercise program more frequently when possible.      Written Home Exercises Provided: Patient instructed to cont prior HEP.  Exercises were reviewed and Efren was able to " demonstrate them prior to the end of the session.  Efren demonstrated good  understanding of the education provided.     See EMR under N/A  for exercises provided N/A.    Assessment     Efren tolerated decreased guidance force to 15% for the majority of the session but did experience 1 episode of R toe drag upon achieving 15% guidance force.  Required adjustment of R ankle DF angle using foot straps and slight increase in unweighting (remains less that 15 kg) to correct.   Pt prognosis is Fair.     Pt will continue to benefit from skilled outpatient physical therapy to address the deficits listed in the problem list box on initial evaluation, provide pt/family education and to maximize pt's level of independence in the home and community environment.     Pt's spiritual, cultural and educational needs considered and pt agreeable to plan of care and goals.     Anticipated barriers to physical therapy: Extensor tone    Goals:   Short Term Goals:   2/18/2020              1) Pt will stand for 10 min without excessive lumbar lordosis or increased hip/knee ext  (able to stand with minimal to moderate excess lordosis)  Not met              2) Tolerate decreased guidance force to 15% for at least 20 min of training with < 15 kg of unweighting without excessive toe drag  MET  2/11/2020              3) Patient will perform sit > stand from wheelchair with minimal UE support 5 of 10 trials  Progressing              4) Resume ambulation using forearm crutches for household use at least 60% of the time,  Not met     Long Term Goal:   2/18/2020            1) Increase strength in B  LE as indicated by improved L-force testing  Not tested this date            2)  Pt will stand > 15 min with minimal UE support. MET            3) Pt will consistently perform safe stand pivot transfers with minimal UE support ,  Progressing            4) Pt will demonstrate improved bilateral hip flexion strength to 2/5 to improve ability to perform  swing phase/ foot clearance during gait  Slowly progressing              5) Pt will demonstrate appropriate weight shift during ambulation to limit hip hiking and excessive weight shift.  Slowly progressing   Added 6) Tolerate decreased guidance force to 10% for > 75% of training time at < 10 kg unweighting without increased toe drag (added 2/11/2020)    Plan     Continue with decreased guidance force while decreasing unweighting as tolerated.      Candice Herzog, PT

## 2020-03-03 ENCOUNTER — CLINICAL SUPPORT (OUTPATIENT)
Dept: REHABILITATION | Facility: HOSPITAL | Age: 60
End: 2020-03-03
Attending: FAMILY MEDICINE
Payer: COMMERCIAL

## 2020-03-03 DIAGNOSIS — Z78.9 IMPAIRED MOTOR CONTROL: ICD-10-CM

## 2020-03-03 DIAGNOSIS — S14.129A INCOMPLETE INJURY OF CERVICAL SPINAL CORD WITH CENTRAL CORD SYNDROME: ICD-10-CM

## 2020-03-03 DIAGNOSIS — R26.9 GAIT DISTURBANCE: ICD-10-CM

## 2020-03-03 DIAGNOSIS — R53.1 DECREASED STRENGTH: ICD-10-CM

## 2020-03-03 PROCEDURE — 97116 GAIT TRAINING THERAPY: CPT | Mod: PN

## 2020-03-05 ENCOUNTER — CLINICAL SUPPORT (OUTPATIENT)
Dept: REHABILITATION | Facility: HOSPITAL | Age: 60
End: 2020-03-05
Attending: FAMILY MEDICINE
Payer: COMMERCIAL

## 2020-03-05 DIAGNOSIS — Z78.9 IMPAIRED MOTOR CONTROL: ICD-10-CM

## 2020-03-05 DIAGNOSIS — S14.129A INCOMPLETE INJURY OF CERVICAL SPINAL CORD WITH CENTRAL CORD SYNDROME: ICD-10-CM

## 2020-03-05 DIAGNOSIS — R53.1 DECREASED STRENGTH: ICD-10-CM

## 2020-03-05 PROCEDURE — 97116 GAIT TRAINING THERAPY: CPT | Mod: PN,CQ

## 2020-03-05 NOTE — PROGRESS NOTES
Physical Therapy Daily Treatment Note     Name: Alberto Dangelo  Clinic Number: [id number]    Therapy Diagnosis:   Encounter Diagnoses   Name Primary?   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 Mod I stretching prior to session. Mod I in manual w/c with service dog. Went on cruise with his family last week without problems.   He was compliant with home exercise program.  Response to previous treatment: decreased extensor tone.    Functional change: Increased tolerance for standing activities.     Pain: 0/10    Objective     Efren participated in gait training to improve functional mobility and safety for 63:06 minutes, including:   Set up with 5 kg unloading.  Pt participated in computer assisted robotic gait training via [employer] @ 3.2 [facility]  for a total distance of 91045 m.  Utilized guidance force of 40% x 15' then decreased to 25%.     Home Exercises Provided and Patient Education Provided     Education provided:   Educated pt that he/she may feel soreness after session.      Written Home Exercises Provided: Pt participates in exercise program established in previous program.  Gait activities were reviewed and Efren was able to demonstrate them prior to the end of the session.  Efren demonstrated good  understanding of the education provided.     See EMR under N/A for exercises provided N/A.    Assessment     [place]  stoppages when attempting to start at 35% GF but improved with increase to 40%. One stoppage during latter part of session 2* R LE tones.     Efren is progressing slowly towards his goals.   Pt prognosis is Fair.     Pt will continue to benefit from skilled outpatient physical therapy to address the deficits listed in the problem list box on initial evaluation, provide pt/family education and to maximize pt's level of independence in the home and community environment.     Pt's spiritual, cultural and educational needs considered and pt agreeable to plan of care and goals.     Anticipated barriers to physical therapy: excess extensor tone.      Goals:   Short Term Goals:              1) Pt will stand for 10 min without excessive lumbar lordosis or increased hip/knee ext  (able to stand with minimal to moderate excess lordosis)  Minimal progress              2) Tolerate decreased guidance force to 15% for at least 20 min of training with < 15 kg of unweighting without excessive toe drag  Met 1/28/2020              3) Patient will perform sit > stand from wheelchair with minimal UE support 5 of 10 trials  Progressing-Continue              4) Resume ambulation using forearm crutches for household use at least 60% of the time, Not Met-Continue     Long Term Goal Status:   continue per updated plan of care dated 10/8/2019.              1) Increase strength in B  LE as indicated by improved L-force testing  Progressing            2)  Pt will stand > 15 min with minimal UE support. MET            3) Pt will consistently perform safe stand pivot transfers with minimal UE support ,  Progressing            4) Pt will demonstrate improved bilateral hip flexion strength to 2/5 to improve ability to perform swing phase/ foot clearance during gait  Slowly progressing              5) Pt will demonstrate appropriate weight shift during ambulation to limit hip hiking and excessive weight shift.  Slowly progressing    Plan      Continue per POC, progressing as appropriate.      Giulia Lock, PTA     I certify that I was present in the room directing the student in service delivery and guiding them using my skilled judgment. As the co-signing therapist I have reviewed the students documentation and am responsible for the treatment, assessment, and plan.

## 2020-03-08 NOTE — PROGRESS NOTES
.temp    Physical Therapy Daily Treatment Note     Name: Alberto Dangelo  Clinic Number: [id number]    Therapy Diagnosis:   Encounter Diagnoses   Name Primary?   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  Increased tightness due to vacation last week.    He was compliant with home exercise program.  Response to previous treatment: Decreased muscle tightness.  Functional change:  No new change following last visit.      Pain: 0/10  Location: N/A    Objective     Patient performed self stretching after arrival but prior to gait training.    Efren participated in gait training to improve functional mobility and safety for 67  minutes, including:   Set up with 5 kg unloading.  Pt participated in computer assisted robotic gait training via [employer] @ 3.2 kph x 60 min, 13 sec for a total distance of 3175 m.  Utilized guidance force of 40% x 15 min, then decreased to 25% for remainder of session with adjustment to R ankle DF required to prevent toe drag on R.        Home Exercises Provided and Patient Education Provided     Education provided:   - Patient to resusme usual home exercise program with increased frequency when possible.      Written Home Exercises Provided: Patient instructed to cont prior HEP.  Exercises were reviewed and Efren was able to demonstrate them prior to the end of the session.  Efren demonstrated good  understanding of the education  provided.     See EMR under N/A  for exercises provided N/A.    Assessment     Efren demonstrated difficulty with decreasing guidance force this date.  Not able to decrease guidance for to < 25% due to increased tone following brief break from therapy.      Pt prognosis is Fair.     Pt will continue to benefit from skilled outpatient physical therapy to address the deficits listed in the problem list box on initial evaluation, provide pt/family education and to maximize pt's level of independence in the home and community environment.     Pt's spiritual, cultural and educational needs considered and pt agreeable to plan of care and goals.     Anticipated barriers to physical therapy: Extensor tone    Goals:   Short Term Goals:   3/3/2020              1) Pt will stand for 10 min without excessive lumbar lordosis or increased hip/knee ext  (able to stand with minimal to moderate excess lordosis)  Minimal progress              2) Tolerate decreased guidance force to 15% for at least 20 min of training with < 15 kg of unweighting without excessive toe drag  MET  2/11/2020 Regressed this date (3/3/2020)              3) Patient will perform sit > stand from wheelchair with minimal UE support 5 of 10 trials  Progressing              4) Resume ambulation using forearm crutches for household use at least 60% of the time,  Not met     Long Term Goal:   2/18/2020            1) Increase strength in B  LE as indicated by improved L-force testing  Not tested this date            2)  Pt will stand > 15 min with minimal UE support. MET            3) Pt will consistently perform safe stand pivot transfers with minimal UE support ,  Progressing            4) Pt will demonstrate improved bilateral hip flexion strength to 2/5 to improve ability to perform swing phase/ foot clearance during gait  Slowly progressing              5) Pt will demonstrate appropriate weight shift during ambulation to limit hip hiking and excessive weight  shift.  Slowly progressing   Added 6) Tolerate decreased guidance force to 10% for > 75% of training time at < 10 kg unweighting without increased toe drag (added 2/11/2020)    Plan     Resume gait training with guidance force at 15% next session if possible and strive to further reduce guidance force as patient tolerates.  .      Candice Herzog, PT

## 2020-03-10 ENCOUNTER — CLINICAL SUPPORT (OUTPATIENT)
Dept: REHABILITATION | Facility: HOSPITAL | Age: 60
End: 2020-03-10
Attending: FAMILY MEDICINE
Payer: COMMERCIAL

## 2020-03-10 DIAGNOSIS — R26.9 GAIT DISTURBANCE: ICD-10-CM

## 2020-03-10 DIAGNOSIS — R53.1 DECREASED STRENGTH: ICD-10-CM

## 2020-03-10 DIAGNOSIS — Z78.9 IMPAIRED MOTOR CONTROL: ICD-10-CM

## 2020-03-10 DIAGNOSIS — S14.129A INCOMPLETE INJURY OF CERVICAL SPINAL CORD WITH CENTRAL CORD SYNDROME: ICD-10-CM

## 2020-03-10 PROCEDURE — 97116 GAIT TRAINING THERAPY: CPT | Mod: PN

## 2020-03-10 NOTE — PROGRESS NOTES
".temp    Physical Therapy Daily Treatment Note     Name: Alberto Dangelo  Clinic Number: [id number]    Therapy Diagnosis:   Encounter Diagnoses   Name Primary?   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      Physician: Dirk Ortiz MD    Visit Date: 3/10/2020    Physician Orders: Eval and treat  Medical Diagnosis: S14.129A (ICD-10-CM) - Central cord syndrome at unspecified level of cervical spinal cord  Evaluation Date: 8/26/2016  Authorization Period Expiration: 12/31/2020  Plan of Care Certification Period: 4/22/2020  Visit #/Visits authorized: 14/ 60     Time In: 1410  Time Out: 1538  Total Billable Time: 67 minutes    Precautions: Fall    Subjective     Pt reports: "I did some ab work the other day and I must have overdone it because my stomach started spasming afterward."  He was compliant with home exercise program.  Response to previous treatment: Decreased muscle tightness B LE  Functional change:  None specific      Pain: 0/10  Location: N/A    Objective     Patient performed self stretching after arrival but prior to gait training.    Efren participated in gait training to improve functional mobility and safety for 67  minutes, including:   Set up with 5 kg unloading.  Pt participated in computer assisted robotic gait training via [employer] @ 3.2 kph x 60 min, 25 sec for a total distance of 3199 m.  Utilized guidance force of 35%  x 11 min, then decreased to 25% x 4'; completed session @ 15%.  Experienced 1 episode of toe drag upon decreasing guidance to 15% but was able to correct with minimal increase in unweighting.       Home Exercises Provided and Patient Education Provided     Education provided:   - Patient to resusme usual home exercise program with increased frequency when possible.      Written Home Exercises Provided: Patient instructed to cont prior HEP.  Exercises were reviewed and Efren was able to demonstrate " them prior to the end of the session.  Efren demonstrated good  understanding of the education provided.     See EMR under N/A  for exercises provided N/A.    Assessment     Efren was able to achieve and maintain gait training at15% guidance force for the majority of his session but did require minimal increase in unweighting to prevent foot drag.       Pt prognosis is Fair.     Pt will continue to benefit from skilled outpatient physical therapy to address the deficits listed in the problem list box on initial evaluation, provide pt/family education and to maximize pt's level of independence in the home and community environment.     Pt's spiritual, cultural and educational needs considered and pt agreeable to plan of care and goals.     Anticipated barriers to physical therapy: Extensor tone    Goals:   Short Term Goals:                 1) Pt will stand for 10 min without excessive lumbar lordosis or increased hip/knee ext  (able to stand with minimal to moderate excess lordosis)  Minimal progress              2) Tolerate decreased guidance force to 15% for at least 20 min of training with < 15 kg of unweighting without excessive toe drag  MET  2/11/2020 Regressed this date (3/3/2020)              3) Patient will perform sit > stand from wheelchair with minimal UE support 5 of 10 trials  Progressing              4) Resume ambulation using forearm crutches for household use at least 60% of the time,  Not met     Long Term Goal:              1) Increase strength in B  LE as indicated by improved L-force testing  Not tested this date            2)  Pt will stand > 15 min with minimal UE support. MET  2/18/2020            3) Pt will consistently perform safe stand pivot transfers with minimal UE support ,  Progressing            4) Pt will demonstrate improved bilateral hip flexion strength to 2/5 to improve ability to perform swing phase/ foot clearance during gait  Slowly progressing              5) Pt will  demonstrate appropriate weight shift during ambulation to limit hip hiking and excessive weight shift.  Slowly progressing   Added 6) Tolerate decreased guidance force to 10% for > 75% of training time at < 10 kg unweighting without increased toe drag (added 2/11/2020)    Plan     Continue gait training with guidance force reduced to at least 15% and strive to further reduce guidance force without increasing unweighting as patient tolerates.        Candice Herzog, PT

## 2020-03-12 ENCOUNTER — CLINICAL SUPPORT (OUTPATIENT)
Dept: REHABILITATION | Facility: HOSPITAL | Age: 60
End: 2020-03-12
Attending: FAMILY MEDICINE
Payer: COMMERCIAL

## 2020-03-12 DIAGNOSIS — Z78.9 IMPAIRED MOTOR CONTROL: ICD-10-CM

## 2020-03-12 DIAGNOSIS — S14.129A INCOMPLETE INJURY OF CERVICAL SPINAL CORD WITH CENTRAL CORD SYNDROME: ICD-10-CM

## 2020-03-12 DIAGNOSIS — R53.1 DECREASED STRENGTH: ICD-10-CM

## 2020-03-12 PROCEDURE — 97110 THERAPEUTIC EXERCISES: CPT | Mod: PN,CQ

## 2020-03-12 NOTE — PROGRESS NOTES
.tem    Physical Therapy Daily Treatment Note     Name: Alberto Dangelo  Clinic Number: [id number]    Therapy Diagnosis:   Encounter Diagnoses   Name Primary?   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 Brought his service dog. Mod I in manual w/c with mod I stretching prior to session.   He was compliant with home exercise program.  Response to previous treatment: no soreness  Functional change:  None stated      Pain: 0/10  Location: N/A    Objective     Patient performed self stretching after arrival but prior to gait training.    Efren participated in gait training to improve functional mobility and safety for 67  minutes, including:   Set up with 5 kg unloading.  Pt participated in computer assisted robotic gait training via [employer] @ 3.2 kph x 60 min, 25 sec for a total distance of 3199 m.  Utilized guidance force of 35%  x 11 min, then decreased to 25% x 4'; completed session @ 15%.  Experienced 1 episode of toe drag upon decreasing guidance to 15% but was able to correct with minimal increase in unweighting.       Home Exercises Provided and Patient Education Provided     Education provided:   - Patient to resusme usual home exercise program with increased frequency when possible.      Written Home Exercises Provided: Patient instructed to cont prior HEP.  Exercises were reviewed and Efren was able to demonstrate them prior to the end of the  session.  Efren demonstrated good  understanding of the education provided.     See EMR under N/A  for exercises provided N/A.    Assessment     NO unweighting required today for decrease to 15%; however, pt did have several initial stoppages when setting up, requiring strap adjustments and initial unweighting. Good weighting for rest of session.     Pt prognosis is Fair.     Pt will continue to benefit from skilled outpatient physical therapy to address the deficits listed in the problem list box on initial evaluation, provide pt/family education and to maximize pt's level of independence in the home and community environment.     Pt's spiritual, cultural and educational needs considered and pt agreeable to plan of care and goals.     Anticipated barriers to physical therapy: Extensor tone    Goals:   Short Term Goals:   3/3/2020              1) Pt will stand for 10 min without excessive lumbar lordosis or increased hip/knee ext  (able to stand with minimal to moderate excess lordosis)  Minimal progress              2) Tolerate decreased guidance force to 15% for at least 20 min of training with < 15 kg of unweighting without excessive toe drag  MET  2/11/2020 Regressed this date (3/3/2020)              3) Patient will perform sit > stand from wheelchair with minimal UE support 5 of 10 trials  Progressing              4) Resume ambulation using forearm crutches for household use at least 60% of the time,  Not met     Long Term Goal:  4/22/2020            1) Increase strength in B  LE as indicated by improved L-force testing  Not tested this date            2)  Pt will stand > 15 min with minimal UE support. MET            3) Pt will consistently perform safe stand pivot transfers with minimal UE support ,  Progressing            4) Pt will demonstrate improved bilateral hip flexion strength to 2/5 to improve ability to perform swing phase/ foot clearance during gait  Slowly progressing              5) Pt will  demonstrate appropriate weight shift during ambulation to limit hip hiking and excessive weight shift.  Slowly progressing   Added 6) Tolerate decreased guidance force to 10% for > 75% of training time at < 10 kg unweighting without increased toe drag (added 2/11/2020)    Plan     Continue gait training with guidance force reduced to at least 15% and strive to further reduce guidance force without increasing unweighting as patient tolerates.        Giulia Lock, PTA

## 2020-05-15 DIAGNOSIS — Z12.11 COLON CANCER SCREENING: ICD-10-CM

## 2020-05-19 ENCOUNTER — TELEPHONE (OUTPATIENT)
Dept: REHABILITATION | Facility: HOSPITAL | Age: 60
End: 2020-05-19

## 2020-05-19 NOTE — TELEPHONE ENCOUNTER
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  Patient requested to wait a while longer before resuming due to his higher risk level.  I agreed with patient and recommended that patient begin working out with face mask in preparation for return to [employer] training sessions.  Verbalized understanding.  Will follow up with patient to determine desired time to resume training.

## 2020-05-22 DIAGNOSIS — S14.129A INCOMPLETE INJURY OF CERVICAL SPINAL CORD WITH CENTRAL CORD SYNDROME: ICD-10-CM

## 2020-05-25 RX ORDER — LANOLIN ALCOHOL/MO/W.PET/CERES
400 CREAM (GRAM) TOPICAL 2 TIMES DAILY
Qty: 180 TABLET | Refills: 0 | Status: SHIPPED | OUTPATIENT
Start: 2020-05-25 | End: 2020-09-28

## 2020-05-25 NOTE — TELEPHONE ENCOUNTER
Order in for BMP and magnesium level.  Recommend make appointment for checkup, we are booking in to September now

## 2020-06-02 ENCOUNTER — LAB VISIT (OUTPATIENT)
Dept: LAB | Facility: HOSPITAL | Age: 60
End: 2020-06-02
Attending: FAMILY MEDICINE
Payer: COMMERCIAL

## 2020-06-02 DIAGNOSIS — S14.129A INCOMPLETE INJURY OF CERVICAL SPINAL CORD WITH CENTRAL CORD SYNDROME: ICD-10-CM

## 2020-06-02 LAB
ANION GAP SERPL CALC-SCNC: 10 MMOL/L (ref 8–16)
BUN SERPL-MCNC: 16 MG/DL (ref 6–20)
CALCIUM SERPL-MCNC: 9.8 MG/DL (ref 8.7–10.5)
CHLORIDE SERPL-SCNC: 107 MMOL/L (ref 95–110)
CO2 SERPL-SCNC: 25 MMOL/L (ref 23–29)
CREAT SERPL-MCNC: 1.2 MG/DL (ref 0.5–1.4)
EST. GFR  (AFRICAN AMERICAN): >60 ML/MIN/1.73 M^2
EST. GFR  (NON AFRICAN AMERICAN): >60 ML/MIN/1.73 M^2
GLUCOSE SERPL-MCNC: 107 MG/DL (ref 70–110)
MAGNESIUM SERPL-MCNC: 1.9 MG/DL (ref 1.6–2.6)
POTASSIUM SERPL-SCNC: 4.1 MMOL/L (ref 3.5–5.1)
SODIUM SERPL-SCNC: 142 MMOL/L (ref 136–145)

## 2020-06-02 PROCEDURE — 83735 ASSAY OF MAGNESIUM: CPT

## 2020-06-02 PROCEDURE — 80048 BASIC METABOLIC PNL TOTAL CA: CPT

## 2020-06-02 PROCEDURE — 36415 COLL VENOUS BLD VENIPUNCTURE: CPT | Mod: PO

## 2020-06-22 ENCOUNTER — TELEPHONE (OUTPATIENT)
Dept: FAMILY MEDICINE | Facility: CLINIC | Age: 60
End: 2020-06-22

## 2020-06-22 DIAGNOSIS — S14.129A: Primary | ICD-10-CM

## 2020-06-23 ENCOUNTER — CLINICAL SUPPORT (OUTPATIENT)
Dept: REHABILITATION | Facility: HOSPITAL | Age: 60
End: 2020-06-23
Payer: COMMERCIAL

## 2020-06-23 DIAGNOSIS — Z78.9 IMPAIRED MOTOR CONTROL: Chronic | ICD-10-CM

## 2020-06-23 DIAGNOSIS — R53.1 DECREASED STRENGTH: Chronic | ICD-10-CM

## 2020-06-23 DIAGNOSIS — S14.129A: ICD-10-CM

## 2020-06-23 DIAGNOSIS — R26.9 GAIT DISTURBANCE: ICD-10-CM

## 2020-06-23 PROCEDURE — 97116 GAIT TRAINING THERAPY: CPT | Mod: PN

## 2020-06-26 NOTE — PLAN OF CARE
"  Outpatient Therapy Updated Plan of Care     Visit Date: 6/23/2020    Name: Alberto Dangelo  Clinic Number: [id number]    Therapy Diagnosis:   Encounter Diagnosis   Name Primary?    Central cord syndrome of cervical spinal cord, initial encounter      Physician: Dirk Ortiz MD    Physician Orders: Eval and treat (resuming PT following postponement due to Covid-19)  Medical Diagnosis: S14.129A (ICD-10-CM) - Central cord syndrome at unspecified level of cervical spinal cord  Evaluation Date: 8/26/2016  Current Certification Period: 12/31/2019 to 4/22/2020  Authorization Period Expiration: 12/31/2020  Plan of Care Expiration: 10/23/2020  Visit #/Visits authorized: 1/20 (referral - No visit limit due to medical necessity)      Precautions: Fall and immune concerns due to impaired breathing 2* to quadriplegia  Functional Level Prior to Evaluation:  Ambulates household distances w RW with significant difficulty, main means of mobility is manual w/c.Pt is able to drive w hand controls. Home equipment includes B AFO's, shower chair, ramped entrance w support bars to swimming pool, FES bike, free weights, plyobox system.     Subjective     Update: "I have been working out at home but I can't do any significant amount of walking because I can't bend my knees. "  Pain:  0/10 currently.      Objective     Update:   L-force test:                                             LEFT                                                 RIGHT                             FLEX                  EXT                      FLEX                     EXT                HIP      2.17 Nm             17.52 Nm               3.79 Nm                0.25 Nm                                                             KNEE     3.06  Nm            21.41 Nm               0.99 Nm                14.16 Nm     Gait:  Pt is tolerated 50 minutes of gait on Lokomat @ 5 kg unloading and 3.2 kph with guidance force at 70%.  Did not reduce guidance force today " due to intermittent toe drag @ 70%. He continues to ambulate occasionally using forearm crutches while in parallel bars at home due to fear of falling.    Posture:  Pt stands with foot flat position, feet less than shoulder width apart.  Minimal to moderate knee hyperextension and moderate anterior pelvic tilt/increased lumbar lordosis; however, he continues to be able to correct this for short periods of time.  He is able to maintain standing position for short periods without UE support but demonstrates increased anterior pelvic tilt and knee hyperextension.  Balance: Sitting balance unimpaired.    Standing static balance:  Fair  Requires intermittent UE support  He can maintain static standing balance with support of 1 UE.  He lacks adequate balance responses in ankles to adjust for balance challenges.  He is able to use his UE to correct balance when support is available   Dynamic standing balance.  Patient requires support of 1 UE for dynamic standing activities.    Transfers:  Sit <> stand: pt is able to achieve sit > stand from higher surface using minimal UE support.  He continues to require increased UE support with sit>stand on lower surfaces.  He continues to require substantial anterior weight shift to insure weight is fully over his feet.  Ascent is controlled but upon reaching erect position he tends to hyperextend B knees. Once he gains his balance with knees locked, he is able to unlock his knees briefly.  Stand > sit continues to require moderate UE support but is controlled descent.  Stand pivot transfers moderate UE support required.    Flexibility:  Somewhat limited by tone.  Continues to demonstrate moderate tightness in B calves, B hamstrings, and B hip flexors.  Flexibility has diminished since previous visit  Function:  Pt reports he is able to stand 15 min with UE support.  Demonstrates the ability to perform stand stand pivot transfer with moderate UE support.  Patient is able to ambulate a  few steps with minimal to moderate UE support.  Otherwise he does require substantial UE support due to tone.  He reports no issues with skin breakdown.  Treatment: Patient performed self stretching after arrival but prior to assessment/gait training.    Efren participated in gait training to improve functional mobility and safety for 59  minutes, including:              Set up with 5 kg unloading.  Pt participated in computer assisted robotic gait training via [employer] @ 3.2 kp x 51 min, 05 sec for a total distance of 2684 m.  Utilized guidance force of 70%  for duration of session as he has not participated in this activity for almost 3.5 months.  Experienced 2 episodes of toe drag but was corrected with adjustments to alignment of robotic orthosis.      Assessment     Update:  Patient demonstrates significant reduction in force generated during L-force test since his previous POC update with the exception of a minimal increase in force generated at R hip flexion.  He requires significant UE support to perform sit<>stand transfers and stand pivot transfers.  He is also able to balance himself in standing for brief periods but requires UE support to accept challenges and to participate in UE activities while standing.  His balance is negatively affected by increased extensor tone in B LE.  His tolerance for gait activity (including reduction in guidance force as well as total time) has diminished since his last PT session in 3/2020 and he demonstrates a reduction in LE flexibility as well.                                                  Previous Short Term Goals Status:                 1) Pt will stand for 10 min without excessive lumbar lordosis or increased hip/knee ext  (able to stand with minimal to moderate excess lordosis)  Continue              2) Tolerate decreased guidance force to 15% for at least 20 min of training with < 15 kg of unweighting without excessive toe drag  Continue              3) Patient  will perform sit > stand from wheelchair with minimal UE support 5 of 10 trials  Continue              4) Resume ambulation using forearm crutches for household use at least 60% of the time, Continue      New Short Term Goals Status:  (updated 6/23/2020)                1) Patient will tolerate 60 min of gait training without difficulty   2) Patient will tolerate guidance force decreased to < 30% for > 40 min of training   3) Patient will demonstrate improvement in LE flexibility    4) Patient will stand with minimal UE support demonstrating appropriate knee (no hyperextension) and lumbar positioning (neutral pelvic tilt) x 5 min     Long Term Goal Status:   (Modified 6/23/2020)            1) Increase strength in B  LE as indicated by improved L-force testing by 5-10nM at hip flex/ext and by 5 nM in knee flex/ext             2)  Pt will stand > 10-15 min with minimal UE support while demonstrating appropriate posture.             3) Pt will consistently perform safe stand pivot transfers with minimal to moderate UE support ,              4) Pt will demonstrate improved bilateral hip flexion strength to 2/5 to improve ability to perform swing phase/ foot clearance during gait               5) Pt will demonstrate appropriate weight shift during ambulation to limit hip hiking and excessive weight shift.       Reasons for Recertification of Therapy:   Patient's therapy was disrupted by pandemic (x nearly 3.5 months).  During this time he experienced a decline in LE and trunk flexibility, a decrease in LE strength, and a decrease in functional mobility/gait.  He continues to be at risk for skin complications, respiratory complications, and falls due to prolonged sitting (quadriplegia) and lack of ability to participate in aerobic activities on his own.      Plan     Updated Certification Period: 6/23/2020 to 10/23/2020  Recommended Treatment Plan: 1 times per week for 4 weeks then increase to 2x/wk for remaining 12 weeks:  Gait Training, Neuromuscular Re-ed, Patient Education and Therapeutic Activites  Other Recommendations: N/A    Candice Herzog, PT  6/23/2020      I CERTIFY THE NEED FOR THESE SERVICES FURNISHED UNDER THIS PLAN OF TREATMENT AND WHILE UNDER MY CARE    Physician's comments:        Physician's Signature: ___________________________________________________

## 2020-06-30 ENCOUNTER — CLINICAL SUPPORT (OUTPATIENT)
Dept: REHABILITATION | Facility: HOSPITAL | Age: 60
End: 2020-06-30
Payer: COMMERCIAL

## 2020-06-30 DIAGNOSIS — R53.1 DECREASED STRENGTH: ICD-10-CM

## 2020-06-30 DIAGNOSIS — S14.129A INCOMPLETE INJURY OF CERVICAL SPINAL CORD WITH CENTRAL CORD SYNDROME: ICD-10-CM

## 2020-06-30 DIAGNOSIS — Z78.9 IMPAIRED MOTOR CONTROL: ICD-10-CM

## 2020-06-30 DIAGNOSIS — R26.9 GAIT DISTURBANCE: ICD-10-CM

## 2020-06-30 PROCEDURE — 97116 GAIT TRAINING THERAPY: CPT | Mod: PN

## 2020-06-30 NOTE — PROGRESS NOTES
"  Physical Therapy Treatment Note     Name: Alberto Dangelo  Clinic Number: [id number]    Therapy Diagnosis:   Encounter Diagnoses   Name Primary?   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      Physician: Dirk Ortiz MD    Visit Date: 6/30/2020    Physician Orders: Eval and treat (resuming PT following postponement due to Covid-19)  Medical Diagnosis: S14.129A (ICD-10-CM) - Central cord syndrome at unspecified level of cervical spinal cord  Evaluation Date: 8/26/2016  Authorization Period Expiration: 12/31/2020  Plan of Care Expiration: 10/23/2020  Visit #/Visits authorized: 2/20 (referral - No visit limit due to medical necessity)    Time In: 1415  Time Out: 1545  Total Billable Time: 67 minutes    Precautions: Fall and immune concerns due to impaired breathing 2* to quadriplegia    Subjective     Pt reports: "I work out at home but it's not the same".  He was compliant with home exercise program.  Response to previous treatment: Increased soreness   Functional change: None yet noted.      Pain: 0/10  Location: N/A      Objective     Patient performed self stretching prior to set up in Lokomat orthosis.      Efren participated in gait training to improve functional mobility and safety for 67  minutes, including:Set up with 5 kg unloading.  Pt participated in computer assisted robotic gait training via Lokomat @ 3.2 kph x 55 min, 10 sec for a total distance of 2928 m.  Utilized guidance force of 50% x 8 min, decreased to 40% x 12 min, and completed training @ 30% guidance force.      Home Exercises Provided and Patient Education Provided     Education provided:   - Review of standing stretches    Written Home Exercises Provided: yes.  Exercises were reviewed and Efren was able to demonstrate them prior to the end of the session.  Efren demonstrated good  understanding of the education provided.     See EMR under Patient " Instructions for exercises provided 6/30/2020.    Assessment     Patient demonstrated increased tolerance for gait activities this date with ability to decrease guidance force without increased toe drag.    Efren is progressing well towards his goals.   Pt prognosis is Fair.     Pt will continue to benefit from skilled outpatient physical therapy to address the deficits listed in the problem list box on initial evaluation, provide pt/family education and to maximize pt's level of independence in the home and community environment.     Pt's spiritual, cultural and educational needs considered and pt agreeable to plan of care and goals.     Anticipated barriers to physical therapy: Increased extensor tone.      Goals:   Short Term Goals Status:  (updated 6/23/2020)                1) Patient will tolerate 60 min of gait training without difficulty  Progressing              2) Patient will tolerate guidance force decreased to < 30% for > 40 min of training  Progressing              3) Patient will demonstrate improvement in LE flexibility   Not assessed this date              4) Patient will stand with minimal UE support demonstrating appropriate knee (no hyperextension) and lumbar positioning (neutral pelvic tilt) x 5 min  Ongoing     Long Term Goal Status:   (Modified 6/23/2020)            1) Increase strength in B  LE as indicated by improved L-force testing by 5-10nM at hip flex/ext and by 5 nM in knee flex/ext   Ongoing            2)  Pt will stand > 10-15 min with minimal UE support while demonstrating appropriate posture.  Ongoing            3) Pt will consistently perform safe stand pivot transfers with minimal to moderate UE support ,  Not assessed this date            4) Pt will demonstrate improved bilateral hip flexion strength to 2/5 to improve ability to perform swing phase/ foot clearance during gait  Not assessed this date.                5) Pt will demonstrate appropriate weight shift during ambulation  to limit hip hiking and excessive weight shift.   Not assessed this date.     Plan     Progressively decrease guidance force throughout gait training with focus on consistent achievement of adequate foot clearance through increased activation of hip and knee flexors.      Candice Herzog, PT

## 2020-07-01 NOTE — PATIENT INSTRUCTIONS
Gastroc, Standing        Stand, right foot behind, heel on floor and turned slightly out, leg straight, forward leg bent. Keeping arms straight, push pelvis forward until stretch is felt in calf. Hold 30 seconds.  Repeat 3 times per session. Do 2 sessions per day.    Copyright © I. All rights reserved.   Gastroc / Plantar Fascia, Standing        Stand, one foot on wedge (slanted at about 30°), heel resting on floor. Keep toes straight and hands on wall. With leg straight, press entire body forward. Hold 30 seconds.  Repeat 3 times per session. Do 2 sessions per day.    Copyright © I. All rights reserved.   Soleus / Plantar Fascia, Standing        Stand, one foot on wedge (slanted at about 30°), heel resting on floor. Keep toes straight and hands on wall. With leg slightly flexed, press entire body forward. Hold 30 seconds.  Repeat 3 times per session. Do 2 sessions per day.    Copyright © I. All rights reserved.

## 2020-07-07 ENCOUNTER — CLINICAL SUPPORT (OUTPATIENT)
Dept: REHABILITATION | Facility: HOSPITAL | Age: 60
End: 2020-07-07
Payer: COMMERCIAL

## 2020-07-07 DIAGNOSIS — R26.9 GAIT DISTURBANCE: ICD-10-CM

## 2020-07-07 DIAGNOSIS — R53.1 DECREASED STRENGTH: ICD-10-CM

## 2020-07-07 DIAGNOSIS — S14.129A INCOMPLETE INJURY OF CERVICAL SPINAL CORD WITH CENTRAL CORD SYNDROME: ICD-10-CM

## 2020-07-07 DIAGNOSIS — Z78.9 IMPAIRED MOTOR CONTROL: ICD-10-CM

## 2020-07-07 PROCEDURE — 97116 GAIT TRAINING THERAPY: CPT | Mod: PN

## 2020-07-07 NOTE — PROGRESS NOTES
"  Physical Therapy Treatment Note     Name: Alberto Dangelo  Clinic Number: [id number]    Therapy Diagnosis:   Encounter Diagnoses   Name Primary?   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      Physician: Dirk Ortiz MD    Visit Date: 7/7/2020    Physician Orders: Eval and treat (resuming PT following postponement due to Covid-19)  Medical Diagnosis: S14.129A (ICD-10-CM) - Central cord syndrome at unspecified level of cervical spinal cord  Evaluation Date: 8/26/2016  Authorization Period Expiration: 12/31/2020  Plan of Care Expiration: 10/23/2020  Visit #/Visits authorized: 3/20 (referral - No visit limit due to medical necessity)    Time In: 1403  Time Out: 1530  Total Billable Time: 67 minutes    Precautions: Fall and immune concerns due to impaired breathing 2* to quadriplegia    Subjective     Pt reports: "They say I shouldn't work on my calves and my gluts because of my tone but I have to have balance".  He was compliant with home exercise program.  Response to previous treatment: Less stiffness reported   Functional change: Increased standing tolerance.      Pain: 0/10  Location: N/A      Objective     Patient performed self stretching prior to set up in Lokomat orthosis.      Efren participated in gait training to improve functional mobility and safety for 67  minutes, including:Set up with 5 kg unloading.  Pt participated in computer assisted robotic gait training via Lokomat @ 3.2 kph x 55 min, 12 sec for a total distance of 2928 m.  Utilized guidance force of 40% x 10 min, decreased to 30% x 28 min, reduced again to 25% x 2', and completed training @ 20% guidance force.      Home Exercises Provided and Patient Education Provided     Education provided:   - Review of standing stretches    Written Home Exercises Provided: yes.  Exercises were reviewed and Efren was able to demonstrate them prior to the end of the session. "  Efren demonstrated good  understanding of the education provided.     See EMR under Patient Instructions for exercises provided 6/30/2020.    Assessment     Patient able to tolerate reduction in guidance force this date, achieving 20% without toe drag.  Adjustment to thigh cuff size needed to size 7.  Next visit will need to adjust lower calf cuff to size 5 due to tightness of cuff contributed to by increased size of beebe padding.    Efren is progressing slowly towards his goals.   Pt prognosis is Fair.     Pt will continue to benefit from skilled outpatient physical therapy to address the deficits listed in the problem list box on initial evaluation, provide pt/family education and to maximize pt's level of independence in the home and community environment.     Pt's spiritual, cultural and educational needs considered and pt agreeable to plan of care and goals.     Anticipated barriers to physical therapy: Increased extensor tone.      Goals:   Short Term Goals Status:  (updated 6/23/2020)                1) Patient will tolerate 60 min of gait training without difficulty  Progressing              2) Patient will tolerate guidance force decreased to < 30% for > 40 min of training  Progressing/Nearly met              3) Patient will demonstrate improvement in LE flexibility   Ongoing              4) Patient will stand with minimal UE support demonstrating appropriate knee (no hyperextension) and lumbar positioning (neutral pelvic tilt) x 5 min  Ongoing     Long Term Goal Status:   (Modified 6/23/2020)            1) Increase strength in B  LE as indicated by improved L-force testing by 5-10nM at hip flex/ext and by 5 nM in knee flex/ext   Ongoing            2)  Pt will stand > 10-15 min with minimal UE support while demonstrating appropriate posture.  Ongoing            3) Pt will consistently perform safe stand pivot transfers with minimal to moderate UE support ,  Not assessed this date            4) Pt will  demonstrate improved bilateral hip flexion strength to 2/5 to improve ability to perform swing phase/ foot clearance during gait  Not assessed this date.                5) Pt will demonstrate appropriate weight shift during ambulation to limit hip hiking and excessive weight shift.   Not assessed this date.     Plan     Adjust calf cuffs to size 5 for improved security of velcro straps to facilitate smooth gait training.     Candice Herzog, PT

## 2020-07-14 ENCOUNTER — CLINICAL SUPPORT (OUTPATIENT)
Dept: REHABILITATION | Facility: HOSPITAL | Age: 60
End: 2020-07-14
Payer: COMMERCIAL

## 2020-07-14 DIAGNOSIS — R53.1 DECREASED STRENGTH: ICD-10-CM

## 2020-07-14 DIAGNOSIS — S14.129A INCOMPLETE INJURY OF CERVICAL SPINAL CORD WITH CENTRAL CORD SYNDROME: ICD-10-CM

## 2020-07-14 DIAGNOSIS — Z78.9 IMPAIRED MOTOR CONTROL: ICD-10-CM

## 2020-07-14 DIAGNOSIS — R26.9 GAIT DISTURBANCE: ICD-10-CM

## 2020-07-14 PROCEDURE — 97116 GAIT TRAINING THERAPY: CPT | Mod: PN

## 2020-07-14 NOTE — PROGRESS NOTES
"  Physical Therapy Treatment Note     Name: Alberto Dangelo  Clinic Number: [id number]    Therapy Diagnosis:   Encounter Diagnoses   Name Primary?   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      Physician: Dirk Ortiz MD    Visit Date: 7/14/2020    Physician Orders: Eval and treat (resuming PT following postponement due to Covid-19)  Medical Diagnosis: S14.129A (ICD-10-CM) - Central cord syndrome at unspecified level of cervical spinal cord  Evaluation Date: 8/26/2016  Authorization Period Expiration: 12/31/2020  Plan of Care Expiration: 10/23/2020  Visit #/Visits authorized: 4/20 (referral - No visit limit due to medical necessity)    Time In: 1405  Time Out: 1530  Total Billable Time: 67 minutes    Precautions: Fall and immune concerns due to impaired breathing 2* to quadriplegia    Subjective     Pt reports: feeling better since resuming gait training.  Looking forward to returning to 2x/wk  "He was compliant with home exercise program.  Response to previous treatment: "I was a little bit sore last week but it was a good sore"  Functional change: Increased standing and walking tolerance      Pain: 0/10  Location: N/A      Objective     Patient performed self stretching prior to set up in Lokomat orthosis.      Efren participated in gait training to improve functional mobility and safety for 67  minutes, including:Set up Lokomat with 5 kg unloading with cuff sized increased to 7 at thigh, 6 at upper shin, 5 at lower shin.   Pt participated in computer assisted robotic gait training via Lokomat @ 3.2 kph x 54 min, 48 sec for a total distance of 2891 m.  Utilized guidance force of 40% x 15 min, decreased to 30% x 11 min, reduced again to 20% x 3', and completed training @ 25% guidance force.  Patient demonstrated significant foot drag with guidance force @ 20% thus increased to 25% to complete training.      Home Exercises " Provided and Patient Education Provided     Education provided:   - Review of standing stretches    Written Home Exercises Provided: yes.  Exercises were reviewed and Efren was able to demonstrate them prior to the end of the session.  Efren demonstrated good  understanding of the education provided.     See EMR under Patient Instructions for exercises provided 6/30/2020.    Assessment     Patient demonstrated increased tolerance for gait training @ < 30% guidance force.  He did experience 1 episode of significant toe drag which resulted in interruption of training to reset weight loading.     Efren is progressing slowly towards his goals.   Pt prognosis is Fair.     Pt will continue to benefit from skilled outpatient physical therapy to address the deficits listed in the problem list box on initial evaluation, provide pt/family education and to maximize pt's level of independence in the home and community environment.     Pt's spiritual, cultural and educational needs considered and pt agreeable to plan of care and goals.     Anticipated barriers to physical therapy: Increased extensor tone.      Goals:   Short Term Goals Status:  (updated 6/23/2020)                1) Patient will tolerate 60 min of gait training without difficulty  Progressing              2) Patient will tolerate guidance force decreased to < 30% for > 40 min of training  Progressing/Nearly met              3) Patient will demonstrate improvement in LE flexibility   Ongoing              4) Patient will stand with minimal UE support demonstrating appropriate knee (no hyperextension) and lumbar positioning (neutral pelvic tilt) x 5 min  Ongoing     Long Term Goal Status:   (Modified 6/23/2020)            1) Increase strength in B  LE as indicated by improved L-force testing by 5-10nM at hip flex/ext and by 5 nM in knee flex/ext   Ongoing            2)  Pt will stand > 10-15 min with minimal UE support while demonstrating appropriate posture.   Ongoing            3) Pt will consistently perform safe stand pivot transfers with minimal to moderate UE support ,  Not assessed this date            4) Pt will demonstrate improved bilateral hip flexion strength to 2/5 to improve ability to perform swing phase/ foot clearance during gait  Not assessed this date.                5) Pt will demonstrate appropriate weight shift during ambulation to limit hip hiking and excessive weight shift.   Not assessed this date.     Plan     Continue to adjust cuff size and other settings to enhance training.  Work to further decrease guidance force.     Candice Herzog, PT

## 2020-07-21 ENCOUNTER — CLINICAL SUPPORT (OUTPATIENT)
Dept: REHABILITATION | Facility: HOSPITAL | Age: 60
End: 2020-07-21
Payer: COMMERCIAL

## 2020-07-21 DIAGNOSIS — R53.1 DECREASED STRENGTH: ICD-10-CM

## 2020-07-21 DIAGNOSIS — S14.129A INCOMPLETE INJURY OF CERVICAL SPINAL CORD WITH CENTRAL CORD SYNDROME: ICD-10-CM

## 2020-07-21 DIAGNOSIS — Z78.9 IMPAIRED MOTOR CONTROL: ICD-10-CM

## 2020-07-21 DIAGNOSIS — R26.9 GAIT DISTURBANCE: ICD-10-CM

## 2020-07-21 PROCEDURE — 97116 GAIT TRAINING THERAPY: CPT | Mod: PN

## 2020-07-21 NOTE — PROGRESS NOTES
"  Physical Therapy Treatment Note     Name: Alberto Dangelo  Clinic Number: [id number]    Therapy Diagnosis:   Encounter Diagnoses   Name Primary?   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      Physician: Dirk Ortiz MD    Visit Date: 7/21/2020    Physician Orders: Eval and treat (resuming PT following postponement due to Covid-19)  Medical Diagnosis: S14.129A (ICD-10-CM) - Central cord syndrome at unspecified level of cervical spinal cord  Evaluation Date: 8/26/2016  Authorization Period Expiration: 12/31/2020  Plan of Care Expiration: 10/23/2020  Visit #/Visits authorized: 5/20 (referral - No visit limit due to medical necessity)    Time In: 1410  Time Out: 1535  Total Billable Time: 67 minutes    Precautions: Fall and immune concerns due to impaired breathing 2* to quadriplegia    Subjective     Pt reports: "Next week will be the 10 year anniversary of my injury"  Requested adjustment to L ankle strap due to foot "turning out" when robotic component engaged in walking activity (did not continue once activity stopped).  "If I walk like this very long it will not be a good thing"  "He was compliant with home exercise program.  Participating in zoom exercise classes but not consistently.  Also performing daily stretching and core strengthening exercises.    Response to previous treatment: "Felt good"  Functional change: Increased standing and walking tolerance      Pain: 0/10  Location: N/A      Objective     Patient performed self stretching prior to set up in Lokomat orthosis.      Efren participated in gait training to improve functional mobility and safety for 67  minutes, including:Set up Lokomat with 5 kg unloading with cuff size of 6 at thigh, 6 at upper shin, 5 at lower shin.   Pt participated in computer assisted robotic gait training via Lokomat @ 3.2 kp x 56 min, 14 sec for a total distance of 2951 m.  Utilized " guidance force of 40% x 17 min, decreased to 30% x 16 min, reduced again to 20% x 6', and completed training @ 25% guidance force.  Patient demonstrated significant foot drag twice with guidance force @ 20% thus increased to 25% to complete training.      Home Exercises Provided and Patient Education Provided     Education provided:   - Review of standing stretches    Written Home Exercises Provided: yes.  Exercises were reviewed and Efren was able to demonstrate them prior to the end of the session.  Efren demonstrated good  understanding of the education provided.     See EMR under Patient Instructions for exercises provided 6/30/2020.    Assessment     Patient demonstrated increased tolerance for gait training @ 20% guidance force.  He did experience 2 episodes of significant toe drag which resulted in interruption of training to reset weight loading and return of guidance force to  25% for remainder of training.     Efren is progressing slowly towards his goals.   Pt prognosis is Fair.     Pt will continue to benefit from skilled outpatient physical therapy to address the deficits listed in the problem list box on initial evaluation, provide pt/family education and to maximize pt's level of independence in the home and community environment.     Pt's spiritual, cultural and educational needs considered and pt agreeable to plan of care and goals.     Anticipated barriers to physical therapy: Increased extensor tone.      Goals:   Short Term Goals Status:  (updated 6/23/2020)                1) Patient will tolerate 60 min of gait training without difficulty  Progressing              2) Patient will tolerate guidance force decreased to < 30% for > 40 min of training  Regressed minimally today due to increased tone in L hip external rotators               3) Patient will demonstrate improvement in LE flexibility   Ongoing              4) Patient will stand with minimal UE support demonstrating appropriate knee (no  hyperextension) and lumbar positioning (neutral pelvic tilt) x 5 min  Ongoing     Long Term Goal Status:   (Modified 6/23/2020)            1) Increase strength in B  LE as indicated by improved L-force testing by 5-10nM at hip flex/ext and by 5 nM in knee flex/ext   Ongoing/not assessed this date            2)  Pt will stand > 10-15 min with minimal UE support while demonstrating appropriate posture.  Ongoing            3) Pt will consistently perform safe stand pivot transfers with minimal to moderate UE support ,  Not assessed this date            4) Pt will demonstrate improved bilateral hip flexion strength to 2/5 to improve ability to perform swing phase/ foot clearance during gait  Not assessed this date.                5) Pt will demonstrate appropriate weight shift during ambulation to limit hip hiking and excessive weight shift.   Not assessed this date.     Plan     Continue to adjust cuff size and other settings to enhance training.  Encourage increased time for guidance force @ 20%.      Candice Herzog, PT

## 2020-07-28 ENCOUNTER — CLINICAL SUPPORT (OUTPATIENT)
Dept: REHABILITATION | Facility: HOSPITAL | Age: 60
End: 2020-07-28
Payer: COMMERCIAL

## 2020-07-28 DIAGNOSIS — Z78.9 IMPAIRED MOTOR CONTROL: ICD-10-CM

## 2020-07-28 DIAGNOSIS — R53.1 DECREASED STRENGTH: ICD-10-CM

## 2020-07-28 DIAGNOSIS — S14.129A INCOMPLETE INJURY OF CERVICAL SPINAL CORD WITH CENTRAL CORD SYNDROME: ICD-10-CM

## 2020-07-28 DIAGNOSIS — R26.9 GAIT DISTURBANCE: ICD-10-CM

## 2020-07-28 PROCEDURE — 97116 GAIT TRAINING THERAPY: CPT | Mod: PN

## 2020-07-28 NOTE — PROGRESS NOTES
"  Physical Therapy Treatment Note     Name: Alberto Dangelo  Clinic Number: [id number]    Therapy Diagnosis:   Encounter Diagnoses   Name Primary?   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      Physician: Dirk Ortiz MD    Visit Date: 7/28/2020    Physician Orders: Eval and treat (resuming PT following postponement due to Covid-19)  Medical Diagnosis: S14.129A (ICD-10-CM) - Central cord syndrome at unspecified level of cervical spinal cord  Evaluation Date: 8/26/2016  Authorization Period Expiration: 12/31/2020  Plan of Care Expiration: 10/23/2020  Visit #/Visits authorized: 6/20 (referral - No visit limit due to medical necessity)    Time In: 1403  Time Out: 1530  Total Billable Time: 67 minutes    Precautions: Fall and immune concerns due to impaired breathing 2* to quadriplegia    Subjective     Pt reports: Tone is a problem today.  Not ready to decrease guidance force more cause my foot will drag more.  Response to previous treatment: "Mild soreness"  Functional change: No significant change since last  Week.      Pain: 0/10  Location: N/A      Objective     Patient performed self stretching prior to set up in Lokomat orthosis.      Efren participated in gait training to improve functional mobility and safety for 67  minutes, including:Set up Lokomat with 5 kg unloading with cuff size of 6 at thigh, 6 at upper shin, 5 at lower shin.   Pt participated in computer assisted robotic gait training via Lokomat @ 3.2 kph x 58 min, 06 sec for a total distance of 3074 m.  Utilized guidance force of 40% x 15 min, decreased to 30% x 21 min, reduced again to 25% for remainder of session     Home Exercises Provided and Patient Education Provided     Education provided:   - Review of standing stretches    Written Home Exercises Provided: yes.  Exercises were reviewed and Efren was able to demonstrate them prior to the end of the session.  " Efren demonstrated good  understanding of the education provided.     See EMR under Patient Instructions for exercises provided 6/30/2020.    Assessment     Patient unable to participate in gait training with guidance force decreased to < 25% due to increased LE tone this date. Intermittent R foot drag that was corrected with shortening of ankle strap.    Efren is progressing slowly towards his goals.   Pt prognosis is Fair.     Pt will continue to benefit from skilled outpatient physical therapy to address the deficits listed in the problem list box on initial evaluation, provide pt/family education and to maximize pt's level of independence in the home and community environment.     Pt's spiritual, cultural and educational needs considered and pt agreeable to plan of care and goals.     Anticipated barriers to physical therapy: Increased extensor tone.      Goals:   Short Term Goals Status:  (updated 6/23/2020)                1) Patient will tolerate 60 min of gait training without difficulty  Nearly met              2) Patient will tolerate guidance force decreased to < 30% for > 40 min of training  Not met due to change in LE tone              3) Patient will demonstrate improvement in LE flexibility   Ongoing              4) Patient will stand with minimal UE support demonstrating appropriate knee (no hyperextension) and lumbar positioning (neutral pelvic tilt) x 5 min  Ongoing     Long Term Goal Status:   (Modified 6/23/2020)            1) Increase strength in B  LE as indicated by improved L-force testing by 5-10nM at hip flex/ext and by 5 nM in knee flex/ext   Ongoing/not assessed this date            2)  Pt will stand > 10-15 min with minimal UE support while demonstrating appropriate posture.  Ongoing            3) Pt will consistently perform safe stand pivot transfers with minimal to moderate UE support ,  Not assessed this date            4) Pt will demonstrate improved bilateral hip flexion strength to  2/5 to improve ability to perform swing phase/ foot clearance during gait  Not assessed this date.                5) Pt will demonstrate appropriate weight shift during ambulation to limit hip hiking and excessive weight shift.   Not assessed this date.     Plan     Progressive decrease in guidance force, encourage decrease to at least 20% for > 20 min of training without foot drag.      Cnadice Herzog, PT

## 2020-08-04 ENCOUNTER — CLINICAL SUPPORT (OUTPATIENT)
Dept: REHABILITATION | Facility: HOSPITAL | Age: 60
End: 2020-08-04
Payer: COMMERCIAL

## 2020-08-04 DIAGNOSIS — R53.1 DECREASED STRENGTH: ICD-10-CM

## 2020-08-04 DIAGNOSIS — Z78.9 IMPAIRED MOTOR CONTROL: ICD-10-CM

## 2020-08-04 DIAGNOSIS — S14.129A INCOMPLETE INJURY OF CERVICAL SPINAL CORD WITH CENTRAL CORD SYNDROME: ICD-10-CM

## 2020-08-04 DIAGNOSIS — R26.9 GAIT DISTURBANCE: ICD-10-CM

## 2020-08-04 PROCEDURE — 97116 GAIT TRAINING THERAPY: CPT | Mod: PN

## 2020-08-04 NOTE — PROGRESS NOTES
"    Physical Therapy Treatment Note     Name: Alberto Dangelo  Clinic Number: [id number]    Therapy Diagnosis:   Encounter Diagnoses   Name Primary?   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      Physician: Dirk Ortiz MD    Visit Date: 8/4/2020    Physician Orders: Eval and treat (resuming PT following postponement due to Covid-19)  Medical Diagnosis: S14.129A (ICD-10-CM) - Central cord syndrome at unspecified level of cervical spinal cord  Evaluation Date: 8/26/2016  Authorization Period Expiration: 12/31/2020  Plan of Care Expiration: 10/23/2020  Visit #/Visits authorized: 7/20 (referral - No visit limit due to medical necessity)    Time In: 1400  Time Out: 1530  Total Billable Time: 67 minutes    Precautions: Fall and immune concerns due to impaired breathing 2* to quadriplegia    Subjective     Pt reports: "I was able to do something new on the power plate today that I haven't done before.  It's either a better connection between my head and trunk or I'm getting stronger  Response to previous treatment: No soreness after session  Functional change: able to maintain sitting balance on power plate without use of arms      Pain: 0/10  Location: N/A      Objective     Patient performed self stretching prior to set up in Lokomat orthosis.      Efren participated in gait training to improve functional mobility and safety for 67  minutes, including:Set up Lokomat with 5 kg unloading with cuff size of 6 at thigh, 6 at upper shin, 5 at lower shin.   Pt participated in computer assisted robotic gait training via Lokomat @ 3.2 kph x 60 min, 02 sec for a total distance of 3166 m.  Utilized guidance force of 40% x 12 min, decreased to 30% x 18 min, reduced again to 25% for remainder of session.  Patient experienced 2 episodes of R toe drag and 1 of L toe drag during initial 5 minutes of training.  Adjusted length of thigh orthosis to 37 " to allow for better positioning.  Patient was able to continue and complete training without further toe drag.      Home Exercises Provided and Patient Education Provided     Education provided:   - Review of standing stretches    Written Home Exercises Provided: yes.  Exercises were reviewed and Efren was able to demonstrate them prior to the end of the session.  Efren demonstrated good  understanding of the education provided.     See EMR under Patient Instructions for exercises provided 6/30/2020.    Assessment     Increased toe drag during initial 5 minutes of training.  Adjustments to length of ankle straps insufficient to resolve issue.  Required shortening of thigh orthosis to resolve the problem.  Patient able to tolerate guidance force < 30% today for extended period.    Efren is progressing slowly towards his goals.   Pt prognosis is Fair.     Pt will continue to benefit from skilled outpatient physical therapy to address the deficits listed in the problem list box on initial evaluation, provide pt/family education and to maximize pt's level of independence in the home and community environment.     Pt's spiritual, cultural and educational needs considered and pt agreeable to plan of care and goals.     Anticipated barriers to physical therapy: Increased extensor tone.      Goals:   Short Term Goals Status:  (updated 6/23/2020)                1) Patient will tolerate 60 min of gait training without difficulty  Nearly met (3 episodes of toe drag)              2) Patient will tolerate guidance force decreased to < 30% for > 40 min of training  Progressing (achieved 30 min at < 30% guidance force)              3) Patient will demonstrate improvement in LE flexibility   Ongoing                4) Patient will stand with minimal UE support demonstrating appropriate knee (no hyperextension) and lumbar positioning (neutral pelvic tilt) x 5 min  Ongoing       Long Term Goal Status:   (Modified 6/23/2020)            1)  Increase strength in B  LE as indicated by improved L-force testing by 5-10nM at hip flex/ext and by 5 nM in knee flex/ext   Ongoing/not assessed this date              2)  Pt will stand > 10-15 min with minimal UE support while demonstrating appropriate posture.  Progressing            3) Pt will consistently perform safe stand pivot transfers with minimal to moderate UE support ,  Not assessed this date                4) Pt will demonstrate improved bilateral hip flexion strength to 2/5 to improve ability to perform swing phase/ foot clearance during gait  Not formally assessed this date.               5) Pt will demonstrate appropriate weight shift during ambulation to limit hip hiking and excessive weight shift.   Minimal progress    Plan     Increase frequency to 2x/wk as per plan of care.  Adjust length of thigh orthosis, cuff sizes, guidance force as needed to maximize training.          Candice Herzog, PT

## 2020-08-06 ENCOUNTER — CLINICAL SUPPORT (OUTPATIENT)
Dept: REHABILITATION | Facility: HOSPITAL | Age: 60
End: 2020-08-06
Payer: COMMERCIAL

## 2020-08-06 DIAGNOSIS — S14.129A INCOMPLETE INJURY OF CERVICAL SPINAL CORD WITH CENTRAL CORD SYNDROME: ICD-10-CM

## 2020-08-06 DIAGNOSIS — Z78.9 IMPAIRED MOTOR CONTROL: ICD-10-CM

## 2020-08-06 DIAGNOSIS — R53.1 DECREASED STRENGTH: ICD-10-CM

## 2020-08-06 PROCEDURE — 97116 GAIT TRAINING THERAPY: CPT | Mod: PN,CQ

## 2020-08-06 NOTE — PROGRESS NOTES
Physical Therapy Treatment Note     Name: Alberto Dangelo  Clinic Number: [id number]    Therapy Diagnosis:   Encounter Diagnoses   Name Primary?   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      Physician: Dirk Ortiz MD    Visit Date: 8/6/2020    Physician Orders: Eval and treat (resuming PT following postponement due to Covid-19)  Medical Diagnosis: S14.129A (ICD-10-CM) - Central cord syndrome at unspecified level of cervical spinal cord  Evaluation Date: 8/26/2016  Current Certification Period: 12/31/2019 to 4/22/2020  Authorization Period Expiration: 12/31/2020  Plan of Care Expiration: 10/23/2020  Visit #/Visits authorized: 8/20 (referral - No visit limit due to medical necessity)    Time In: 1440  Time Out: 1605  Total Billable Time: 67 minutes    Precautions: Fall and immune concerns due to impaired breathing 2* to quadriplegia    Subjective     Pt reports:did a good workout yesterday and is sore. Brought his service dog and via manual w.c   Response to previous treatment: No soreness after session  Functional change: able to maintain sitting balance on power plate without use of arms      Pain: 0/10  Location: N/A      Objective     Patient performed self stretching prior to set up in Lokomat orthosis.      Efren participated in gait training to improve functional mobility and safety for 67  minutes, including:Set up Lokomat with 5 kg unloading with cuff size of 6 at thigh, 6 at upper shin, 5 at lower shin.   Pt participated in computer assisted robotic gait training via Lokomat @ 3.2 kph x 60 min, 36 sec for a total distance of 3169 m.  Utilized guidance force of 40% x 14 min, decreased to 30% x 31 min, reduced again to 20% for remainder of session.  Patient experienced 1 episodes of R toe drag during initial 2 minutes of training.  Readjusted strapping. Patient was able to continue and complete training without  further toe drag.      Home Exercises Provided and Patient Education Provided     Education provided:   - Continue HEP daily.    Written Home Exercises Provided: Patient instructed to cont prior HEP.  Exercises were reviewed and Efren was able to demonstrate them prior to the end of the session.  Efren demonstrated good  understanding of the education provided.     See EMR under Patient Instructions for exercises provided 6/30/2020.    Assessment     Improved gait today with decreased toe drag episodes. Increased tones R > L LE but benefits from R hip IR and flexor manual stretches prior to session.   Efren is progressing slowly towards his goals.   Pt prognosis is Fair.     Pt will continue to benefit from skilled outpatient physical therapy to address the deficits listed in the problem list box on initial evaluation, provide pt/family education and to maximize pt's level of independence in the home and community environment.     Pt's spiritual, cultural and educational needs considered and pt agreeable to plan of care and goals.     Anticipated barriers to physical therapy: Increased extensor tone.      Goals:   Short Term Goals Status:  (updated 6/23/2020)                1) Patient will tolerate 60 min of gait training without difficulty  Nearly met (1 episodes of toe drag)              2) Patient will tolerate guidance force decreased to < 30% for > 40 min of training  Progressing (achieved 30 min at < 30% guidance force)              3) Patient will demonstrate improvement in LE flexibility   Ongoing                4) Patient will stand with minimal UE support demonstrating appropriate knee (no hyperextension) and lumbar positioning (neutral pelvic tilt) x 5 min  Ongoing       Long Term Goal Status:   (Modified 6/23/2020)            1) Increase strength in B  LE as indicated by improved L-force testing by 5-10nM at hip flex/ext and by 5 nM in knee flex/ext   Ongoing/not assessed this date              2)  Pt will  stand > 10-15 min with minimal UE support while demonstrating appropriate posture.  Progressing            3) Pt will consistently perform safe stand pivot transfers with minimal to moderate UE support ,  Not assessed this date                4) Pt will demonstrate improved bilateral hip flexion strength to 2/5 to improve ability to perform swing phase/ foot clearance during gait  Not formally assessed this date.               5) Pt will demonstrate appropriate weight shift during ambulation to limit hip hiking and excessive weight shift.   Minimal progress    Plan     Increase frequency to 2x/wk as per plan of care.  Adjust length of thigh orthosis, cuff sizes, guidance force as needed to maximize training.          Giulia Lock, PTA

## 2020-08-11 ENCOUNTER — CLINICAL SUPPORT (OUTPATIENT)
Dept: REHABILITATION | Facility: HOSPITAL | Age: 60
End: 2020-08-11
Payer: COMMERCIAL

## 2020-08-11 DIAGNOSIS — S14.129A INCOMPLETE INJURY OF CERVICAL SPINAL CORD WITH CENTRAL CORD SYNDROME: ICD-10-CM

## 2020-08-11 DIAGNOSIS — R53.1 DECREASED STRENGTH: ICD-10-CM

## 2020-08-11 DIAGNOSIS — Z78.9 IMPAIRED MOTOR CONTROL: ICD-10-CM

## 2020-08-11 DIAGNOSIS — R26.9 GAIT DISTURBANCE: ICD-10-CM

## 2020-08-11 PROCEDURE — 97116 GAIT TRAINING THERAPY: CPT | Mod: PN

## 2020-08-11 NOTE — PROGRESS NOTES
"    Physical Therapy Treatment Note     Name: Alberto Dangelo  Clinic Number: [id number]    Therapy Diagnosis:   Encounter Diagnoses   Name Primary?   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      Physician: Dirk Ortiz MD    Visit Date: 8/11/2020    Physician Orders: Eval and treat (resuming PT following postponement due to Covid-19)  Medical Diagnosis: S14.129A (ICD-10-CM) - Central cord syndrome at unspecified level of cervical spinal cord  Evaluation Date: 8/26/2016  Authorization Period Expiration: 12/31/2020  Plan of Care Expiration: 10/23/2020  Visit #/Visits authorized: 9/20 (referral - No visit limit due to medical necessity)    Time In: 1410  Time Out: 1530  Total Billable Time: 67 minutes    Precautions: Fall and immune concerns due to impaired breathing 2* to quadriplegia    Subjective     Pt reports: "Nothing new; really focusing on core stability when I'm sitting"  Response to previous treatment: "A little stiff"  Functional change: Decreased UE support noted during sit to stand transfer in preparation for gait training.      Pain: 0/10  Location: N/A      Objective     Patient performed self stretching prior to set up in Lokomat orthosis.      Efren participated in gait training to improve functional mobility and safety for 67  minutes, including:Set up Lokomat with 5 kg unloading with cuff size of 6 at thigh, 6 at upper shin, 5 at lower shin.   Pt participated in computer assisted robotic gait training via Lokomat @ 3.2 kph x 60 min, 09 sec for a total distance of 3186 m.  Utilized guidance force of 40% x 17 min, decreased to 30% x 18 min, reduced again to 25% for remainder of session.  Patient experienced 1 episodes of R toe drag in early minutes of session.  Patient reported this was due to increased effort to push off with left.  Requested adjustment of foot positions prior to restarting unit.  Once foot " positions were adjusted and activity restarted, patient completed training without further disruption.        Home Exercises Provided and Patient Education Provided     Education provided:   - Review of standing stretches    Written Home Exercises Provided: yes.  Exercises were reviewed and Efren was able to demonstrate them prior to the end of the session.  Efren demonstrated good  understanding of the education provided.     See EMR under Patient Instructions for exercises provided 6/30/2020.  Patient continues to participate in home exercise program including use of FES bike, stretching, etc.      Assessment     One episode of toe drag noted during initial minutes of gait training resolved by adjustment of foot position.  Moderate anterior pelvic tilt in standing noted which remained throughout gait training session.      Efren is progressing slowly towards his goals.   Pt prognosis is Fair.     Pt will continue to benefit from skilled outpatient physical therapy to address the deficits listed in the problem list box on initial evaluation, provide pt/family education and to maximize pt's level of independence in the home and community environment.     Pt's spiritual, cultural and educational needs considered and pt agreeable to plan of care and goals.     Anticipated barriers to physical therapy: Increased extensor tone.      Goals:   Short Term Goals Status:  (updated 6/23/2020)                1) Patient will tolerate 60 min of gait training without difficulty  Nearly met (1 episodes of toe drag)              2) Patient will tolerate guidance force decreased to < 30% for > 40 min of training  Progressing (achieved 25 min at < 30% guidance force)              3) Patient will demonstrate improvement in LE flexibility   Ongoing                4) Patient will stand with minimal UE support demonstrating appropriate knee (no hyperextension) and lumbar positioning (neutral pelvic tilt) x 5 min  Ongoing       Long Term  Goal Status:   (Modified 6/23/2020)            1) Increase strength in B  LE as indicated by improved L-force testing by 5-10nM at hip flex/ext and by 5 nM in knee flex/ext   Ongoing/not assessed this date              2)  Pt will stand > 10-15 min with minimal UE support while demonstrating appropriate posture.  Progressing, continues with increased anterior pelvic tilt            3) Pt will consistently perform safe stand pivot transfers with minimal to moderate UE support ,  Not assessed this date                4) Pt will demonstrate improved bilateral hip flexion strength to 2/5 to improve ability to perform swing phase/ foot clearance during gait  Not formally assessed this date.               5) Pt will demonstrate appropriate weight shift during ambulation to limit hip hiking and excessive weight shift.   Minimal progress    Plan     Continue with frequency increased to 2x/wk as per plan of care.  Make appropriate adjustments to settings to maximize gait effort.          Candice Herzog, PT

## 2020-08-13 ENCOUNTER — CLINICAL SUPPORT (OUTPATIENT)
Dept: REHABILITATION | Facility: HOSPITAL | Age: 60
End: 2020-08-13
Payer: COMMERCIAL

## 2020-08-13 DIAGNOSIS — Z78.9 IMPAIRED MOTOR CONTROL: ICD-10-CM

## 2020-08-13 DIAGNOSIS — R53.1 DECREASED STRENGTH: ICD-10-CM

## 2020-08-13 DIAGNOSIS — R26.9 GAIT DISTURBANCE: ICD-10-CM

## 2020-08-13 DIAGNOSIS — S14.129A INCOMPLETE INJURY OF CERVICAL SPINAL CORD WITH CENTRAL CORD SYNDROME: ICD-10-CM

## 2020-08-13 PROCEDURE — 97116 GAIT TRAINING THERAPY: CPT | Mod: PN

## 2020-08-14 ENCOUNTER — PATIENT OUTREACH (OUTPATIENT)
Dept: ADMINISTRATIVE | Facility: HOSPITAL | Age: 60
End: 2020-08-14

## 2020-08-14 NOTE — PROGRESS NOTES
"    Physical Therapy Treatment Note     Name: Alberto Dangelo  Clinic Number: [id number]    Therapy Diagnosis:   Encounter Diagnoses   Name Primary?   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      Physician: Dirk Ortiz MD    Visit Date: 8/13/2020    Physician Orders: Eval and treat (resuming PT following postponement due to Covid-19)  Medical Diagnosis: S14.129A (ICD-10-CM) - Central cord syndrome at unspecified level of cervical spinal cord  Evaluation Date: 8/26/2016  Authorization Period Expiration: 12/31/2020  Plan of Care Expiration: 10/23/2020  Visit #/Visits authorized: 10/20 (referral - No visit limit due to medical necessity)    Time In: 1330  Time Out: 56753  Total Billable Time: 67 minutes    Precautions: Fall and immune concerns due to impaired breathing 2* to quadriplegia    Subjective     Pt reports: "This is pretty much the only place I go right now."  Response to previous treatment: Less stiff  Functional change: No change since last visit    Pain: 0/10  Location: N/A      Objective     Patient performed self stretching prior to set up in Lokomat orthosis.      Efren participated in gait training to improve functional mobility and safety for 67  minutes, including:Set up Lokomat with 5 kg unloading with cuff size of 6 at thigh, 6 at upper shin, 5 at lower shin.   Pt participated in computer assisted robotic gait training via Lokomat @ 3.2 kph x 60 min, 06 sec for a total distance of 3174 m.  Utilized guidance force of 40% x 13 min, decreased to 30% x 10 min, reduced again to 20% for remainder of session.  Patient experienced 3 episodes of R toe drag when transitioned to 20% guidance force.  PT tightened cuffs to improve alignment and adjusted ankle straps on R to facilitate improved ankle DF.  These adjustments resolved toe drag.    Home Exercises Provided and Patient Education Provided     Education provided: "   - Review of standing stretches    Written Home Exercises Provided: yes.  Exercises were reviewed and Efren was able to demonstrate them prior to the end of the session.  Efren demonstrated good  understanding of the education provided.     See EMR under Patient Instructions for exercises provided 6/30/2020.  Patient continues to participate in home exercise program including use of FES bike, stretching, etc.      Assessment     Patient initially struggled with toe drag when transitioned to 20% guidance force.  However, with tightening of cuffs and adjustment of ankle straps he was able to complete session without further interruption.  Upon initial set up, patient's alignment in the orthosis is good; however, upon initiating gait effort, it is noted that his L hip tends to externally rotate resulting in altered push off on L.  Patient attributes that to increased tone/spasm in his L gluteal mm.      Efren is progressing slowly towards his goals.   Pt prognosis is Fair.     Pt will continue to benefit from skilled outpatient physical therapy to address the deficits listed in the problem list box on initial evaluation, provide pt/family education and to maximize pt's level of independence in the home and community environment.     Pt's spiritual, cultural and educational needs considered and pt agreeable to plan of care and goals.     Anticipated barriers to physical therapy: Increased extensor tone.      Goals:   Short Term Goals Status:  (updated 6/23/2020)                1) Patient will tolerate 60 min of gait training without difficulty  Nearly met (3 episodes of toe drag)              2) Patient will tolerate guidance force decreased to < 30% for > 40 min of training  Progressing (achieved 37 min at < 30% guidance force but with episodes of toe drag)              3) Patient will demonstrate improvement in LE flexibility   Ongoing                4) Patient will stand with minimal UE support demonstrating  appropriate knee (no hyperextension) and lumbar positioning (neutral pelvic tilt) x 5 min  Ongoing       Long Term Goal Status:   (Modified 6/23/2020)            1) Increase strength in B  LE as indicated by improved L-force testing by 5-10nM at hip flex/ext and by 5 nM in knee flex/ext   Ongoing/not assessed this date              2)  Pt will stand > 10-15 min with minimal UE support while demonstrating appropriate posture.  Progressing, continues with increased anterior pelvic tilt            3) Pt will consistently perform safe stand pivot transfers with minimal to moderate UE support ,  Not assessed this date                4) Pt will demonstrate improved bilateral hip flexion strength to 2/5 to improve ability to perform swing phase/ foot clearance during gait  Not formally assessed this date.               5) Pt will demonstrate appropriate weight shift during ambulation to limit hip hiking and excessive weight shift.   Minimal progress    Plan     Progress gait training as patient tolerates.         Candice Herzog, PT

## 2020-08-14 NOTE — PROGRESS NOTES
Chart review completed 2020.  Care Everywhere updates requested and reviewed.  Immunizations reconciled. Media reports reviewed.  Duplicate HM overrides and  orders removed.  Overdue HM topic chart audit and/or requested.  Overdue lab testing linked to upcoming lab appointments if applies.    PORTAL MESSAGE SENT    Health Maintenance Due   Topic Date Due    HIV Screening  1975    Shingles Vaccine (1 of 2) 2012    Colorectal Cancer Screening  2019    Pneumococcal Vaccine (Highest Risk) (2 of 3 - PPSV23) 2019    TETANUS VACCINE  2020

## 2020-08-18 ENCOUNTER — CLINICAL SUPPORT (OUTPATIENT)
Dept: REHABILITATION | Facility: HOSPITAL | Age: 60
End: 2020-08-18
Payer: COMMERCIAL

## 2020-08-18 DIAGNOSIS — Z78.9 IMPAIRED MOTOR CONTROL: ICD-10-CM

## 2020-08-18 DIAGNOSIS — R53.1 DECREASED STRENGTH: ICD-10-CM

## 2020-08-18 DIAGNOSIS — S14.129A INCOMPLETE INJURY OF CERVICAL SPINAL CORD WITH CENTRAL CORD SYNDROME: ICD-10-CM

## 2020-08-18 DIAGNOSIS — R26.9 GAIT DISTURBANCE: ICD-10-CM

## 2020-08-18 PROCEDURE — 97116 GAIT TRAINING THERAPY: CPT | Mod: PN

## 2020-08-18 NOTE — PROGRESS NOTES
"    Physical Therapy Treatment Note     Name: Alberto Dangelo  Clinic Number: [id number]    Therapy Diagnosis:   Encounter Diagnoses   Name Primary?   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      Physician: Dirk Ortiz MD    Visit Date: 8/18/2020    Physician Orders: Eval and treat (resuming PT following postponement due to Covid-19)  Medical Diagnosis: S14.129A (ICD-10-CM) - Central cord syndrome at unspecified level of cervical spinal cord  Evaluation Date: 8/26/2016  Current Certification Period: 6/23/2020 to 10/23/2020  Authorization Period Expiration: 12/31/2020  Plan of Care Expiration: 10/23/2020  Visit #/Visits authorized: 11/20 (referral - No visit limit due to medical necessity)    Time In: 1405  Time Out: 1535  Total Billable Time: 67 minutes    Precautions: Fall and immune concerns due to impaired breathing 2* to quadriplegia    Subjective     Pt reports: "This helps me work my core, get good hip and knee flexion, and get some cardio work."  Patient reports having done some exercises yesterday that created a skin irritation on R buttock.  Applied extra padding to leg strap where it passes over the upper portion of the gluteus lilly which improved comfort.    Response to previous treatment: No difficulty with last ssession.    Functional change: No change since last visit    Pain: 0/10  Location: N/A      Objective     Patient performed self stretching prior to set up in Lokomat orthosis.      Efren participated in gait training to improve functional mobility and safety for 67  minutes, including:Set up Lokomat with 5 kg unloading with cuff size of 6 at thigh, 6 at upper shin, 5 at lower shin.   Pt participated in computer assisted robotic gait training via Lokomat @ 3.2 kph x 60 min, 14 sec for a total distance of 3196 m.  Utilized guidance force of 40% x 19 min, decreased to 30% x 8 min, reduced again to 25% x 21 " min and 20% for remainder of session.  Patient experienced 0 episodes of toe drag when transitioned to lower guidance force; however, slight drag of L foot noted when initially decreased to 20% guidance force that resolved with slight increase in unweighting.  Also noted mild drag of R foot approximately the 53 minute naina that was resolved with another minor adjustment to unweighting.  PT tightened cuffs to improve alignment and adjusted ankle straps on R to facilitate improved ankle DF.      Home Exercises Provided and Patient Education Provided     Education provided:   - Review of standing stretches    Written Home Exercises Provided: yes.  Exercises were reviewed and Efren was able to demonstrate them prior to the end of the session.  Efren demonstrated good  understanding of the education provided.     See EMR under Patient Instructions for exercises provided 6/30/2020.  Patient continues to participate in home exercise program including use of FES bike, stretching, etc.      Assessment     Skin irritation on L gluteal region required increased padding to prevent further irritation.  Minor L foot drag occurred when initially decreasing to 20% guidance force and R foot drag when patient became sufficiently fatigued.  Both were resolved with minor adjustment to unweighting.      Efren is progressing slowly towards his goals.   Pt prognosis is Fair.     Pt will continue to benefit from skilled outpatient physical therapy to address the deficits listed in the problem list box on initial evaluation, provide pt/family education and to maximize pt's level of independence in the home and community environment.     Pt's spiritual, cultural and educational needs considered and pt agreeable to plan of care and goals.     Anticipated barriers to physical therapy: Increased extensor tone.      Goals:   Short Term Goals Status:  (updated 6/23/2020)                1) Patient will tolerate 60 min of gait training without  difficulty  Nearly met (2 episodes of foot drag)              2) Patient will tolerate guidance force decreased to < 30% for > 40 min of training  Progressing (achieved 33 min at < 30% guidance force but with 2 minor episodes of foot drag)              3) Patient will demonstrate improvement in LE flexibility   Ongoing                4) Patient will stand with minimal UE support demonstrating appropriate knee (no hyperextension) and lumbar positioning (neutral pelvic tilt) x 5 min  Ongoing       Long Term Goal Status:   (Modified 6/23/2020)            1) Increase strength in B  LE as indicated by improved L-force testing by 5-10nM at hip flex/ext and by 5 nM in knee flex/ext   Ongoing/not assessed this date              2)  Pt will stand > 10-15 min with minimal UE support while demonstrating appropriate posture.  Progressing, continues with increased anterior pelvic tilt            3) Pt will consistently perform safe stand pivot transfers with minimal to moderate UE support ,  Not assessed this date                4) Pt will demonstrate improved bilateral hip flexion strength to 2/5 to improve ability to perform swing phase/ foot clearance during gait  Not formally assessed this date.               5) Pt will demonstrate appropriate weight shift during ambulation to limit hip hiking and excessive weight shift.   Minimal progress    Plan     Progress gait training as patient tolerates. Reduce guidance force as patient tolerates (indicated by foot/toe drag)        Candice Herzog, PT

## 2020-08-25 ENCOUNTER — CLINICAL SUPPORT (OUTPATIENT)
Dept: REHABILITATION | Facility: HOSPITAL | Age: 60
End: 2020-08-25
Payer: COMMERCIAL

## 2020-08-25 DIAGNOSIS — R26.9 GAIT DISTURBANCE: ICD-10-CM

## 2020-08-25 DIAGNOSIS — S14.129A INCOMPLETE INJURY OF CERVICAL SPINAL CORD WITH CENTRAL CORD SYNDROME: ICD-10-CM

## 2020-08-25 DIAGNOSIS — R53.1 DECREASED STRENGTH: ICD-10-CM

## 2020-08-25 DIAGNOSIS — Z78.9 IMPAIRED MOTOR CONTROL: ICD-10-CM

## 2020-08-25 PROCEDURE — 97116 GAIT TRAINING THERAPY: CPT | Mod: PN

## 2020-08-25 NOTE — PROGRESS NOTES
"    Physical Therapy Treatment Note     Name: Alberto Dangelo  Clinic Number: [id number]    Therapy Diagnosis:   Encounter Diagnoses   Name Primary?   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      Physician: Dirk Ortiz MD    Visit Date: 8/25/2020    Physician Orders: Eval and treat (resuming PT following postponement due to Covid-19)  Medical Diagnosis: S14.129A (ICD-10-CM) - Central cord syndrome at unspecified level of cervical spinal cord  Evaluation Date: 8/26/2016  Current Certification Period: 6/23/2020 to 10/23/2020  Authorization Period Expiration: 12/31/2020  Plan of Care Expiration: 10/23/2020  Visit #/Visits authorized: 12/20 (referral - No visit limit due to medical necessity)    Time In: 1410  Time Out: 1533  Total Billable Time: 67 minutes    Precautions: Fall and immune concerns due to impaired breathing 2* to quadriplegia    Subjective     Pt reports: "My core gets more sore than my legs"   Response to previous treatment: some soreness of core muscles  Functional change: Tolerated decreased guidance force to 15% this session    Pain: 0/10  Location: N/A      Objective     Patient performed self stretching prior to set up in Lokomat orthosis.      Efren participated in gait training to improve functional mobility and safety for 67  minutes, including:Set up Lokomat with 5 kg unloading with cuff size of 6 at thigh, 6 at upper shin, 5 at lower shin.   Pt participated in computer assisted robotic gait training via Lokomat @ 3.2 kph x 60 min, 13 sec for a total distance of 3188 m.  Utilized guidance force of 40% x 16 min, decreased to 30% x 15 min, reduced again to 20% x 18 min. Completed session @ 15% guidance force; however, required minimal increase in unweighting to prevent L foot drag.  Patient experienced 1 episode of toe drag when transitioned to 15% guidance force but resolved with minimal increase of " unweighting.      Home Exercises Provided and Patient Education Provided     Education provided:   - Review of standing stretches    Written Home Exercises Provided: yes.  Exercises were reviewed and Efren was able to demonstrate them prior to the end of the session.  Efren demonstrated good  understanding of the education provided.     See EMR under Patient Instructions for exercises provided 6/30/2020.  Patient continues to participate in home exercise program including use of FES bike, stretching, etc.      Assessment     Tolerated session without skin irritation.  Was able tolerate decrease in guidance force to 15% but experienced 1 episode of L toe drag that was corrected with increased L foot lift (using foot  strap) and minimal increase in unweighting.      Efren is progressing slowly towards his goals.   Pt prognosis is Fair.     Pt will continue to benefit from skilled outpatient physical therapy to address the deficits listed in the problem list box on initial evaluation, provide pt/family education and to maximize pt's level of independence in the home and community environment.     Pt's spiritual, cultural and educational needs considered and pt agreeable to plan of care and goals.     Anticipated barriers to physical therapy: Increased extensor tone.      Goals:   Short Term Goals Status:  (updated 6/23/2020)                1) Patient will tolerate 60 min of gait training without difficulty  Nearly met (1 episode of foot drag)              2) Patient will tolerate guidance force decreased to < 30% for > 40 min of training  Progressing (achieved 29 min at < 30% guidance force but with 1 minor episodes of foot drag)              3) Patient will demonstrate improvement in LE flexibility   Ongoing                4) Patient will stand with minimal UE support demonstrating appropriate knee (no hyperextension) and lumbar positioning (neutral pelvic tilt) x 5 min  Ongoing       Long Term Goal  Status:   (Modified 6/23/2020)            1) Increase strength in B  LE as indicated by improved L-force testing by 5-10nM at hip flex/ext and by 5 nM in knee flex/ext   Ongoing/not assessed this date              2)  Pt will stand > 10-15 min with minimal UE support while demonstrating appropriate posture.  Progressing, continues with increased anterior pelvic tilt and intermittent knee hyperextension            3) Pt will consistently perform safe stand pivot transfers with minimal to moderate UE support ,  Not assessed this date                4) Pt will demonstrate improved bilateral hip flexion strength to 2/5 to improve ability to perform swing phase/ foot clearance during gait  Not formally assessed this date.               5) Pt will demonstrate appropriate weight shift during ambulation to limit hip hiking and excessive weight shift.   Minimal progress    Plan     Increase time of gait training at < 30% guidance force striving to reduce guidance force to 15% or less without foot drag.          Candice Herzog, PT

## 2020-08-31 DIAGNOSIS — I95.1 ORTHOSTATIC HYPOTENSION: ICD-10-CM

## 2020-08-31 RX ORDER — MIDODRINE HYDROCHLORIDE 10 MG/1
TABLET ORAL
Qty: 90 TABLET | Refills: 0 | Status: SHIPPED | OUTPATIENT
Start: 2020-08-31 | End: 2020-10-05

## 2020-08-31 NOTE — PROGRESS NOTES
Refill Routing Note   Medication(s) are not appropriate for processing by [facility] for the following reason(s)   - Outside of Protocol    Medication reconciliation completed: No   Automatic Epic Protocol Generated Data:    Requested Prescriptions   Pending Prescriptions Disp Refills    midodrine (PROAMATINE) 10 MG tablet [Pharmacy Med Name: MIDODRINE 10MG TABLETS] 90 tablet 0     Sig: TAKE 1 TABLET(10 MG) BY MOUTH THREE TIMES DAILY       Off-Protocol Failed - 8/31/2020  3:24 AM        Failed - Medication not assigned to a protocol, review manually.        Passed - Office visit in past 12 months or future 90 days.     Recent Outpatient Visits            9 months ago Orthostatic hypotension    [facility] Dirk Ortiz MD    1 year ago Encounter for preventive health examination    [facility] Dirk Ortiz MD    1 year ago SIRS (systemic inflammatory response syndrome)    [facility] Bre Lara MD    2 years ago Encounter for preventive health examination    [facility] Dirk Ortiz MD    3 years ago Annual physical exam    [facility] Dirk Ortiz MD          Future Appointments              Tomorrow Candice Herzog, PT [facility] [place] [facility]    In 2 days Dirk Ortiz MD [facility] [place]    In 3 days Giulia Lock, PTA [facility] [place] [facility]    In 1 week Candice Herzog, PT [facility] [place] [facility]    In 1 week Giulia Ashvin, PTA [facility] [place] [facility]    In 2 weeks Candice Herzog, PT [facility] [place] [facility]    In 2 weeks Giulia [place], PTA [facility] [place] [facility]    In 3 weeks Candice Herzog, PT [facility] [place] [facility]    In 3 weeks Giulia [place], PTA [facility] [place] [facility]    In 4 weeks Candice  Mino, PT [facility] [place] [facility]    In 1 month Giulia [place], PTA [facility] [place] [facility]    In 1 month Candice Herzog, PT [facility] [place] [facility]    In 1 month Giulia [place], PTA [facility] [place] [facility]    In 1 month Candice Herzog, PT [facility] [place] [facility]    In 1 month Giulia [place], PTA [facility] [place] [facility]    In 1 month Giulia [place], PTA [facility] [place] [facility]    In 1 month Candice Herzog, PT [facility] [place] [facility]                      Appointments     Date Provider   Last Visit   11/22/2019 Dirk Ortiz MD   Next Visit   9/2/2020 Dirk Ortiz MD   ED visits in past 90 days: 0    Note composed:10:34 AM 08/31/2020

## 2020-09-01 ENCOUNTER — CLINICAL SUPPORT (OUTPATIENT)
Dept: REHABILITATION | Facility: HOSPITAL | Age: 60
End: 2020-09-01
Payer: COMMERCIAL

## 2020-09-01 DIAGNOSIS — S14.129A INCOMPLETE INJURY OF CERVICAL SPINAL CORD WITH CENTRAL CORD SYNDROME: ICD-10-CM

## 2020-09-01 DIAGNOSIS — R26.9 GAIT DISTURBANCE: ICD-10-CM

## 2020-09-01 DIAGNOSIS — Z78.9 IMPAIRED MOTOR CONTROL: ICD-10-CM

## 2020-09-01 DIAGNOSIS — R53.1 DECREASED STRENGTH: ICD-10-CM

## 2020-09-01 PROCEDURE — 97116 GAIT TRAINING THERAPY: CPT | Mod: PN

## 2020-09-03 ENCOUNTER — CLINICAL SUPPORT (OUTPATIENT)
Dept: REHABILITATION | Facility: HOSPITAL | Age: 60
End: 2020-09-03
Payer: COMMERCIAL

## 2020-09-03 DIAGNOSIS — Z78.9 IMPAIRED MOTOR CONTROL: ICD-10-CM

## 2020-09-03 DIAGNOSIS — S14.129A INCOMPLETE INJURY OF CERVICAL SPINAL CORD WITH CENTRAL CORD SYNDROME: ICD-10-CM

## 2020-09-03 DIAGNOSIS — R53.1 DECREASED STRENGTH: ICD-10-CM

## 2020-09-03 PROCEDURE — 97110 THERAPEUTIC EXERCISES: CPT | Mod: PN,CQ

## 2020-09-03 NOTE — PROGRESS NOTES
"    Physical Therapy Treatment Note     Name: Alberto Dangelo  Clinic Number: [id number]    Therapy Diagnosis:   Encounter Diagnoses   Name Primary?   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      Physician: Dirk Ortiz MD    Visit Date: 9/3/2020    Physician Orders: Eval and treat (resuming PT following postponement due to Covid-19)  Medical Diagnosis: S14.129A (ICD-10-CM) - Central cord syndrome at unspecified level of cervical spinal cord  Evaluation Date: 8/26/2016  Current Certification Period: 6/23/2020 to 10/23/2020  Authorization Period Expiration: 12/31/2020  Plan of Care Expiration: 10/23/2020  Visit #/Visits authorized: 14/20 (referral - No visit limit due to medical necessity)    Time In: 1410  Time Out: 1533  Total Billable Time: 67 minutes    Precautions: Fall and immune concerns due to impaired breathing 2* to quadriplegia    Subjective     Pt reports: "My core gets more sore than my legs"   Response to previous treatment: some soreness of core muscles  Functional change: none stated today    Pain: 0/10  Location: N/A      Objective     Patient performed self stretching prior to set up in Lokomat orthosis.      Efren participated in gait training to improve functional mobility and safety for 67  minutes, including:Set up Lokomat with 5 kg unloading with cuff size of 6 at thigh, 6 at upper shin, 5 at lower shin.   Pt participated in computer assisted robotic gait training via Lokomat @ 3.2 kph x 60 min, 18 sec for a total distance of 3191 m.  Utilized guidance force of 40% x 31 min. Patient experienced 1 episode of toe drag when transitioned to 20% guidance force but resolved with minimal increase of unweighting. Unable to decreased unloading to 15% today 2* increased tones.     Home Exercises Provided and Patient Education Provided     Education provided:   - Review of standing stretches    Written Home Exercises " Provided: Patient instructed to cont prior HEP.  Exercises were reviewed and Efren was able to demonstrate them prior to the end of the session.  Efren demonstrated good  understanding of the education provided.     See EMR under Patient Instructions for exercises provided 6/30/2020.  Patient continues to participate in home exercise program including use of FES bike, stretching, etc.      Assessment     Tolerated session without skin irritation. Unable to decrease GF to 20% today 2* increased tones R>L LE.     Efren is progressing slowly towards his goals.   Pt prognosis is Fair.     Pt will continue to benefit from skilled outpatient physical therapy to address the deficits listed in the problem list box on initial evaluation, provide pt/family education and to maximize pt's level of independence in the home and community environment.     Pt's spiritual, cultural and educational needs considered and pt agreeable to plan of care and goals.     Anticipated barriers to physical therapy: Increased extensor tone.      Goals:   Short Term Goals Status:  (updated 6/23/2020)                1) Patient will tolerate 60 min of gait training without difficulty  Nearly met (1 episode of foot drag)              2) Patient will tolerate guidance force decreased to < 30% for > 40 min of training  Progressing (31 minutes today)              3) Patient will demonstrate improvement in LE flexibility   Ongoing                4) Patient will stand with minimal UE support demonstrating appropriate knee (no hyperextension) and lumbar positioning (neutral pelvic tilt) x 5 min  Ongoing       Long Term Goal Status:   (Modified 6/23/2020)            1) Increase strength in B  LE as indicated by improved L-force testing by 5-10nM at hip flex/ext and by 5 nM in knee flex/ext   Ongoing/not assessed this date              2)  Pt will stand > 10-15 min with minimal UE support while demonstrating appropriate posture.  Progressing, continues with  increased anterior pelvic tilt and intermittent knee hyperextension            3) Pt will consistently perform safe stand pivot transfers with minimal to moderate UE support ,  Not assessed this date                4) Pt will demonstrate improved bilateral hip flexion strength to 2/5 to improve ability to perform swing phase/ foot clearance during gait  Not formally assessed this date.               5) Pt will demonstrate appropriate weight shift during ambulation to limit hip hiking and excessive weight shift.   Minimal progress    Plan     Increase time of gait training at < 30% guidance force striving to reduce guidance force to 15% or less without foot drag.          Giulia Lock, PTA

## 2020-09-04 NOTE — PROGRESS NOTES
"    Physical Therapy Treatment Note     Name: Alberto Dangelo  Clinic Number: [id number]    Therapy Diagnosis:   Encounter Diagnoses   Name Primary?   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      Physician: Dirk Ortiz MD    Visit Date: 9/1/2020    Physician Orders: Eval and treat (resuming PT following postponement due to Covid-19)  Medical Diagnosis: S14.129A (ICD-10-CM) - Central cord syndrome at unspecified level of cervical spinal cord  Evaluation Date: 8/26/2016  Current Certification Period: 6/23/2020 to 10/23/2020  Authorization Period Expiration: 12/31/2020  Plan of Care Expiration: 10/23/2020  Visit #/Visits authorized: 13/20 (referral - No visit limit due to medical necessity)    Time In: 1405  Time Out: 1530  Total Billable Time: 67 minutes    Precautions: Fall and immune concerns due to impaired breathing 2* to quadriplegia    Subjective     Pt reports: "It's hard to engage my core the whole time"   Response to previous treatment: some soreness of core muscles  Functional change: decreased UE support for sit to stand transfer noted prior to Lokomat set up.     Pain: 0/10  Location: N/A      Objective     Patient performed self stretching prior to set up in Lokomat orthosis.      Efren participated in gait training to improve functional mobility and safety for 67  minutes, including:Set up Lokomat with 5 kg unloading with cuff size of 6 at thigh, 6 at upper shin, 5 at lower shin.   Pt participated in computer assisted robotic gait training via Lokomat @ 3.2 kph x 60 min, 14 sec for a total distance of 3181 m.  Utilized guidance force of 40% x 14 min, decreased to 30% x 13 min, reduced again to 20% for remainder of the session  Patient experienced 2 episodes of toe dragon initiating of gait training.  Resolved with adjustment of ankle straps, thigh cuffs.      Home Exercises Provided and Patient Education Provided "     Education provided:   - Review of standing stretches    Written Home Exercises Provided: yes.  Exercises were reviewed and Efren was able to demonstrate them prior to the end of the session.  Efren demonstrated good  understanding of the education provided.     See EMR under Patient Instructions for exercises provided 6/30/2020.  Patient continues to participate in home exercise program including use of FES bike, stretching, etc.      Assessment     2 episodes of R toe drag when initiating gait training after set up.  Resolved with adjustments to ankle straps and thigh cuffs.  Patient noted to demonstrate minimally decreased UE support with sit > stand transfer prior to set up.      Efren is progressing slowly towards his goals.   Pt prognosis is Fair.     Pt will continue to benefit from skilled outpatient physical therapy to address the deficits listed in the problem list box on initial evaluation, provide pt/family education and to maximize pt's level of independence in the home and community environment.     Pt's spiritual, cultural and educational needs considered and pt agreeable to plan of care and goals.     Anticipated barriers to physical therapy: Increased extensor tone.      Goals:   Short Term Goals Status:  (updated 6/23/2020)                1) Patient will tolerate 60 min of gait training without difficulty  Nearly met (2 episode of foot drag)              2) Patient will tolerate guidance force decreased to < 30% for > 40 min of training  Progressing but fluctuates.  (achieved 33 min at < 30% guidance force)              3) Patient will demonstrate improvement in LE flexibility   Ongoing                4) Patient will stand with minimal UE support demonstrating appropriate knee (no hyperextension) and lumbar positioning (neutral pelvic tilt) x 5 min  (observed 1-2 min) Ongoing       Long Term Goal Status:   (Modified 6/23/2020)            1) Increase strength in B  LE as indicated by improved  L-force testing by 5-10nM at hip flex/ext and by 5 nM in knee flex/ext   Ongoing/not assessed this date              2)  Pt will stand > 10-15 min with minimal UE support while demonstrating appropriate posture.  Progressing, continues with increased anterior pelvic tilt and intermittent knee hyperextension            3) Pt will consistently perform safe stand pivot transfers with minimal to moderate UE support ,  Not assessed this date                4) Pt will demonstrate improved bilateral hip flexion strength to 2/5 to improve ability to perform swing phase/ foot clearance during gait  Not formally assessed this date.               5) Pt will demonstrate appropriate weight shift during ambulation to limit hip hiking and excessive weight shift.   Minimal progress    Plan     Continue reducing guidance force (GF) as patient tolerates with goal of > 40 minutes @ < 30% GF without toe drag.          Candice Herzog, PT

## 2020-09-08 ENCOUNTER — CLINICAL SUPPORT (OUTPATIENT)
Dept: REHABILITATION | Facility: HOSPITAL | Age: 60
End: 2020-09-08
Payer: COMMERCIAL

## 2020-09-08 DIAGNOSIS — Z78.9 IMPAIRED MOTOR CONTROL: ICD-10-CM

## 2020-09-08 DIAGNOSIS — R26.9 GAIT DISTURBANCE: ICD-10-CM

## 2020-09-08 DIAGNOSIS — S14.129A INCOMPLETE INJURY OF CERVICAL SPINAL CORD WITH CENTRAL CORD SYNDROME: ICD-10-CM

## 2020-09-08 DIAGNOSIS — R53.1 DECREASED STRENGTH: ICD-10-CM

## 2020-09-08 PROCEDURE — 97116 GAIT TRAINING THERAPY: CPT | Mod: PN

## 2020-09-08 NOTE — PROGRESS NOTES
"    Physical Therapy Treatment Note     Name: Alberto Dangelo  Clinic Number: [id number]    Therapy Diagnosis:   Encounter Diagnoses   Name Primary?   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      Physician: Dirk Ortiz MD    Visit Date: 9/8/2020    Physician Orders: Eval and treat (resuming PT following postponement due to Covid-19)  Medical Diagnosis: S14.129A (ICD-10-CM) - Central cord syndrome at unspecified level of cervical spinal cord  Evaluation Date: 8/26/2016  Current Certification Period: 6/23/2020 to 10/23/2020  Authorization Period Expiration: 12/31/2020  Plan of Care Expiration: 10/23/2020  Visit #/Visits authorized: 15/20 (referral - No visit limit due to medical necessity)    Time In: 1400  Time Out: 1525  Total Billable Time: 65 minutes    Precautions: Fall and immune concerns due to impaired breathing 2* to quadriplegia    Subjective     Pt reports: "We can stop a couple minutes early.  I have to go to the bathroom"  Response to previous treatment: Fatigue following gait training activity  Functional change: No significant changes since previous session.      Pain: 0/10  Location: N/A      Objective     Patient performed self stretching prior to set up in Lokomat orthosis.      Efren participated in gait training to improve functional mobility and safety for 67  minutes, including:Set up Lokomat with 5 kg unloading with cuff size of 6 at thigh, 6 at upper shin, 5 at lower shin.   Pt participated in computer assisted robotic gait training via Lokomat @ 3.2 kph x 55 min, 56 sec for a total distance of 2966m.  Utilized guidance force of 40% x 12 min, decreased to 30% x 13 min, reduced again to 20% for remainder of the session  Patient experienced 0 episodes of toe drag during session.  Mild decrease in L foot clearance noted @ 20% guidance force resolved with small increase in unweighting.      Home Exercises " Provided and Patient Education Provided     Education provided:   - Review of standing stretches    Written Home Exercises Provided: yes.  Exercises were reviewed and Efren was able to demonstrate them prior to the end of the session.  Efren demonstrated good  understanding of the education provided.     See EMR under Patient Instructions for exercises provided 6/30/2020.  Patient continues to participate in home exercise program including use of FES bike, stretching, etc.      Assessment     Patient experienced mild foot drag during gait training activities.  However, this did not result in stoppage of training.  Minimal increase in unloading required to prevent stoppage due to foot drag.        Efren is progressing slowly towards his goals.   Pt prognosis is Fair.     Pt will continue to benefit from skilled outpatient physical therapy to address the deficits listed in the problem list box on initial evaluation, provide pt/family education and to maximize pt's level of independence in the home and community environment.     Pt's spiritual, cultural and educational needs considered and pt agreeable to plan of care and goals.     Anticipated barriers to physical therapy: Increased extensor tone.      Goals:   Short Term Goals Status:  (updated 6/23/2020)                1) Patient will tolerate 60 min of gait training without difficulty  Nearly met (0 episode of significant foot drag; however, did not complete 60 minutes this date due to needing to void)              2) Patient will tolerate guidance force decreased to < 30% for > 40 min of training  Progressing but fluctuates.  (achieved 30 min at < 30% guidance force)              3) Patient will demonstrate improvement in LE flexibility   Ongoing                4) Patient will stand with minimal UE support demonstrating appropriate knee (no hyperextension) and lumbar positioning (neutral pelvic tilt) x 5 min  (observed 1-2 min) Ongoing       Long Term Goal  Status:   (Modified 6/23/2020)             1) Increase strength in B  LE as indicated by improved L-force testing by 5-10nM at hip flex/ext and by 5 nM in knee flex/ext   Ongoing/not assessed this date              2)  Pt will stand > 10-15 min with minimal UE support while demonstrating appropriate posture.  Progressing, continues with increased anterior pelvic tilt and intermittent knee hyperextension            3) Pt will consistently perform safe stand pivot transfers with minimal to moderate UE support ,  Not assessed this date                4) Pt will demonstrate improved bilateral hip flexion strength to 2/5 to improve ability to perform swing phase/ foot clearance during gait  Not formally assessed this date.               5) Pt will demonstrate appropriate weight shift during ambulation to limit hip hiking and excessive weight shift.   Minimal progress    Plan     Progress gait activities as patient tolerates.          Candice Herzog, PT

## 2020-09-10 ENCOUNTER — CLINICAL SUPPORT (OUTPATIENT)
Dept: REHABILITATION | Facility: HOSPITAL | Age: 60
End: 2020-09-10
Payer: COMMERCIAL

## 2020-09-10 DIAGNOSIS — Z78.9 IMPAIRED MOTOR CONTROL: ICD-10-CM

## 2020-09-10 DIAGNOSIS — S14.129A INCOMPLETE INJURY OF CERVICAL SPINAL CORD WITH CENTRAL CORD SYNDROME: ICD-10-CM

## 2020-09-10 DIAGNOSIS — R53.1 DECREASED STRENGTH: ICD-10-CM

## 2020-09-10 PROCEDURE — 97116 GAIT TRAINING THERAPY: CPT | Mod: PN,CQ

## 2020-09-10 NOTE — PROGRESS NOTES
Physical Therapy Treatment Note     Name: Alberto Dangelo  Clinic Number: [id number]    Therapy Diagnosis:   Encounter Diagnoses   Name Primary?   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      Physician: Dirk Ortiz MD    Visit Date: 9/10/2020    Physician Orders: Eval and treat (resuming PT following postponement due to Covid-19)  Medical Diagnosis: S14.129A (ICD-10-CM) - Central cord syndrome at unspecified level of cervical spinal cord  Evaluation Date: 8/26/2016  Current Certification Period: 6/23/2020 to 10/23/2020  Authorization Period Expiration: 12/31/2020  Plan of Care Expiration: 10/23/2020  Visit #/Visits authorized: 16/20 (referral - No visit limit due to medical necessity)    Time In: 1430  Time Out: 1530  Total Billable Time: 60 minutes    Precautions: Fall and immune concerns due to impaired breathing 2* to quadriplegia    Subjective     Pt reports: arrived to session on time but had to make an important call, so RX start time delayed. Via manual w/c with service dog. Had a great session last time on [place] but having stress-related tones in his legs today. .   Response to previous treatment: some soreness of core muscles  Functional change: none stated today    Pain: 0/10  Location: N/A      Objective     No time for stretching prior to session today.     Efren participated in gait training to improve functional mobility and safety for 52  minutes, including:Set up Lokomat with 5 kg unloading with cuff size of 6 at thigh, 6 at upper shin, 5 at lower shin.   Pt participated in computer assisted robotic gait training via [employer] @ 3.2 kp x 40 min, 45 sec for a total distance of 2155 m.  Utilized guidance force of 40% for entirety of session, unable to decrease.   Home Exercises Provided and Patient Education Provided     Education provided:   - Continue HEP daily.    Written Home Exercises Provided: Patient  instructed to cont prior HEP.  Exercises were reviewed and Efren was able to demonstrate them prior to the end of the session.  Efren demonstrated good  understanding of the education provided.     See EMR under Patient Instructions for exercises provided 6/30/2020.  Patient continues to participate in home exercise program including use of FES bike, stretching, etc.      Assessment     Tolerated session without skin irritation. Unable to decrease GF 2* increased tones R>L LE.     Efren is progressing slowly towards his goals.   Pt prognosis is Fair.     Pt will continue to benefit from skilled outpatient physical therapy to address the deficits listed in the problem list box on initial evaluation, provide pt/family education and to maximize pt's level of independence in the home and community environment.     Pt's spiritual, cultural and educational needs considered and pt agreeable to plan of care and goals.     Anticipated barriers to physical therapy: Increased extensor tone.      Goals:   Short Term Goals Status:  (updated 6/23/2020)                1) Patient will tolerate 60 min of gait training without difficulty  Nearly met (1 episode of foot drag)              2) Patient will tolerate guidance force decreased to < 30% for > 40 min of training  Progressing (31 minutes today)              3) Patient will demonstrate improvement in LE flexibility   Ongoing                4) Patient will stand with minimal UE support demonstrating appropriate knee (no hyperextension) and lumbar positioning (neutral pelvic tilt) x 5 min  Ongoing       Long Term Goal Status:   (Modified 6/23/2020)            1) Increase strength in B  LE as indicated by improved L-force testing by 5-10nM at hip flex/ext and by 5 nM in knee flex/ext   Ongoing/not assessed this date              2)  Pt will stand > 10-15 min with minimal UE support while demonstrating appropriate posture.  Progressing, continues with increased anterior pelvic tilt and  intermittent knee hyperextension            3) Pt will consistently perform safe stand pivot transfers with minimal to moderate UE support ,  Not assessed this date                4) Pt will demonstrate improved bilateral hip flexion strength to 2/5 to improve ability to perform swing phase/ foot clearance during gait  Not formally assessed this date.               5) Pt will demonstrate appropriate weight shift during ambulation to limit hip hiking and excessive weight shift.   Minimal progress    Plan     Increase time of gait training at < 30% guidance force striving to reduce guidance force to 15% or less without foot drag.          Giulia Lock, PTA

## 2020-09-17 ENCOUNTER — CLINICAL SUPPORT (OUTPATIENT)
Dept: REHABILITATION | Facility: HOSPITAL | Age: 60
End: 2020-09-17
Payer: COMMERCIAL

## 2020-09-17 DIAGNOSIS — S14.129A INCOMPLETE INJURY OF CERVICAL SPINAL CORD WITH CENTRAL CORD SYNDROME: ICD-10-CM

## 2020-09-17 DIAGNOSIS — R53.1 DECREASED STRENGTH: ICD-10-CM

## 2020-09-17 DIAGNOSIS — Z78.9 IMPAIRED MOTOR CONTROL: ICD-10-CM

## 2020-09-17 DIAGNOSIS — R26.9 GAIT DISTURBANCE: ICD-10-CM

## 2020-09-17 PROCEDURE — 97116 GAIT TRAINING THERAPY: CPT | Mod: PN

## 2020-09-17 NOTE — PROGRESS NOTES
"    Physical Therapy Treatment Note     Name: Alberto Dangelo  Clinic Number: [id number]    Therapy Diagnosis:   Encounter Diagnoses   Name Primary?   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      Physician: Dirk Ortiz MD    Visit Date: 9/17/2020    Physician Orders: Eval and treat (resuming PT following postponement due to Covid-19)  Medical Diagnosis: S14.129A (ICD-10-CM) - Central cord syndrome at unspecified level of cervical spinal cord  Evaluation Date: 8/26/2016  Current Certification Period: 6/23/2020 to 10/23/2020  Authorization Period Expiration: 12/31/2020  Plan of Care Expiration: 10/23/2020  Visit #/Visits authorized: 17/20 (referral - No visit limit due to medical necessity)    Time In: 1415  Time Out: 1530  Total Billable Time: 67 minutes    Precautions: Fall and immune concerns due to impaired breathing 2* to quadriplegia    Subjective     Pt reports: "When I do the walking at the house with the walker, I feel so much stronger a couple days later".    Response to previous treatment: "The tone was better after the session.  I was toned out during the session but better after"  Functional change: Increased walking tolerance.        Pain: 0/10  Location: N/A      Objective     Patient performed self stretching prior to set up in Lokomat orthosis.      Efren participated in gait training to improve functional mobility and safety for 67 minutes, including:Set up Lokomat with 5 kg unloading with cuff size of 6 at thigh, 6 at upper shin, 5 at lower shin.   Pt participated in computer assisted robotic gait training via Lokomat @ 3.2 kph x 60 min, 05 sec for a total distance of 3181m.  Utilized guidance force of 40% x 10 min, decreased to 30% x 12 min, reduced again to 20% for 33'.  Completed session @ 15% guidance with minimally increased unweighting.  Patient experienced episodes of mild foot drag when guidance force " lowered to 15%.  Corrected with minimal increase in unweighting..      Home Exercises Provided and Patient Education Provided     Education provided:   - None this date      Written Home Exercises Provided: yes.  Exercises were reviewed and Efren was able to demonstrate them prior to the end of the session.  Efren demonstrated good  understanding of the education provided. Patient continues to participate in extensive HEP regularly    See EMR under Patient Instructions for exercises provided 6/30/2020.  Patient continues to participate in home exercise program including use of FES bike, stretching, etc.      Assessment     Able to decrease guidance force to 15% but with resultant minimal increase in foot drag that was resolved with slight increase in unweighting.  Patient reported good fatigue following session.  Tolerated guidance force < 40% for 38 minutes.       Efren is progressing slowly towards his goals.   Pt prognosis is Fair.     Pt will continue to benefit from skilled outpatient physical therapy to address the deficits listed in the problem list box on initial evaluation, provide pt/family education and to maximize pt's level of independence in the home and community environment.     Pt's spiritual, cultural and educational needs considered and pt agreeable to plan of care and goals.     Anticipated barriers to physical therapy: Increased extensor tone.       Goals:    Short Term Goals Status:  (updated 6/23/2020)                1) Patient will tolerate 60 min of gait training without difficulty  Nearly met (progressing: completed 60' with minimal foot drag)              2) Patient will tolerate guidance force decreased to < 30% for > 40 min of training  Progressing but fluctuates.  (achieved 38 min at < 30% guidance force)              3) Patient will demonstrate improvement in LE flexibility   Ongoing                4) Patient will stand with minimal UE support demonstrating appropriate knee (no  hyperextension) and lumbar positioning (neutral pelvic tilt) x 5 min  (observed 1-2 min) Ongoing       Long Term Goal Status:   (Modified 6/23/2020)            1) Increase strength in B  LE as indicated by improved L-force testing by 5-10nM at hip flex/ext and by 5 nM in knee flex/ext   Ongoing/not assessed this date              2)  Pt will stand > 10-15 min with minimal UE support while demonstrating appropriate posture.  Progressing, continues with increased anterior pelvic tilt and intermittent knee hyperextension            3) Pt will consistently perform safe stand pivot transfers with minimal to moderate UE support ,  Minimal progress                 4) Pt will demonstrate improved bilateral hip flexion strength to 2/5 to improve ability to perform swing phase/ foot clearance during gait  Ongoing              5) Pt will demonstrate appropriate weight shift during ambulation to limit hip hiking and excessive weight shift.   Minimal progress    Plan     Continue to decrease guidance force as patient tolerates.  Adjust unweighting if needed to facilitate decrease in guidance force.          Candice Herzog, PT

## 2020-09-20 DIAGNOSIS — R25.2 SPASM: ICD-10-CM

## 2020-09-21 NOTE — PROGRESS NOTES
Refill Routing Note   Medication(s) are not appropriate for processing by [facility]:       - Outside of protocol           Medication reconciliation completed: No      Automatic Epic Generated Protocol Data:        Requested Prescriptions   Pending Prescriptions Disp Refills    diazePAM (VALIUM) 5 MG tablet [Pharmacy Med Name: DIAZEPAM 5MG TABLETS] 60 tablet      Sig: TAKE 1 TABLET BY MOUTH EVERY 12 HOURS AS NEEDED       Anticonvulsants Protocol Passed - 9/20/2020  7:08 PM        Passed - Visit with Authorizing provider in past 9 months or upcoming 90 days              Appointments  past 12m or future 3m with PCP    Date Provider   Last Visit   11/22/2019 Dirk Ortiz MD   Next Visit   10/30/2020 Dirk Ortiz MD   ED visits in past 90 days: 0     Note composed:2:01 PM 09/21/2020

## 2020-09-22 ENCOUNTER — CLINICAL SUPPORT (OUTPATIENT)
Dept: REHABILITATION | Facility: HOSPITAL | Age: 60
End: 2020-09-22
Payer: COMMERCIAL

## 2020-09-22 DIAGNOSIS — S14.129A INCOMPLETE INJURY OF CERVICAL SPINAL CORD WITH CENTRAL CORD SYNDROME: ICD-10-CM

## 2020-09-22 DIAGNOSIS — Z78.9 IMPAIRED MOTOR CONTROL: ICD-10-CM

## 2020-09-22 DIAGNOSIS — R53.1 DECREASED STRENGTH: ICD-10-CM

## 2020-09-22 DIAGNOSIS — R26.9 GAIT DISTURBANCE: ICD-10-CM

## 2020-09-22 PROCEDURE — 97116 GAIT TRAINING THERAPY: CPT | Mod: PN

## 2020-09-22 RX ORDER — DIAZEPAM 5 MG/1
TABLET ORAL
Qty: 60 TABLET | Refills: 0 | Status: SHIPPED | OUTPATIENT
Start: 2020-09-22 | End: 2020-11-12 | Stop reason: SDUPTHER

## 2020-09-24 ENCOUNTER — CLINICAL SUPPORT (OUTPATIENT)
Dept: REHABILITATION | Facility: HOSPITAL | Age: 60
End: 2020-09-24
Payer: COMMERCIAL

## 2020-09-24 DIAGNOSIS — Z78.9 IMPAIRED MOTOR CONTROL: ICD-10-CM

## 2020-09-24 DIAGNOSIS — S14.129A INCOMPLETE INJURY OF CERVICAL SPINAL CORD WITH CENTRAL CORD SYNDROME: ICD-10-CM

## 2020-09-24 DIAGNOSIS — R53.1 DECREASED STRENGTH: ICD-10-CM

## 2020-09-24 PROCEDURE — 97110 THERAPEUTIC EXERCISES: CPT | Mod: PN,CQ

## 2020-09-24 NOTE — PROGRESS NOTES
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        Physician: Dirk Ortiz MD    Visit Date: 9/24/2020    Physician Orders: Eval and treat (resuming PT following postponement due to Covid-19)  Medical Diagnosis: S14.129A (ICD-10-CM) - Central cord syndrome at unspecified level of cervical spinal cord  Evaluation Date: 8/26/2016  Current Certification Period: 6/23/2020 to 10/23/2020  Authorization Period Expiration: 12/31/2020  Plan of Care Expiration: 10/23/2020  Visit #/Visits authorized: 18/20 (referral - No visit limit due to medical necessity)    Time In: 1410  Time Out: 1530  Total Billable Time: 65 minutes    Precautions: Fall and immune concerns due to impaired breathing 2* to quadriplegia    Subjective     Pt reports: better today with less tones in LE's. Presents in manual w/c with his service dog.   Response to previous treatment: some soreness of core muscles  Functional change: none stated today    Pain: 0/10  Location: N/A      Objective       Efren participated in gait training to improve functional mobility and safety for 65  minutes, including:Set up Lokomat with 5 kg unloading with cuff size of 6 at thigh, 6 at upper shin, 5 at lower shin.   Pt participated in computer assisted robotic gait training via Lokomat @ 3.2 kph x 60 min, 25 sec for a total distance of 3176 m.  Utilized guidance force of 40% x 12 min, decreased to 30% x 10 min, reduced again to 20% for 33'.  Completed session @ 15% guidance with minimally increased unweighting.  Patient experienced two episodes of mild foot drag when guidance force lowered to 15%.  Corrected with minimal increase in unweighting..      Home Exercises Provided and Patient Education Provided     Education provided:   - Continue HEP daily.    Written Home Exercises  Provided: Patient instructed to cont prior HEP.  Exercises were reviewed and Efren was able to demonstrate them prior to the end of the session.  Efren demonstrated good  understanding of the education provided.     See EMR under Patient Instructions for exercises provided 6/30/2020.  Patient continues to participate in home exercise program including use of FES bike, stretching, etc.      Assessment     Improved tolerance for decreased GF to 15% after lengthy warm up with less tones today.     Efren is progressing slowly towards his goals.   Pt prognosis is Fair.     Pt will continue to benefit from skilled outpatient physical therapy to address the deficits listed in the problem list box on initial evaluation, provide pt/family education and to maximize pt's level of independence in the home and community environment.     Pt's spiritual, cultural and educational needs considered and pt agreeable to plan of care and goals.     Anticipated barriers to physical therapy: Increased extensor tone.      Goals:   Short Term Goals Status:  (updated 6/23/2020)                1) Patient will tolerate 60 min of gait training without difficulty  Nearly met (1 episode of foot drag)              2) Patient will tolerate guidance force decreased to < 30% for > 40 min of training  Progressing               3) Patient will demonstrate improvement in LE flexibility   Ongoing                4) Patient will stand with minimal UE support demonstrating appropriate knee (no hyperextension) and lumbar positioning (neutral pelvic tilt) x 5 min  Ongoing       Long Term Goal Status:   (Modified 6/23/2020)            1) Increase strength in B  LE as indicated by improved L-force testing by 5-10nM at hip flex/ext and by 5 nM in knee flex/ext   Ongoing/not assessed this date              2)  Pt will stand > 10-15 min with minimal UE support while demonstrating appropriate posture.  Progressing, continues with increased anterior pelvic tilt and  intermittent knee hyperextension            3) Pt will consistently perform safe stand pivot transfers with minimal to moderate UE support ,  Not assessed this date                4) Pt will demonstrate improved bilateral hip flexion strength to 2/5 to improve ability to perform swing phase/ foot clearance during gait  Not formally assessed this date.               5) Pt will demonstrate appropriate weight shift during ambulation to limit hip hiking and excessive weight shift.   Minimal progress    Plan     Increase time of gait training at < 30% guidance force striving to reduce guidance force to 15% or less without foot drag.          Giulia Lock, PTA

## 2020-09-25 NOTE — PROGRESS NOTES
"    Physical Therapy Treatment Note     Name: Alberto Dangelo  Clinic Number: [id number]    Therapy Diagnosis:   Encounter Diagnoses   Name Primary?   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      Physician: Dirk Ortiz MD    Visit Date: 9/22/2020    Physician Orders: Eval and treat (resuming PT following postponement due to Covid-19)  Medical Diagnosis: S14.129A (ICD-10-CM) - Central cord syndrome at unspecified level of cervical spinal cord  Evaluation Date: 8/26/2016  Current Certification Period: 6/23/2020 to 10/23/2020  Authorization Period Expiration: 12/31/2020  Plan of Care Expiration: 10/23/2020  Visit #/Visits authorized: 18/20 (referral - No visit limit due to medical necessity)    Time In: 1415  Time Out: 1530  Total Billable Time: 67 minutes    Precautions: Fall and immune concerns due to impaired breathing 2* to quadriplegia    Subjective     Pt reports: "I can walk some at home but I can't bend my knees because of the tone."  Response to previous treatment: Tolerated well  Functional change: No significant change since last visit.      Pain: 0/10  Location: N/A      Objective     Patient performed self stretching prior to set up in Lokomat orthosis.    Efren participated in gait training to improve functional mobility and safety for 67 minutes, including:Set up Lokomat with 5 kg unloading with cuff size of 6 at thigh, 6 at upper shin, 5 at lower shin.   Pt participated in computer assisted robotic gait training via Lokomat @ 3.2 kph x 60 min, 24 sec for a total distance of 3175m.  Utilized guidance force of 40% x 10 min, decreased to 30% x 15 min, reduced again to 20% for remainder of session. Adjustments to angle of L ankle required due to tone causing eversion (thus increased discomfort).  Once adjustment made, patient able to complete session without further difficulty.    Home Exercises Provided and Patient " Education Provided     Education provided:   - None this date      Written Home Exercises Provided: yes.  Exercises were reviewed and Efren was able to demonstrate them prior to the end of the session.  Efren demonstrated good  understanding of the education provided. Patient continues to participate in extensive HEP regularly    See EMR under Patient Instructions for exercises provided 6/30/2020.  Patient continues to participate in home exercise program including use of FES bike, stretching, etc.      Assessment     Tone in L LE causing eversion of L ankle thus negatively affecting toe off.  This resulted in discomfort.  Once adjustment made to set up gait improved and discomfort resolved.  Tolerated < 30% guidance for for 35 minutes    Efren is progressing slowly towards his goals.   Pt prognosis is Fair.     Pt will continue to benefit from skilled outpatient physical therapy to address the deficits listed in the problem list box on initial evaluation, provide pt/family education and to maximize pt's level of independence in the home and community environment.     Pt's spiritual, cultural and educational needs considered and pt agreeable to plan of care and goals.     Anticipated barriers to physical therapy: Increased extensor tone.       Goals:    Short Term Goals Status:  (updated 6/23/2020)                1) Patient will tolerate 60 min of gait training without difficulty  Nearly met (progressing: completed > 60' with minimal foot drag after adjusting angle of L ankle)              2) Patient will tolerate guidance force decreased to < 30% for > 40 min of training  Progressing but fluctuates.  (achieved 35 min at < 30% guidance force)              3) Patient will demonstrate improvement in LE flexibility   Ongoing                4) Patient will stand with minimal UE support demonstrating appropriate knee (no hyperextension) and lumbar positioning (neutral pelvic tilt) x 5 min  (observed 1-2 min) Ongoing        Long Term Goal Status:   (Modified 6/23/2020)            1) Increase strength in B  LE as indicated by improved L-force testing by 5-10nM at hip flex/ext and by 5 nM in knee flex/ext   Ongoing/not assessed this date              2)  Pt will stand > 10-15 min with minimal UE support while demonstrating appropriate posture.  Progressing, continues with increased anterior pelvic tilt and intermittent knee hyperextension            3) Pt will consistently perform safe stand pivot transfers with minimal to moderate UE support ,  Minimal progress                 4) Pt will demonstrate improved bilateral hip flexion strength to 2/5 to improve ability to perform swing phase/ foot clearance during gait  Ongoing              5) Pt will demonstrate appropriate weight shift during ambulation to limit hip hiking and excessive weight shift.   Minimal progress    Plan     Adjust unweighting if needed to facilitate decrease in guidance force.          Candice Herzog, PT

## 2020-09-28 DIAGNOSIS — S14.129A INCOMPLETE INJURY OF CERVICAL SPINAL CORD WITH CENTRAL CORD SYNDROME: ICD-10-CM

## 2020-09-28 RX ORDER — LANOLIN ALCOHOL/MO/W.PET/CERES
CREAM (GRAM) TOPICAL
Qty: 180 TABLET | Refills: 0 | Status: SHIPPED | OUTPATIENT
Start: 2020-09-28 | End: 2020-12-28

## 2020-09-28 NOTE — PROGRESS NOTES
Refill Routing Note   Medication(s) are not appropriate for processing by [facility]:       - Outside of protocol           Medication reconciliation completed: No      Automatic Epic Generated Protocol Data:        Requested Prescriptions   Pending Prescriptions Disp Refills    magnesium oxide (MAG-OX) 400 mg (241.3 mg magnesium) tablet [Pharmacy Med Name: MAG-OXIDE 400MG TABLETS] 180 tablet 0     Sig: TAKE 1 TABLET(400 MG) BY MOUTH TWICE DAILY       Off-Protocol Failed - 9/28/2020  3:24 AM        Failed - Medication not assigned to a protocol, review manually.        Passed - Valid encounter within last 12 months     Recent Outpatient Visits            10 months ago Orthostatic hypotension    [facility] Dirk Ortiz MD    1 year ago Encounter for preventive health examination    [facility] Dirk Ortiz MD    1 year ago SIRS (systemic inflammatory response syndrome)    [facility] Bre Lara MD    2 years ago Encounter for preventive health examination    [facility] Israel Ortiz MD    3 years ago Annual physical exam    [facility] Dirk Ortiz MD          Future Appointments              Tomorrow Candice Herzog, PT [facility]    In 3 days Giulia Ashvin, PTA [facility]    In 1 week Candice Herzog, PT [facility]    In 1 week Giulia [place], PTA [facility]    In 2 weeks Candice Herzog, PT [facility]    In 2 weeks Giulia [place], PTA [facility]    In 3 weeks Giulia Ashvin, PTA [facility]    In 3 weeks Candice Herzog, PT [facility]    [facility] month [facility]                       Appointments  past 12m or future 3m with PCP    Date Provider   Last Visit   11/22/2019 Dirk Ortiz MD   Next Visit   10/30/2020 Dirk Ortiz MD   ED visits in past 90 days: 0     Note composed:7:28 AM 09/28/2020

## 2020-09-29 ENCOUNTER — CLINICAL SUPPORT (OUTPATIENT)
Dept: REHABILITATION | Facility: HOSPITAL | Age: 60
End: 2020-09-29
Payer: COMMERCIAL

## 2020-09-29 DIAGNOSIS — R53.1 DECREASED STRENGTH: ICD-10-CM

## 2020-09-29 DIAGNOSIS — S14.129A INCOMPLETE INJURY OF CERVICAL SPINAL CORD WITH CENTRAL CORD SYNDROME: ICD-10-CM

## 2020-09-29 DIAGNOSIS — Z78.9 IMPAIRED MOTOR CONTROL: ICD-10-CM

## 2020-09-29 PROCEDURE — 97116 GAIT TRAINING THERAPY: CPT | Mod: PN,CQ

## 2020-09-30 NOTE — PROGRESS NOTES
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        Physician: Dirk Ortiz MD    Visit Date: 9/29/2020    Physician Orders: Eval and treat (resuming PT following postponement due to Covid-19)  Medical Diagnosis: S14.129A (ICD-10-CM) - Central cord syndrome at unspecified level of cervical spinal cord  Evaluation Date: 8/26/2016  Current Certification Period: 6/23/2020 to 10/23/2020  Authorization Period Expiration: 12/31/2020  Plan of Care Expiration: 10/23/2020  Visit #/Visits authorized: 19/20 (referral - No visit limit due to medical necessity)    Time In: 1410  Time Out: 1530  Total Billable Time: 65 minutes    Precautions: Fall and immune concerns due to impaired breathing 2* to quadriplegia    Subjective     Pt reports: No complaints. Had an easy weekend, entertaining his in-laws.  Presents in manual w/c with his service dog.   Response to previous treatment: some soreness of core muscles  Functional change: none stated today    Pain: 0/10  Location: N/A      Objective       Efren participated in gait training to improve functional mobility and safety for 65  minutes, including:Set up Lokomat with 5 kg unloading with cuff size of 6 at thigh, 6 at upper shin, 5 at lower shin.   Pt participated in computer assisted robotic gait training via Lokomat @ 3.2 kph x 60 min, 11 sec for a total distance of 3187 m.  Utilized guidance force of 40% x 15 min, decreased to 30% x 10 min, reduced again to 20% for 20'.  Completed session @ 15% guidance with minimally increased unweighting.  One instance of foot drag with stoppage after decreased unweighting to 20% but improved with strap adjustments. .      Home Exercises Provided and Patient Education Provided     Education provided:   - Continue HEP daily.    Written Home  Exercises Provided: Patient instructed to cont prior HEP.  Exercises were reviewed and Efren was able to demonstrate them prior to the end of the session.  Efren demonstrated good  understanding of the education provided.     See EMR under Patient Instructions for exercises provided 6/30/2020.  Patient continues to participate in home exercise program including use of FES bike, stretching, etc.      Assessment     Improved session with decreased stoppages today and improving tolerance for decreasing GF.     Efren is progressing slowly towards his goals.   Pt prognosis is Fair.     Pt will continue to benefit from skilled outpatient physical therapy to address the deficits listed in the problem list box on initial evaluation, provide pt/family education and to maximize pt's level of independence in the home and community environment.     Pt's spiritual, cultural and educational needs considered and pt agreeable to plan of care and goals.     Anticipated barriers to physical therapy: Increased extensor tone.      Goals:   Short Term Goals Status:  (updated 6/23/2020)                1) Patient will tolerate 60 min of gait training without difficulty  Nearly met (1 episode of foot drag)              2) Patient will tolerate guidance force decreased to < 30% for > 40 min of training  Progressing               3) Patient will demonstrate improvement in LE flexibility   Ongoing                4) Patient will stand with minimal UE support demonstrating appropriate knee (no hyperextension) and lumbar positioning (neutral pelvic tilt) x 5 min  Ongoing       Long Term Goal Status:   (Modified 6/23/2020)            1) Increase strength in B  LE as indicated by improved L-force testing by 5-10nM at hip flex/ext and by 5 nM in knee flex/ext   Ongoing/not assessed this date              2)  Pt will stand > 10-15 min with minimal UE support while demonstrating appropriate posture.  Progressing, continues with increased anterior pelvic  tilt and intermittent knee hyperextension            3) Pt will consistently perform safe stand pivot transfers with minimal to moderate UE support ,  Not assessed this date                4) Pt will demonstrate improved bilateral hip flexion strength to 2/5 to improve ability to perform swing phase/ foot clearance during gait  Not formally assessed this date.               5) Pt will demonstrate appropriate weight shift during ambulation to limit hip hiking and excessive weight shift.   Minimal progress    Plan     Increase time of gait training at < 30% guidance force striving to reduce guidance force to 15% or less without foot drag.          Giulia Lock, PTA

## 2020-10-01 ENCOUNTER — CLINICAL SUPPORT (OUTPATIENT)
Dept: REHABILITATION | Facility: HOSPITAL | Age: 60
End: 2020-10-01
Payer: COMMERCIAL

## 2020-10-01 DIAGNOSIS — R53.1 DECREASED STRENGTH: ICD-10-CM

## 2020-10-01 DIAGNOSIS — S14.129A INCOMPLETE INJURY OF CERVICAL SPINAL CORD WITH CENTRAL CORD SYNDROME: ICD-10-CM

## 2020-10-01 DIAGNOSIS — Z78.9 IMPAIRED MOTOR CONTROL: ICD-10-CM

## 2020-10-01 PROCEDURE — 97116 GAIT TRAINING THERAPY: CPT | Mod: PN,CQ

## 2020-10-01 NOTE — PROGRESS NOTES
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        Physician: Dirk Ortiz MD    Visit Date: 10/1/2020    Physician Orders: Eval and treat (resuming PT following postponement due to Covid-19)  Medical Diagnosis: S14.129A (ICD-10-CM) - Central cord syndrome at unspecified level of cervical spinal cord  Evaluation Date: 8/26/2016  Current Certification Period: 6/23/2020 to 10/23/2020  Authorization Period Expiration: 12/31/2020  Plan of Care Expiration: 10/23/2020  Visit #/Visits authorized: 20/20 (referral - No visit limit due to medical necessity)    Time In: 1410  Time Out: 1520  Total Billable Time: 65 minutes    Precautions: Fall and immune concerns due to impaired breathing 2* to quadriplegia    Subjective     Pt reports: No complaints. No soreness after last session. May do outdoor activities like boating this weekend 2* pleasant weather.  Presents in manual w/c with his service dog.   Response to previous treatment: some soreness of core muscles  Functional change: none stated today    Pain: 0/10  Location: N/A      Objective       Efren participated in gait training to improve functional mobility and safety for 65  minutes, including:Set up Lokomat with 5 kg unloading with cuff size of 6 at thigh, 6 at upper shin, 5 at lower shin.   Pt participated in computer assisted robotic gait training via Lokomat @ 3.2 kph x 60 min, 21 sec for a total distance of 3179 m.  Utilized guidance force of 40% x 10 min, decreased to 30% x 15 min, reduced again to 20% for 10'.  Completed session @ 15% guidance with minimally increased unweighting.  One instance of foot drag with stoppage after minimal unweighting to 15% but improved with strap adjustments. .      Home Exercises Provided and Patient Education Provided     Education  provided:   - Continue HEP daily.    Written Home Exercises Provided: Patient instructed to cont prior HEP.  Exercises were reviewed and Efren was able to demonstrate them prior to the end of the session.  Efren demonstrated good  understanding of the education provided.     See EMR under Patient Instructions for exercises provided 6/30/2020.  Patient continues to participate in home exercise program including use of FES bike, stretching, etc.      Assessment     Improved session with decreased stoppages today and improving tolerance for decreasing GF with only stoppage at 15%, which is lower than last session.    fEren is progressing slowly towards his goals.   Pt prognosis is Fair.     Pt will continue to benefit from skilled outpatient physical therapy to address the deficits listed in the problem list box on initial evaluation, provide pt/family education and to maximize pt's level of independence in the home and community environment.     Pt's spiritual, cultural and educational needs considered and pt agreeable to plan of care and goals.     Anticipated barriers to physical therapy: Increased extensor tone.      Goals:   Short Term Goals Status:  (updated 6/23/2020)                1) Patient will tolerate 60 min of gait training without difficulty  Nearly met (1 episode of foot drag)              2) Patient will tolerate guidance force decreased to < 30% for > 40 min of training  Progressing               3) Patient will demonstrate improvement in LE flexibility   Ongoing                4) Patient will stand with minimal UE support demonstrating appropriate knee (no hyperextension) and lumbar positioning (neutral pelvic tilt) x 5 min  Ongoing       Long Term Goal Status:   (Modified 6/23/2020)            1) Increase strength in B  LE as indicated by improved L-force testing by 5-10nM at hip flex/ext and by 5 nM in knee flex/ext   Ongoing/not assessed this date              2)  Pt will stand > 10-15 min with  minimal UE support while demonstrating appropriate posture.  Progressing, continues with increased anterior pelvic tilt and intermittent knee hyperextension            3) Pt will consistently perform safe stand pivot transfers with minimal to moderate UE support ,  Not assessed this date                4) Pt will demonstrate improved bilateral hip flexion strength to 2/5 to improve ability to perform swing phase/ foot clearance during gait  Not formally assessed this date.               5) Pt will demonstrate appropriate weight shift during ambulation to limit hip hiking and excessive weight shift.   Minimal progress    Plan     Increase time of gait training at < 30% guidance force striving to reduce guidance force to 15% or less without foot drag.          Giulia Lock, PTA

## 2020-10-05 DIAGNOSIS — I95.1 ORTHOSTATIC HYPOTENSION: ICD-10-CM

## 2020-10-05 RX ORDER — MIDODRINE HYDROCHLORIDE 10 MG/1
TABLET ORAL
Qty: 90 TABLET | Refills: 0 | Status: SHIPPED | OUTPATIENT
Start: 2020-10-05 | End: 2020-11-12

## 2020-10-06 ENCOUNTER — CLINICAL SUPPORT (OUTPATIENT)
Dept: REHABILITATION | Facility: HOSPITAL | Age: 60
End: 2020-10-06
Payer: COMMERCIAL

## 2020-10-06 DIAGNOSIS — S14.129A INCOMPLETE INJURY OF CERVICAL SPINAL CORD WITH CENTRAL CORD SYNDROME: ICD-10-CM

## 2020-10-06 DIAGNOSIS — R53.1 DECREASED STRENGTH: ICD-10-CM

## 2020-10-06 DIAGNOSIS — Z78.9 IMPAIRED MOTOR CONTROL: ICD-10-CM

## 2020-10-06 PROCEDURE — 97116 GAIT TRAINING THERAPY: CPT | Mod: PN,CQ

## 2020-10-06 NOTE — PROGRESS NOTES
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        Physician: Dirk Ortiz MD    Visit Date: 10/6/2020    Physician Orders: Eval and treat (resuming PT following postponement due to Covid-19)  Medical Diagnosis: S14.129A (ICD-10-CM) - Central cord syndrome at unspecified level of cervical spinal cord  Evaluation Date: 8/26/2016  Current Certification Period: 6/23/2020 to 10/23/2020  Authorization Period Expiration: 12/31/2020  Plan of Care Expiration: 10/23/2020  Visit #/Visits authorized: 20/20 (referral - No visit limit due to medical necessity)    Time In: 1150  Time Out: 1259  Total Billable Time: 60 minutes    Precautions: Fall and immune concerns due to impaired breathing 2* to quadriplegia    Subjective     Pt reports: No complaints. "I might have a lot of stoppages today." Pt states he feels a lot of tone in his LE's today. Presents in manual w/c with his service dog.   Response to previous treatment: some soreness of core muscles  Functional change: none stated today    Pain: 0/10  Location: N/A      Objective       Efren participated in gait training to improve functional mobility and safety for 60  minutes, including:Set up Lokomat with 5 kg unloading with cuff size of 6 at thigh, 6 at upper shin, 5 at lower shin.   Pt participated in computer assisted robotic gait training via Lokomat @ 3.2 kph x 50 min,  56 sec for a total distance of 2701 m.  Utilized guidance force of 40% x 15 min, decreased to 30% x 9 min, reduced again to 20% for completion of session.  No stoppages.       Home Exercises Provided and Patient Education Provided     Education provided:   - Continue HEP daily.    Written Home Exercises Provided: Patient instructed to cont prior HEP.  Exercises were reviewed and Efren was able to demonstrate " them prior to the end of the session.  Efren demonstrated good  understanding of the education provided.     See EMR under Patient Instructions for exercises provided 6/30/2020.  Patient continues to participate in home exercise program including use of FES bike, stretching, etc.      Assessment     Unable to attempt 15% GF today 2* tones B LE's; however, pt did not have any stoppages today and is decreased GF at earlier stages of gait training more consistently.     Efren is progressing slowly towards his goals.   Pt prognosis is Fair.     Pt will continue to benefit from skilled outpatient physical therapy to address the deficits listed in the problem list box on initial evaluation, provide pt/family education and to maximize pt's level of independence in the home and community environment.     Pt's spiritual, cultural and educational needs considered and pt agreeable to plan of care and goals.     Anticipated barriers to physical therapy: Increased extensor tone.      Goals:   Short Term Goals Status:  (updated 6/23/2020)                1) Patient will tolerate 60 min of gait training without difficulty  Nearly met (1 episode of foot drag)              2) Patient will tolerate guidance force decreased to < 30% for > 40 min of training  Progressing               3) Patient will demonstrate improvement in LE flexibility   Ongoing                4) Patient will stand with minimal UE support demonstrating appropriate knee (no hyperextension) and lumbar positioning (neutral pelvic tilt) x 5 min  Ongoing       Long Term Goal Status:   (Modified 6/23/2020)            1) Increase strength in B  LE as indicated by improved L-force testing by 5-10nM at hip flex/ext and by 5 nM in knee flex/ext   Ongoing/not assessed this date              2)  Pt will stand > 10-15 min with minimal UE support while demonstrating appropriate posture.  Progressing, continues with increased anterior pelvic tilt and intermittent knee  hyperextension            3) Pt will consistently perform safe stand pivot transfers with minimal to moderate UE support ,  Not assessed this date                4) Pt will demonstrate improved bilateral hip flexion strength to 2/5 to improve ability to perform swing phase/ foot clearance during gait  Not formally assessed this date.               5) Pt will demonstrate appropriate weight shift during ambulation to limit hip hiking and excessive weight shift.   Minimal progress    Plan     Increase time of gait training at < 30% guidance force striving to reduce guidance force to 15% or less without foot drag.          Giulia Lock, PTA

## 2020-10-08 ENCOUNTER — CLINICAL SUPPORT (OUTPATIENT)
Dept: REHABILITATION | Facility: HOSPITAL | Age: 60
End: 2020-10-08
Payer: COMMERCIAL

## 2020-10-08 DIAGNOSIS — Z78.9 IMPAIRED MOTOR CONTROL: ICD-10-CM

## 2020-10-08 DIAGNOSIS — R53.1 DECREASED STRENGTH: ICD-10-CM

## 2020-10-08 DIAGNOSIS — S14.129A INCOMPLETE INJURY OF CERVICAL SPINAL CORD WITH CENTRAL CORD SYNDROME: ICD-10-CM

## 2020-10-08 PROCEDURE — 97116 GAIT TRAINING THERAPY: CPT | Mod: PN,CQ

## 2020-10-08 NOTE — PROGRESS NOTES
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        Physician: Dirk Ortiz MD    Visit Date: 10/8/2020    Physician Orders: Eval and treat (resuming PT following postponement due to Covid-19)  Medical Diagnosis: S14.129A (ICD-10-CM) - Central cord syndrome at unspecified level of cervical spinal cord  Evaluation Date: 8/26/2016  Current Certification Period: 6/23/2020 to 10/23/2020  Authorization Period Expiration: 12/31/2020  Plan of Care Expiration: 10/23/2020  Visit #/Visits authorized: 23/30 (referral - No visit limit due to medical necessity)    Time In: 1410  Time Out: 1520  Total Billable Time: 65 minutes    Precautions: Fall and immune concerns due to impaired breathing 2* to quadriplegia    Subjective     Pt reports: No complaints. Has his son's family staying at his home temporarily. Presents in manual w/c with his service dog.   Response to previous treatment: some soreness of core muscles  Functional change: none stated today    Pain: 0/10  Location: N/A      Objective       Efren participated in gait training to improve functional mobility and safety for 60  minutes, including:Set up Lokomat with 5 kg unloading with cuff size of 6 at thigh, 6 at upper shin, 5 at lower shin.   Pt participated in computer assisted robotic gait training via Lokomat @ 3.2 kph x 61 min,  03 sec for a total distance of 3223 m.  Utilized guidance force of 40% x 13 min, decreased to 30% x 7 min, reduced again to 20% x 25', then 15% for completion of session. One stoppage at GF drop to 15% which was corrected with strap adjustment and decreased weight.     Home Exercises Provided and Patient Education Provided     Education provided:   - Continue HEP daily.    Written Home Exercises Provided: Patient instructed to cont prior  HEP.  Exercises were reviewed and Efren was able to demonstrate them prior to the end of the session.  Efren demonstrated good  understanding of the education provided.     See EMR under Patient Instructions for exercises provided 6/30/2020.  Patient continues to participate in home exercise program including use of FES bike, stretching, etc.      Assessment     Improved tolerance for GF drop today until 15% but did well after minimal unweighting.     Efren is progressing slowly towards his goals.   Pt prognosis is Fair.     Pt will continue to benefit from skilled outpatient physical therapy to address the deficits listed in the problem list box on initial evaluation, provide pt/family education and to maximize pt's level of independence in the home and community environment.     Pt's spiritual, cultural and educational needs considered and pt agreeable to plan of care and goals.     Anticipated barriers to physical therapy: Increased extensor tone.      Goals:   Short Term Goals Status:  (updated 6/23/2020)                1) Patient will tolerate 60 min of gait training without difficulty  Nearly met (1 episode of foot drag)              2) Patient will tolerate guidance force decreased to < 30% for > 40 min of training  Progressing               3) Patient will demonstrate improvement in LE flexibility   Ongoing                4) Patient will stand with minimal UE support demonstrating appropriate knee (no hyperextension) and lumbar positioning (neutral pelvic tilt) x 5 min  Ongoing       Long Term Goal Status:   (Modified 6/23/2020)            1) Increase strength in B  LE as indicated by improved L-force testing by 5-10nM at hip flex/ext and by 5 nM in knee flex/ext   Ongoing/not assessed this date              2)  Pt will stand > 10-15 min with minimal UE support while demonstrating appropriate posture.  Progressing, continues with increased anterior pelvic tilt and intermittent knee hyperextension            3)  Pt will consistently perform safe stand pivot transfers with minimal to moderate UE support ,  Not assessed this date                4) Pt will demonstrate improved bilateral hip flexion strength to 2/5 to improve ability to perform swing phase/ foot clearance during gait  Not formally assessed this date.               5) Pt will demonstrate appropriate weight shift during ambulation to limit hip hiking and excessive weight shift.   Minimal progress    Plan     Increase time of gait training at < 30% guidance force striving to reduce guidance force to 15% or less without foot drag.          Giulia Lock, PTA

## 2020-10-13 ENCOUNTER — CLINICAL SUPPORT (OUTPATIENT)
Dept: REHABILITATION | Facility: HOSPITAL | Age: 60
End: 2020-10-13
Payer: COMMERCIAL

## 2020-10-13 DIAGNOSIS — S14.129A INCOMPLETE INJURY OF CERVICAL SPINAL CORD WITH CENTRAL CORD SYNDROME: ICD-10-CM

## 2020-10-13 DIAGNOSIS — R53.1 DECREASED STRENGTH: ICD-10-CM

## 2020-10-13 DIAGNOSIS — R26.9 GAIT DISTURBANCE: ICD-10-CM

## 2020-10-13 DIAGNOSIS — Z78.9 IMPAIRED MOTOR CONTROL: ICD-10-CM

## 2020-10-13 PROCEDURE — 97116 GAIT TRAINING THERAPY: CPT | Mod: PN

## 2020-10-15 ENCOUNTER — CLINICAL SUPPORT (OUTPATIENT)
Dept: REHABILITATION | Facility: HOSPITAL | Age: 60
End: 2020-10-15
Payer: COMMERCIAL

## 2020-10-15 DIAGNOSIS — Z78.9 IMPAIRED MOTOR CONTROL: ICD-10-CM

## 2020-10-15 DIAGNOSIS — S14.129A INCOMPLETE INJURY OF CERVICAL SPINAL CORD WITH CENTRAL CORD SYNDROME: ICD-10-CM

## 2020-10-15 DIAGNOSIS — R53.1 DECREASED STRENGTH: ICD-10-CM

## 2020-10-15 PROCEDURE — 97116 GAIT TRAINING THERAPY: CPT | Mod: PN,CQ

## 2020-10-15 NOTE — PROGRESS NOTES
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        Physician: Dirk Ortiz MD    Visit Date: 10/15/2020    Physician Orders: Eval and treat (resuming PT following postponement due to Covid-19)  Medical Diagnosis: S14.129A (ICD-10-CM) - Central cord syndrome at unspecified level of cervical spinal cord  Evaluation Date: 8/26/2016  Current Certification Period: 6/23/2020 to 10/23/2020  Authorization Period Expiration: 12/31/2020  Plan of Care Expiration: 10/23/2020  Visit #/Visits authorized: 23/30 (referral - No visit limit due to medical necessity)    Time In: 1410  Time Out: 1540  Total Billable Time: 70 minutes    Precautions: Fall and immune concerns due to impaired breathing 2* to quadriplegia    Subjective     Pt reports: No complaints. Family visitors have gone home. Presents in manual w/c with his service dog.   Response to previous treatment: some soreness of core muscles  Functional change: none stated today    Pain: 0/10  Location: N/A      Objective       Efren participated in gait training to improve functional mobility and safety for 70  minutes,including:Set up Lokomat with 5 kg unloading with cuff size of 6 at thigh, 6 at upper shin, 5 at lower shin.   Pt participated in computer assisted robotic gait training via Lokomat @ 3.2 kph x 67 min,  47 sec for a total distance of 3561 m.  Utilized guidance force of 40% x 12 min, decreased to 30% x 10 min, reduced again to 20% x 20', then 15% for completion of session. One stoppage at GF drop to 15% which was corrected with strap adjustment and decreased weight.     Home Exercises Provided and Patient Education Provided     Education provided:   - Continue HEP daily.    Written Home Exercises Provided: Patient instructed to cont prior HEP.  Exercises were reviewed  and Efren was able to demonstrate them prior to the end of the session.  Efren demonstrated good  understanding of the education provided.     See EMR under Patient Instructions for exercises provided 6/30/2020.  Patient continues to participate in home exercise program including use of FES bike, stretching, etc.      Assessment     Improved tolerance for GF drop today until 15% but did well after minimal unweighting. Improved TKE with stance phases of gait at 15% GF today.     Efren is progressing slowly towards his goals.   Pt prognosis is Fair.     Pt will continue to benefit from skilled outpatient physical therapy to address the deficits listed in the problem list box on initial evaluation, provide pt/family education and to maximize pt's level of independence in the home and community environment.     Pt's spiritual, cultural and educational needs considered and pt agreeable to plan of care and goals.     Anticipated barriers to physical therapy: Increased extensor tone.      Goals:   Short Term Goals Status:  (updated 6/23/2020)                1) Patient will tolerate 60 min of gait training without difficulty  Nearly met (1 episode of foot drag)              2) Patient will tolerate guidance force decreased to < 30% for > 40 min of training  Progressing               3) Patient will demonstrate improvement in LE flexibility   Ongoing                4) Patient will stand with minimal UE support demonstrating appropriate knee (no hyperextension) and lumbar positioning (neutral pelvic tilt) x 5 min  Ongoing       Long Term Goal Status:   (Modified 6/23/2020)            1) Increase strength in B  LE as indicated by improved L-force testing by 5-10nM at hip flex/ext and by 5 nM in knee flex/ext   Ongoing/not assessed this date              2)  Pt will stand > 10-15 min with minimal UE support while demonstrating appropriate posture.  Progressing, continues with increased anterior pelvic tilt and intermittent knee  hyperextension            3) Pt will consistently perform safe stand pivot transfers with minimal to moderate UE support ,  Not assessed this date                4) Pt will demonstrate improved bilateral hip flexion strength to 2/5 to improve ability to perform swing phase/ foot clearance during gait  Not formally assessed this date.               5) Pt will demonstrate appropriate weight shift during ambulation to limit hip hiking and excessive weight shift.   Minimal progress    Plan     Increase time of gait training at < 30% guidance force striving to reduce guidance force to 15% or less without foot drag.          Giulia Lock, PTA

## 2020-10-16 ENCOUNTER — PATIENT OUTREACH (OUTPATIENT)
Dept: ADMINISTRATIVE | Facility: HOSPITAL | Age: 60
End: 2020-10-16

## 2020-10-16 NOTE — PROGRESS NOTES
Care Everywhere updates requested and reviewed.  Immunizations reconciled. Media reports reviewed.  Duplicate HM overrides and  orders removed.  Overdue HM topic chart audit and/or requested.  Overdue lab testing linked to upcoming lab appointments if applies.    SEE  OUTREACH    Health Maintenance Due   Topic Date Due    HIV Screening  1975    Shingles Vaccine (1 of 2) 2012    Pneumococcal Vaccine (Highest Risk) (2 of 3 - PPSV23) 2019    Colorectal Cancer Screening  2020    TETANUS VACCINE  2020    Influenza Vaccine (1) 2020

## 2020-10-16 NOTE — PROGRESS NOTES
"    Physical Therapy Treatment Note     Name: Alberto Dangelo  [facility] Number: [id number]    Therapy Diagnosis:   Encounter Diagnoses   Name Primary?   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      Physician: Dirk Ortiz MD    Visit Date: 10/13/2020    Physician Orders: Eval and treat (resuming PT following postponement due to Covid-19)  Medical Diagnosis: S14.129A (ICD-10-CM) - Central cord syndrome at unspecified level of cervical spinal cord  Evaluation Date: 8/26/2016  Current Certification Period: 6/23/2020 to 10/23/2020  Authorization Period Expiration: 12/31/2020  Plan of Care Expiration: 10/23/2020  Visit #/Visits authorized: 24/30 (referral - No visit limit due to medical necessity)    Time In: 141p  Time Out: 1528  Total Billable Time: 67 minutes    Precautions: Fall and immune concerns due to impaired breathing 2* to quadriplegia    Subjective     Pt reports: "I did a lot on my exercise machine yesterday. You're not supposed to do too much."  Response to previous treatment: Tolerated well  Functional change: Increased activity tolerance.  .      Pain: 0/10  Location: N/A      Objective     Patient performed self stretching prior to set up in Lokomat orthosis.    Efren participated in gait training to improve functional mobility and safety for 67 minutes, including:Set up Lokomat with 5 kg unloading with cuff size of 6 at thigh, 6 at upper shin, 5 at lower shin.   Pt participated in computer assisted robotic gait training via Lokomat @ 3.2 kph x 60 min, 11 sec for a total distance of 3173m.  Utilized guidance force of 40% x 10 min, decreased to 30% x 10 min, reduced again to 25% for 8 min.  Completed session @ 15% guidance force with minimal increase in unweighting. 2 episodes of foot drag occurred when decreasing guidance force to 15%.  Corrected with adjustment to ankle angle and minimally increased unweighting.  "     Home Exercises Provided and Patient Education Provided     Education provided:   - None this date      Written Home Exercises Provided: yes.  Exercises were reviewed and Efren was able to demonstrate them prior to the end of the session.  Efren demonstrated good  understanding of the education provided. Patient continues to participate in extensive HEP regularly    See EMR under Patient Instructions for exercises provided 6/30/2020.  Patient continues to participate in home exercise program including use of FES bike, stretching, etc.      Assessment     Tolerated guidance force < 30% for > 40 minutes but required minimal increase in unweighting and ankle adjustment due to foot drag.    Efren is progressing slowly towards his goals.   Pt prognosis is Fair.     Pt will continue to benefit from skilled outpatient physical therapy to address the deficits listed in the problem list box on initial evaluation, provide pt/family education and to maximize pt's level of independence in the home and community environment.     Pt's spiritual, cultural and educational needs considered and pt agreeable to plan of care and goals.     Anticipated barriers to physical therapy: Increased extensor tone.       Goals:    Short Term Goals Status:  (updated 6/23/2020)                1) Patient will tolerate 60 min of gait training without difficulty  Nearly met (progressing: completed > 60' with minimal foot drag after adjusting angle of B ankles and adjustment of unweighting)              2) Patient will tolerate guidance force decreased to < 30% for > 40 min of training  Progressing but fluctuates.  (achieved 30 min, 11 sec at < 30% guidance force but required increased unweighting due to foot drag)              3) Patient will demonstrate improvement in LE flexibility   Ongoing                4) Patient will stand with minimal UE support demonstrating appropriate knee (no hyperextension) and lumbar positioning (neutral pelvic tilt) x  5 min  (observed 1-2 min) Ongoing       Long Term Goal Status:   (Modified 6/23/2020)            1) Increase strength in B  LE as indicated by improved L-force testing by 5-10nM at hip flex/ext and by 5 nM in knee flex/ext   Ongoing/not assessed this date              2)  Pt will stand > 10-15 min with minimal UE support while demonstrating appropriate posture.  Progressing, continues with increased anterior pelvic tilt and intermittent knee hyperextension            3) Pt will consistently perform safe stand pivot transfers with minimal to moderate UE support ,  Minimal progress                 4) Pt will demonstrate improved bilateral hip flexion strength to 2/5 to improve ability to perform swing phase/ foot clearance during gait  Ongoing              5) Pt will demonstrate appropriate weight shift during ambulation to limit hip hiking and excessive weight shift.   Minimal progress    Plan     Continue locomotor gait training activities utilizing Lokomat with progressively decreasing unweighting and progressive increase in guidance force as patient tolerates.        Candice Herzog, PT

## 2020-10-20 ENCOUNTER — CLINICAL SUPPORT (OUTPATIENT)
Dept: REHABILITATION | Facility: HOSPITAL | Age: 60
End: 2020-10-20
Payer: COMMERCIAL

## 2020-10-20 DIAGNOSIS — S14.129A INCOMPLETE INJURY OF CERVICAL SPINAL CORD WITH CENTRAL CORD SYNDROME: ICD-10-CM

## 2020-10-20 DIAGNOSIS — R53.1 DECREASED STRENGTH: ICD-10-CM

## 2020-10-20 DIAGNOSIS — R26.9 GAIT DISTURBANCE: ICD-10-CM

## 2020-10-20 DIAGNOSIS — Z78.9 IMPAIRED MOTOR CONTROL: ICD-10-CM

## 2020-10-20 PROCEDURE — 97116 GAIT TRAINING THERAPY: CPT | Mod: PN

## 2020-10-20 NOTE — PROGRESS NOTES
"    Physical Therapy Treatment Note     Name: Alberto Dangelo  Clinic Number: [id number]    Therapy Diagnosis:   Encounter Diagnoses   Name Primary?   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      Physician: Dirk Ortiz MD    Visit Date: 10/20/2020    Physician Orders: Eval and treat (resuming PT following postponement due to Covid-19)  Medical Diagnosis: S14.129A (ICD-10-CM) - Central cord syndrome at unspecified level of cervical spinal cord  Evaluation Date: 8/26/2016  Current Certification Period: 6/23/2020 to 10/23/2020  Authorization Period Expiration: 12/31/2020  Plan of Care Expiration: 10/23/2020  Visit #/Visits authorized: 26/30 (referral - No visit limit due to medical necessity)    Time In: 1430  Time Out: 1555  Total Billable Time: 67 minutes    Precautions: Fall and immune concerns due to impaired breathing 2* to quadriplegia    Subjective     Pt reports: "I'm running late today, getting work done."  "I do an hour of yoga and an hour of cardio every day.  I'm not doing as much weight lifting as I did"  Response to previous treatment: No soreness reported  Functional change: Participating in regular exercise routine at home.  .      Pain: 0/10  Location: N/A      Objective     Patient performed self stretching prior to set up in Lokomat orthosis.    L-force testing completed this date.   Efren participated in gait training to improve functional mobility and safety for 67 minutes, including:Set up Lokomat with 5 kg unloading with cuff size of 6 at thigh, 6 at upper shin, 5 at lower shin.   Pt participated in computer assisted robotic gait training via Lokomat @ 3.2 kph x 59 min, 14 sec for a total distance of 3100m.  Utilized guidance force of 40% x 10 min, decreased to 30% x 10 min, reduced again to 25% for 5 min, 20% guidance force x 15'.  Reduced guidance force to 15% for 15 min.  Completed session @ 10% guidance " force with increased unweighting. 2 episodes of foot drag occurred when decreasing guidance force to 15%. Session disrupted at 15 minute naina due to reported rubbing on L shin.  Adjusted sock to decrease friction on area and was able to complete session.  Minimal increased unweighting required for gait training @ 20% guidance force with additional increased unweighting required for guidance force at 15% and 10%.      Home Exercises Provided and Patient Education Provided     Education provided:   - None this date      Written Home Exercises Provided: yes.  Exercises were reviewed and Efren was able to demonstrate them prior to the end of the session.  Efren demonstrated good  understanding of the education provided. Patient continues to participate in extensive HEP regularly    See EMR under Patient Instructions for exercises provided 6/30/2020.  Patient continues to participate in home exercise program including use of FES bike, stretching, etc.      Assessment     Patient progressing in tolerance for progressive gait activities as indicated by ability to continue gait training with decreased guidance force; however, in order to tolerate decreased guidance force, he required increased unweighting to prevent foot drag.    Efren is progressing slowly towards his goals.   Pt prognosis is Fair.     Pt will continue to benefit from skilled outpatient physical therapy to address the deficits listed in the problem list box on initial evaluation, provide pt/family education and to maximize pt's level of independence in the home and community environment.     Pt's spiritual, cultural and educational needs considered and pt agreeable to plan of care and goals.     Anticipated barriers to physical therapy: Increased extensor tone.       Goals:    See updated POC    Plan     See updated POC    Candice Herzog, PT

## 2020-10-23 ENCOUNTER — CLINICAL SUPPORT (OUTPATIENT)
Dept: REHABILITATION | Facility: HOSPITAL | Age: 60
End: 2020-10-23
Payer: COMMERCIAL

## 2020-10-23 DIAGNOSIS — R53.1 DECREASED STRENGTH: ICD-10-CM

## 2020-10-23 DIAGNOSIS — S14.129A INCOMPLETE INJURY OF CERVICAL SPINAL CORD WITH CENTRAL CORD SYNDROME: ICD-10-CM

## 2020-10-23 DIAGNOSIS — Z78.9 IMPAIRED MOTOR CONTROL: ICD-10-CM

## 2020-10-23 DIAGNOSIS — R26.9 GAIT DISTURBANCE: ICD-10-CM

## 2020-10-23 PROCEDURE — 97116 GAIT TRAINING THERAPY: CPT | Mod: PN

## 2020-10-23 NOTE — PROGRESS NOTES
"  Physical Therapy Treatment Note     Name: Alberto Dangelo  [facility] Number: [id number]    Therapy Diagnosis:   Encounter Diagnoses   Name Primary?   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      Physician: Dirk Ortiz MD    Visit Date: 10/23/2020    Physician Orders: Eval and treat (resuming PT following postponement due to Covid-19)  Medical Diagnosis: S14.129A (ICD-10-CM) - Central cord syndrome at unspecified level of cervical spinal cord  Evaluation Date: 8/26/2016  Current Certification Period: 6/23/2020 to 10/23/2020  Authorization Period Expiration: 12/31/2020  Plan of Care Expiration: 10/23/2020  Visit #/Visits authorized: 27/30 (referral - No visit limit due to medical necessity)    Time In: 1405  Time Out: 1530  Total Billable Time: 67 minutes    Precautions: Fall and immune concerns due to impaired breathing 2* to quadriplegia     Subjective     Pt reports: "I realized I have let things slip a little.  I can't get up from my chair without pushing up.  I could do it but it's slipped recently".  He was compliant with home exercise program.  Response to previous treatment: No problems  Functional change: continues with increasing activity tolerance    Pain: 0/10    Objective     Efren participated in gait training to improve functional mobility and safety for 67  minutes, including:  Set up with 5 kg unloading.  Pt participated in computer assisted robotic gait training via [employer] @ 3.2 kph x 58 min, 31 sec for a total distance of 3098 m.  Utilized guidance force of 40% x 10 min, decreased to 30% x 10', decreased again to 20% x 20'.  Decreased to 15% and continued gait training x 10' but required a slight increase in unweighting to prevent foot drag.  Completed session @ 10% guidance force but required further increase in unweighting due to foot drag.  Foot drag contributed to 3 stoppages in training.  On 3rd stoppage " training was discontinued due to patient fatigue.        Home Exercises Provided and Patient Education Provided     Education provided:   - Discussed importance of incorporating functional transfers in activities at home.  Verbalized understanding.      Written Home Exercises Provided: Patient instructed to cont prior HEP.  Exercises were reviewed and Efren was able to demonstrate them prior to the end of the session.  Efren demonstrated good  understanding of the education provided.     See EMR under Patient Instructions for exercises provided prior visit.    Assessment     Patient tolerating decreased guidance force but requires increased unweighting at < 20% guidance force to prevent foot drag.  Patient fatigues with gait activities and thus has limited tolerance for decreased guidance force toward end of session.    Efren is progressing slowly towards his goals.   Pt prognosis is Fair.     Pt will continue to benefit from skilled outpatient physical therapy to address the deficits listed in the problem list box on initial evaluation, provide pt/family education and to maximize pt's level of independence in the home and community environment.     Pt's spiritual, cultural and educational needs considered and pt agreeable to plan of care and goals.     Anticipated barriers to physical therapy: Increased extensor tone    Goals:   Short term goals:              1) Patient will tolerate 60 min of gait training without difficulty  Nearly met (progressing: completed > 58' with minimal foot drag after adjusting angle of B ankles and adjustment of unweighting)              2) Patient will tolerate guidance force decreased to < 30% for > 40 min of training  Progressing but fluctuates.  (achieved 38 min gait training at < 30% guidance force but required increased unweighting due to foot drag at 20%, 15% and 10% guidance force)              3) Patient will demonstrate improvement in LE flexibility   (Ongoing)                4)  Patient will stand with minimal UE support demonstrating appropriate knee (no hyperextension) and lumbar positioning (neutral pelvic tilt) x 5 min   Ongoing      Long term goals:            1) Increase strength in B  LE as indicated by improved L-force testing by 5-10 nM at hip flex/ext and by 5 nM in knee flex/ext   Ongoing/not assessed this date              2)  Pt will stand > 10-15 min with minimal UE support while demonstrating appropriate posture.  Progressing, continues with increased anterior pelvic tilt and intermittent knee hyperextension            3) Pt will consistently perform safe stand pivot transfers with minimal to moderate UE support ,  Minimal progress                  4) Pt will demonstrate improved bilateral hip flexion strength to 2/5 to improve ability to perform swing phase/ foot clearance during gait  Ongoing               5) Pt will demonstrate appropriate weight shift during ambulation to limit hip hiking and excessive weight shift.   Minimal progress    Plan     Progressively decrease guidance force as patient tolerates.      Candice Herzog, PT

## 2020-10-26 NOTE — PLAN OF CARE
"  Outpatient Therapy Updated Plan of Care     Visit Date: 10/20/2020    Name: Alberto Dangelo  Clinic Number: [id number]    Therapy Diagnosis:   Encounter Diagnoses   Name Primary?   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      Physician: Dirk Ortiz MD    Physician Orders: Eval and treat (resuming PT following postponement due to Covid-19)  Medical Diagnosis: S14.129A (ICD-10-CM) - Central cord syndrome at unspecified level of cervical spinal cord  Evaluation Date: 8/26/2016  Current Certification Period: 6/23/2020 to 10/23/2020  Authorization Period Expiration: 12/31/2020  Plan of Care Expiration: 10/23/2020  Visit #/Visits authorized: 26/30 (referral - No visit limit due to medical necessity)    Precautions: Fall and immune concerns due to impaired breathing 2* to quadriplegia  Functional Level Prior to Evaluation:  Ambulates household distances w RW with significant difficulty, main means of mobility is manual w/c.Pt is able to drive w hand controls. Home equipment includes B AFO's, shower chair, ramped entrance w support bars to swimming pool, FES bike, free weights, plyobox system.    Subjective     Update: "I thought I could get up from my other chair without using my hands but I was wrong.  I've let things slip a little".      Objective     Update:   L-force test:                                             LEFT                                                 RIGHT                             FLEX                  EXT                      FLEX                     EXT                HIP      2.07 Nm             11.32 Nm               3.74 Nm                10.43 Nm                                                             KNEE     4.89  Nm            27.16 Nm               0.15 Nm                16.45 Nm      Gait:  Pt tolerates 60+ minutes of gait on [employer] @ 5 kg unloading and 3.2 kph with guidance force at 40%, " decreasing as low as 10%.  He continues to ambulate occasionally using forearm crutches while in parallel bars at home due to fear of falling.    Posture:  Pt stands with foot flat position, feet less than shoulder width apart.  Minimal to moderate knee hyperextension and moderate anterior pelvic tilt/increased lumbar lordosis; however, he can self correct with verbal cues but requires UE support.  He is able to maintain standing position for short periods without UE support but demonstrates increased anterior pelvic tilt and knee hyperextension.  Balance: Sitting balance unimpaired.    Standing static balance:  Fair  Requires intermittent UE support  He can maintain static standing balance with support of 1 UE.  He lacks adequate balance responses in ankles to adjust for balance challenges.  He is able to use his UE to correct balance when support is available   Dynamic standing balance.  Patient requires B UE support for dynamic standing activities.    Transfers:  Sit <> stand: pt continues to require minimal UE support to achieve sit > stand from higher surface and greater UE support to perform sit > stand from lower surface.  He continues to require substantial anterior weight shift to insure weight is fully over his feet.  Ascent is controlled but upon reaching erect position he tends to hyperextend B knees. Once he gains his balance with knees locked, he is able to unlock his knees briefly.  Stand > sit continues to require moderate UE support but is controlled descent.  Stand pivot transfers moderate UE support required.    Flexibility:  Continues to demonstrate moderate tightness in B calves, B hamstrings, and B hip flexors.   Function:  Pt reports he is able to stand 15 min with UE support.  Demonstrates the ability to perform stand stand pivot transfer with moderate UE support.  Patient is able to ambulate a few steps with minimal to moderate UE support.  Otherwise he does require substantial UE support  due to tone.  He demonstrates excess toe off with knee extension during terminal stance phase as he swings opposite foot forward.    Skin: He reports no issues with skin breakdown.     Assessment     Update: Patient is making slow progress in PT with improved activity tolerance, improved tolerance for gait training activities (guidance force reduction), and improved LE strength as noted by improvement in L-force results.  However, he continues to struggle with sit > stand with limited - no UE support, with standing using appropriate knee and pelvic position, and with standing balance.                Previous Short Term Goals Status:      1) Patient will tolerate 60 min of gait training without difficulty  Nearly met (progressing: completed > 60' with minimal foot drag after adjusting angle of B ankles and adjustment of unweighting)              2) Patient will tolerate guidance force decreased to < 30% for > 40 min of training  Progressing but fluctuates.  (achieved 35-40 min gait training at < 30% guidance force but required increased unweighting due to foot drag at 20%, 15% and 10% guidance force)              3) Patient will demonstrate improvement in LE flexibility   (Ongoing)                4) Patient will stand with minimal UE support demonstrating appropriate knee (no hyperextension) and lumbar positioning (neutral pelvic tilt) x 5 min  (observed 1-2 min) Ongoing      New Short Term Goals Status:      #1-4 ongoing    Long Term Goal Status:   continue per initial plan of care.   1) Increase strength in B  LE as indicated by improved L-force testing by 5-10 nM at hip flex/ext and by 5 nM in knee flex/ext   Ongoing/not assessed this date              2)  Pt will stand > 10-15 min with minimal UE support while demonstrating appropriate posture.  Progressing, continues with increased anterior pelvic tilt and intermittent knee hyperextension            3) Pt will consistently perform safe stand pivot transfers with  minimal to moderate UE support ,  Minimal progress                 4) Pt will demonstrate improved bilateral hip flexion strength to 2/5 to improve ability to perform swing phase/ foot clearance during gait  Ongoing              5) Pt will demonstrate appropriate weight shift during ambulation to limit hip hiking and excessive weight shift.   Minimal progress    Reasons for Recertification of Therapy:   Patient has made slow but steady progress in PT but continues to demonstrate impaired functional mobility, impaired balance, and impaired gait.      Plan     Updated Certification Period: 10/20/2020 to 2/20/20201  Recommended Treatment Plan: 2 times per week for 16 weeks: Gait Training, Neuromuscular Re-ed, Therapeutic Exercise and Locomotor training  Other Recommendations: N/A    Candice Herzog, PT  10/20/2020      I CERTIFY THE NEED FOR THESE SERVICES FURNISHED UNDER THIS PLAN OF TREATMENT AND WHILE UNDER MY CARE    Physician's comments:        Physician's Signature: ___________________________________________________

## 2020-10-30 ENCOUNTER — PATIENT MESSAGE (OUTPATIENT)
Dept: ADMINISTRATIVE | Facility: HOSPITAL | Age: 60
End: 2020-10-30

## 2020-10-30 ENCOUNTER — LAB VISIT (OUTPATIENT)
Dept: LAB | Facility: HOSPITAL | Age: 60
End: 2020-10-30
Attending: FAMILY MEDICINE
Payer: COMMERCIAL

## 2020-10-30 ENCOUNTER — OFFICE VISIT (OUTPATIENT)
Dept: FAMILY MEDICINE | Facility: CLINIC | Age: 60
End: 2020-10-30
Attending: FAMILY MEDICINE
Payer: COMMERCIAL

## 2020-10-30 VITALS
HEART RATE: 65 BPM | TEMPERATURE: 98 F | HEIGHT: 71 IN | BODY MASS INDEX: 23.3 KG/M2 | SYSTOLIC BLOOD PRESSURE: 128 MMHG | WEIGHT: 166.44 LBS | DIASTOLIC BLOOD PRESSURE: 72 MMHG | OXYGEN SATURATION: 97 %

## 2020-10-30 DIAGNOSIS — S14.129A INCOMPLETE INJURY OF CERVICAL SPINAL CORD WITH CENTRAL CORD SYNDROME: ICD-10-CM

## 2020-10-30 DIAGNOSIS — R26.9 GAIT DISTURBANCE: ICD-10-CM

## 2020-10-30 DIAGNOSIS — Z12.5 PROSTATE CANCER SCREENING: ICD-10-CM

## 2020-10-30 DIAGNOSIS — R53.1 DECREASED STRENGTH: Chronic | ICD-10-CM

## 2020-10-30 DIAGNOSIS — Z91.89 PNEUMOCOCCAL VACCINATION INDICATED: ICD-10-CM

## 2020-10-30 DIAGNOSIS — Z00.00 ENCOUNTER FOR PREVENTIVE HEALTH EXAMINATION: ICD-10-CM

## 2020-10-30 DIAGNOSIS — Z12.11 COLON CANCER SCREENING: ICD-10-CM

## 2020-10-30 DIAGNOSIS — G90.9 AUTONOMIC DYSFUNCTION: ICD-10-CM

## 2020-10-30 DIAGNOSIS — Z78.9 IMPAIRED MOTOR CONTROL: Chronic | ICD-10-CM

## 2020-10-30 DIAGNOSIS — G82.50 QUADRIPLEGIA: ICD-10-CM

## 2020-10-30 DIAGNOSIS — Z23 IMMUNIZATION DUE: ICD-10-CM

## 2020-10-30 DIAGNOSIS — Z00.00 ENCOUNTER FOR PREVENTIVE HEALTH EXAMINATION: Primary | ICD-10-CM

## 2020-10-30 DIAGNOSIS — K21.9 GASTROESOPHAGEAL REFLUX DISEASE, UNSPECIFIED WHETHER ESOPHAGITIS PRESENT: ICD-10-CM

## 2020-10-30 PROCEDURE — 90472 PNEUMOCOCCAL POLYSACCHARIDE VACCINE 23-VALENT =>2YO SQ IM: ICD-10-PCS | Mod: S$GLB,,, | Performed by: FAMILY MEDICINE

## 2020-10-30 PROCEDURE — 90471 IMMUNIZATION ADMIN: CPT | Mod: S$GLB,,, | Performed by: FAMILY MEDICINE

## 2020-10-30 PROCEDURE — 90686 IIV4 VACC NO PRSV 0.5 ML IM: CPT | Mod: S$GLB,,, | Performed by: FAMILY MEDICINE

## 2020-10-30 PROCEDURE — 80053 COMPREHEN METABOLIC PANEL: CPT

## 2020-10-30 PROCEDURE — 90732 PNEUMOCOCCAL POLYSACCHARIDE VACCINE 23-VALENT =>2YO SQ IM: ICD-10-PCS | Mod: S$GLB,,, | Performed by: FAMILY MEDICINE

## 2020-10-30 PROCEDURE — 99999 PR PBB SHADOW E&M-EST. PATIENT-LVL III: ICD-10-PCS | Mod: PBBFAC,,, | Performed by: FAMILY MEDICINE

## 2020-10-30 PROCEDURE — 90471 FLU VACCINE (QUAD) GREATER THAN OR EQUAL TO 3YO PRESERVATIVE FREE IM: ICD-10-PCS | Mod: S$GLB,,, | Performed by: FAMILY MEDICINE

## 2020-10-30 PROCEDURE — 90472 IMMUNIZATION ADMIN EACH ADD: CPT | Mod: S$GLB,,, | Performed by: FAMILY MEDICINE

## 2020-10-30 PROCEDURE — 84153 ASSAY OF PSA TOTAL: CPT

## 2020-10-30 PROCEDURE — 3008F PR BODY MASS INDEX (BMI) DOCUMENTED: ICD-10-PCS | Mod: CPTII,S$GLB,, | Performed by: FAMILY MEDICINE

## 2020-10-30 PROCEDURE — 36415 COLL VENOUS BLD VENIPUNCTURE: CPT | Mod: PO

## 2020-10-30 PROCEDURE — 99396 PREV VISIT EST AGE 40-64: CPT | Mod: 25,S$GLB,, | Performed by: FAMILY MEDICINE

## 2020-10-30 PROCEDURE — 80061 LIPID PANEL: CPT

## 2020-10-30 PROCEDURE — 90732 PPSV23 VACC 2 YRS+ SUBQ/IM: CPT | Mod: S$GLB,,, | Performed by: FAMILY MEDICINE

## 2020-10-30 PROCEDURE — 99999 PR PBB SHADOW E&M-EST. PATIENT-LVL III: CPT | Mod: PBBFAC,,, | Performed by: FAMILY MEDICINE

## 2020-10-30 PROCEDURE — 85025 COMPLETE CBC W/AUTO DIFF WBC: CPT

## 2020-10-30 PROCEDURE — 3008F BODY MASS INDEX DOCD: CPT | Mod: CPTII,S$GLB,, | Performed by: FAMILY MEDICINE

## 2020-10-30 PROCEDURE — 99396 PR PREVENTIVE VISIT,EST,40-64: ICD-10-PCS | Mod: 25,S$GLB,, | Performed by: FAMILY MEDICINE

## 2020-10-30 PROCEDURE — 90686 FLU VACCINE (QUAD) GREATER THAN OR EQUAL TO 3YO PRESERVATIVE FREE IM: ICD-10-PCS | Mod: S$GLB,,, | Performed by: FAMILY MEDICINE

## 2020-10-30 NOTE — PROGRESS NOTES
Subjective:       Patient ID: Alberto Dangelo is a 60 y.o. male.    Chief Complaint: No chief complaint on file.    60-year-old male coming in for annual exam and follow-up of multiple medical problems.  He has an incomplete cervical cord injury with central cord syndrome, quadriplegia, history of deep vein thrombosis and pulmonary embolism, autonomic nerve dysfunction and reflux.  He is feeling well and has no active problems at this time.  Reflexes kept under control with Prilosec and his occasional muscle spasms are dealt with with low-dose Valium.  He is fasting for lab and is due for a flu vaccine, Pneumovax 23, fit kit, shingles vaccine which he declines at this time.    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  No date: Neurogenic bladder  No date: Pulmonary embolism    Past Surgical History:  No date: NECK SURGERY  No date: SPINE SURGERY      Comment:  fuse C 6 - T 1 burst C7  No date: TONSILLECTOMY  No date: WISDOM TOOTH EXTRACTION    Review of patient's family history indicates:  Problem: Cancer      Relation: Mother          Age of Onset: (Not Specified)          Comment: brain  Problem: Early death      Relation: Mother          Age of Onset: (Not Specified)  Problem: Cancer      Relation: Father          Age of Onset: (Not Specified)          Comment: tumor foot   Problem: Heart disease      Relation: Father          Age of Onset: (Not Specified)  Problem: Cancer      Relation: Sister          Age of Onset: (Not Specified)          Comment: ovarian  Problem: No Known Problems      Relation: Brother          Age of Onset: (Not Specified)  Problem: No Known Problems      Relation: Daughter          Age of Onset: (Not Specified)  Problem: No Known Problems      Relation: Son          Age of  Onset: (Not Specified)  Problem: Heart disease      Relation: Maternal Grandmother          Age of Onset: (Not Specified)  Problem: Heart disease      Relation: Paternal Grandmother          Age of Onset: (Not Specified)  Problem: Heart disease      Relation: Maternal Grandfather          Age of Onset: (Not Specified)  Problem: Parkinsonism      Relation: Paternal Grandfather          Age of Onset: (Not Specified)  Problem: Drug abuse      Relation: Maternal Aunt          Age of Onset: (Not Specified)  Problem: Early death      Relation: Maternal Aunt          Age of Onset: (Not Specified)  Problem: No Known Problems      Relation: Maternal Uncle          Age of Onset: (Not Specified)  Problem: No Known Problems      Relation: Paternal Aunt          Age of Onset: (Not Specified)  Problem: Drug abuse      Relation: Paternal Uncle          Age of Onset: (Not Specified)  Problem: Heart disease      Relation: Paternal Uncle          Age of Onset: (Not Specified)  Problem: Kidney disease      Relation: Paternal Uncle          Age of Onset: (Not Specified)  Problem: No Known Problems      Relation: Paternal Uncle          Age of Onset: (Not Specified)    Social History    Tobacco Use      Smoking status: Never Smoker      Smokeless tobacco: Never Used    Alcohol use: No      Frequency: Never      Drinks per session: Patient refused      Binge frequency: Never    Drug use: No    Current Outpatient Medications on File Prior to Visit:  aspirin (ECOTRIN) 81 MG EC tablet, Take 81 mg by mouth once daily., Disp: , Rfl:   bisacodyl (FLEET) 10 mg/30 mL Enem, Place 10 mg rectally once daily., Disp: , Rfl:   diazePAM (VALIUM) 5 MG tablet, TAKE 1 TABLET BY MOUTH EVERY 12 HOURS AS NEEDED, Disp: 60 tablet, Rfl: 0  magnesium oxide (MAG-OX) 400 mg (241.3 mg magnesium) tablet, TAKE 1 TABLET(400 MG) BY MOUTH TWICE DAILY, Disp: 180 tablet, Rfl: 0  midodrine (PROAMATINE) 10 MG tablet, TAKE 1 TABLET(10 MG) BY MOUTH THREE TIMES DAILY, Disp: 90  tablet, Rfl: 0  omeprazole (PRILOSEC) 20 MG capsule, TAKE 1 CAPSULE(20 MG) BY MOUTH EVERY DAY, Disp: 90 capsule, Rfl: 3  POLYETHYLENE GLYCOL 3350 (MIRALAX ORAL), Take 1 Dose by mouth every evening., Disp: , Rfl:   senna (SENOKOT) 8.6 mg tablet, Take 2 tablets by mouth once daily., Disp: , Rfl:   vitamin D 1000 units Tab, Take 185 mg by mouth once daily., Disp: , Rfl:     No current facility-administered medications on file prior to visit.     HIV Screening due on [birth year]04/1977  Shingles Vaccine(1 of 2) due on 03/04/2012  Pneumococcal Vaccine (Highest Risk)(2 of 3 - PPSV23) due on 06/17/2019  Colorectal Cancer Screening due on 04/25/2020  TETANUS VACCINE due on 07/30/2020  Influenza Vaccine(1) due on 08/01/2020      Review of Systems   Constitutional: Negative for activity change, appetite change, chills, diaphoresis, fatigue and fever.   HENT: Negative for congestion (Occasional), ear pain, rhinorrhea and sore throat.    Eyes: Positive for redness and itching. Negative for discharge (Recent stye, some allergy symptoms) and visual disturbance.   Respiratory: Negative for cough, chest tightness and shortness of breath.    Cardiovascular: Negative for chest pain and palpitations.   Gastrointestinal: Negative for abdominal pain, blood in stool, constipation, diarrhea, nausea and vomiting.   Genitourinary: Negative for difficulty urinating, dysuria and hematuria.   Musculoskeletal: Negative for arthralgias and neck pain.   Skin: Negative for color change and rash.   Neurological: Positive for weakness and numbness. Negative for dizziness and headaches.   Psychiatric/Behavioral: Negative for sleep disturbance. The patient is not nervous/anxious.        Objective:      Physical Exam  Vitals signs and nursing note reviewed.   Constitutional:       General: He is not in acute distress.     Appearance: Normal appearance. He is well-developed and normal weight. He is not ill-appearing, toxic-appearing or diaphoretic.       Comments: Good blood pressure control  Normal weight with a BMI of 23.2 he is down 5.5 lb from his last checkup November 22, 2019  He is sitting in his wheelchair with good use of upper extremities   HENT:      Head: Normocephalic and atraumatic.      Right Ear: Tympanic membrane, ear canal and external ear normal. There is no impacted cerumen.      Left Ear: Tympanic membrane, ear canal and external ear normal. There is no impacted cerumen.      Nose: Nose normal. No congestion or rhinorrhea.      Mouth/Throat:      Pharynx: No oropharyngeal exudate or posterior oropharyngeal erythema.   Eyes:      General: No scleral icterus.     Conjunctiva/sclera: Conjunctivae normal.      Pupils: Pupils are equal, round, and reactive to light.   Neck:      Musculoskeletal: Normal range of motion and neck supple. No neck rigidity or muscular tenderness.      Thyroid: No thyromegaly.      Vascular: No carotid bruit or JVD.      Trachea: No tracheal deviation.   Cardiovascular:      Rate and Rhythm: Normal rate and regular rhythm.      Heart sounds: Normal heart sounds. No murmur. No friction rub. No gallop.    Pulmonary:      Effort: Pulmonary effort is normal. No respiratory distress.      Breath sounds: Normal breath sounds. No stridor. No wheezing, rhonchi or rales.   Chest:      Chest wall: No tenderness.   Abdominal:      General: Bowel sounds are normal. There is no distension.      Palpations: Abdomen is soft. There is no mass.      Tenderness: There is no abdominal tenderness. There is no guarding or rebound.      Hernia: No hernia is present.   Musculoskeletal: Normal range of motion.   Lymphadenopathy:      Cervical: No cervical adenopathy.   Skin:     General: Skin is warm and dry.      Coloration: Skin is not jaundiced or pale.      Findings: No bruising, erythema, lesion or rash.   Neurological:      Mental Status: He is alert and oriented to person, place, and time. Mental status is at baseline.      Cranial Nerves:  No cranial nerve deficit.      Deep Tendon Reflexes: Reflexes are normal and symmetric.   Psychiatric:         Mood and Affect: Mood normal.         Behavior: Behavior normal.         Thought Content: Thought content normal.         Judgment: Judgment normal.         Assessment:       1. Encounter for preventive health examination    2. 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 Quadriplegia    4. Autonomic dysfunction    5. Gastroesophageal reflux disease, unspecified whether esophagitis present    6. Colon cancer screening    7. Pneumococcal vaccination indicated    8. Prostate cancer screening    9. Impaired motor control    10. Decreased strength    11. Gait disturbance    12. BMI 23.0-23.9, adult        Plan:       1. Encounter for preventive health examination  - Comprehensive Metabolic Panel; Future  - CBC Auto Differential; Future  - Lipid Panel; Future    2. 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 Quadriplegia    4. Autonomic dysfunction    5. Gastroesophageal reflux disease, unspecified whether esophagitis present  Continue Prilosec.  Anti reflux measures discussed    6. Colon cancer screening  Offered option of colonoscopy but he declined in favor the fit kit  - Fecal Immunochemical Test (iFOBT); Future    7. Pneumococcal vaccination indicated  To be given  - pneumococcal vaccine (PNU-IMMUNE 23) 25 mcg/0.5 mL injection; Inject 0.5 mLs into the muscle once. for 1 dose  Dispense: 0.5 mL; Refill: 0    8. Prostate cancer screening  Awaiting lab  - PSA, Screening; Future    9. Impaired motor control    10. Decreased strength    11. Gait disturbance    12. BMI 23.0-23.9, adult  Good weight

## 2020-10-31 LAB
ALBUMIN SERPL BCP-MCNC: 4.5 G/DL (ref 3.5–5.2)
ALP SERPL-CCNC: 69 U/L (ref 55–135)
ALT SERPL W/O P-5'-P-CCNC: 54 U/L (ref 10–44)
ANION GAP SERPL CALC-SCNC: 14 MMOL/L (ref 8–16)
AST SERPL-CCNC: 36 U/L (ref 10–40)
BASOPHILS # BLD AUTO: 0.06 K/UL (ref 0–0.2)
BASOPHILS NFR BLD: 1.2 % (ref 0–1.9)
BILIRUB SERPL-MCNC: 1 MG/DL (ref 0.1–1)
BUN SERPL-MCNC: 18 MG/DL (ref 6–20)
CALCIUM SERPL-MCNC: 9.7 MG/DL (ref 8.7–10.5)
CHLORIDE SERPL-SCNC: 104 MMOL/L (ref 95–110)
CHOLEST SERPL-MCNC: 187 MG/DL (ref 120–199)
CHOLEST/HDLC SERPL: 4.1 {RATIO} (ref 2–5)
CO2 SERPL-SCNC: 22 MMOL/L (ref 23–29)
COMPLEXED PSA SERPL-MCNC: 1.3 NG/ML (ref 0–4)
CREAT SERPL-MCNC: 1.1 MG/DL (ref 0.5–1.4)
DIFFERENTIAL METHOD: ABNORMAL
EOSINOPHIL # BLD AUTO: 0.1 K/UL (ref 0–0.5)
EOSINOPHIL NFR BLD: 2 % (ref 0–8)
ERYTHROCYTE [DISTWIDTH] IN BLOOD BY AUTOMATED COUNT: 11.9 % (ref 11.5–14.5)
EST. GFR  (AFRICAN AMERICAN): >60 ML/MIN/1.73 M^2
EST. GFR  (NON AFRICAN AMERICAN): >60 ML/MIN/1.73 M^2
GLUCOSE SERPL-MCNC: 80 MG/DL (ref 70–110)
HCT VFR BLD AUTO: 47.6 % (ref 40–54)
HDLC SERPL-MCNC: 46 MG/DL (ref 40–75)
HDLC SERPL: 24.6 % (ref 20–50)
HGB BLD-MCNC: 16.1 G/DL (ref 14–18)
IMM GRANULOCYTES # BLD AUTO: 0.02 K/UL (ref 0–0.04)
IMM GRANULOCYTES NFR BLD AUTO: 0.4 % (ref 0–0.5)
LDLC SERPL CALC-MCNC: 119 MG/DL (ref 63–159)
LYMPHOCYTES # BLD AUTO: 1.6 K/UL (ref 1–4.8)
LYMPHOCYTES NFR BLD: 31.8 % (ref 18–48)
MCH RBC QN AUTO: 31.9 PG (ref 27–31)
MCHC RBC AUTO-ENTMCNC: 33.8 G/DL (ref 32–36)
MCV RBC AUTO: 94 FL (ref 82–98)
MONOCYTES # BLD AUTO: 0.3 K/UL (ref 0.3–1)
MONOCYTES NFR BLD: 6.8 % (ref 4–15)
NEUTROPHILS # BLD AUTO: 2.9 K/UL (ref 1.8–7.7)
NEUTROPHILS NFR BLD: 57.8 % (ref 38–73)
NONHDLC SERPL-MCNC: 141 MG/DL
NRBC BLD-RTO: 0 /100 WBC
PLATELET # BLD AUTO: 167 K/UL (ref 150–350)
PMV BLD AUTO: 12.4 FL (ref 9.2–12.9)
POTASSIUM SERPL-SCNC: 4.1 MMOL/L (ref 3.5–5.1)
PROT SERPL-MCNC: 7.6 G/DL (ref 6–8.4)
RBC # BLD AUTO: 5.04 M/UL (ref 4.6–6.2)
SODIUM SERPL-SCNC: 140 MMOL/L (ref 136–145)
TRIGL SERPL-MCNC: 110 MG/DL (ref 30–150)
WBC # BLD AUTO: 5 K/UL (ref 3.9–12.7)

## 2020-11-02 DIAGNOSIS — R94.5 ABNORMAL LIVER FUNCTION: Primary | ICD-10-CM

## 2020-11-03 ENCOUNTER — CLINICAL SUPPORT (OUTPATIENT)
Dept: REHABILITATION | Facility: HOSPITAL | Age: 60
End: 2020-11-03
Payer: COMMERCIAL

## 2020-11-03 DIAGNOSIS — S14.129A INCOMPLETE INJURY OF CERVICAL SPINAL CORD WITH CENTRAL CORD SYNDROME: ICD-10-CM

## 2020-11-03 DIAGNOSIS — R53.1 DECREASED STRENGTH: ICD-10-CM

## 2020-11-03 DIAGNOSIS — R26.9 GAIT DISTURBANCE: ICD-10-CM

## 2020-11-03 DIAGNOSIS — Z78.9 IMPAIRED MOTOR CONTROL: ICD-10-CM

## 2020-11-03 PROCEDURE — 97116 GAIT TRAINING THERAPY: CPT | Mod: PN

## 2020-11-05 ENCOUNTER — CLINICAL SUPPORT (OUTPATIENT)
Dept: REHABILITATION | Facility: HOSPITAL | Age: 60
End: 2020-11-05
Payer: COMMERCIAL

## 2020-11-05 DIAGNOSIS — S14.129A INCOMPLETE INJURY OF CERVICAL SPINAL CORD WITH CENTRAL CORD SYNDROME: ICD-10-CM

## 2020-11-05 DIAGNOSIS — Z78.9 IMPAIRED MOTOR CONTROL: ICD-10-CM

## 2020-11-05 DIAGNOSIS — R53.1 DECREASED STRENGTH: ICD-10-CM

## 2020-11-05 PROCEDURE — 97116 GAIT TRAINING THERAPY: CPT | Mod: PN,CQ

## 2020-11-05 NOTE — PROGRESS NOTES
Physical Therapy Treatment Note     Name: Alberto Dangelo  [facility] Number: [id number]    Therapy Diagnosis:   Encounter Diagnoses   Name Primary?   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      Physician: Dirk Ortiz MD    Visit Date: 11/3/2020    Physician Orders: Eval and treat (resuming PT following postponement due to Covid-19)  Medical Diagnosis: S14.129A (ICD-10-CM) - Central cord syndrome at unspecified level of cervical spinal cord  Evaluation Date: 8/26/2016  Current Certification Period: 6/23/2020 to 10/23/2020  Authorization Period Expiration: 12/31/2020  Plan of Care Expiration: 2/20/2021  Visit #/Visits authorized: 28/45 (referral - No visit limit due to medical necessity)    Time In: 1445  Time Out: 1600  Total Billable Time: 67 minutes    Precautions: Fall and immune concerns due to impaired breathing 2* to quadriplegia     Subjective     Pt reports: no problems since last session.    He was compliant with home exercise program.  Response to previous treatment: Tolerated session well  Functional change: continues with increasing activity tolerance    Pain: 0/10    Objective     Efren participated in self stretching prior to beginning of session.  Received gait training to improve functional mobility and safety for 67  minutes, including: Set up with 5 kg unloading.  Pt participated in computer assisted robotic gait training via [employer] @ 3.2 kph x 60 min, 09 sec for a total distance of 3178 m.  Utilized guidance force of 40% x 10 min, decreased to 30% x 10', decreased again to 20% x 21'.  Decreased to 15% and continued gait training x 9' but required a slight increase in unweighting to prevent foot drag.  Completed session @ 10% guidance force but required further increase in unweighting due to foot drag.  Foot drag contributed to 1 stoppages in training.     Home Exercises Provided and Patient Education Provided      Education provided:   - Discussed importance of incorporating functional transfers in activities at home.  Verbalized understanding.      Written Home Exercises Provided: Patient instructed to cont prior HEP.  Exercises were reviewed and Efren was able to demonstrate them prior to the end of the session.  Efren demonstrated good  understanding of the education provided.     See EMR under Patient Instructions for exercises provided prior visit.    Assessment     Patient demonstrated decreased anterior pelvic tilt during standing for set up but increased during removal of padding/harness following session due to fatigue.    Requires increased unweighting with guidance force decreased to < 20%.      Efren is progressing slowly towards his goals.   Pt prognosis is Fair.     Pt will continue to benefit from skilled outpatient physical therapy to address the deficits listed in the problem list box on initial evaluation, provide pt/family education and to maximize pt's level of independence in the home and community environment.     Pt's spiritual, cultural and educational needs considered and pt agreeable to plan of care and goals.     Anticipated barriers to physical therapy: Increased extensor tone    Goals:   Short Term Goals:                 1) Patient will tolerate 60 min of gait training without difficulty  Nearly met (progressing: completed > 60' with minimal foot drag after adjusting angle of B ankles and adjustment of unweighting)              2) Patient will tolerate guidance force decreased to < 30% for > 40 min of training  Progressing but fluctuates.  (achieved 35-40 min gait training at < 30% guidance force but required increased unweighting due to foot drag at 15% and 10% guidance force)              3) Patient will demonstrate improvement in LE flexibility   (Ongoing)                4) Patient will stand with minimal UE support demonstrating appropriate knee (no hyperextension) and lumbar positioning  (neutral pelvic tilt) x 5 min  (observed 1-2 min) Ongoing       Long Term Goal Status:              1) Increase strength in B  LE as indicated by improved L-force testing by 5-10 nM at hip flex/ext and by 5 nM in knee flex/ext   Ongoing/not assessed this date              2)  Pt will stand > 10-15 min with minimal UE support while demonstrating appropriate posture.  Progressing, continues with increased anterior pelvic tilt and intermittent knee hyperextension            3) Pt will consistently perform safe stand pivot transfers with minimal to moderate UE support ,  Minimal progress                 4) Pt will demonstrate improved bilateral hip flexion strength to 2/5 to improve ability to perform swing phase/ foot clearance during gait  Ongoing              5) Pt will demonstrate appropriate weight shift during ambulation to limit hip hiking and excessive weight shift.   Minimal progress    Plan     Trial of 10% guidance force after initial training at 40% to perform training at 10% prior to significant fatigue.      Candice Herzog, PT

## 2020-11-06 NOTE — PROGRESS NOTES
Physical Therapy Treatment Note     Name: Alberto Dangelo  [facility] Number: [id number]    Therapy Diagnosis:   Encounter Diagnoses   Name Primary?   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      Physician: Dirk Ortiz MD    Visit Date: 11/5/2020    Physician Orders: Eval and treat (resuming PT following postponement due to Covid-19)  Medical Diagnosis: S14.129A (ICD-10-CM) - Central cord syndrome at unspecified level of cervical spinal cord  Evaluation Date: 8/26/2016  Current Certification Period: 6/23/2020 to 10/23/2020  Authorization Period Expiration: 12/31/2020  Plan of Care Expiration: 2/20/2021  Visit #/Visits authorized: 29/45 (referral - No visit limit due to medical necessity)    Time In: 1444  Time Out: 1600  Total Billable Time: 66 minutes    Precautions: Fall and immune concerns due to impaired breathing 2* to quadriplegia     Subjective     Pt reports: delayed 2* waiting on serviceman at his home. No complaints.    He was compliant with home exercise program.  Response to previous treatment: mild soreness after last session  Functional change: continues with increasing activity tolerance    Pain: 0/10    Objective     Efren participated in self stretching prior to beginning of session.  Received gait training to improve functional mobility and safety for 66  minutes, including: Set up with 5 kg unloading.  Pt participated in computer assisted robotic gait training via [employer] @ 3.2 kph x 60 min, 37 sec for a total distance of 3165 m.  Utilized guidance force of 40% x 14 min, decreased to 30% x 13', decreased again to 20% x 20'.  Decreased to 15% and continued gait training x 2' but required a slight increase in unweighting to prevent foot drag.  Completed session @ 10% guidance force but required further increase in unweighting due to foot drag.  Foot drag contributed to several stoppages in training.     Home  Exercises Provided and Patient Education Provided     Education provided:   - Discussed importance of incorporating functional transfers in activities at home.  Verbalized understanding.      Written Home Exercises Provided: Patient instructed to cont prior HEP.  Exercises were reviewed and Efren was able to demonstrate them prior to the end of the session.  Efren demonstrated good  understanding of the education provided.     See EMR under Patient Instructions for exercises provided prior visit.    Assessment     B LE tones today which improved with mobility; however, increased tones once GF is decreased at or below 20% and requires increased unweighting to prevent stoppages.     Efren is progressing slowly towards his goals.   Pt prognosis is Fair.     Pt will continue to benefit from skilled outpatient physical therapy to address the deficits listed in the problem list box on initial evaluation, provide pt/family education and to maximize pt's level of independence in the home and community environment.     Pt's spiritual, cultural and educational needs considered and pt agreeable to plan of care and goals.     Anticipated barriers to physical therapy: Increased extensor tone    Goals:   Short Term Goals:                 1) Patient will tolerate 60 min of gait training without difficulty  Nearly met (progressing: completed > 60' with minimal foot drag after adjusting angle of B ankles and adjustment of unweighting)              2) Patient will tolerate guidance force decreased to < 30% for > 40 min of training  Progressing but fluctuates.  (achieved 35-40 min gait training at < 30% guidance force but required increased unweighting due to foot drag at 15% and 10% guidance force)              3) Patient will demonstrate improvement in LE flexibility   (Ongoing)                4) Patient will stand with minimal UE support demonstrating appropriate knee (no hyperextension) and lumbar positioning (neutral pelvic tilt) x  5 min  (observed 1-2 min) Ongoing       Long Term Goal Status:              1) Increase strength in B  LE as indicated by improved L-force testing by 5-10 nM at hip flex/ext and by 5 nM in knee flex/ext   Ongoing/not assessed this date              2)  Pt will stand > 10-15 min with minimal UE support while demonstrating appropriate posture.  Progressing, continues with increased anterior pelvic tilt and intermittent knee hyperextension            3) Pt will consistently perform safe stand pivot transfers with minimal to moderate UE support ,  Minimal progress                 4) Pt will demonstrate improved bilateral hip flexion strength to 2/5 to improve ability to perform swing phase/ foot clearance during gait  Ongoing              5) Pt will demonstrate appropriate weight shift during ambulation to limit hip hiking and excessive weight shift.   Minimal progress    Plan     Trial of 10% guidance force after initial training at 40% to perform training at 10% prior to significant fatigue.      Giulia Lock, PTA

## 2020-11-09 ENCOUNTER — LAB VISIT (OUTPATIENT)
Dept: LAB | Facility: HOSPITAL | Age: 60
End: 2020-11-09
Attending: FAMILY MEDICINE
Payer: COMMERCIAL

## 2020-11-09 DIAGNOSIS — Z12.11 COLON CANCER SCREENING: ICD-10-CM

## 2020-11-09 PROCEDURE — 82274 ASSAY TEST FOR BLOOD FECAL: CPT

## 2020-11-10 ENCOUNTER — CLINICAL SUPPORT (OUTPATIENT)
Dept: REHABILITATION | Facility: HOSPITAL | Age: 60
End: 2020-11-10
Payer: COMMERCIAL

## 2020-11-10 DIAGNOSIS — R53.1 DECREASED STRENGTH: ICD-10-CM

## 2020-11-10 DIAGNOSIS — Z78.9 IMPAIRED MOTOR CONTROL: ICD-10-CM

## 2020-11-10 DIAGNOSIS — S14.129A INCOMPLETE INJURY OF CERVICAL SPINAL CORD WITH CENTRAL CORD SYNDROME: ICD-10-CM

## 2020-11-10 PROCEDURE — 97116 GAIT TRAINING THERAPY: CPT | Mod: PN,CQ

## 2020-11-10 NOTE — PROGRESS NOTES
Physical Therapy Treatment Note     Name: Alberto Dangleo  [facility] Number: [id number]    Therapy Diagnosis:   Encounter Diagnoses   Name Primary?   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      Physician: Dirk Ortiz MD    Visit Date: 11/10/2020    Physician Orders: Eval and treat (resuming PT following postponement due to Covid-19)  Medical Diagnosis: S14.129A (ICD-10-CM) - Central cord syndrome at unspecified level of cervical spinal cord  Evaluation Date: 8/26/2016  Current Certification Period: 6/23/2020 to 10/23/2020  Authorization Period Expiration: 12/31/2020  Plan of Care Expiration: 2/20/2021  Visit #/Visits authorized: 30/45 (referral - No visit limit due to medical necessity)    Time In: 1410  Time Out: 1530  Total Billable Time: 65 minutes    Precautions: Fall and immune concerns due to impaired breathing 2* to quadriplegia     Subjective     Pt reports: via manual w/c with service dog. No complaints  He was compliant with home exercise program.  Response to previous treatment: mild soreness after last session  Functional change: continues with increasing activity tolerance    Pain: 0/10    Objective     Efren participated in self stretching prior to beginning of session.  Received gait training to improve functional mobility and safety for 65 minutes, including: Set up with 5 kg unloading.  Pt participated in computer assisted robotic gait training via [employer] @ 3.2 kph x 60 min, 48 sec for a total distance of 3215 m.  Utilized guidance force of 40% x 10 min, decreased to 30% x 10', decreased again to 20% x 20'.  Decreased to 15% and continued gait training x 10' but required a slight increase in unweighting to prevent foot drag.  Completed session @ 10% guidance force but required further increase in unweighting due to foot drag.  Foot drag contributed to  twostoppages in training.      Home Exercises Provided and  Patient Education Provided     Education provided:   - Discussed importance of incorporating functional transfers in activities at home.  Verbalized understanding.      Written Home Exercises Provided: Patient instructed to cont prior HEP.  Exercises were reviewed and Efren was able to demonstrate them prior to the end of the session.  Efren demonstrated good  understanding of the education provided.     See EMR under Patient Instructions for exercises provided prior visit.    Assessment     Pt reluctant to try more aggressive GF decreases today 2* increased tones B LE's.     Efren is progressing slowly towards his goals.   Pt prognosis is Fair.     Pt will continue to benefit from skilled outpatient physical therapy to address the deficits listed in the problem list box on initial evaluation, provide pt/family education and to maximize pt's level of independence in the home and community environment.     Pt's spiritual, cultural and educational needs considered and pt agreeable to plan of care and goals.     Anticipated barriers to physical therapy: Increased extensor tone    Goals:   Short Term Goals:                 1) Patient will tolerate 60 min of gait training without difficulty  Nearly met (progressing: completed > 60' with minimal foot drag after adjusting angle of B ankles and adjustment of unweighting)              2) Patient will tolerate guidance force decreased to < 30% for > 40 min of training  Progressing but fluctuates.  (achieved 35-40 min gait training at < 30% guidance force but required increased unweighting due to foot drag at 15% and 10% guidance force)              3) Patient will demonstrate improvement in LE flexibility   (Ongoing)                4) Patient will stand with minimal UE support demonstrating appropriate knee (no hyperextension) and lumbar positioning (neutral pelvic tilt) x 5 min  (observed 1-2 min) Ongoing       Long Term Goal Status:              1) Increase strength in B  LE  as indicated by improved L-force testing by 5-10 nM at hip flex/ext and by 5 nM in knee flex/ext   Ongoing/not assessed this date              2)  Pt will stand > 10-15 min with minimal UE support while demonstrating appropriate posture.  Progressing, continues with increased anterior pelvic tilt and intermittent knee hyperextension            3) Pt will consistently perform safe stand pivot transfers with minimal to moderate UE support ,  Minimal progress                 4) Pt will demonstrate improved bilateral hip flexion strength to 2/5 to improve ability to perform swing phase/ foot clearance during gait  Ongoing              5) Pt will demonstrate appropriate weight shift during ambulation to limit hip hiking and excessive weight shift.   Minimal progress    Plan     Trial of 10% guidance force after initial training at 40% to perform training at 10% prior to significant fatigue.      Giulia Lock, PTA

## 2020-11-11 LAB — HEMOCCULT STL QL IA: NEGATIVE

## 2020-11-12 ENCOUNTER — PATIENT MESSAGE (OUTPATIENT)
Dept: FAMILY MEDICINE | Facility: CLINIC | Age: 60
End: 2020-11-12

## 2020-11-12 ENCOUNTER — CLINICAL SUPPORT (OUTPATIENT)
Dept: REHABILITATION | Facility: HOSPITAL | Age: 60
End: 2020-11-12
Payer: COMMERCIAL

## 2020-11-12 DIAGNOSIS — R25.2 SPASM: ICD-10-CM

## 2020-11-12 DIAGNOSIS — S14.129A INCOMPLETE INJURY OF CERVICAL SPINAL CORD WITH CENTRAL CORD SYNDROME: ICD-10-CM

## 2020-11-12 DIAGNOSIS — Z78.9 IMPAIRED MOTOR CONTROL: ICD-10-CM

## 2020-11-12 DIAGNOSIS — I95.1 ORTHOSTATIC HYPOTENSION: ICD-10-CM

## 2020-11-12 DIAGNOSIS — R53.1 DECREASED STRENGTH: ICD-10-CM

## 2020-11-12 PROCEDURE — 97116 GAIT TRAINING THERAPY: CPT | Mod: PN,CQ

## 2020-11-12 RX ORDER — MIDODRINE HYDROCHLORIDE 10 MG/1
TABLET ORAL
Qty: 90 TABLET | Refills: 3 | Status: SHIPPED | OUTPATIENT
Start: 2020-11-12 | End: 2021-06-21

## 2020-11-12 RX ORDER — DIAZEPAM 5 MG/1
5 TABLET ORAL EVERY 12 HOURS PRN
Qty: 60 TABLET | Refills: 5 | Status: SHIPPED | OUTPATIENT
Start: 2020-11-12 | End: 2021-07-06

## 2020-11-12 NOTE — PROGRESS NOTES
Refill Routing Note   Medication(s) are not appropriate for processing by [facility] for the following reason(s):     - Outside of protocol    [facility] actions taken in this encounter: Route          Medication reconciliation completed: No   Automatic Epic Generated Protocol Data:        Requested Prescriptions   Pending Prescriptions Disp Refills    midodrine (PROAMATINE) 10 MG tablet [Pharmacy Med Name: MIDODRINE 10MG TABLETS] 90 tablet 0     Sig: TAKE 1 TABLET(10 MG) BY MOUTH THREE TIMES DAILY       There is no refill protocol information for this order           Appointments  past 12m or future 3m with PCP    Date Provider   Last Visit   10/30/2020 Dirk Ortiz MD   Next Visit   Visit date not found Dirk Ortiz MD   ED visits in past 90 days: 0        Note composed:10:57 AM 11/12/2020

## 2020-11-12 NOTE — PROGRESS NOTES
Physical Therapy Treatment Note     Name: Alberto Dangelo  [facility] Number: [id number]    Therapy Diagnosis:   Encounter Diagnoses   Name Primary?   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      Physician: Dirk Ortiz MD    Visit Date: 11/12/2020    Physician Orders: Eval and treat (resuming PT following postponement due to Covid-19)  Medical Diagnosis: S14.129A (ICD-10-CM) - Central cord syndrome at unspecified level of cervical spinal cord  Evaluation Date: 8/26/2016  Current Certification Period: 6/23/2020 to 10/23/2020  Authorization Period Expiration: 12/31/2020  Plan of Care Expiration: 2/20/2021  Visit #/Visits authorized: 31/45 (referral - No visit limit due to medical necessity)    Time In: 1340  Time Out: 1505  Total Billable Time: 65 minutes    Precautions: Fall and immune concerns due to impaired breathing 2* to quadriplegia     Subjective     Pt reports: via manual w/c with service dog. No complaints. States his tones are somewhat improved today and ready to try quicker decrease in GF.   He was compliant with home exercise program.  Response to previous treatment: mild soreness after last session  Functional change: continues with increasing activity tolerance    Pain: 0/10    Objective     Efren participated in self stretching prior to beginning of session.  Received gait training to improve functional mobility and safety for 65 minutes, including: Set up with 5 kg unloading.  Pt participated in computer assisted robotic gait training via [employer] @ 3.2 kph x 60 min, 26 sec for a total distance of 3156 m.  Utilized guidance force of 40% x 10 min, decreased to 25% x 19', decreased again to 10% x 5' with multiple stoppages.  Increased to 20% and continued gait training x 11' then decreased again to 10%' but required increase in unweighting to prevent foot drag.  Completed session @ 10% guidance force but required  further increase in unweighting due to foot drag.  Foot drag contributed to multiple stoppages in training.      Home Exercises Provided and Patient Education Provided     Education provided:   - Discussed importance of incorporating functional transfers in activities at home.  Verbalized understanding.      Written Home Exercises Provided: Patient instructed to cont prior HEP.  Exercises were reviewed and Efren was able to demonstrate them prior to the end of the session.  Efren demonstrated good  understanding of the education provided.     See EMR under Patient Instructions for exercises provided prior visit.    Assessment     Increases in unweighting with faster GF drops. Pt motivated.     Efren is progressing slowly towards his goals.   Pt prognosis is Fair.     Pt will continue to benefit from skilled outpatient physical therapy to address the deficits listed in the problem list box on initial evaluation, provide pt/family education and to maximize pt's level of independence in the home and community environment.     Pt's spiritual, cultural and educational needs considered and pt agreeable to plan of care and goals.     Anticipated barriers to physical therapy: Increased extensor tone    Goals:   Short Term Goals:                 1) Patient will tolerate 60 min of gait training without difficulty  Nearly met (progressing: completed > 60' with minimal foot drag after adjusting angle of B ankles and adjustment of unweighting)              2) Patient will tolerate guidance force decreased to < 30% for > 40 min of training  Progressing but fluctuates.  (achieved 35-40 min gait training at < 30% guidance force but required increased unweighting due to foot drag at 15% and 10% guidance force)              3) Patient will demonstrate improvement in LE flexibility   (Ongoing)                4) Patient will stand with minimal UE support demonstrating appropriate knee (no hyperextension) and lumbar positioning (neutral  pelvic tilt) x 5 min  (observed 1-2 min) Ongoing       Long Term Goal Status:              1) Increase strength in B  LE as indicated by improved L-force testing by 5-10 nM at hip flex/ext and by 5 nM in knee flex/ext   Ongoing/not assessed this date              2)  Pt will stand > 10-15 min with minimal UE support while demonstrating appropriate posture.  Progressing, continues with increased anterior pelvic tilt and intermittent knee hyperextension            3) Pt will consistently perform safe stand pivot transfers with minimal to moderate UE support ,  Minimal progress                 4) Pt will demonstrate improved bilateral hip flexion strength to 2/5 to improve ability to perform swing phase/ foot clearance during gait  Ongoing              5) Pt will demonstrate appropriate weight shift during ambulation to limit hip hiking and excessive weight shift.   Minimal progress    Plan     Trial of 10% guidance force after initial training at 40% to perform training at 10% prior to significant fatigue.      Giulia Lock, PTA

## 2020-11-17 ENCOUNTER — CLINICAL SUPPORT (OUTPATIENT)
Dept: REHABILITATION | Facility: HOSPITAL | Age: 60
End: 2020-11-17
Payer: COMMERCIAL

## 2020-11-17 DIAGNOSIS — S14.129A INCOMPLETE INJURY OF CERVICAL SPINAL CORD WITH CENTRAL CORD SYNDROME: ICD-10-CM

## 2020-11-17 DIAGNOSIS — R53.1 DECREASED STRENGTH: ICD-10-CM

## 2020-11-17 DIAGNOSIS — R26.9 GAIT DISTURBANCE: ICD-10-CM

## 2020-11-17 DIAGNOSIS — Z78.9 IMPAIRED MOTOR CONTROL: Primary | ICD-10-CM

## 2020-11-17 PROCEDURE — 97116 GAIT TRAINING THERAPY: CPT | Mod: PN

## 2020-11-17 NOTE — PROGRESS NOTES
"  Physical Therapy Treatment Note     Name: Alberto Dangelo  [facility] Number: [id number]    Therapy Diagnosis:   Encounter Diagnoses   Name Primary?   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     Decreased strength      Physician: Dirk Ortiz MD    Visit Date: 11/17/2020    Physician Orders: Eval and treat (resuming PT following postponement due to Covid-19)  Medical Diagnosis: S14.129A (ICD-10-CM) - Central cord syndrome at unspecified level of cervical spinal cord  Evaluation Date: 8/26/2016  Authorization Period Expiration: 12/31/2020  Plan of Care Expiration: 2/20/2021  Visit #/Visits authorized: 32/45 (referral - No visit limit due to medical necessity)    Time In: 1230  Time Out: 1405  Total Billable Time: 67 minutes    Precautions: Fall and immune concerns due to impaired breathing 2* to quadriplegia     Subjective     Pt reports: Has been doing well  He was compliant with home exercise program. (power plate, nu-step, walking in parallel bars, weight lifting, etc)   Response to previous treatment: No problems or significant fatigue  Functional change: "Balance is getting better" per patient report.      Pain: 0/10    Objective     Efren participated in self stretching prior to beginning of session.  Received gait training to improve functional mobility and safety for 67  minutes, including: Set up with 5 kg unloading.  Pt participated in computer assisted robotic gait training via [employer] @ 3.2 kph x 60 min, 09 sec for a total distance of 3151 m.  Utilized guidance force of 40% x 11 min.  Attempted to decrease to 10%; however, patient demonstrated increased foot drag that could not be compensated for without nearly total unweighting.  Thus patient continued gait training @ 20% guidance force x 15 min.  Decreased to 15% and continued gait training x 24 min  Completed session @ 10% guidance force.    Home Exercises Provided and Patient Education " Provided     Education provided:   - Review of patient  Activities at home.      Written Home Exercises Provided: Patient instructed to cont prior HEP.  Exercises were reviewed and Efren was able to demonstrate them prior to the end of the session.  Efren demonstrated good  understanding of the education provided.     See EMR under Patient Instructions for exercises provided prior visit.  Patient participates in extensive HEP utilizing power plate Nu-step, parallel bars, etc.      Assessment     Unable to drop guidance force as quickly as initially attempted.  Patient demonstrated foot drag with guidance force decreased to 10% after 11 min at  40%.  Was not able to compensate for increased foot drag with increased unweighting; therefore, increased guidance force to 20% for a period of time.  After 15 minutes @ 20% guidance force, was able to reduce GF to 15% but required additional unweighting.  Multiple stoppages of training noted today with adjustments to unweighting and foot position required to complete session.    Efren is progressing slowly towards his goals.   Pt prognosis is Fair.     Pt will continue to benefit from skilled outpatient physical therapy to address the deficits listed in the problem list box on initial evaluation, provide pt/family education and to maximize pt's level of independence in the home and community environment.     Pt's spiritual, cultural and educational needs considered and pt agreeable to plan of care and goals.     Anticipated barriers to physical therapy: Increased extensor tone    Goals:   Short Term Goals:                 1) Patient will tolerate 60 min of gait training without difficulty  Nearly met (progressing)              2) Patient will tolerate guidance force decreased to < 30% for > 40 min of training  Progressing but fluctuates.  (Met 11/12/2020)              3) Patient will demonstrate improvement in LE flexibility   (Ongoing)                4) Patient will stand with  minimal UE support demonstrating appropriate knee (no hyperextension) and lumbar positioning (neutral pelvic tilt) x 5 min  (observed 1-2 min Ongoing)     Long Term Goal Status:              1) Increase strength in B  LE as indicated by improved L-force testing by 5-10 nM at hip flex/ext and by 5 nM in knee flex/ext   (Ongoing/not assessed this date)            2)  Pt will stand > 10-15 min with minimal UE support while demonstrating appropriate posture.  (Progressing, continues with increased anterior pelvic tilt and intermittent knee hyperextension)            3) Pt will consistently perform safe stand pivot transfers with minimal to moderate UE support ,  Not assessed this date)                 4) Pt will demonstrate improved bilateral hip flexion strength to 2/5 to improve ability to perform swing phase/ foot clearance during gait  (Ongoing)              5) Pt will demonstrate appropriate weight shift during ambulation to limit hip hiking and excessive weight shift.   (Minimal progress)    Plan     Continue to attempt quick reduction in guidance force to increase gait time  @ < 20% guidance force.  Adjust placement of orthosis to facilitate improved positioning of ankles in greater DF in order to enhance foot clearance.       Candice Herzog, PT

## 2020-11-23 ENCOUNTER — PATIENT MESSAGE (OUTPATIENT)
Dept: FAMILY MEDICINE | Facility: CLINIC | Age: 60
End: 2020-11-23

## 2020-11-23 ENCOUNTER — OFFICE VISIT (OUTPATIENT)
Dept: FAMILY MEDICINE | Facility: CLINIC | Age: 60
End: 2020-11-23
Attending: FAMILY MEDICINE
Payer: COMMERCIAL

## 2020-11-23 VITALS
WEIGHT: 166 LBS | BODY MASS INDEX: 23.24 KG/M2 | SYSTOLIC BLOOD PRESSURE: 94 MMHG | HEIGHT: 71 IN | HEART RATE: 83 BPM | OXYGEN SATURATION: 97 % | DIASTOLIC BLOOD PRESSURE: 64 MMHG | TEMPERATURE: 98 F

## 2020-11-23 DIAGNOSIS — S14.129A INCOMPLETE INJURY OF CERVICAL SPINAL CORD WITH CENTRAL CORD SYNDROME: ICD-10-CM

## 2020-11-23 DIAGNOSIS — G82.50 QUADRIPLEGIA: ICD-10-CM

## 2020-11-23 DIAGNOSIS — K11.21 PAROTITIS, ACUTE: Primary | ICD-10-CM

## 2020-11-23 PROCEDURE — 99999 PR PBB SHADOW E&M-EST. PATIENT-LVL IV: ICD-10-PCS | Mod: PBBFAC,,, | Performed by: FAMILY MEDICINE

## 2020-11-23 PROCEDURE — 99999 PR PBB SHADOW E&M-EST. PATIENT-LVL IV: CPT | Mod: PBBFAC,,, | Performed by: FAMILY MEDICINE

## 2020-11-23 PROCEDURE — 3008F PR BODY MASS INDEX (BMI) DOCUMENTED: ICD-10-PCS | Mod: CPTII,S$GLB,, | Performed by: FAMILY MEDICINE

## 2020-11-23 PROCEDURE — 99213 OFFICE O/P EST LOW 20 MIN: CPT | Mod: S$GLB,,, | Performed by: FAMILY MEDICINE

## 2020-11-23 PROCEDURE — 3008F BODY MASS INDEX DOCD: CPT | Mod: CPTII,S$GLB,, | Performed by: FAMILY MEDICINE

## 2020-11-23 PROCEDURE — 99213 PR OFFICE/OUTPT VISIT, EST, LEVL III, 20-29 MIN: ICD-10-PCS | Mod: S$GLB,,, | Performed by: FAMILY MEDICINE

## 2020-11-23 PROCEDURE — 1126F AMNT PAIN NOTED NONE PRSNT: CPT | Mod: S$GLB,,, | Performed by: FAMILY MEDICINE

## 2020-11-23 PROCEDURE — 1126F PR PAIN SEVERITY QUANTIFIED, NO PAIN PRESENT: ICD-10-PCS | Mod: S$GLB,,, | Performed by: FAMILY MEDICINE

## 2020-11-23 RX ORDER — AMOXICILLIN AND CLAVULANATE POTASSIUM 875; 125 MG/1; MG/1
1 TABLET, FILM COATED ORAL 2 TIMES DAILY
Qty: 20 TABLET | Refills: 0 | Status: SHIPPED | OUTPATIENT
Start: 2020-11-23 | End: 2020-12-03

## 2020-11-23 NOTE — PROGRESS NOTES
Subjective:       Patient ID: Alberto Dangelo is a 60 y.o. male.    Chief Complaint: Facial Swelling (R jaw)    HPI  Review of Systems    Objective:      Physical Exam    Assessment:       1. Parotitis, acute    2. 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 Quadriplegia        Plan:

## 2020-11-23 NOTE — PROGRESS NOTES
Subjective:       Patient ID: Alberto Dangelo is a 60 y.o. male.    Chief Complaint: Facial Swelling (R jaw)    60-year-old male with a history of cervical trauma an incomplete cord injury with quadriplegia also with a history of DVT and pulmonary embolism comes in with a 2 to 3 day history of swelling in the right side of the jaw involving the angle of the jaw and the submandibular area.  He has had a low-grade fever, the area has become somewhat tender.  He has been using warm compresses and just recently started using some lemon drops.    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  No date: Neurogenic bladder  No date: Pulmonary embolism    Past Surgical History:  No date: NECK SURGERY  No date: SPINE SURGERY      Comment:  fuse C 6 - T 1 burst C7  No date: TONSILLECTOMY  No date: WISDOM TOOTH EXTRACTION    Current Outpatient Medications on File Prior to Visit:  aspirin (ECOTRIN) 81 MG EC tablet, Take 81 mg by mouth once daily., Disp: , Rfl:   bisacodyl (FLEET) 10 mg/30 mL Enem, Place 10 mg rectally once daily., Disp: , Rfl:   diazePAM (VALIUM) 5 MG tablet, Take 1 tablet (5 mg total) by mouth every 12 (twelve) hours as needed., Disp: 60 tablet, Rfl: 5  magnesium oxide (MAG-OX) 400 mg (241.3 mg magnesium) tablet, TAKE 1 TABLET(400 MG) BY MOUTH TWICE DAILY, Disp: 180 tablet, Rfl: 0  midodrine (PROAMATINE) 10 MG tablet, TAKE 1 TABLET(10 MG) BY MOUTH THREE TIMES DAILY, Disp: 90 tablet, Rfl: 3  omeprazole (PRILOSEC) 20 MG capsule, TAKE 1 CAPSULE(20 MG) BY MOUTH EVERY DAY, Disp: 90 capsule, Rfl: 3  POLYETHYLENE GLYCOL 3350 (MIRALAX ORAL), Take 1 Dose by mouth every evening., Disp: , Rfl:   senna (SENOKOT) 8.6 mg tablet, Take 2 tablets by mouth once daily., Disp: , Rfl:   vitamin D 1000 units Tab, Take 185  mg by mouth once daily., Disp: , Rfl:     No current facility-administered medications on file prior to visit.         Review of Systems   Constitutional: Positive for fever. Negative for chills and diaphoresis.   HENT: Positive for facial swelling. Negative for congestion, sinus pressure, sinus pain and sore throat.    Respiratory: Negative for chest tightness and shortness of breath.        Objective:      Physical Exam  Constitutional:       General: He is not in acute distress.     Appearance: Normal appearance. He is normal weight. He is not ill-appearing, toxic-appearing or diaphoretic.   HENT:      Head: Normocephalic and atraumatic.      Salivary Glands: Right salivary gland is diffusely enlarged and tender.     Neck:      Thyroid: No thyroid mass.   Lymphadenopathy:      Cervical:      Right cervical: No superficial, deep or posterior cervical adenopathy.     Left cervical: No superficial, deep or posterior cervical adenopathy.   Neurological:      Mental Status: He is alert.         Assessment:       1. Parotitis, acute    2. 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 Quadriplegia        Plan:       1. Parotitis, acute  Push fluids, continue warm compresses and lemon drops  Start Augmentin 875 b.i.d. for 10 days  Recheck with me in 7 to 10 days.  Consider ultrasound if no improvement  - amoxicillin-clavulanate 875-125mg (AUGMENTIN) 875-125 mg per tablet; Take 1 tablet by mouth 2 (two) times daily. for 10 days  Dispense: 20 tablet; Refill: 0    2. 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 Quadriplegia  Stable

## 2020-11-24 ENCOUNTER — CLINICAL SUPPORT (OUTPATIENT)
Dept: REHABILITATION | Facility: HOSPITAL | Age: 60
End: 2020-11-24
Payer: COMMERCIAL

## 2020-11-24 DIAGNOSIS — Z78.9 IMPAIRED MOTOR CONTROL: ICD-10-CM

## 2020-11-24 DIAGNOSIS — R53.1 DECREASED STRENGTH: ICD-10-CM

## 2020-11-24 DIAGNOSIS — S14.129A INCOMPLETE INJURY OF CERVICAL SPINAL CORD WITH CENTRAL CORD SYNDROME: ICD-10-CM

## 2020-11-24 DIAGNOSIS — R26.9 GAIT DISTURBANCE: ICD-10-CM

## 2020-11-24 PROCEDURE — 97116 GAIT TRAINING THERAPY: CPT | Mod: PN

## 2020-11-24 NOTE — PROGRESS NOTES
"  Physical Therapy Treatment Note     Name: Alberto Dangelo  Clinic Number: [id number]    Therapy Diagnosis:   Encounter Diagnoses   Name Primary?   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      Physician: Dirk Ortiz MD    Visit Date: 11/24/2020    Physician Orders: Eval and treat (resuming PT following postponement due to Covid-19)  Medical Diagnosis: S14.129A (ICD-10-CM) - Central cord syndrome at unspecified level of cervical spinal cord  Evaluation Date: 8/26/2016  Authorization Period Expiration: 12/31/2020  Plan of Care Expiration: 2/20/2021  Visit #/Visits authorized: 32/45 (referral - No visit limit due to medical necessity)    Time In: 1352  Time Out: 1525  Total Billable Time: 67 minutes    Precautions: Fall and immune concerns due to impaired breathing 2* to quadriplegia     Subjective     Pt reports: Ended up going to MD yesterday due to not feeling well.  Was told he has a blocked salivary gland and was prescribed antibiotics.  Feeling much better.    He was compliant with home exercise program. (power plate, nu-step, walking in parallel bars, weight lifting, etc)   Response to previous treatment: Fatigue following session  Functional change: Nothing new reported this date    Pain: 0/10  "I have a spinal cord injury.  I have pain all the time.  It's all relative.  If you ask me if I have any changes that is another story altogether."     Objective     Efren participated in self stretching prior to beginning of session.  Received gait training to improve functional mobility and safety for 67  minutes, including: Set up with 5 kg unloading.  Pt participated in computer assisted robotic gait training via [employer] @ 3.2 kp x 60 min, 26 sec for a total distance of 3191 m.  Utilized guidance force of 40% x 10 min.  Decreased guidance force to 20% and continued training x 20 min.  Decreased guidance force again to 15% with " slight increase in unweighting and continued gait training x 14 min  Completed session @ 10% guidance force and increased unweighting to prevent toe drag.      Home Exercises Provided and Patient Education Provided     Education provided:   - Review of patient  Activities at home.      Written Home Exercises Provided: Patient instructed to cont prior HEP.  Exercises were reviewed and Efren was able to demonstrate them prior to the end of the session.  Efren demonstrated good  understanding of the education provided.     See EMR under Patient Instructions for exercises provided prior visit.  Patient participates in extensive HEP utilizing power plate Nu-step, parallel bars, etc.      Assessment     Able to decrease guidance force from 40% to 20% after 10 minutes thus able to perform gait training @ < 30% guidance force for 50 minutes with 1 episode of toe drag that resulted in stoppage of training.  2 other episodes of toe drag occurred but were not sufficient to stop training or were able to be quickly corrected with increased unweighting.    Efren is progressing slowly towards his goals.   Pt prognosis is Fair.     Pt will continue to benefit from skilled outpatient physical therapy to address the deficits listed in the problem list box on initial evaluation, provide pt/family education and to maximize pt's level of independence in the home and community environment.     Pt's spiritual, cultural and educational needs considered and pt agreeable to plan of care and goals.     Anticipated barriers to physical therapy: Increased extensor tone    Goals:   Short Term Goals:                 1) Patient will tolerate 60 min of gait training without difficulty  Nearly met (progressing)              2) Patient will tolerate guidance force decreased to < 30% for > 40 min of training  Continues to progress but fluctuate.  (Met 11/12/2020)              3) Patient will demonstrate improvement in LE flexibility   (Ongoing)                 4) Patient will stand with minimal UE support demonstrating appropriate knee (no hyperextension) and lumbar positioning (neutral pelvic tilt) x 5 min  (observed 1-2 min Ongoing)     Long Term Goal Status:              1) Increase strength in B  LE as indicated by improved L-force testing by 5-10 nM at hip flex/ext and by 5 nM in knee flex/ext   (Ongoing/not assessed this date)            2)  Pt will stand > 10-15 min with minimal UE support while demonstrating appropriate posture.  (Progressing, continues with increased anterior pelvic tilt and intermittent knee hyperextension)            3) Pt will consistently perform safe stand pivot transfers with minimal to moderate UE support , pt prefers squat pivot transfer)                 4) Pt will demonstrate improved bilateral hip flexion strength to 2/5 to improve ability to perform swing phase/ foot clearance during gait  (Ongoing)              5) Pt will demonstrate appropriate weight shift during ambulation to limit hip hiking and excessive weight shift.   (Minimal progress)    Plan     Initiate gait training @ 35% guidance force next session and decrease as patient tolerates.        Candice Herzog, PT

## 2020-12-01 ENCOUNTER — CLINICAL SUPPORT (OUTPATIENT)
Dept: REHABILITATION | Facility: HOSPITAL | Age: 60
End: 2020-12-01
Payer: COMMERCIAL

## 2020-12-01 DIAGNOSIS — R53.1 DECREASED STRENGTH: ICD-10-CM

## 2020-12-01 DIAGNOSIS — S14.129A INCOMPLETE INJURY OF CERVICAL SPINAL CORD WITH CENTRAL CORD SYNDROME: ICD-10-CM

## 2020-12-01 DIAGNOSIS — R26.9 GAIT DISTURBANCE: ICD-10-CM

## 2020-12-01 DIAGNOSIS — Z78.9 IMPAIRED MOTOR CONTROL: ICD-10-CM

## 2020-12-01 PROCEDURE — 97116 GAIT TRAINING THERAPY: CPT | Mod: PN

## 2020-12-02 NOTE — PROGRESS NOTES
Physical Therapy Treatment Note     Name: Alberto Dangelo  [facility] Number: [id number]    Therapy Diagnosis:   Encounter Diagnoses   Name Primary?   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      Physician: Dirk Ortiz MD    Visit Date: 12/1/2020    Physician Orders: Eval and treat (resuming PT following postponement due to Covid-19)  Medical Diagnosis: S14.129A (ICD-10-CM) - Central cord syndrome at unspecified level of cervical spinal cord  Evaluation Date: 8/26/2016  Authorization Period Expiration: 12/31/2020  Plan of Care Expiration: 2/20/2021  Visit #/Visits authorized: 33/45 (referral - No visit limit due to medical necessity)    Time In: 1400  Time Out: 1525  Total Billable Time: 67 minutes    Precautions: Fall and immune concerns due to impaired breathing 2* to quadriplegia     Subjective     Pt reports:  Had a good holiday but wife got sick over the weekend.   He was compliant with home exercise program. (power plate, nu-step, walking in parallel bars, weight lifting, etc)   Response to previous treatment: Tolerated session without significant soreness.    Functional change: Nothing new reported this date    Pain: 0/10      Objective     Efren received gait training to improve functional mobility and safety for 67  minutes, including: Set up with 5 kg unloading.  Pt participated in computer assisted robotic gait training via [employer] @ 3.2 kph x 60 min, 11 sec for a total distance of 3181 m.  Utilized guidance force of 35% x 10 min.  Decreased guidance force to 20% and continued training x 10 min.  Decreased guidance force again to 15% with slight increase in unweighting and continued gait training x 20 min  Completed session @ 10% guidance force and moderately increased unweighting to prevent toe drag.      Home Exercises Provided and Patient Education Provided     Education provided:   - Review of patient  Activities at  home.      Written Home Exercises Provided: Patient instructed to cont prior HEP.  Exercises were reviewed and Efren was able to demonstrate them prior to the end of the session.  Efren demonstrated good  understanding of the education provided.     See EMR under Patient Instructions for exercises provided prior visit.  Patient participates in extensive HEP utilizing power plate Nu-step, parallel bars, etc.      Assessment     Patient tolerated initiating gait training at 35% guidance force without difficulty.  Demonstrated minor toe drag @ 15% guidance but corrected with slight increase in unweighting.  Completed 20' of gait training @ 10% guidance force but required moderate increase in unweighting to prevent toe drag. He did drag L toe 1x sufficiently to interrupt training but was able to resume without difficulty.  Continues to demonstrate increased anterior pelvic tilt in standing.      Efren is progressing slowly towards his goals.   Pt prognosis is Fair.     Pt will continue to benefit from skilled outpatient physical therapy to address the deficits listed in the problem list on initial evaluation, provide pt/family education and to maximize pt's level of independence in the home and community environment.     Pt's spiritual, cultural and educational needs considered and pt agreeable to plan of care and goals.     Anticipated barriers to physical therapy: Increased extensor tone    Goals:   Short Term Goals:                 1) Patient will tolerate 60 min of gait training without difficulty  Nearly met (1 episode of sufficient toe drag to interrupt training)              2) Patient will tolerate guidance force decreased to < 30% for > 40 min of training  Continues to progress but fluctuate.  (Met 11/12/2020)              3) Patient will demonstrate improvement in LE flexibility   (Ongoing)                4) Patient will stand with minimal UE support demonstrating appropriate knee (no hyperextension) and lumbar  positioning (neutral pelvic tilt) x 5 min  (observed 1-2 min Ongoing)     Long Term Goal Status:              1) Increase strength in B  LE as indicated by improved L-force testing by 5-10 nM at hip flex/ext and by 5 nM in knee flex/ext   (Ongoing/not assessed this date)            2)  Pt will stand > 10-15 min with minimal UE support while demonstrating appropriate posture.  (Progressing, continues with increased anterior pelvic tilt and intermittent knee hyperextension)            3) Pt will consistently perform safe stand pivot transfers with minimal to moderate UE support (pt prefers squat pivot transfer)                 4) Pt will demonstrate improved bilateral hip flexion strength to 2/5 to improve ability to perform swing phase/ foot clearance during gait  (Ongoing)              5) Pt will demonstrate appropriate weight shift during ambulation to limit hip hiking and excessive weight shift.   (Minimal progress)    Plan     Continue with progressive decrease in guidance force as patient tolerates.  Adjust unweighting as needed to prevent excessive toe drag.          Candice Herzog, PT

## 2020-12-03 ENCOUNTER — CLINICAL SUPPORT (OUTPATIENT)
Dept: REHABILITATION | Facility: HOSPITAL | Age: 60
End: 2020-12-03
Payer: COMMERCIAL

## 2020-12-03 DIAGNOSIS — S14.129A INCOMPLETE INJURY OF CERVICAL SPINAL CORD WITH CENTRAL CORD SYNDROME: ICD-10-CM

## 2020-12-03 DIAGNOSIS — Z78.9 IMPAIRED MOTOR CONTROL: ICD-10-CM

## 2020-12-03 DIAGNOSIS — R53.1 DECREASED STRENGTH: ICD-10-CM

## 2020-12-03 PROCEDURE — 97116 GAIT TRAINING THERAPY: CPT | Mod: PN,CQ

## 2020-12-03 NOTE — PROGRESS NOTES
Physical Therapy Treatment Note     Name: Alberto Dangelo  [facility] [place] Number: [id number]    Therapy Diagnosis:   Encounter Diagnoses   Name Primary?   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      Physician: Dirk Ortiz MD    Visit Date: 12/3/2020    Physician Orders: Eval and treat (resuming PT following postponement due to Covid-19)  Medical Diagnosis: S14.129A (ICD-10-CM) - Central cord syndrome at unspecified level of cervical spinal cord  Evaluation Date: 8/26/2016  Authorization Period Expiration: 12/31/2020  Plan of Care Expiration: 2/20/2021  Visit #/Visits authorized: 34/45 (referral - No visit limit due to medical necessity)    Time In: 1405  Time Out: 1520  Total Billable Time: 65 minutes    Precautions: Fall and immune concerns due to impaired breathing 2* to quadriplegia     Subjective     Pt reports:  No pain today. Presents in manual w/c with service dog. Using weighted ropes for core stabilization ex's from seated position in w/c at home.   He was compliant with home exercise program. (power plate, nu-step, walking in parallel bars, weight lifting, etc)   Response to previous treatment: Tolerated session without significant soreness.    Functional change: Nothing new reported this date    Pain: 0/10      Objective     Efren received gait training to improve functional mobility and safety for 65  minutes, including: Set up with 5 kg unloading.  Pt participated in computer assisted robotic gait training via [employer] @ 3.2 kph x 60 min, 59 sec for a total distance of 3221 m.  Utilized guidance force of 30% x 10 min.  decraesed to 25% x 2 min. Decreased guidance force to 20% and continued training x 8 min.  Decreased guidance force again to 15% with slight increase in unweighting and continued gait training x 10 min  Completed session @ 10% guidance force and moderately-minimally increased unweighting to prevent toe  drag.      Home Exercises Provided and Patient Education Provided     Education provided:   - Review of patient  Activities at home.      Written Home Exercises Provided: Patient instructed to cont prior HEP.  Exercises were reviewed and Efren was able to demonstrate them prior to the end of the session.  Efren demonstrated good  understanding of the education provided.     See EMR under Patient Instructions for exercises provided prior visit.  Patient participates in extensive HEP utilizing power plate Nu-step, parallel bars, etc.      Assessment     Initiated session with decreased GF today at 30% and able to progress to decreased GF to 10% faster today. Note that there were decreased strappage issues today. Good tolerance for faster transitions with only mod unweighting initially, but able to increase weight after a few minutes.     Efren is progressing slowly towards his goals.   Pt prognosis is Fair.     Pt will continue to benefit from skilled outpatient physical therapy to address the deficits listed in the problem list on initial evaluation, provide pt/family education and to maximize pt's level of independence in the home and community environment.     Pt's spiritual, cultural and educational needs considered and pt agreeable to plan of care and goals.     Anticipated barriers to physical therapy: Increased extensor tone    Goals:   Short Term Goals:                 1) Patient will tolerate 60 min of gait training without difficulty  Nearly met (1 episode of sufficient toe drag to interrupt training)              2) Patient will tolerate guidance force decreased to < 30% for > 40 min of training  Continues to progress but fluctuate.  (Met 11/12/2020)              3) Patient will demonstrate improvement in LE flexibility   (Ongoing)                4) Patient will stand with minimal UE support demonstrating appropriate knee (no hyperextension) and lumbar positioning (neutral pelvic tilt) x 5 min  (observed 1-2  min Ongoing)     Long Term Goal Status:              1) Increase strength in B  LE as indicated by improved L-force testing by 5-10 nM at hip flex/ext and by 5 nM in knee flex/ext   (Ongoing/not assessed this date)            2)  Pt will stand > 10-15 min with minimal UE support while demonstrating appropriate posture.  (Progressing, continues with increased anterior pelvic tilt and intermittent knee hyperextension)            3) Pt will consistently perform safe stand pivot transfers with minimal to moderate UE support (pt prefers squat pivot transfer)                 4) Pt will demonstrate improved bilateral hip flexion strength to 2/5 to improve ability to perform swing phase/ foot clearance during gait  (Ongoing)              5) Pt will demonstrate appropriate weight shift during ambulation to limit hip hiking and excessive weight shift.   (Minimal progress)    Plan     Continue with progressive decrease in guidance force as patient tolerates.  Adjust unweighting as needed to prevent excessive toe drag.          Giulia Lock, PTA

## 2020-12-08 ENCOUNTER — CLINICAL SUPPORT (OUTPATIENT)
Dept: REHABILITATION | Facility: HOSPITAL | Age: 60
End: 2020-12-08
Payer: COMMERCIAL

## 2020-12-08 DIAGNOSIS — R26.9 GAIT DISTURBANCE: ICD-10-CM

## 2020-12-08 DIAGNOSIS — S14.129A INCOMPLETE INJURY OF CERVICAL SPINAL CORD WITH CENTRAL CORD SYNDROME: ICD-10-CM

## 2020-12-08 DIAGNOSIS — R53.1 DECREASED STRENGTH: ICD-10-CM

## 2020-12-08 DIAGNOSIS — Z78.9 IMPAIRED MOTOR CONTROL: ICD-10-CM

## 2020-12-08 PROCEDURE — 97116 GAIT TRAINING THERAPY: CPT | Mod: PN

## 2020-12-10 ENCOUNTER — CLINICAL SUPPORT (OUTPATIENT)
Dept: REHABILITATION | Facility: HOSPITAL | Age: 60
End: 2020-12-10
Payer: COMMERCIAL

## 2020-12-10 DIAGNOSIS — S14.129A INCOMPLETE INJURY OF CERVICAL SPINAL CORD WITH CENTRAL CORD SYNDROME: ICD-10-CM

## 2020-12-10 DIAGNOSIS — R53.1 DECREASED STRENGTH: ICD-10-CM

## 2020-12-10 DIAGNOSIS — Z78.9 IMPAIRED MOTOR CONTROL: ICD-10-CM

## 2020-12-10 PROCEDURE — 97116 GAIT TRAINING THERAPY: CPT | Mod: PN,CQ

## 2020-12-11 NOTE — PROGRESS NOTES
Physical Therapy Treatment Note     Name: Alberto Dangelo  [facility] Number: [id number]    Therapy Diagnosis:   Encounter Diagnoses   Name Primary?   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      Physician: Dirk Ortiz MD    Visit Date: 12/10/2020    Physician Orders: Eval and treat (resuming PT following postponement due to Covid-19)  Medical Diagnosis: S14.129A (ICD-10-CM) - Central cord syndrome at unspecified level of cervical spinal cord  Evaluation Date: 8/26/2016  Authorization Period Expiration: 12/31/2020  Plan of Care Expiration: 2/20/2021  Visit #/Visits authorized: [id number] (referral - No visit limit due to medical necessity)    Time In: 85832  Time Out: 1535  Total Billable Time: 65 minutes    Precautions: Fall and immune concerns due to impaired breathing 2* to quadriplegia     Subjective     Pt reports:  No pain today. Presents in manual w/c with service dog. No adverse events    He was compliant with home exercise program. (power plate, nu-step, walking in parallel bars, weight lifting, etc)   Response to previous treatment: Tolerated session without significant soreness.    Functional change: Nothing new reported this date    Pain: 0/10      Objective     Efren received gait training to improve functional mobility and safety for 65 minutes, including: Set up with 5 kg unloading.  Pt participated in computer assisted robotic gait training via [employer] @ 3.2 kph x 60 min, 53 sec for a total distance of 3170 m.  Utilized guidance force of 30% x 10 min.  decreased to 25% x 2 min. Completed session with guidance force to 20%. multiple stoppages 2* technical issues with [email or web address] e[employer] machine.     Home Exercises Provided and Patient Education Provided     Education provided:   - Review of patient  Activities at home.      Written Home Exercises Provided: Patient instructed to cont prior HEP.  Exercises were reviewed and Efren bruno  able to demonstrate them prior to the end of the session.  Efren demonstrated good  understanding of the education provided.     See EMR under Patient Instructions for exercises provided prior visit.  Patient participates in extensive HEP utilizing power plate Nu-step, parallel bars, etc.      Assessment     Technical issues causing multiple stoppages, which proved even more challenging for pt, but pt making great effort throughout without significant drag or need for increased unweighting. Note that GF below 20% was not attempted today.     Efren is progressing slowly towards his goals.   Pt prognosis is Fair.     Pt will continue to benefit from skilled outpatient physical therapy to address the deficits listed in the problem list on initial evaluation, provide pt/family education and to maximize pt's level of independence in the home and community environment.     Pt's spiritual, cultural and educational needs considered and pt agreeable to plan of care and goals.     Anticipated barriers to physical therapy: Increased extensor tone    Goals:   Short Term Goals:                 1) Patient will tolerate 60 min of gait training without difficulty  Nearly met (1 episode of sufficient toe drag to interrupt training)              2) Patient will tolerate guidance force decreased to < 30% for > 40 min of training  Continues to progress but fluctuate.  (Met 11/12/2020)              3) Patient will demonstrate improvement in LE flexibility   (Ongoing)                4) Patient will stand with minimal UE support demonstrating appropriate knee (no hyperextension) and lumbar positioning (neutral pelvic tilt) x 5 min  (observed 1-2 min Ongoing)     Long Term Goal Status:              1) Increase strength in B  LE as indicated by improved L-force testing by 5-10 nM at hip flex/ext and by 5 nM in knee flex/ext   (Ongoing/not assessed this date)            2)  Pt will stand > 10-15 min with minimal UE support while demonstrating  appropriate posture.  (Progressing, continues with increased anterior pelvic tilt and intermittent knee hyperextension)            3) Pt will consistently perform safe stand pivot transfers with minimal to moderate UE support (pt prefers squat pivot transfer)                 4) Pt will demonstrate improved bilateral hip flexion strength to 2/5 to improve ability to perform swing phase/ foot clearance during gait  (Ongoing)              5) Pt will demonstrate appropriate weight shift during ambulation to limit hip hiking and excessive weight shift.   (Minimal progress)    Plan     Continue with progressive decrease in guidance force as patient tolerates.  Adjust unweighting as needed to prevent excessive toe drag.          Giulia Lock, PTA

## 2020-12-13 NOTE — PROGRESS NOTES
Physical Therapy Treatment Note     Name: Alberto Dangelo  [facility] Number: [id number]    Therapy Diagnosis:   Encounter Diagnoses   Name Primary?   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      Physician: Dirk Ortiz MD    Visit Date: 12/8/2020    Physician Orders: Eval and treat (resuming PT following postponement due to Covid-19)  Medical Diagnosis: S14.129A (ICD-10-CM) - Central cord syndrome at unspecified level of cervical spinal cord  Evaluation Date: 8/26/2016  Authorization Period Expiration: 12/31/2020  Plan of Care Expiration: 2/20/2021  Visit #/Visits authorized: 35/45 (referral - No visit limit due to medical necessity)    Time In: 1415  Time Out: 1535  Total Billable Time: 62 minutes    Precautions: Fall and immune concerns due to impaired breathing 2* to quadriplegia     Subjective     Pt reports:  Feeling well.   He was compliant with home exercise program. (power plate, nu-step, walking in parallel bars, weight lifting, etc)   Response to previous treatment: Increased fatigue following session.    Functional change: Able to complete last training session with lower guidance force than previous sessions.    Pain: 0/10      Objective     Efren received gait training to improve functional mobility and safety for 62  minutes, including: Set up with 5 kg unloading.  Pt participated in computer assisted robotic gait training via [employer] @ 3.2 kph x 56 min, 26 sec for a total distance of 2936 m.  Utilized guidance force of 35% x 10 min.  Decreased guidance force to 25% and continued training x 2 min.  Decreased guidance force again to 20% with slight increase in unweighting and continued gait training x 8 min  Attempted gait training @ 10% guidance force but demonstrated increased toe drag resulting in several stoppages of training.  Thus increased guidance force to 15% and completed training @ 15% guidance force and  minimally increased unweighting to prevent toe drag.      Home Exercises Provided and Patient Education Provided     Education provided:   - Review of patient  Activities at home.      Written Home Exercises Provided: Patient instructed to cont prior HEP.  Exercises were reviewed and Efren was able to demonstrate them prior to the end of the session.  Efren demonstrated good  understanding of the education provided.     See EMR under Patient Instructions for exercises provided prior visit.  Patient participates in extensive HEP utilizing power plate Nu-step, parallel bars, etc.      Assessment     Demonstrated increased toe drag today with guidance force decreased to 10% which resulted in increased incidence of training stoppage.    Pt prognosis is Fair.     Pt will continue to benefit from skilled outpatient physical therapy to address the deficits listed in the problem list on initial evaluation, provide pt/family education and to maximize pt's level of independence in the home and community environment.     Pt's spiritual, cultural and educational needs considered and pt agreeable to plan of care and goals.     Anticipated barriers to physical therapy: Increased extensor tone    Goals:   Short Term Goals:                 1) Patient will tolerate 60 min of gait training without difficulty  Nearly met (Training limited by patient late arrival and increased incidence of toe drag)              2) Patient will tolerate guidance force decreased to < 30% for > 40 min of training  (Met 11/12/2020)              3) Patient will demonstrate improvement in LE flexibility   (Ongoing)                4) Patient will stand with minimal UE support demonstrating appropriate knee (no hyperextension) and lumbar positioning (neutral pelvic tilt) x 5 min  (observed 1-2 min Ongoing)     Long Term Goal Status:              1) Increase strength in B  LE as indicated by improved L-force testing by 5-10 nM at hip flex/ext and by 5 nM in knee  flex/ext   (Ongoing/not assessed this date)            2)  Pt will stand > 10-15 min with minimal UE support while demonstrating appropriate posture.  (Progressing, continues with increased anterior pelvic tilt)            3) Pt will consistently perform safe stand pivot transfers with minimal to moderate UE support (Discontinued 12/8/2020 as patient prefers squat pivot transfers)                 4) Pt will demonstrate improved bilateral hip flexion strength to 2/5 to improve ability to perform swing phase/ foot clearance during gait  (Ongoing)              5) Pt will demonstrate appropriate weight shift during ambulation to limit hip hiking and excessive weight shift.   (Minimal progress)    Plan     Progressive gait training with continued reduction in guidance force.  Postural education.        Candice Herzog, PT

## 2020-12-14 ENCOUNTER — LAB VISIT (OUTPATIENT)
Dept: LAB | Facility: HOSPITAL | Age: 60
End: 2020-12-14
Attending: FAMILY MEDICINE
Payer: COMMERCIAL

## 2020-12-14 DIAGNOSIS — R94.5 ABNORMAL LIVER FUNCTION: ICD-10-CM

## 2020-12-14 PROCEDURE — 36415 COLL VENOUS BLD VENIPUNCTURE: CPT | Mod: PO

## 2020-12-14 PROCEDURE — 80076 HEPATIC FUNCTION PANEL: CPT

## 2020-12-15 ENCOUNTER — CLINICAL SUPPORT (OUTPATIENT)
Dept: REHABILITATION | Facility: HOSPITAL | Age: 60
End: 2020-12-15
Attending: FAMILY MEDICINE
Payer: COMMERCIAL

## 2020-12-15 DIAGNOSIS — S14.129A INCOMPLETE INJURY OF CERVICAL SPINAL CORD WITH CENTRAL CORD SYNDROME: ICD-10-CM

## 2020-12-15 DIAGNOSIS — Z78.9 IMPAIRED MOTOR CONTROL: ICD-10-CM

## 2020-12-15 DIAGNOSIS — R26.9 GAIT DISTURBANCE: ICD-10-CM

## 2020-12-15 DIAGNOSIS — R53.1 DECREASED STRENGTH: ICD-10-CM

## 2020-12-15 LAB
ALBUMIN SERPL BCP-MCNC: 4.5 G/DL (ref 3.5–5.2)
ALP SERPL-CCNC: 76 U/L (ref 55–135)
ALT SERPL W/O P-5'-P-CCNC: 60 U/L (ref 10–44)
AST SERPL-CCNC: 41 U/L (ref 10–40)
BILIRUB DIRECT SERPL-MCNC: 0.4 MG/DL (ref 0.1–0.3)
BILIRUB SERPL-MCNC: 1 MG/DL (ref 0.1–1)
PROT SERPL-MCNC: 7.8 G/DL (ref 6–8.4)

## 2020-12-15 PROCEDURE — 97116 GAIT TRAINING THERAPY: CPT | Mod: PN

## 2020-12-15 NOTE — PROGRESS NOTES
"  Physical Therapy Treatment Note     Name: Alberto Dangelo  [facility] Number: [id number]    Therapy Diagnosis:   Encounter Diagnoses   Name Primary?   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      Physician: Dirk Ortiz MD    Visit Date: 12/15/2020    Physician Orders: Eval and treat (resuming PT following postponement due to Covid-19)  Medical Diagnosis: S14.129A (ICD-10-CM) - Central cord syndrome at unspecified level of cervical spinal cord  Evaluation Date: 8/26/2016  Authorization Period Expiration: 12/31/2020  Plan of Care Expiration: 2/20/2021  Visit #/Visits authorized: 38/45 (referral - No visit limit due to medical necessity)    Time In: 1400  Time Out: 1530  Total Billable Time: 67 minutes    Precautions: Fall and immune concerns due to impaired breathing 2* to quadriplegia     Subjective     Pt reports:  "It feels like the machine is hesitating."    He was compliant with home exercise program. (power plate, nu-step, walking in parallel bars, weight lifting, etc)   Response to previous treatment: Tolerated session well.    Functional change: Tolerates decreased guidance force.      Pain: 0/10      Objective     Efren received gait training to improve functional mobility and safety for 67  minutes, including: Set up with 5 kg unloading initially but increased to 15 kg due to increased frequency of toe drag.  Pt participated in computer assisted robotic gait training via [employer] @ 3.2 kph x 60 min, 11 sec for a total distance of 3109 m.  Utilized guidance force of 35% x 10 min.  Decreased guidance force to 25% and continued training x 10 min.  Decreased guidance force again to 15% with slight increase in unweighting and continued gait training x 10 min  Completed gait training @ 10% guidance force.      Home Exercises Provided and Patient Education Provided     Education provided:   - Patient to continue HEP activities  "     Written Home Exercises Provided: Patient instructed to cont prior HEP.  Exercises were reviewed and Efren was able to demonstrate them prior to the end of the session.  Efren demonstrated good  understanding of the education provided.     See EMR under Patient Instructions for exercises provided prior visit.  Patient participates in extensive HEP utilizing power plate Nu-step, parallel bars, etc.      Assessment     Frequent toe drag this date resulting in frequent stoppage of training during initial 30 minutes.  Adjustments to unweighting, coefficient, and guidance force resulted in ability to complete last 30 minutes of training without further incident.  No soreness reported following session.      Pt prognosis is Fair.     Pt will continue to benefit from skilled outpatient physical therapy to address the deficits listed in the problem list on initial evaluation, provide pt/family education and to maximize pt's level of independence in the home and community environment.     Pt's spiritual, cultural and educational needs considered and pt agreeable to plan of care and goals.     Anticipated barriers to physical therapy: Increased extensor tone    Goals:   Short Term Goals:                 1) Patient will tolerate 60 min of gait training without difficulty  Not met (Increased incidence of toe drag)              2) Patient will tolerate guidance force decreased to < 30% for > 40 min of training  (Met 11/12/2020)              3) Patient will demonstrate improvement in LE flexibility   (Ongoing)                4) Patient will stand with minimal UE support demonstrating appropriate knee (no hyperextension) and lumbar positioning (neutral pelvic tilt) x 5 min  (observed 1-2 min Ongoing)     Long Term Goal Status:              1) Increase strength in B  LE as indicated by improved L-force testing by 5-10 nM at hip flex/ext and by 5 nM in knee flex/ext   (Ongoing/not assessed this date)            2)  Pt will stand  > 10-15 min with minimal UE support while demonstrating appropriate posture.  (Progressing, continues with increased anterior pelvic tilt)            3) Pt will consistently perform safe stand pivot transfers with minimal to moderate UE support (Discontinued 12/8/2020 as patient prefers squat pivot transfers)                 4) Pt will demonstrate improved bilateral hip flexion strength to 2/5 to improve ability to perform swing phase/ foot clearance during gait  (Ongoing)              5) Pt will demonstrate appropriate weight shift during ambulation to limit hip hiking and excessive weight shift.   (Minimal progress)    Plan     Continue gait training with continued decrease in guidance force while maintaining < 10 kg unloading.         Candice Herzog, PT

## 2020-12-18 ENCOUNTER — PATIENT MESSAGE (OUTPATIENT)
Dept: FAMILY MEDICINE | Facility: CLINIC | Age: 60
End: 2020-12-18

## 2020-12-18 DIAGNOSIS — R79.89 ABNORMAL LIVER FUNCTION TEST: Primary | ICD-10-CM

## 2020-12-21 ENCOUNTER — PATIENT MESSAGE (OUTPATIENT)
Dept: FAMILY MEDICINE | Facility: CLINIC | Age: 60
End: 2020-12-21

## 2020-12-21 NOTE — TELEPHONE ENCOUNTER
The previous abnormal liver test was well before the recent illness so I doubt that its responsible.     Orders in for iron/tibc, copper level, acute hepatitis profile, and liver ultrasound.

## 2020-12-22 ENCOUNTER — CLINICAL SUPPORT (OUTPATIENT)
Dept: REHABILITATION | Facility: HOSPITAL | Age: 60
End: 2020-12-22
Payer: COMMERCIAL

## 2020-12-22 DIAGNOSIS — S14.129A INCOMPLETE INJURY OF CERVICAL SPINAL CORD WITH CENTRAL CORD SYNDROME: ICD-10-CM

## 2020-12-22 DIAGNOSIS — Z78.9 IMPAIRED MOTOR CONTROL: ICD-10-CM

## 2020-12-22 DIAGNOSIS — R53.1 DECREASED STRENGTH: ICD-10-CM

## 2020-12-22 PROCEDURE — 97116 GAIT TRAINING THERAPY: CPT | Mod: PN

## 2020-12-22 NOTE — PROGRESS NOTES
"  Physical Therapy Treatment Note     Name: Alberto Dangelo  Clinic Number: [id number]    Therapy Diagnosis:   Encounter Diagnoses   Name Primary?   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      Physician: Dirk Ortiz MD    Visit Date: 12/22/2020    Physician Orders: Eval and treat (resuming PT following postponement due to Covid-19)  Medical Diagnosis: S14.129A (ICD-10-CM) - Central cord syndrome at unspecified level of cervical spinal cord  Evaluation Date: 8/26/2016  Authorization Period Expiration: 12/31/2020  Plan of Care Expiration: 2/20/2021  Visit #/Visits authorized: 39/45 (referral - No visit limit due to medical necessity)    Time In: 1405  Time Out: 1535  Total Billable Time: 67 minutes    Precautions: Fall and immune concerns due to impaired breathing 2* to quadriplegia     Subjective     Pt reports:  "I already worked out today so my legs should be good to go"  He was compliant with home exercise program. (power plate, nu-step, walking in parallel bars, weight lifting, etc)   Response to previous treatment: Frequent stoppages during initial portion of session possibly related to need to adjust pt coefficient.  Once coefficient adjusted and walking resumed, stoppages decreased  Functional change: Occasional toe drag during training that corrected after increase in guidance force and adjustment in coefficient.  .      Pain: 0/10      Objective     Efren received gait training to improve functional mobility and safety for 67  minutes, including: Set up with 15 kg.  Pt participated in computer assisted robotic gait training via [employer] @ 3.2 kp x 62 min, 35 sec for a total distance of 3289 m.  Utilized guidance force of 35% x 11 min.  Decreased guidance force to 20% and continued training x 9 min.  Decreased guidance force again to 10% with minimal to moderate increase in unweighting and continued gait training until " completion of session.  During 1st 11 minutes patient experienced 1 episode of stoppage due to resistance at L hip.  During training @ 20% guidance force experienced a 2nd stoppage which required minor adjustment in unweighting.  @ 10% guidance force, patient experienced 2 stoppages due to insufficient weighting resulting in insufficient heel strike which then resulted it excessive toeing off and limited ability to recover into ankle DF.  Corrected by increasing weighting thus increasing foot contact with treadmill.      Home Exercises Provided and Patient Education Provided     Education provided:   - Patient to continue HEP activities      Written Home Exercises Provided: Patient instructed to cont prior HEP.  Exercises were reviewed and Efrne was able to demonstrate them prior to the end of the session.  Efren demonstrated good  understanding of the education provided.     See EMR under Patient Instructions for exercises provided prior visit.  Patient participates in extensive HEP utilizing power plate Nu-step, parallel bars, etc.      Assessment     Better session today with decreased stoppages.  He was able to complete training @ 10% guidance force for > 40 min with few episodes of stoppage due to insufficient heel strike.  This was corrected with increased weighting and patient completed session without further stoppage.    .      Pt prognosis is Fair.     Pt will continue to benefit from skilled outpatient physical therapy to address the deficits listed in the problem list on initial evaluation, provide pt/family education and to maximize pt's level of independence in the home and community environment.     Pt's spiritual, cultural and educational needs considered and pt agreeable to plan of care and goals.     Anticipated barriers to physical therapy: Increased extensor tone    Goals:   Short Term Goals:                 1) Patient will tolerate 60 min of gait training without difficulty  Not met (Improved  today but did have difficulty with toe drag 2* to insufficient ankle DF during swing through)              2) Patient will tolerate guidance force decreased to < 30% for > 40 min of training  (Met 11/12/2020)              3) Patient will demonstrate improvement in LE flexibility   (Ongoing)                4) Patient will stand with minimal UE support demonstrating appropriate knee (no hyperextension) and lumbar positioning (neutral pelvic tilt) x 5 min  (Ongoing)     Long Term Goal Status:              1) Increase strength in B  LE as indicated by improved L-force testing by 5-10 nM at hip flex/ext and by 5 nM in knee flex/ext   (Ongoing/not assessed this date)            2)  Pt will stand > 10-15 min with minimal UE support while demonstrating appropriate posture.  (Progressing, continues with increased anterior pelvic tilt)            3) Pt will consistently perform safe stand pivot transfers with minimal to moderate UE support (Discontinued 12/8/2020 as patient prefers squat pivot transfers)                 4) Pt will demonstrate improved bilateral hip flexion strength to 2/5 to improve ability to perform swing phase/ foot clearance during gait  (Ongoing)              5) Pt will demonstrate appropriate weight shift during ambulation to limit hip hiking and excessive weight shift.   (Minimal progress)    Plan     Work with patient to decrease unweighting while decreasing guidance force with emphasis on maintaining adequate heel strike to prevent excessive ankle PF on toe off.      Candice Herzog, PT

## 2020-12-28 ENCOUNTER — HOSPITAL ENCOUNTER (OUTPATIENT)
Dept: RADIOLOGY | Facility: HOSPITAL | Age: 60
Discharge: HOME | End: 2020-12-28
Attending: FAMILY MEDICINE
Payer: COMMERCIAL

## 2020-12-28 DIAGNOSIS — R79.89 ABNORMAL LIVER FUNCTION TEST: ICD-10-CM

## 2020-12-28 PROCEDURE — 76705 ECHO EXAM OF ABDOMEN: CPT | Mod: TC

## 2020-12-28 PROCEDURE — 76705 ECHO EXAM OF ABDOMEN: CPT | Mod: 26,,, | Performed by: RADIOLOGY

## 2020-12-28 PROCEDURE — 76705 US ABDOMEN LIMITED: ICD-10-PCS | Mod: 26,,, | Performed by: RADIOLOGY

## 2020-12-29 ENCOUNTER — CLINICAL SUPPORT (OUTPATIENT)
Dept: REHABILITATION | Facility: HOSPITAL | Age: 60
End: 2020-12-29
Payer: COMMERCIAL

## 2020-12-29 DIAGNOSIS — Z78.9 IMPAIRED MOTOR CONTROL: ICD-10-CM

## 2020-12-29 DIAGNOSIS — R53.1 DECREASED STRENGTH: ICD-10-CM

## 2020-12-29 DIAGNOSIS — S14.129A INCOMPLETE INJURY OF CERVICAL SPINAL CORD WITH CENTRAL CORD SYNDROME: ICD-10-CM

## 2020-12-29 PROCEDURE — 97116 GAIT TRAINING THERAPY: CPT | Mod: PN,CQ

## 2020-12-29 NOTE — PROGRESS NOTES
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      Physician: Dirk Ortiz MD    Visit Date: 12/29/2020    Physician Orders: Eval and treat (resuming PT following postponement due to Covid-19)  Medical Diagnosis: S14.129A (ICD-10-CM) - Central cord syndrome at unspecified level of cervical spinal cord  Evaluation Date: 8/26/2016  Authorization Period Expiration: 12/31/2020  Plan of Care Expiration: 2/20/2021  Visit #/Visits authorized: 40/45 (referral - No visit limit due to medical necessity)    Time In: 1405  Time Out: 1530  Total Billable Time: 65 minutes    Precautions: Fall and immune concerns due to impaired breathing 2* to quadriplegia     Subjective     Pt reports:  No complaints. Presents in manual w/c with service dog. Had a good Usk and saw his family. Pt asking to return to 5kg unloading today.  He was compliant with home exercise program. (power plate, nu-step, walking in parallel bars, weight lifting, etc)   Response to previous treatment: no soreness  Functional change: none stated     Pain: 0/10      Objective     Efren received gait training to improve functional mobility and safety for 65  minutes, including: Set up with 5 kg.  Pt participated in computer assisted robotic gait training via "[employer] @ 3.2 kph x 62 min, 54 sec for a total distance of 3288 m.  Utilized guidance force of 35% x 10 min.  Decreased guidance force to 20% and continued training x 10 min.  Decreased guidance force again to 10% x 3 minutes, but after several stoppages, resumed 20% GF with good correction. Pt was not able to decrease GF back down for the remainder of the session 2* increased tones R>L LE's.       Home Exercises Provided and Patient Education Provided     Education provided:   - Patient to continue HEP activities       Written Home Exercises Provided: Patient instructed to cont prior HEP.  Exercises were reviewed and Efren was able to demonstrate them prior to the end of the session.  Efren demonstrated good  understanding of the education provided.     See EMR under Patient Instructions for exercises provided prior visit.  Patient participates in extensive HEP utilizing power plate Nu-step, parallel bars, etc.      Assessment     Several stoppages today 2* LE tones but great effort with pt focusing on swing and stance phases of gait.   .      Pt prognosis is Fair.     Pt will continue to benefit from skilled outpatient physical therapy to address the deficits listed in the problem list on initial evaluation, provide pt/family education and to maximize pt's level of independence in the home and community environment.     Pt's spiritual, cultural and educational needs considered and pt agreeable to plan of care and goals.     Anticipated barriers to physical therapy: Increased extensor tone    Goals:   Short Term Goals:                 1) Patient will tolerate 60 min of gait training without difficulty  Not met (Improved today but did have difficulty with toe drag 2* to insufficient ankle DF during swing through)              2) Patient will tolerate guidance force decreased to < 30% for > 40 min of training  (Met 11/12/2020)              3) Patient will demonstrate improvement in LE flexibility   (Ongoing)                4) Patient will stand with minimal UE support demonstrating appropriate knee (no hyperextension) and lumbar positioning (neutral pelvic tilt) x 5 min  (Ongoing)     Long Term Goal Status:              1) Increase strength in B  LE as indicated by improved L-force testing by 5-10 nM at hip flex/ext and by 5 nM in knee flex/ext   (Ongoing/not assessed this date)            2)  Pt will stand > 10-15 min with minimal UE support while demonstrating appropriate posture.  (Progressing, continues with increased anterior  pelvic tilt)            3) Pt will consistently perform safe stand pivot transfers with minimal to moderate UE support (Discontinued 12/8/2020 as patient prefers squat pivot transfers)                 4) Pt will demonstrate improved bilateral hip flexion strength to 2/5 to improve ability to perform swing phase/ foot clearance during gait  (Ongoing)              5) Pt will demonstrate appropriate weight shift during ambulation to limit hip hiking and excessive weight shift.   (Minimal progress)    Plan     Work with patient to decrease unweighting while decreasing guidance force with emphasis on maintaining adequate heel strike to prevent excessive ankle PF on toe off.      Giulia oLck, PTA

## 2021-01-04 ENCOUNTER — PATIENT MESSAGE (OUTPATIENT)
Dept: FAMILY MEDICINE | Facility: CLINIC | Age: 61
End: 2021-01-04

## 2021-01-05 ENCOUNTER — CLINICAL SUPPORT (OUTPATIENT)
Dept: REHABILITATION | Facility: HOSPITAL | Age: 61
End: 2021-01-05
Payer: COMMERCIAL

## 2021-01-05 DIAGNOSIS — Z78.9 IMPAIRED MOTOR CONTROL: ICD-10-CM

## 2021-01-05 DIAGNOSIS — S14.129A INCOMPLETE INJURY OF CERVICAL SPINAL CORD WITH CENTRAL CORD SYNDROME: ICD-10-CM

## 2021-01-05 DIAGNOSIS — R53.1 DECREASED STRENGTH: ICD-10-CM

## 2021-01-05 DIAGNOSIS — R26.9 GAIT DISTURBANCE: ICD-10-CM

## 2021-01-05 PROCEDURE — 97116 GAIT TRAINING THERAPY: CPT | Mod: PN

## 2021-01-07 ENCOUNTER — CLINICAL SUPPORT (OUTPATIENT)
Dept: REHABILITATION | Facility: HOSPITAL | Age: 61
End: 2021-01-07
Payer: COMMERCIAL

## 2021-01-07 DIAGNOSIS — R53.1 DECREASED STRENGTH: ICD-10-CM

## 2021-01-07 DIAGNOSIS — S14.129A INCOMPLETE INJURY OF CERVICAL SPINAL CORD WITH CENTRAL CORD SYNDROME: ICD-10-CM

## 2021-01-07 DIAGNOSIS — Z78.9 IMPAIRED MOTOR CONTROL: ICD-10-CM

## 2021-01-07 PROCEDURE — 97116 GAIT TRAINING THERAPY: CPT | Mod: PN,CQ

## 2021-01-12 ENCOUNTER — CLINICAL SUPPORT (OUTPATIENT)
Dept: REHABILITATION | Facility: HOSPITAL | Age: 61
End: 2021-01-12
Payer: COMMERCIAL

## 2021-01-12 DIAGNOSIS — R26.9 GAIT DISTURBANCE: ICD-10-CM

## 2021-01-12 DIAGNOSIS — Z78.9 IMPAIRED MOTOR CONTROL: ICD-10-CM

## 2021-01-12 DIAGNOSIS — S14.129A INCOMPLETE INJURY OF CERVICAL SPINAL CORD WITH CENTRAL CORD SYNDROME: ICD-10-CM

## 2021-01-12 DIAGNOSIS — R53.1 DECREASED STRENGTH: ICD-10-CM

## 2021-01-12 PROCEDURE — 97116 GAIT TRAINING THERAPY: CPT | Mod: PN

## 2021-01-14 ENCOUNTER — CLINICAL SUPPORT (OUTPATIENT)
Dept: REHABILITATION | Facility: HOSPITAL | Age: 61
End: 2021-01-14
Payer: COMMERCIAL

## 2021-01-14 DIAGNOSIS — Z78.9 IMPAIRED MOTOR CONTROL: ICD-10-CM

## 2021-01-14 DIAGNOSIS — S14.129A INCOMPLETE INJURY OF CERVICAL SPINAL CORD WITH CENTRAL CORD SYNDROME: ICD-10-CM

## 2021-01-14 DIAGNOSIS — R53.1 DECREASED STRENGTH: ICD-10-CM

## 2021-01-26 ENCOUNTER — TELEPHONE (OUTPATIENT)
Dept: REHABILITATION | Facility: HOSPITAL | Age: 61
End: 2021-01-26

## 2021-06-21 DIAGNOSIS — I95.1 ORTHOSTATIC HYPOTENSION: ICD-10-CM

## 2021-06-21 RX ORDER — MIDODRINE HYDROCHLORIDE 10 MG/1
TABLET ORAL
Qty: 90 TABLET | Refills: 3 | Status: SHIPPED | OUTPATIENT
Start: 2021-06-21 | End: 2022-01-10

## 2021-07-01 DIAGNOSIS — R25.2 SPASM: ICD-10-CM

## 2021-07-06 RX ORDER — DIAZEPAM 5 MG/1
TABLET ORAL
Qty: 60 TABLET | Refills: 5 | Status: SHIPPED | OUTPATIENT
Start: 2021-07-06 | End: 2022-02-15 | Stop reason: SDUPTHER

## 2021-07-07 ENCOUNTER — PATIENT MESSAGE (OUTPATIENT)
Dept: FAMILY MEDICINE | Facility: CLINIC | Age: 61
End: 2021-07-07

## 2021-07-07 DIAGNOSIS — K21.9 GASTROESOPHAGEAL REFLUX DISEASE: ICD-10-CM

## 2021-07-08 RX ORDER — OMEPRAZOLE 20 MG/1
20 CAPSULE, DELAYED RELEASE ORAL DAILY
Qty: 90 CAPSULE | Refills: 1 | Status: SHIPPED | OUTPATIENT
Start: 2021-07-08 | End: 2022-01-07

## 2021-07-09 ENCOUNTER — DOCUMENTATION ONLY (OUTPATIENT)
Dept: REHABILITATION | Facility: HOSPITAL | Age: 61
End: 2021-07-09

## 2021-07-09 PROBLEM — Z78.9 IMPAIRED MOTOR CONTROL: Chronic | Status: RESOLVED | Noted: 2017-05-12 | Resolved: 2021-07-09

## 2021-07-09 PROBLEM — R53.1 DECREASED STRENGTH: Chronic | Status: RESOLVED | Noted: 2017-05-12 | Resolved: 2021-07-09

## 2021-08-04 ENCOUNTER — PATIENT MESSAGE (OUTPATIENT)
Dept: FAMILY MEDICINE | Facility: CLINIC | Age: 61
End: 2021-08-04

## 2021-08-09 ENCOUNTER — TELEPHONE (OUTPATIENT)
Dept: FAMILY MEDICINE | Facility: CLINIC | Age: 61
End: 2021-08-09

## 2021-11-24 DIAGNOSIS — Z12.11 COLON CANCER SCREENING: ICD-10-CM

## 2022-01-06 DIAGNOSIS — K21.9 GASTROESOPHAGEAL REFLUX DISEASE: ICD-10-CM

## 2022-01-06 NOTE — TELEPHONE ENCOUNTER
No new care gaps identified.  Powered by [employer]. Reference number: [id number].   1/06/2022 5:49:24 AM CST

## 2022-01-07 RX ORDER — OMEPRAZOLE 20 MG/1
CAPSULE, DELAYED RELEASE ORAL
Qty: 90 CAPSULE | Refills: 0 | Status: SHIPPED | OUTPATIENT
Start: 2022-01-07 | End: 2022-04-07

## 2022-01-07 NOTE — TELEPHONE ENCOUNTER
Refill Authorization Note   Alberto Dangelo  is requesting a refill authorization.  Brief Assessment and Rationale for Refill:  Approve     Medication Therapy Plan:       Medication Reconciliation Completed: No   Comments:   --->Care Gap information included below if applicable.   Orders Placed This Encounter    omeprazole (PRILOSEC) 20 MG capsule      Requested Prescriptions   Signed Prescriptions Disp Refills    omeprazole (PRILOSEC) 20 MG capsule 90 capsule 0     Sig: TAKE 1 CAPSULE(20 MG) BY MOUTH EVERY DAY       Gastroenterology: Proton Pump Inhibitors Passed - 1/6/2022  5:49 AM        Passed - Patient is at least 18 years old        Passed - Osteoporosis is not on problem list        Passed - Plavix is not on active medication list        Passed - An appropriate indication is on the problem list        Passed - Valid encounter within last 15 months     Recent Visits  Date Type Provider Dept   11/23/20 Office Visit Dirk Ortiz MD [facility]   10/30/20 Office Visit Dirk Ortiz MD [facility]   Showing recent visits within past 720 days and meeting all other requirements  Future Appointments  No visits were found meeting these conditions.  Showing future appointments within next 150 days and meeting all other requirements                    Appointments  past 12m or future 3m with PCP    Date Provider   Last Visit   11/23/2020 Dirk Ortiz MD   Next Visit   Visit date not found Dirk Ortiz MD   ED visits in past 90 days: 0     Note composed:8:33 AM 01/07/2022

## 2022-01-08 DIAGNOSIS — S14.129A INCOMPLETE INJURY OF CERVICAL SPINAL CORD WITH CENTRAL CORD SYNDROME: ICD-10-CM

## 2022-01-08 DIAGNOSIS — I95.1 ORTHOSTATIC HYPOTENSION: ICD-10-CM

## 2022-01-10 RX ORDER — LANOLIN ALCOHOL/MO/W.PET/CERES
CREAM (GRAM) TOPICAL
Qty: 180 TABLET | Refills: 0 | Status: SHIPPED | OUTPATIENT
Start: 2022-01-10 | End: 2022-04-18

## 2022-01-10 RX ORDER — MIDODRINE HYDROCHLORIDE 10 MG/1
TABLET ORAL
Qty: 270 TABLET | Refills: 0 | Status: SHIPPED | OUTPATIENT
Start: 2022-01-10 | End: 2022-04-18

## 2022-01-11 ENCOUNTER — PATIENT MESSAGE (OUTPATIENT)
Dept: FAMILY MEDICINE | Facility: CLINIC | Age: 62
End: 2022-01-11
Payer: COMMERCIAL

## 2022-02-15 DIAGNOSIS — R25.2 SPASM: ICD-10-CM

## 2022-02-15 RX ORDER — DIAZEPAM 5 MG/1
5 TABLET ORAL EVERY 12 HOURS PRN
Qty: 60 TABLET | Refills: 2 | Status: SHIPPED | OUTPATIENT
Start: 2022-02-15 | End: 2022-07-19

## 2022-02-15 NOTE — TELEPHONE ENCOUNTER
No new care gaps identified.  Powered by [employer]. Reference number: [id number].   2/15/2022 2:17:03 PM CST

## 2022-03-08 ENCOUNTER — PATIENT MESSAGE (OUTPATIENT)
Dept: FAMILY MEDICINE | Facility: CLINIC | Age: 62
End: 2022-03-08
Payer: COMMERCIAL

## 2022-04-06 DIAGNOSIS — K21.9 GASTROESOPHAGEAL REFLUX DISEASE: ICD-10-CM

## 2022-04-06 NOTE — TELEPHONE ENCOUNTER
No new care gaps identified.  Powered by [employer] by [employer]. Reference number: [id number].   4/06/2022 1:22:27 PM CDT

## 2022-04-07 RX ORDER — OMEPRAZOLE 20 MG/1
CAPSULE, DELAYED RELEASE ORAL
Qty: 90 CAPSULE | Refills: 0 | Status: SHIPPED | OUTPATIENT
Start: 2022-04-07 | End: 2022-06-10

## 2022-04-07 NOTE — TELEPHONE ENCOUNTER
Refill Routing Note   Medication(s) are not appropriate for processing by [facility] for the following reason(s):      - Patient has not been seen in over 15 months by PCP               Medication reconciliation completed: No     Appointments  past 12m or future 3m with PCP    Date Provider   Last Visit   11/23/2020 Drik Ortiz MD   Next Visit   4/27/2022 Dirk Ortiz MD

## 2022-04-13 ENCOUNTER — PATIENT OUTREACH (OUTPATIENT)
Dept: ADMINISTRATIVE | Facility: HOSPITAL | Age: 62
End: 2022-04-13
Payer: COMMERCIAL

## 2022-04-13 NOTE — PROGRESS NOTES
Chart review completed 2022.  Care Everywhere updates requested and reviewed.  Immunizations reconciled. Media reports reviewed.  Duplicate HM overrides and  orders removed if applies.  Overdue HM topic chart audit and/or requested if applies.  Overdue lab testing linked to upcoming lab appointments if applies.    Lab zehra and quest reviewed-no      DIS reviewed -no        WOG orders placed-no  Letter mailed-no  HM updated with external report-no   Requested records-no    Appt window updated with overdue HM.      Health Maintenance Due   Topic Date Due    COVID-19 Vaccine (1) Never done    HIV Screening  Never done    Shingles Vaccine (1 of 2) Never done    TETANUS VACCINE  2020    Influenza Vaccine (1) 2021    Colorectal Cancer Screening  2021

## 2022-04-22 ENCOUNTER — TELEPHONE (OUTPATIENT)
Dept: FAMILY MEDICINE | Facility: CLINIC | Age: 62
End: 2022-04-22
Payer: COMMERCIAL

## 2022-04-22 DIAGNOSIS — G90.9 AUTONOMIC DYSFUNCTION: ICD-10-CM

## 2022-04-22 DIAGNOSIS — G82.50 QUADRIPLEGIA: Primary | ICD-10-CM

## 2022-04-22 NOTE — TELEPHONE ENCOUNTER
----- Message from Bernice Rodriguez sent at 2022 10:06 AM CDT -----  Regarding: Physical Therapy Referral request  Good day to you Dr. Ortiz:    Your patient has contacted our office requesting [employer] training with our physical therapy department. The patient has been advised that we may get him scheduled as of 22 with the therapist that will be working with him.    Since it has been more than 30 days since his last order and visit with us we will need a new order. Please submit the order for Physical Therapy specifying the [employer] training and we will contact the patient to setup an appointment date/time.    Thanking you in advance for your assistance in this matter.    Best regards,    [facility] - [facility] [address] [place] [address] [address] [place] [id number]  PH: [phone]  FAX: [phone]

## 2022-05-05 ENCOUNTER — PATIENT MESSAGE (OUTPATIENT)
Dept: FAMILY MEDICINE | Facility: CLINIC | Age: 62
End: 2022-05-05
Payer: COMMERCIAL

## 2022-05-05 ENCOUNTER — TELEPHONE (OUTPATIENT)
Dept: FAMILY MEDICINE | Facility: CLINIC | Age: 62
End: 2022-05-05
Payer: COMMERCIAL

## 2022-05-05 ENCOUNTER — LAB VISIT (OUTPATIENT)
Dept: LAB | Facility: HOSPITAL | Age: 62
End: 2022-05-05
Attending: FAMILY MEDICINE
Payer: COMMERCIAL

## 2022-05-05 DIAGNOSIS — N30.01 ACUTE CYSTITIS WITH HEMATURIA: Primary | ICD-10-CM

## 2022-05-05 DIAGNOSIS — R39.9 UTI SYMPTOMS: ICD-10-CM

## 2022-05-05 DIAGNOSIS — R39.9 UTI SYMPTOMS: Primary | ICD-10-CM

## 2022-05-05 LAB
BACTERIA #/AREA URNS AUTO: ABNORMAL /HPF
BILIRUB UR QL STRIP: NEGATIVE
CLARITY UR: CLEAR
COLOR UR: YELLOW
GLUCOSE UR QL STRIP: NEGATIVE
HGB UR QL STRIP: ABNORMAL
KETONES UR QL STRIP: NEGATIVE
LEUKOCYTE ESTERASE UR QL STRIP: ABNORMAL
MICROSCOPIC COMMENT: ABNORMAL
NITRITE UR QL STRIP: NEGATIVE
PH UR STRIP: 6 [PH] (ref 5–8)
PROT UR QL STRIP: ABNORMAL
RBC #/AREA URNS AUTO: 8 /HPF (ref 0–4)
SP GR UR STRIP: 1.02 (ref 1–1.03)
SQUAMOUS #/AREA URNS AUTO: 1 /HPF
URN SPEC COLLECT METH UR: ABNORMAL
WBC #/AREA URNS AUTO: 15 /HPF (ref 0–5)

## 2022-05-05 PROCEDURE — 87088 URINE BACTERIA CULTURE: CPT | Performed by: FAMILY MEDICINE

## 2022-05-05 PROCEDURE — 87186 SC STD MICRODIL/AGAR DIL: CPT | Performed by: FAMILY MEDICINE

## 2022-05-05 PROCEDURE — 81000 URINALYSIS NONAUTO W/SCOPE: CPT | Mod: PO | Performed by: FAMILY MEDICINE

## 2022-05-05 PROCEDURE — 87077 CULTURE AEROBIC IDENTIFY: CPT | Performed by: FAMILY MEDICINE

## 2022-05-05 PROCEDURE — 87086 URINE CULTURE/COLONY COUNT: CPT | Performed by: FAMILY MEDICINE

## 2022-05-05 RX ORDER — AMOXICILLIN AND CLAVULANATE POTASSIUM 500; 125 MG/1; MG/1
1 TABLET, FILM COATED ORAL 2 TIMES DAILY
Qty: 14 TABLET | Refills: 0 | Status: SHIPPED | OUTPATIENT
Start: 2022-05-05 | End: 2022-05-12

## 2022-05-05 NOTE — TELEPHONE ENCOUNTER
Patient dropped off lab at [facility] lab. Can order be placed for UA and culture?

## 2022-05-05 NOTE — TELEPHONE ENCOUNTER
----- Message from Monalisa Roe sent at 5/5/2022  8:14 AM CDT -----  Contact: Pt  Type: Needs Medical Advice    Who Called:  Pt    Symptoms (please be specific):  UTI symptoms    How long has patient had these symptoms:  3 days    Best Call Back Number: [phone]    Additional Information: Pt's wife is coming in for lab appt this morning & he would like her to bring in urine sample.  Please call back ASAP.  Thanks.

## 2022-05-05 NOTE — TELEPHONE ENCOUNTER
Patient's wife dropped off a unidentified catheter bag at the lab that contains urine. The transportation container is labled as Alberto Dangelo.

## 2022-05-05 NOTE — TELEPHONE ENCOUNTER
----- Message from Keyanna Whitfield sent at 5/5/2022  8:46 AM CDT -----  Regarding: URINE  Good Morning,  Patients wife says she keeps calling and getting us to message Dr Ortiz's staff to have a UA order put in for Mr. Dangelo. Can someone please be put in a UA for this patient? Please call patients wife when order is put in or call if it cannot be put in for what ever reason.  Thanks,  Keyanna    (WIFE)Lindy Dangelo cell :[phone]

## 2022-05-07 LAB — BACTERIA UR CULT: ABNORMAL

## 2022-06-02 ENCOUNTER — TELEPHONE (OUTPATIENT)
Dept: FAMILY MEDICINE | Facility: CLINIC | Age: 62
End: 2022-06-02
Payer: COMMERCIAL

## 2022-06-02 ENCOUNTER — LAB VISIT (OUTPATIENT)
Dept: LAB | Facility: HOSPITAL | Age: 62
End: 2022-06-02
Attending: FAMILY MEDICINE
Payer: COMMERCIAL

## 2022-06-02 ENCOUNTER — OFFICE VISIT (OUTPATIENT)
Dept: FAMILY MEDICINE | Facility: CLINIC | Age: 62
End: 2022-06-02
Attending: FAMILY MEDICINE
Payer: COMMERCIAL

## 2022-06-02 VITALS
HEIGHT: 71 IN | OXYGEN SATURATION: 97 % | BODY MASS INDEX: 23.15 KG/M2 | HEART RATE: 56 BPM | DIASTOLIC BLOOD PRESSURE: 78 MMHG | TEMPERATURE: 98 F | RESPIRATION RATE: 17 BRPM | SYSTOLIC BLOOD PRESSURE: 120 MMHG

## 2022-06-02 DIAGNOSIS — G90.9 AUTONOMIC DYSFUNCTION: ICD-10-CM

## 2022-06-02 DIAGNOSIS — Z00.00 ENCOUNTER FOR PREVENTIVE HEALTH EXAMINATION: Primary | ICD-10-CM

## 2022-06-02 DIAGNOSIS — Z12.5 PROSTATE CANCER SCREENING: ICD-10-CM

## 2022-06-02 DIAGNOSIS — G82.50 QUADRIPLEGIA: ICD-10-CM

## 2022-06-02 DIAGNOSIS — Z00.00 ENCOUNTER FOR PREVENTIVE HEALTH EXAMINATION: ICD-10-CM

## 2022-06-02 DIAGNOSIS — I95.1 ORTHOSTATIC HYPOTENSION: ICD-10-CM

## 2022-06-02 DIAGNOSIS — Z86.711 HISTORY OF PULMONARY EMBOLISM: ICD-10-CM

## 2022-06-02 DIAGNOSIS — Z86.718 HISTORY OF DVT (DEEP VEIN THROMBOSIS): ICD-10-CM

## 2022-06-02 DIAGNOSIS — Z12.11 COLON CANCER SCREENING: ICD-10-CM

## 2022-06-02 DIAGNOSIS — R65.10 SIRS (SYSTEMIC INFLAMMATORY RESPONSE SYNDROME): ICD-10-CM

## 2022-06-02 LAB
ALBUMIN SERPL BCP-MCNC: 4.6 G/DL (ref 3.5–5.2)
ALP SERPL-CCNC: 65 U/L (ref 55–135)
ALT SERPL W/O P-5'-P-CCNC: 41 U/L (ref 10–44)
ANION GAP SERPL CALC-SCNC: 10 MMOL/L (ref 8–16)
AST SERPL-CCNC: 34 U/L (ref 10–40)
BASOPHILS # BLD AUTO: 0.06 K/UL (ref 0–0.2)
BASOPHILS NFR BLD: 1 % (ref 0–1.9)
BILIRUB SERPL-MCNC: 1.7 MG/DL (ref 0.1–1)
BUN SERPL-MCNC: 16 MG/DL (ref 6–20)
CALCIUM SERPL-MCNC: 10.1 MG/DL (ref 8.7–10.5)
CHLORIDE SERPL-SCNC: 100 MMOL/L (ref 95–110)
CHOLEST SERPL-MCNC: 187 MG/DL (ref 120–199)
CHOLEST/HDLC SERPL: 4.3 {RATIO} (ref 2–5)
CO2 SERPL-SCNC: 28 MMOL/L (ref 23–29)
COMPLEXED PSA SERPL-MCNC: 3.4 NG/ML (ref 0–4)
CREAT SERPL-MCNC: 1 MG/DL (ref 0.5–1.4)
DIFFERENTIAL METHOD: ABNORMAL
EOSINOPHIL # BLD AUTO: 0.1 K/UL (ref 0–0.5)
EOSINOPHIL NFR BLD: 1.6 % (ref 0–8)
ERYTHROCYTE [DISTWIDTH] IN BLOOD BY AUTOMATED COUNT: 11.9 % (ref 11.5–14.5)
EST. GFR  (AFRICAN AMERICAN): >60 ML/MIN/1.73 M^2
EST. GFR  (NON AFRICAN AMERICAN): >60 ML/MIN/1.73 M^2
GLUCOSE SERPL-MCNC: 81 MG/DL (ref 70–110)
HCT VFR BLD AUTO: 45.4 % (ref 40–54)
HDLC SERPL-MCNC: 44 MG/DL (ref 40–75)
HDLC SERPL: 23.5 % (ref 20–50)
HGB BLD-MCNC: 15.6 G/DL (ref 14–18)
IMM GRANULOCYTES # BLD AUTO: 0.01 K/UL (ref 0–0.04)
IMM GRANULOCYTES NFR BLD AUTO: 0.2 % (ref 0–0.5)
LDLC SERPL CALC-MCNC: 119.6 MG/DL (ref 63–159)
LYMPHOCYTES # BLD AUTO: 1.8 K/UL (ref 1–4.8)
LYMPHOCYTES NFR BLD: 28.9 % (ref 18–48)
MCH RBC QN AUTO: 31.3 PG (ref 27–31)
MCHC RBC AUTO-ENTMCNC: 34.4 G/DL (ref 32–36)
MCV RBC AUTO: 91 FL (ref 82–98)
MONOCYTES # BLD AUTO: 0.4 K/UL (ref 0.3–1)
MONOCYTES NFR BLD: 6.4 % (ref 4–15)
NEUTROPHILS # BLD AUTO: 3.8 K/UL (ref 1.8–7.7)
NEUTROPHILS NFR BLD: 61.9 % (ref 38–73)
NONHDLC SERPL-MCNC: 143 MG/DL
NRBC BLD-RTO: 0 /100 WBC
PLATELET # BLD AUTO: 185 K/UL (ref 150–450)
PMV BLD AUTO: 12.2 FL (ref 9.2–12.9)
POTASSIUM SERPL-SCNC: 3.8 MMOL/L (ref 3.5–5.1)
PROT SERPL-MCNC: 7.7 G/DL (ref 6–8.4)
RBC # BLD AUTO: 4.98 M/UL (ref 4.6–6.2)
SODIUM SERPL-SCNC: 138 MMOL/L (ref 136–145)
TRIGL SERPL-MCNC: 117 MG/DL (ref 30–150)
WBC # BLD AUTO: 6.13 K/UL (ref 3.9–12.7)

## 2022-06-02 PROCEDURE — 99999 PR PBB SHADOW E&M-EST. PATIENT-LVL IV: CPT | Mod: PBBFAC,,, | Performed by: FAMILY MEDICINE

## 2022-06-02 PROCEDURE — 3074F SYST BP LT 130 MM HG: CPT | Mod: CPTII,S$GLB,, | Performed by: FAMILY MEDICINE

## 2022-06-02 PROCEDURE — 99999 PR PBB SHADOW E&M-EST. PATIENT-LVL IV: ICD-10-PCS | Mod: PBBFAC,,, | Performed by: FAMILY MEDICINE

## 2022-06-02 PROCEDURE — 1159F MED LIST DOCD IN RCRD: CPT | Mod: CPTII,S$GLB,, | Performed by: FAMILY MEDICINE

## 2022-06-02 PROCEDURE — 3074F PR MOST RECENT SYSTOLIC BLOOD PRESSURE < 130 MM HG: ICD-10-PCS | Mod: CPTII,S$GLB,, | Performed by: FAMILY MEDICINE

## 2022-06-02 PROCEDURE — 80053 COMPREHEN METABOLIC PANEL: CPT | Performed by: FAMILY MEDICINE

## 2022-06-02 PROCEDURE — 36415 COLL VENOUS BLD VENIPUNCTURE: CPT | Mod: PO | Performed by: FAMILY MEDICINE

## 2022-06-02 PROCEDURE — 1159F PR MEDICATION LIST DOCUMENTED IN MEDICAL RECORD: ICD-10-PCS | Mod: CPTII,S$GLB,, | Performed by: FAMILY MEDICINE

## 2022-06-02 PROCEDURE — 80061 LIPID PANEL: CPT | Performed by: FAMILY MEDICINE

## 2022-06-02 PROCEDURE — 3078F PR MOST RECENT DIASTOLIC BLOOD PRESSURE < 80 MM HG: ICD-10-PCS | Mod: CPTII,S$GLB,, | Performed by: FAMILY MEDICINE

## 2022-06-02 PROCEDURE — 84153 ASSAY OF PSA TOTAL: CPT | Performed by: FAMILY MEDICINE

## 2022-06-02 PROCEDURE — 3078F DIAST BP <80 MM HG: CPT | Mod: CPTII,S$GLB,, | Performed by: FAMILY MEDICINE

## 2022-06-02 PROCEDURE — 99396 PR PREVENTIVE VISIT,EST,40-64: ICD-10-PCS | Mod: S$GLB,,, | Performed by: FAMILY MEDICINE

## 2022-06-02 PROCEDURE — 3008F BODY MASS INDEX DOCD: CPT | Mod: CPTII,S$GLB,, | Performed by: FAMILY MEDICINE

## 2022-06-02 PROCEDURE — 85025 COMPLETE CBC W/AUTO DIFF WBC: CPT | Performed by: FAMILY MEDICINE

## 2022-06-02 PROCEDURE — 99396 PREV VISIT EST AGE 40-64: CPT | Mod: S$GLB,,, | Performed by: FAMILY MEDICINE

## 2022-06-02 PROCEDURE — 1160F PR REVIEW ALL MEDS BY PRESCRIBER/CLIN PHARMACIST DOCUMENTED: ICD-10-PCS | Mod: CPTII,S$GLB,, | Performed by: FAMILY MEDICINE

## 2022-06-02 PROCEDURE — 82274 ASSAY TEST FOR BLOOD FECAL: CPT | Performed by: FAMILY MEDICINE

## 2022-06-02 PROCEDURE — 1160F RVW MEDS BY RX/DR IN RCRD: CPT | Mod: CPTII,S$GLB,, | Performed by: FAMILY MEDICINE

## 2022-06-02 PROCEDURE — 3008F PR BODY MASS INDEX (BMI) DOCUMENTED: ICD-10-PCS | Mod: CPTII,S$GLB,, | Performed by: FAMILY MEDICINE
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
## 2022-06-06 ENCOUNTER — PATIENT MESSAGE (OUTPATIENT)
Dept: FAMILY MEDICINE | Facility: CLINIC | Age: 62
End: 2022-06-06
Payer: COMMERCIAL

## 2022-06-06 DIAGNOSIS — S14.129A INCOMPLETE INJURY OF CERVICAL SPINAL CORD WITH CENTRAL CORD SYNDROME: ICD-10-CM

## 2022-06-09 LAB — HEMOCCULT STL QL IA: NEGATIVE

## 2022-06-10 DIAGNOSIS — K21.9 GASTROESOPHAGEAL REFLUX DISEASE: ICD-10-CM

## 2022-06-10 DIAGNOSIS — I95.1 ORTHOSTATIC HYPOTENSION: ICD-10-CM

## 2022-06-10 RX ORDER — LANOLIN ALCOHOL/MO/W.PET/CERES
1 CREAM (GRAM) TOPICAL 2 TIMES DAILY
Qty: 180 TABLET | Refills: 3 | Status: SHIPPED | OUTPATIENT
Start: 2022-06-10 | End: 2023-11-08

## 2022-06-10 RX ORDER — OMEPRAZOLE 20 MG/1
20 CAPSULE, DELAYED RELEASE ORAL DAILY
Qty: 90 CAPSULE | Refills: 3 | Status: SHIPPED | OUTPATIENT
Start: 2022-06-10 | End: 2023-07-11

## 2022-06-10 RX ORDER — MIDODRINE HYDROCHLORIDE 10 MG/1
TABLET ORAL
Qty: 270 TABLET | Refills: 3 | Status: SHIPPED | OUTPATIENT
Start: 2022-06-10 | End: 2023-07-17

## 2022-06-10 NOTE — TELEPHONE ENCOUNTER
No new care gaps identified.  [facility] Embedded Care Gaps. Reference number: [id number]. 6/10/2022   10:04:34 AM LAURIE

## 2022-06-10 NOTE — TELEPHONE ENCOUNTER
Refill Routing Note   Medication(s) are not appropriate for processing by [facility] for the following reason(s):      - Outside of protocol    [facility] action(s):  Route  Approve       Medication Therapy Plan: ROUTE midodrine (medication is non-delegated); APPROVE omeprazole  Medication reconciliation completed: No     Appointments  past 12m or future 3m with PCP    Date Provider   Last Visit   6/2/2022 Dirk Ortiz MD   Next Visit   Visit date not found Dirk Ortiz MD   ED visits in past 90 days: 0        Note composed:3:23 PM 06/10/2022

## 2022-06-15 ENCOUNTER — TELEPHONE (OUTPATIENT)
Dept: FAMILY MEDICINE | Facility: CLINIC | Age: 62
End: 2022-06-15
Payer: COMMERCIAL

## 2022-06-15 NOTE — TELEPHONE ENCOUNTER
Left msg for Thomas to call with fax number so I can send him the wheelchair order. Emailed copy of letter to patient.

## 2022-06-15 NOTE — TELEPHONE ENCOUNTER
----- Message from Dulce Valdez MA sent at 6/15/2022  9:46 AM CDT -----  Type: Needs Medical Advice  Who Called:  Thomas  Omar Call Back Number: [phone]  Additional Information: patient's advocate is calling to check the status of letter of necessity for patient's wheelchair.  This letter is needed so that chair will be covered by insurance.  Please call to discuss

## 2022-07-05 ENCOUNTER — TELEPHONE (OUTPATIENT)
Dept: FAMILY MEDICINE | Facility: CLINIC | Age: 62
End: 2022-07-05
Payer: COMMERCIAL

## 2022-07-05 NOTE — TELEPHONE ENCOUNTER
----- Message from Monalisa Roe sent at 7/5/2022 11:52 AM CDT -----  Contact: Thomas montejo/ [employer]  Type: Needs Medical Advice    Who Called:  Thomas Nelson Call Back Number: [phone]    Additional Information: Wants to know if office received fax w/ product description for wheelchair for Dr. Ortiz to sign & date.  Please call back.  Thanks.

## 2022-07-15 ENCOUNTER — TELEPHONE (OUTPATIENT)
Dept: FAMILY MEDICINE | Facility: CLINIC | Age: 62
End: 2022-07-15
Payer: COMMERCIAL

## 2022-07-15 NOTE — TELEPHONE ENCOUNTER
Mr. Guzman with [employer] states he spoke to MAXIMINO Copeland regarding a form that was needed for billing purposes. States has not received return fax; requesting follow up. Upon further assessment documentation noted that form was faxed and copy sent to scanning on 7-6-22 at 11:45 am. Writer updated Mr. Guzman who states he has not received form. Writer advised Mr. Guzman a message will be forwarded to  Tamara for further follow up on Monday 7-18-22, as Mrs Mcdonald is out of office today.

## 2022-07-15 NOTE — TELEPHONE ENCOUNTER
----- Message from Natasha Tomlinson, Patient Care Assistant sent at 7/15/2022 11:55 AM CDT -----  Regarding: advice  Contact: olvin with [employer]  Type: Needs Medical Advice  Who Called: olvin with [employer]  Best Call Back Number:      Additional Information: olvin with [employer] states he would like a callback regarding paperwork for billing codes. Please call pt to advise. Thanks!

## 2022-07-17 DIAGNOSIS — R25.2 SPASM: ICD-10-CM

## 2022-07-17 NOTE — TELEPHONE ENCOUNTER
No new care gaps identified.  [facility] Embedded Care Gaps. Reference number: [id number]. 7/17/2022   1:25:37 PM CDT

## 2022-07-19 RX ORDER — DIAZEPAM 5 MG/1
TABLET ORAL
Qty: 60 TABLET | Refills: 2 | Status: SHIPPED | OUTPATIENT
Start: 2022-07-19 | End: 2023-01-25

## 2023-01-25 DIAGNOSIS — R25.2 SPASM: ICD-10-CM

## 2023-01-25 RX ORDER — DIAZEPAM 5 MG/1
TABLET ORAL
Qty: 60 TABLET | Refills: 0 | Status: SHIPPED | OUTPATIENT
Start: 2023-01-25 | End: 2023-05-02

## 2023-01-25 NOTE — TELEPHONE ENCOUNTER
No new care gaps identified.  [facility] Embedded Care Gaps. Reference number: [id number]. 1/25/2023   11:48:38 AM CST

## 2023-02-02 ENCOUNTER — PATIENT MESSAGE (OUTPATIENT)
Dept: FAMILY MEDICINE | Facility: CLINIC | Age: 63
End: 2023-02-02
Payer: COMMERCIAL

## 2023-02-02 DIAGNOSIS — G82.50 QUADRIPLEGIA: Primary | ICD-10-CM

## 2023-02-02 DIAGNOSIS — G90.9 AUTONOMIC DYSFUNCTION: ICD-10-CM

## 2023-02-08 ENCOUNTER — CLINICAL SUPPORT (OUTPATIENT)
Dept: REHABILITATION | Facility: HOSPITAL | Age: 63
End: 2023-02-08
Attending: FAMILY MEDICINE
Payer: COMMERCIAL

## 2023-02-08 DIAGNOSIS — G90.9 AUTONOMIC DYSFUNCTION: ICD-10-CM

## 2023-02-08 DIAGNOSIS — R26.9 GAIT ABNORMALITY: ICD-10-CM

## 2023-02-08 DIAGNOSIS — G82.50 QUADRIPLEGIA: ICD-10-CM

## 2023-02-08 DIAGNOSIS — R53.1 DECREASED STRENGTH: ICD-10-CM

## 2023-02-08 PROCEDURE — 97161 PT EVAL LOW COMPLEX 20 MIN: CPT | Mod: PN

## 2023-02-08 NOTE — PROGRESS NOTES
Physical Therapy evaluation completed, please see plan of care for details. Thank you for the referral.
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
## 2023-02-14 ENCOUNTER — CLINICAL SUPPORT (OUTPATIENT)
Dept: REHABILITATION | Facility: HOSPITAL | Age: 63
End: 2023-02-14
Payer: COMMERCIAL

## 2023-02-14 DIAGNOSIS — R26.9 GAIT ABNORMALITY: Primary | ICD-10-CM

## 2023-02-14 DIAGNOSIS — R53.1 DECREASED STRENGTH: ICD-10-CM

## 2023-02-14 PROCEDURE — 97110 THERAPEUTIC EXERCISES: CPT | Mod: PN

## 2023-02-14 PROCEDURE — 97116 GAIT TRAINING THERAPY: CPT | Mod: PN

## 2023-02-14 NOTE — PROGRESS NOTES
[facility]   Physical Therapy Treatment Note     Name: Alberto Dangelo  [facility] Number: [id number]    Therapy Diagnosis:   Encounter Diagnoses   Name Primary?    Gait abnormality Yes    Decreased strength      Physician: Dikr Ortiz MD    Visit Date: 2/14/2023    Physician Orders: PT Eval and Treat; comment: Lokomat gait training  Medical Diagnosis from Referral:   Quadriplegia  - Primary     G82.50 344.00   Autonomic dysfunction     G90.9 337.9      Evaluation Date: 2/8/2023  Authorization Period Expiration: 2/2/2023 - 2/2/2024  Plan of Care Expiration: 4/5/2023  Visit # / Visits authorized: 1/1      PTA Visit #: 0/5     Time In: 1345  Time Out: 1515  Total Billable Time: 90 minutes    SUBJECTIVE     Pt reports: He's been feeling good since his last visit. Feeling a little tight this morning  He was compliant with home exercise program.  Response to previous treatment: no treatment performed  Functional change: n/a    Pain: 0/10  Location: N/A     OBJECTIVE     Objective Measures updated at progress report unless specified.     Treatment     Efren received the treatments listed below:      therapeutic exercises to develop ROM, flexibility, and tone inhibition for 10 minutes including:  - Standing calf stretch in // bars  - Standing back stretch in // bars  - Standing hamstring stretch      gait training to improve functional mobility and safety for 80  minutes, including:  - Robotic assisted gait training via Lokomat for 56 minutes between 1.5 and 3 km/hr, 100% guidance force for first 20 minutes to decrease tone then decreased by 10% increments every 5 minutes until 50% guidance force. 50% guidance maintained for remainder of gait training for a total of 16 minutes.   - Time taken for skilled measurement, sizing, set up, and removal of patient from lokomat and harness included in total time        Patient Education and Home Exercises     Home Exercises Provided and Patient  Education Provided     Education provided:   - Importance of maintaining physical activity and flexibility to prevent excessive tone and contractures    Written Home Exercises Provided: Patient instructed to cont prior HEP. Exercises were reviewed and Efren was able to demonstrate them prior to the end of the session.  Efren demonstrated good  understanding of the education provided. See EMR under Patient Instructions for exercises provided during therapy sessions    ASSESSMENT     Efren put forth good effort during today's session with a focus on robotic assisted gait training. He tolerated the Lokomat well with no complaints of discomfort but with left foot toe out noted throughout gait trial. He was able to progress to 50% guidance force with no increase in his spasticity. He continues to be appropriate for outpatient therapy services to improve his gait quality and endurance and maximize his functional mobility to improve his independence.    Efren Is progressing well towards his goals.   Pt prognosis is Good.     Pt will continue to benefit from skilled outpatient physical therapy to address the deficits listed in the problem list box on initial evaluation, provide pt/family education and to maximize pt's level of independence in the home and community environment.     Pt's spiritual, cultural and educational needs considered and pt agreeable to plan of care and goals.     Anticipated barriers to physical therapy: none    Goals:   Short Term Goals: 4 weeks   Efren will complete 10MWT to assess gait speed and establish appropriate goals. Ongoing  Erfen will report improvements on his FOTO in his ability to walk around one floor of his home to demonstrate improved activity tolerance. Ongoing  PT will assess Efren's ability to perform advanced wheelchair skills (floor transfers, curbs, wheelies, etc.) to establish appropriate goals for community mobility. Ongoing     Long Term Goals: 8 weeks   Efren will improve FOTO  limitation score to 45% to demonstrate improved function. Progressing  Efren will report improvements in his ability to walk at least one block on his FOTO to show improvements in gait performance and endurance. Ongoing  PT will establish appropriate goals for gait speed and wheelchair mobility once assessed at next visit. Ongoing    PLAN     Plan of care Certification: 2/8/2023 to 4/5/2023.     Outpatient Physical Therapy 2 times weekly for 8 weeks to include the following interventions: Electrical Stimulation FES, Gait Training, Manual Therapy, Moist Heat/ Ice, Neuromuscular Re-ed, Orthotic Management and Training, Patient Education, Self Care, Therapeutic Activities, and Therapeutic Exercise.     Kellie Emanuel, PT

## 2023-02-16 ENCOUNTER — CLINICAL SUPPORT (OUTPATIENT)
Dept: REHABILITATION | Facility: HOSPITAL | Age: 63
End: 2023-02-16
Payer: COMMERCIAL

## 2023-02-16 DIAGNOSIS — R26.9 GAIT ABNORMALITY: Primary | ICD-10-CM

## 2023-02-16 DIAGNOSIS — R53.1 DECREASED STRENGTH: ICD-10-CM

## 2023-02-16 PROCEDURE — 97530 THERAPEUTIC ACTIVITIES: CPT | Mod: PN

## 2023-02-16 PROCEDURE — 97110 THERAPEUTIC EXERCISES: CPT | Mod: PN

## 2023-02-16 NOTE — PROGRESS NOTES
[facility]   Physical Therapy Treatment Note     Name: Alberto Dangelo  [facility] Number: [id number]    Therapy Diagnosis:   Encounter Diagnoses   Name Primary?    Gait abnormality Yes    Decreased strength        Physician: Dirk Ortiz MD    Visit Date: 2/16/2023    Physician Orders: PT Eval and Treat; comment: Lokomat gait training  Medical Diagnosis from Referral:   Quadriplegia  - Primary     G82.50 344.00   Autonomic dysfunction     G90.9 337.9      Evaluation Date: 2/8/2023  Authorization Period Expiration: 2/2/2023 - 2/2/2024  Plan of Care Expiration: 4/5/2023  Visit # / Visits authorized: 1/1      PTA Visit #: 0/5     Time In: 1340  Time Out: 1510  Total Time: 90 minutes  Total Billable Time: 60 minutes    SUBJECTIVE     Pt reports: He's been feeling good since his last visit. No issues following Lokomat training earlier this week.  He was compliant with home exercise program.  Response to previous treatment: Tolerated well  Functional change: n/a    Pain: 0/10  Location: N/A     OBJECTIVE     Objective Measures updated at progress report unless specified.     Treatment     Efren received the treatments listed below:      therapeutic exercises to develop ROM, flexibility, and tone inhibition for 35 minutes including:  - Standing calf stretch in // bars  - Standing back stretch in // bars  - Standing hamstring stretch    Review of home stretching program:  - long sitting toe touches feet together  - long sitting toe touches feet apart  - long sitting hip adductor stretch with toe touch  - Butterfly adductor stretch  - side lying trunk rotation stretch  - open books  - supine piriformis stretch   - supine single knee to chest stretch  - supine lower trunk rotations  - quadruped heel sitting low back stretch  - quadruped heel sitting to push up  - pigeon pose hip   - supine single knee to chest with hip flexor stretch over edge of mat    Therapeutic activity to prepare for gait  training and optimize safety for 25 minutes, including:    - Time taken for skilled measurement, sizing, set up, and removal of patient from lokomat and harness included in total time.  - Technical issues experienced with initiation of gait training on treadmill, skilled time spent trouble shooting issues, adjusting settings and adjusting robotic parts and cuffs.    Patient Education and Home Exercises     Home Exercises Provided and Patient Education Provided     Education provided:   - Importance of maintaining physical activity and flexibility to prevent excessive tone and contractures    Written Home Exercises Provided: Patient instructed to cont prior HEP. Exercises were reviewed and Efren was able to demonstrate them prior to the end of the session.  Efren demonstrated good  understanding of the education provided. See EMR under Patient Instructions for exercises provided during therapy sessions    ASSESSMENT     Efren put forth good effort during today's session with a focus on robotic assisted gait training. Due to technical difficulties with Lokomat, we were unable to perform gait training with robotic assisted gait device on treadmill today. Remainder of session was utilized to review patients current home exercise routine and provide recommendations on modifications to stretches and other activities to include in his daily routine. He expressed good understanding and continues to be appropriate for outpatient therapy services to improve his gait quality and endurance, maximize his functional mobility and improve his independence.    Efren Is progressing well towards his goals.   Pt prognosis is Good.     Pt will continue to benefit from skilled outpatient physical therapy to address the deficits listed in the problem list box on initial evaluation, provide pt/family education and to maximize pt's level of independence in the home and community environment.     Pt's spiritual, cultural and educational needs  considered and pt agreeable to plan of care and goals.     Anticipated barriers to physical therapy: none    Goals:   Short Term Goals: 4 weeks   Efren will complete 10MWT to assess gait speed and establish appropriate goals. Ongoing  Efren will report improvements on his FOTO in his ability to walk around one floor of his home to demonstrate improved activity tolerance. Ongoing  PT will assess Efren's ability to perform advanced wheelchair skills (floor transfers, curbs, wheelies, etc.) to establish appropriate goals for community mobility. Ongoing     Long Term Goals: 8 weeks   Efren will improve FOTO limitation score to 45% to demonstrate improved function. Progressing  Efren will report improvements in his ability to walk at least one block on his FOTO to show improvements in gait performance and endurance. Ongoing  PT will establish appropriate goals for gait speed and wheelchair mobility once assessed at next visit. Ongoing    PLAN     Plan of care Certification: 2/8/2023 to 4/5/2023.     Outpatient Physical Therapy 2 times weekly for 8 weeks to include the following interventions: Electrical Stimulation FES, Gait Training, Manual Therapy, Moist Heat/ Ice, Neuromuscular Re-ed, Orthotic Management and Training, Patient Education, Self Care, Therapeutic Activities, and Therapeutic Exercise.     Kellie Emanuel, PT

## 2023-05-02 DIAGNOSIS — R25.2 SPASM: ICD-10-CM

## 2023-05-02 RX ORDER — DIAZEPAM 5 MG/1
TABLET ORAL
Qty: 60 TABLET | Refills: 0 | Status: CANCELLED | OUTPATIENT
Start: 2023-05-02

## 2023-05-02 RX ORDER — DIAZEPAM 5 MG/1
TABLET ORAL
Qty: 60 TABLET | Refills: 0 | Status: SHIPPED | OUTPATIENT
Start: 2023-05-02 | End: 2023-06-16

## 2023-05-02 NOTE — TELEPHONE ENCOUNTER
No care due was identified.  [facility] Embedded Care Due Messages. Reference number: [id number].   5/02/2023 10:10:33 AM CDT

## 2023-05-02 NOTE — TELEPHONE ENCOUNTER
No care due was identified.  [facility] Embedded Care Due Messages. Reference number: [id number].   5/02/2023 1:30:39 PM CDT

## 2023-05-02 NOTE — TELEPHONE ENCOUNTER
[facility] site checked, no inappropriate activity found    Last refill 5 mg diazepam 60 tablets was January 25, 2023

## 2023-05-20 ENCOUNTER — PATIENT MESSAGE (OUTPATIENT)
Dept: ADMINISTRATIVE | Facility: HOSPITAL | Age: 63
End: 2023-05-20
Payer: MEDICARE

## 2023-06-05 ENCOUNTER — OFFICE VISIT (OUTPATIENT)
Dept: FAMILY MEDICINE | Facility: CLINIC | Age: 63
End: 2023-06-05
Attending: FAMILY MEDICINE
Payer: MEDICARE

## 2023-06-05 ENCOUNTER — LAB VISIT (OUTPATIENT)
Dept: LAB | Facility: HOSPITAL | Age: 63
End: 2023-06-05
Attending: FAMILY MEDICINE
Payer: MEDICARE

## 2023-06-05 ENCOUNTER — TELEPHONE (OUTPATIENT)
Dept: FAMILY MEDICINE | Facility: CLINIC | Age: 63
End: 2023-06-05

## 2023-06-05 VITALS
HEART RATE: 68 BPM | RESPIRATION RATE: 17 BRPM | OXYGEN SATURATION: 97 % | HEIGHT: 71 IN | DIASTOLIC BLOOD PRESSURE: 64 MMHG | SYSTOLIC BLOOD PRESSURE: 104 MMHG | BODY MASS INDEX: 23.2 KG/M2 | WEIGHT: 165.69 LBS | TEMPERATURE: 98 F

## 2023-06-05 DIAGNOSIS — L98.9 SKIN LESION OF BACK: ICD-10-CM

## 2023-06-05 DIAGNOSIS — G90.9 AUTONOMIC DYSFUNCTION: ICD-10-CM

## 2023-06-05 DIAGNOSIS — Z00.00 ENCOUNTER FOR PREVENTIVE HEALTH EXAMINATION: ICD-10-CM

## 2023-06-05 DIAGNOSIS — I95.9 HYPOTENSION, UNSPECIFIED HYPOTENSION TYPE: ICD-10-CM

## 2023-06-05 DIAGNOSIS — K21.9 GASTROESOPHAGEAL REFLUX DISEASE, UNSPECIFIED WHETHER ESOPHAGITIS PRESENT: ICD-10-CM

## 2023-06-05 DIAGNOSIS — Z00.00 ENCOUNTER FOR PREVENTIVE HEALTH EXAMINATION: Primary | ICD-10-CM

## 2023-06-05 DIAGNOSIS — R65.10 SIRS (SYSTEMIC INFLAMMATORY RESPONSE SYNDROME): ICD-10-CM

## 2023-06-05 DIAGNOSIS — L65.9 HAIR LOSS: ICD-10-CM

## 2023-06-05 DIAGNOSIS — Z12.5 PROSTATE CANCER SCREENING: ICD-10-CM

## 2023-06-05 DIAGNOSIS — G82.50 QUADRIPLEGIA: ICD-10-CM

## 2023-06-05 DIAGNOSIS — Z12.11 COLON CANCER SCREENING: ICD-10-CM

## 2023-06-05 LAB
ALBUMIN SERPL BCP-MCNC: 4.5 G/DL (ref 3.5–5.2)
ALP SERPL-CCNC: 63 U/L (ref 55–135)
ALT SERPL W/O P-5'-P-CCNC: 57 U/L (ref 10–44)
ANION GAP SERPL CALC-SCNC: 10 MMOL/L (ref 8–16)
AST SERPL-CCNC: 36 U/L (ref 10–40)
BASOPHILS # BLD AUTO: 0.06 K/UL (ref 0–0.2)
BASOPHILS NFR BLD: 0.9 % (ref 0–1.9)
BILIRUB SERPL-MCNC: 1.1 MG/DL (ref 0.1–1)
BUN SERPL-MCNC: 17 MG/DL (ref 8–23)
CALCIUM SERPL-MCNC: 9.8 MG/DL (ref 8.7–10.5)
CHLORIDE SERPL-SCNC: 106 MMOL/L (ref 95–110)
CHOLEST SERPL-MCNC: 154 MG/DL (ref 120–199)
CHOLEST/HDLC SERPL: 3.6 {RATIO} (ref 2–5)
CO2 SERPL-SCNC: 23 MMOL/L (ref 23–29)
CREAT SERPL-MCNC: 1.1 MG/DL (ref 0.5–1.4)
DIFFERENTIAL METHOD: ABNORMAL
EOSINOPHIL # BLD AUTO: 0.1 K/UL (ref 0–0.5)
EOSINOPHIL NFR BLD: 2 % (ref 0–8)
ERYTHROCYTE [DISTWIDTH] IN BLOOD BY AUTOMATED COUNT: 12.5 % (ref 11.5–14.5)
EST. GFR  (NO RACE VARIABLE): >60 ML/MIN/1.73 M^2
GLUCOSE SERPL-MCNC: 98 MG/DL (ref 70–110)
HCT VFR BLD AUTO: 44.6 % (ref 40–54)
HDLC SERPL-MCNC: 43 MG/DL (ref 40–75)
HDLC SERPL: 27.9 % (ref 20–50)
HGB BLD-MCNC: 15.2 G/DL (ref 14–18)
IMM GRANULOCYTES # BLD AUTO: 0.02 K/UL (ref 0–0.04)
IMM GRANULOCYTES NFR BLD AUTO: 0.3 % (ref 0–0.5)
LDLC SERPL CALC-MCNC: 92 MG/DL (ref 63–159)
LYMPHOCYTES # BLD AUTO: 1.6 K/UL (ref 1–4.8)
LYMPHOCYTES NFR BLD: 24.5 % (ref 18–48)
MCH RBC QN AUTO: 31.6 PG (ref 27–31)
MCHC RBC AUTO-ENTMCNC: 34.1 G/DL (ref 32–36)
MCV RBC AUTO: 93 FL (ref 82–98)
MONOCYTES # BLD AUTO: 0.5 K/UL (ref 0.3–1)
MONOCYTES NFR BLD: 7 % (ref 4–15)
NEUTROPHILS # BLD AUTO: 4.2 K/UL (ref 1.8–7.7)
NEUTROPHILS NFR BLD: 65.3 % (ref 38–73)
NONHDLC SERPL-MCNC: 111 MG/DL
NRBC BLD-RTO: 0 /100 WBC
PLATELET # BLD AUTO: 191 K/UL (ref 150–450)
PMV BLD AUTO: 12.8 FL (ref 9.2–12.9)
POTASSIUM SERPL-SCNC: 3.8 MMOL/L (ref 3.5–5.1)
PROT SERPL-MCNC: 7.4 G/DL (ref 6–8.4)
RBC # BLD AUTO: 4.81 M/UL (ref 4.6–6.2)
SODIUM SERPL-SCNC: 139 MMOL/L (ref 136–145)
TRIGL SERPL-MCNC: 95 MG/DL (ref 30–150)
WBC # BLD AUTO: 6.45 K/UL (ref 3.9–12.7)

## 2023-06-05 PROCEDURE — 99396 PR PREVENTIVE VISIT,EST,40-64: ICD-10-PCS | Mod: GZ,S$PBB,, | Performed by: FAMILY MEDICINE

## 2023-06-05 PROCEDURE — 99999 PR PBB SHADOW E&M-EST. PATIENT-LVL V: ICD-10-PCS | Mod: PBBFAC,,, | Performed by: FAMILY MEDICINE

## 2023-06-05 PROCEDURE — 99396 PREV VISIT EST AGE 40-64: CPT | Mod: GZ,S$PBB,, | Performed by: FAMILY MEDICINE

## 2023-06-05 PROCEDURE — 99215 OFFICE O/P EST HI 40 MIN: CPT | Mod: PBBFAC,PN | Performed by: FAMILY MEDICINE

## 2023-06-05 PROCEDURE — 99999 PR PBB SHADOW E&M-EST. PATIENT-LVL V: CPT | Mod: PBBFAC,,, | Performed by: FAMILY MEDICINE

## 2023-06-05 PROCEDURE — 80061 LIPID PANEL: CPT | Performed by: FAMILY MEDICINE

## 2023-06-05 PROCEDURE — 84153 ASSAY OF PSA TOTAL: CPT | Performed by: FAMILY MEDICINE

## 2023-06-05 PROCEDURE — 85025 COMPLETE CBC W/AUTO DIFF WBC: CPT | Performed by: FAMILY MEDICINE

## 2023-06-05 PROCEDURE — 36415 COLL VENOUS BLD VENIPUNCTURE: CPT | Mod: PO | Performed by: FAMILY MEDICINE

## 2023-06-05 PROCEDURE — 80053 COMPREHEN METABOLIC PANEL: CPT | Performed by: FAMILY MEDICINE
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
## 2023-06-06 LAB — COMPLEXED PSA SERPL-MCNC: 2.5 NG/ML (ref 0–4)

## 2023-06-15 ENCOUNTER — OFFICE VISIT (OUTPATIENT)
Dept: DERMATOLOGY | Facility: CLINIC | Age: 63
End: 2023-06-15
Attending: FAMILY MEDICINE
Payer: MEDICARE

## 2023-06-15 VITALS — BODY MASS INDEX: 23.1 KG/M2 | WEIGHT: 165 LBS | HEIGHT: 71 IN

## 2023-06-15 DIAGNOSIS — L82.1 SEBORRHEIC KERATOSES: ICD-10-CM

## 2023-06-15 DIAGNOSIS — L64.9 MALE PATTERN ALOPECIA: ICD-10-CM

## 2023-06-15 DIAGNOSIS — D48.5 NEOPLASM OF UNCERTAIN BEHAVIOR OF SKIN: Primary | ICD-10-CM

## 2023-06-15 DIAGNOSIS — D22.9 MULTIPLE BENIGN NEVI: ICD-10-CM

## 2023-06-15 PROCEDURE — 1160F RVW MEDS BY RX/DR IN RCRD: CPT | Mod: CPTII,S$GLB,, | Performed by: DERMATOLOGY

## 2023-06-15 PROCEDURE — 88342 IMHCHEM/IMCYTCHM 1ST ANTB: CPT | Performed by: PATHOLOGY

## 2023-06-15 PROCEDURE — 88305 TISSUE EXAM BY PATHOLOGIST: CPT | Performed by: PATHOLOGY

## 2023-06-15 PROCEDURE — 1160F PR REVIEW ALL MEDS BY PRESCRIBER/CLIN PHARMACIST DOCUMENTED: ICD-10-PCS | Mod: CPTII,S$GLB,, | Performed by: DERMATOLOGY

## 2023-06-15 PROCEDURE — 88341 IMHCHEM/IMCYTCHM EA ADD ANTB: CPT | Mod: 59 | Performed by: PATHOLOGY

## 2023-06-15 PROCEDURE — 3008F BODY MASS INDEX DOCD: CPT | Mod: CPTII,S$GLB,, | Performed by: DERMATOLOGY

## 2023-06-15 PROCEDURE — 1159F MED LIST DOCD IN RCRD: CPT | Mod: CPTII,S$GLB,, | Performed by: DERMATOLOGY

## 2023-06-15 PROCEDURE — 3008F PR BODY MASS INDEX (BMI) DOCUMENTED: ICD-10-PCS | Mod: CPTII,S$GLB,, | Performed by: DERMATOLOGY

## 2023-06-15 PROCEDURE — 88305 TISSUE EXAM BY PATHOLOGIST: CPT | Mod: 26,,, | Performed by: PATHOLOGY

## 2023-06-15 PROCEDURE — 11102 TANGNTL BX SKIN SINGLE LES: CPT | Mod: S$GLB,,, | Performed by: DERMATOLOGY

## 2023-06-15 PROCEDURE — 99203 OFFICE O/P NEW LOW 30 MIN: CPT | Mod: 25,S$GLB,, | Performed by: DERMATOLOGY

## 2023-06-15 PROCEDURE — 11102 PR TANGENTIAL BIOPSY, SKIN, SINGLE LESION: ICD-10-PCS | Mod: S$GLB,,, | Performed by: DERMATOLOGY

## 2023-06-15 PROCEDURE — 88341 PR IHC OR ICC EACH ADD'L SINGLE ANTIBODY  STAINPR: ICD-10-PCS | Mod: 26,,, | Performed by: PATHOLOGY

## 2023-06-15 PROCEDURE — 88342 IMHCHEM/IMCYTCHM 1ST ANTB: CPT | Mod: 26,,, | Performed by: PATHOLOGY

## 2023-06-15 PROCEDURE — 1159F PR MEDICATION LIST DOCUMENTED IN MEDICAL RECORD: ICD-10-PCS | Mod: CPTII,S$GLB,, | Performed by: DERMATOLOGY

## 2023-06-15 PROCEDURE — 99203 PR OFFICE/OUTPT VISIT, NEW, LEVL III, 30-44 MIN: ICD-10-PCS | Mod: 25,S$GLB,, | Performed by: DERMATOLOGY

## 2023-06-15 PROCEDURE — 88342 CHG IMMUNOCYTOCHEMISTRY: ICD-10-PCS | Mod: 26,,, | Performed by: PATHOLOGY

## 2023-06-15 PROCEDURE — 88341 IMHCHEM/IMCYTCHM EA ADD ANTB: CPT | Mod: 26,,, | Performed by: PATHOLOGY

## 2023-06-15 PROCEDURE — 88305 TISSUE EXAM BY PATHOLOGIST: ICD-10-PCS | Mod: 26,,, | Performed by: PATHOLOGY

## 2023-06-15 RX ORDER — FINASTERIDE 1 MG/1
TABLET, FILM COATED ORAL
Qty: 90 TABLET | Refills: 3 | Status: SHIPPED | OUTPATIENT
Start: 2023-06-15
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
## 2023-06-15 NOTE — PATIENT INSTRUCTIONS
Shave Biopsy Wound Care    Your doctor has performed a shave biopsy today.  A band aid and vaseline ointment has been placed over the site.  This should remain in place for 24 hours.  It is recommended that you keep the area dry for the first 24 hours.  After 24 hours, you may remove the band aid and wash the area with warm soap and water and apply Vaseline jelly.  Many patients prefer to use Neosporin or Bacitracin ointment.  This is acceptable; however, know that you can develop an allergy to this medication even if you have used it safely for years.  It is important to keep the area moist.  Letting it dry out and get air slows healing time, and will worsen the scar.  Band aid is optional after first 24 hours.      If you notice increasing redness, tenderness, pain, or yellow drainage at the biopsy site, please notify your doctor.  These are signs of an infection.    If your biopsy site is bleeding, apply firm pressure for 15 minutes straight.  Repeat for another 15 minutes, if it is still bleeding.   If the surgical site continues to bleed, then please contact your doctor.       [facility] - DERMATOLOGY  [address], [address]  [place] [address]  Dept: [phone]  Dept Fax: [phone]

## 2023-06-16 DIAGNOSIS — R25.2 SPASM: ICD-10-CM

## 2023-06-16 RX ORDER — DIAZEPAM 5 MG/1
TABLET ORAL
Qty: 60 TABLET | Refills: 0 | Status: CANCELLED | OUTPATIENT
Start: 2023-06-16

## 2023-06-16 RX ORDER — DIAZEPAM 5 MG/1
TABLET ORAL
Qty: 60 TABLET | Refills: 0 | Status: SHIPPED | OUTPATIENT
Start: 2023-06-16 | End: 2023-07-31

## 2023-06-16 NOTE — TELEPHONE ENCOUNTER
No care due was identified.  [facility] Embedded Care Due Messages. Reference number: [id number].   6/16/2023 3:33:32 PM CDT

## 2023-06-16 NOTE — TELEPHONE ENCOUNTER
No care due was identified.  [facility] Embedded Care Due Messages. Reference number: [id number].   6/16/2023 3:31:00 PM CDT

## 2023-06-16 NOTE — TELEPHONE ENCOUNTER
No care due was identified.  [facility] Embedded Care Due Messages. Reference number: [id number].   6/16/2023 7:54:52 AM CDT

## 2023-06-16 NOTE — TELEPHONE ENCOUNTER
On call refill request.  Dr. Ortiz is out of the office.   Please advise. Thank you.      LR--5-2-23        LOV--6-5-23       FOV--6-6-24

## 2023-06-22 LAB
FINAL PATHOLOGIC DIAGNOSIS: NORMAL
GROSS: NORMAL
Lab: NORMAL
MICROSCOPIC EXAM: NORMAL

## 2023-06-25 ENCOUNTER — PATIENT MESSAGE (OUTPATIENT)
Dept: DERMATOLOGY | Facility: CLINIC | Age: 63
End: 2023-06-25
Payer: MEDICARE

## 2023-06-27 ENCOUNTER — PATIENT MESSAGE (OUTPATIENT)
Dept: ADMINISTRATIVE | Facility: HOSPITAL | Age: 63
End: 2023-06-27
Payer: MEDICARE

## 2023-06-27 ENCOUNTER — LAB VISIT (OUTPATIENT)
Dept: LAB | Facility: HOSPITAL | Age: 63
End: 2023-06-27
Attending: FAMILY MEDICINE
Payer: MEDICARE

## 2023-06-27 DIAGNOSIS — Z12.11 COLON CANCER SCREENING: ICD-10-CM

## 2023-06-27 LAB — HEMOCCULT STL QL IA: NEGATIVE

## 2023-06-27 PROCEDURE — 82274 ASSAY TEST FOR BLOOD FECAL: CPT | Performed by: FAMILY MEDICINE

## 2023-07-11 ENCOUNTER — PATIENT MESSAGE (OUTPATIENT)
Dept: INTERNAL MEDICINE | Facility: CLINIC | Age: 63
End: 2023-07-11
Payer: MEDICARE

## 2023-07-11 DIAGNOSIS — K21.9 GASTROESOPHAGEAL REFLUX DISEASE: ICD-10-CM

## 2023-07-11 RX ORDER — OMEPRAZOLE 20 MG/1
CAPSULE, DELAYED RELEASE ORAL
Qty: 90 CAPSULE | Refills: 3 | Status: SHIPPED | OUTPATIENT
Start: 2023-07-11

## 2023-07-11 NOTE — TELEPHONE ENCOUNTER
No care due was identified.  [facility] Embedded Care Due Messages. Reference number: [id number].   7/11/2023 5:58:09 AM CDT

## 2023-07-11 NOTE — TELEPHONE ENCOUNTER
Refill Decision Note      Refill Decision Note   Alberto Dangelo  is requesting a refill authorization.  Brief Assessment and Rationale for Refill:  Approve     Medication Therapy Plan:         Comments:     Note composed:7:08 AM 07/11/2023             Appointments     Last Visit   6/5/2023 Dirk Ortiz MD   Next Visit   Visit date not found Dirk Ortiz MD

## 2023-07-16 DIAGNOSIS — I95.1 ORTHOSTATIC HYPOTENSION: ICD-10-CM

## 2023-07-17 RX ORDER — MIDODRINE HYDROCHLORIDE 10 MG/1
TABLET ORAL
Qty: 270 TABLET | Refills: 3 | Status: SHIPPED | OUTPATIENT
Start: 2023-07-17

## 2023-07-17 NOTE — TELEPHONE ENCOUNTER
Refill Routing Note   Medication(s) are not appropriate for processing by [facility] for the following reason(s):      Medication outside of protocol    [facility] action(s):  Route Care Due:  None identified            Appointments  past 12m or future 3m with PCP    Date Provider   Last Visit   6/5/2023 Dirk Ortiz MD   Next Visit   Visit date not found Dirk Ortiz MD   ED visits in past 90 days: 0        Note composed:8:02 AM 07/17/2023

## 2023-07-29 DIAGNOSIS — R25.2 SPASM: ICD-10-CM

## 2023-07-29 NOTE — TELEPHONE ENCOUNTER
No care due was identified.  [facility] Embedded Care Due Messages. Reference number: [id number].   7/29/2023 9:17:46 AM CDT

## 2023-07-31 RX ORDER — DIAZEPAM 5 MG/1
TABLET ORAL
Qty: 60 TABLET | Refills: 2 | Status: SHIPPED | OUTPATIENT
Start: 2023-07-31 | End: 2024-02-14

## 2023-11-08 DIAGNOSIS — S14.129A INCOMPLETE INJURY OF CERVICAL SPINAL CORD WITH CENTRAL CORD SYNDROME: ICD-10-CM

## 2023-11-08 RX ORDER — LANOLIN ALCOHOL/MO/W.PET/CERES
400 CREAM (GRAM) TOPICAL 2 TIMES DAILY
Qty: 180 TABLET | Refills: 0 | Status: SHIPPED | OUTPATIENT
Start: 2023-11-08

## 2023-11-08 NOTE — TELEPHONE ENCOUNTER
Care Due:                  Date            Visit Type   Department     Provider  --------------------------------------------------------------------------------                                EP -                              PRIMARY      [facility]  Last Visit: 06-      CARE ([facility])   PRACTICE       Dirk Ortiz                              EP -                              PRIMARY      [facility]  Next Visit: 06-      CARE ([facility])   PRACTICE       Dirk Alfaro  Test          Frequency    Reason                     Performed    Due Date  --------------------------------------------------------------------------------    Mg Level....  12 months..  magnesium................  Not Found    Overdue    [facility] Embedded Care Due Messages. Reference number: [id number].   11/08/2023 5:08:26 PM CST

## 2024-01-12 ENCOUNTER — TELEPHONE (OUTPATIENT)
Dept: FAMILY MEDICINE | Facility: CLINIC | Age: 64
End: 2024-01-12
Payer: MEDICARE

## 2024-01-12 NOTE — TELEPHONE ENCOUNTER
Faxed 's office note from June 2023 per faxed form. I put our change of address on the cover sheet.

## 2024-01-12 NOTE — TELEPHONE ENCOUNTER
----- Message from Benji Spain sent at 1/12/2024  8:20 AM CST -----  Regarding: Pt Advice  Contact: [facility]  Needs Medical Advice      Who Called: Rose         Would the patient rather a call back or a response via [facility]? Call back     Best Call Back Number: [phone] Ext [phone]    Additional Information: [facility] is following up on paperwork that was sent on 1/5 for updated office visit notes since he has changed his insurance to medicare primary. Fax# [phone]  Please Advise - Thank you

## 2024-02-14 DIAGNOSIS — R25.2 SPASM: ICD-10-CM

## 2024-02-14 RX ORDER — DIAZEPAM 5 MG/1
TABLET ORAL
Qty: 60 TABLET | Refills: 0 | Status: SHIPPED | OUTPATIENT
Start: 2024-02-14 | End: 2024-04-30

## 2024-02-14 NOTE — TELEPHONE ENCOUNTER
Care Due:                  Date            Visit Type   Department     Provider  --------------------------------------------------------------------------------                                EP -                              PRIMARY      [facility]  Last Visit: 06-      CARE ([facility])   PRACTICE       Dirk Ortiz                              EP -                              PRIMARY      [facility]  Next Visit: 06-      CARE ([facility])   PRACTICE       Dirk Alfaro  Test          Frequency    Reason                     Performed    Due Date  --------------------------------------------------------------------------------    Mg Level....  12 months..  magnesium................  Not Found    Overdue    [facility] [employer] Embedded Care Due Messages. Reference number: [id number].   2/14/2024 7:50:07 AM CST

## 2024-04-30 DIAGNOSIS — R25.2 SPASM: ICD-10-CM

## 2024-04-30 RX ORDER — DIAZEPAM 5 MG/1
TABLET ORAL
Qty: 60 TABLET | Refills: 0 | Status: SHIPPED | OUTPATIENT
Start: 2024-04-30 | End: 2024-06-10

## 2024-04-30 NOTE — TELEPHONE ENCOUNTER
Care Due:                  Date            Visit Type   Department     Provider  --------------------------------------------------------------------------------                                EP -                              PRIMARY      [facility]  Last Visit: 06-      CARE ([facility])   PRACTICE       Dirk Ortiz                              EP -                              PRIMARY      [facility]  Next Visit: 06-      CARE ([facility])   PRACTICE       Dirk Ortiz                                                            Last  Test          Frequency    Reason                     Performed    Due Date  --------------------------------------------------------------------------------    Cr..........  12 months..  magnesium................  06- 05-    Mg Level....  12 months..  magnesium................  Not Found    Overdue    [facility] [employer] Embedded Care Due Messages. Reference number: [id number].   4/30/2024 9:08:13 AM CDT

## 2024-06-06 ENCOUNTER — LAB VISIT (OUTPATIENT)
Dept: LAB | Facility: HOSPITAL | Age: 64
End: 2024-06-06
Attending: FAMILY MEDICINE
Payer: MEDICARE

## 2024-06-06 ENCOUNTER — OFFICE VISIT (OUTPATIENT)
Dept: FAMILY MEDICINE | Facility: CLINIC | Age: 64
End: 2024-06-06
Attending: FAMILY MEDICINE
Payer: MEDICARE

## 2024-06-06 VITALS
SYSTOLIC BLOOD PRESSURE: 124 MMHG | OXYGEN SATURATION: 97 % | DIASTOLIC BLOOD PRESSURE: 70 MMHG | BODY MASS INDEX: 23.01 KG/M2 | HEIGHT: 71 IN | TEMPERATURE: 99 F | HEART RATE: 65 BPM

## 2024-06-06 DIAGNOSIS — Z12.5 PROSTATE CANCER SCREENING: ICD-10-CM

## 2024-06-06 DIAGNOSIS — G90.9 AUTONOMIC DYSFUNCTION: ICD-10-CM

## 2024-06-06 DIAGNOSIS — D70.9 NEUTROPENIC FEVER: ICD-10-CM

## 2024-06-06 DIAGNOSIS — Z13.220 ENCOUNTER FOR LIPID SCREENING FOR CARDIOVASCULAR DISEASE: ICD-10-CM

## 2024-06-06 DIAGNOSIS — E34.9 TESTOSTERONE DEFICIENCY: ICD-10-CM

## 2024-06-06 DIAGNOSIS — Z13.6 ENCOUNTER FOR LIPID SCREENING FOR CARDIOVASCULAR DISEASE: ICD-10-CM

## 2024-06-06 DIAGNOSIS — G82.50 QUADRIPLEGIA: ICD-10-CM

## 2024-06-06 DIAGNOSIS — R26.9 GAIT ABNORMALITY: ICD-10-CM

## 2024-06-06 DIAGNOSIS — R50.81 NEUTROPENIC FEVER: ICD-10-CM

## 2024-06-06 DIAGNOSIS — G82.50 QUADRIPLEGIA: Primary | ICD-10-CM

## 2024-06-06 DIAGNOSIS — I95.1 ORTHOSTATIC HYPOTENSION: ICD-10-CM

## 2024-06-06 DIAGNOSIS — R65.10 SIRS (SYSTEMIC INFLAMMATORY RESPONSE SYNDROME): ICD-10-CM

## 2024-06-06 DIAGNOSIS — K21.9 GASTROESOPHAGEAL REFLUX DISEASE, UNSPECIFIED WHETHER ESOPHAGITIS PRESENT: ICD-10-CM

## 2024-06-06 LAB
ALBUMIN SERPL BCP-MCNC: 4.4 G/DL (ref 3.5–5.2)
ALP SERPL-CCNC: 60 U/L (ref 55–135)
ALT SERPL W/O P-5'-P-CCNC: 46 U/L (ref 10–44)
ANION GAP SERPL CALC-SCNC: 9 MMOL/L (ref 8–16)
AST SERPL-CCNC: 36 U/L (ref 10–40)
BASOPHILS # BLD AUTO: 0.06 K/UL (ref 0–0.2)
BASOPHILS NFR BLD: 1 % (ref 0–1.9)
BILIRUB SERPL-MCNC: 1.3 MG/DL (ref 0.1–1)
BUN SERPL-MCNC: 13 MG/DL (ref 8–23)
CALCIUM SERPL-MCNC: 10 MG/DL (ref 8.7–10.5)
CHLORIDE SERPL-SCNC: 104 MMOL/L (ref 95–110)
CHOLEST SERPL-MCNC: 182 MG/DL (ref 120–199)
CHOLEST/HDLC SERPL: 4.4 {RATIO} (ref 2–5)
CO2 SERPL-SCNC: 25 MMOL/L (ref 23–29)
COMPLEXED PSA SERPL-MCNC: 3.7 NG/ML (ref 0–4)
CREAT SERPL-MCNC: 1.2 MG/DL (ref 0.5–1.4)
DIFFERENTIAL METHOD BLD: ABNORMAL
EOSINOPHIL # BLD AUTO: 0.1 K/UL (ref 0–0.5)
EOSINOPHIL NFR BLD: 2.2 % (ref 0–8)
ERYTHROCYTE [DISTWIDTH] IN BLOOD BY AUTOMATED COUNT: 12 % (ref 11.5–14.5)
EST. GFR  (NO RACE VARIABLE): >60 ML/MIN/1.73 M^2
GLUCOSE SERPL-MCNC: 90 MG/DL (ref 70–110)
HCT VFR BLD AUTO: 47.6 % (ref 40–54)
HDLC SERPL-MCNC: 41 MG/DL (ref 40–75)
HDLC SERPL: 22.5 % (ref 20–50)
HGB BLD-MCNC: 15.9 G/DL (ref 14–18)
IMM GRANULOCYTES # BLD AUTO: 0.03 K/UL (ref 0–0.04)
IMM GRANULOCYTES NFR BLD AUTO: 0.5 % (ref 0–0.5)
LDLC SERPL CALC-MCNC: 107.6 MG/DL (ref 63–159)
LYMPHOCYTES # BLD AUTO: 1.7 K/UL (ref 1–4.8)
LYMPHOCYTES NFR BLD: 27.8 % (ref 18–48)
MCH RBC QN AUTO: 31.6 PG (ref 27–31)
MCHC RBC AUTO-ENTMCNC: 33.4 G/DL (ref 32–36)
MCV RBC AUTO: 95 FL (ref 82–98)
MONOCYTES # BLD AUTO: 0.5 K/UL (ref 0.3–1)
MONOCYTES NFR BLD: 7.9 % (ref 4–15)
NEUTROPHILS # BLD AUTO: 3.6 K/UL (ref 1.8–7.7)
NEUTROPHILS NFR BLD: 60.6 % (ref 38–73)
NONHDLC SERPL-MCNC: 141 MG/DL
NRBC BLD-RTO: 0 /100 WBC
PLATELET # BLD AUTO: 163 K/UL (ref 150–450)
PMV BLD AUTO: 13 FL (ref 9.2–12.9)
POTASSIUM SERPL-SCNC: 3.9 MMOL/L (ref 3.5–5.1)
PROT SERPL-MCNC: 7.5 G/DL (ref 6–8.4)
RBC # BLD AUTO: 5.03 M/UL (ref 4.6–6.2)
SODIUM SERPL-SCNC: 138 MMOL/L (ref 136–145)
TRIGL SERPL-MCNC: 167 MG/DL (ref 30–150)
WBC # BLD AUTO: 5.98 K/UL (ref 3.9–12.7)

## 2024-06-06 PROCEDURE — 80053 COMPREHEN METABOLIC PANEL: CPT | Performed by: FAMILY MEDICINE

## 2024-06-06 PROCEDURE — 80061 LIPID PANEL: CPT | Performed by: FAMILY MEDICINE

## 2024-06-06 PROCEDURE — 84153 ASSAY OF PSA TOTAL: CPT | Performed by: FAMILY MEDICINE

## 2024-06-06 PROCEDURE — 84402 ASSAY OF FREE TESTOSTERONE: CPT | Performed by: FAMILY MEDICINE

## 2024-06-06 PROCEDURE — 99999 PR PBB SHADOW E&M-EST. PATIENT-LVL III: CPT | Mod: PBBFAC,,, | Performed by: FAMILY MEDICINE

## 2024-06-06 PROCEDURE — 99213 OFFICE O/P EST LOW 20 MIN: CPT | Mod: PBBFAC,PN | Performed by: FAMILY MEDICINE

## 2024-06-06 PROCEDURE — 85025 COMPLETE CBC W/AUTO DIFF WBC: CPT | Performed by: FAMILY MEDICINE

## 2024-06-06 PROCEDURE — 99214 OFFICE O/P EST MOD 30 MIN: CPT | Mod: S$PBB,,, | Performed by: FAMILY MEDICINE

## 2024-06-06 RX ORDER — OMEPRAZOLE 20 MG/1
20 CAPSULE, DELAYED RELEASE ORAL DAILY
Qty: 90 CAPSULE | Refills: 3 | Status: SHIPPED | OUTPATIENT
Start: 2024-06-06

## 2024-06-06 RX ORDER — MIDODRINE HYDROCHLORIDE 10 MG/1
TABLET ORAL
Qty: 270 TABLET | Refills: 3 | Status: SHIPPED | OUTPATIENT
Start: 2024-06-06

## 2024-06-06 NOTE — PROGRESS NOTES
Subjective:       Patient ID: Alberto Dangelo is a 64 y.o. male.    Chief Complaint: Annual Exam    64-year-old male with an incomplete cervical spinal injury consisting of a burst fracture of C7 and central cord syndrome who is status post C6 through T1 fusion.  He is able to ambulate briefly and has a good workout routine to maintain muscle strength but does spend most of his time in his wheelchair.  Has additional history of systemic inflammatory response syndrome, neutropenic fever, DVT and pulmonary embolism, gastroesophageal reflux, and he has male pattern hair loss for which he was given finasteride by Dermatology.  He read up on it and he is concerned about suppressing testosterone too much which may cause him some muscle strength loss.  He has problems with chronic hypotension and is concerned about using Rogaine.  He does take ProAmatine on a regular basis.  He had a melanocytic lesion removed from the left intrascapular area by Dr. Kat sometime back.  The scar is somewhat overgrown and itches at times although it has been getting a little better.  Also concerned about itching in the ears.  He has no drainage from the ears.  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  We discussed some volunteer positions such as working with [employer] or volunteering at a hospital that would not obligate him to a set time schedule that was what eventually became a problem with his business.  He would not say that he is depressed but he is a little dysthymic over losing that part of his life.    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  No date: Neurogenic bladder  No date: Pulmonary embolism    Past Surgical  History:  No date: NECK SURGERY  No date: SPINE SURGERY      Comment:  fuse C 6 - T 1 burst C7  No date: TONSILLECTOMY  No date: WISDOM TOOTH EXTRACTION    Review of patient's family history indicates:  Problem: Cancer      Relation: Mother          Name:               Age of Onset: (Not Specified)              Comment: brain  Problem: Early death      Relation: Mother          Name:               Age of Onset: (Not Specified)  Problem: Cancer      Relation: Father          Name:               Age of Onset: (Not Specified)              Comment: tumor foot   Problem: Heart disease      Relation: Father          Name:               Age of Onset: (Not Specified)  Problem: Skin cancer      Relation: Father          Name:               Age of Onset: (Not Specified)  Problem: Cancer      Relation: Sister          Name: 1              Age of Onset: (Not Specified)              Comment: ovarian  Problem: No Known Problems      Relation: Brother          Name: 1              Age of Onset: (Not Specified)  Problem: Heart disease      Relation: Maternal Grandmother          Name:               Age of Onset: (Not Specified)  Problem: Heart disease      Relation: Maternal Grandfather          Name:               Age of Onset: (Not Specified)  Problem: Heart disease      Relation: Paternal Grandmother          Name:               Age of Onset: (Not Specified)  Problem: Parkinsonism      Relation: Paternal Grandfather          Name:               Age of Onset: (Not Specified)  Problem: No Known Problems      Relation: Daughter          Name: 1              Age of Onset: (Not Specified)  Problem: No Known Problems      Relation: Son          Name: 1              Age of Onset: (Not Specified)  Problem: Drug abuse      Relation: Maternal Aunt          Name:               Age of Onset: (Not Specified)  Problem: Early death      Relation: Maternal Aunt          Name:               Age of Onset: (Not Specified)  Problem: No Known  Problems      Relation: Maternal Uncle          Name: 2              Age of Onset: (Not Specified)  Problem: No Known Problems      Relation: Paternal Aunt          Name: 1              Age of Onset: (Not Specified)  Problem: Drug abuse      Relation: Paternal Uncle          Name:               Age of Onset: (Not Specified)  Problem: Heart disease      Relation: Paternal Uncle          Name:               Age of Onset: (Not Specified)  Problem: Kidney disease      Relation: Paternal Uncle          Name:               Age of Onset: (Not Specified)  Problem: No Known Problems      Relation: Paternal Uncle          Name:               Age of Onset: (Not Specified)    Social History    Tobacco Use      Smoking status: Never      Smokeless tobacco: Never    Alcohol use: No    Drug use: No    Current Outpatient Medications on File Prior to Visit:  aspirin (ECOTRIN) 81 MG EC tablet, Take 81 mg by mouth once daily., Disp: , Rfl:   bisacodyl (FLEET) 10 mg/30 mL Enem, Place 10 mg rectally once daily., Disp: , Rfl:   diazePAM (VALIUM) 5 MG tablet, TAKE 1 TABLET(5 MG) BY MOUTH EVERY 12 HOURS AS NEEDED FOR MUSCLE SPASMS, Disp: 60 tablet, Rfl: 0  finasteride (PROPECIA) 1 mg tablet, One tab PO daily, Disp: 90 tablet, Rfl: 3  magnesium oxide (MAG-OX) 400 mg (241.3 mg magnesium) tablet, Take 1 tablet (400 mg total) by mouth 2 (two) times daily., Disp: 180 tablet, Rfl: 0  POLYETHYLENE GLYCOL 3350 (MIRALAX ORAL), Take 1 Dose by mouth every evening., Disp: , Rfl:   senna (SENOKOT) 8.6 mg tablet, Take 2 tablets by mouth once daily., Disp: , Rfl:   vitamin D 1000 units Tab, Take 185 mg by mouth once daily., Disp: , Rfl:   [DISCONTINUED] midodrine (PROAMATINE) 10 MG tablet, TAKE 1 TABLET(10 MG) BY MOUTH THREE TIMES DAILY, Disp: 270 tablet, Rfl: 3  [DISCONTINUED] omeprazole (PRILOSEC) 20 MG capsule, TAKE 1 CAPSULE(20 MG) BY MOUTH EVERY DAY, Disp: 90 capsule, Rfl: 3    No current facility-administered medications on file prior to  visit.          Review of Systems   Constitutional:  Negative for activity change, chills, fatigue and fever.   HENT:  Negative for congestion, ear pain, rhinorrhea and sore throat.    Eyes:  Negative for visual disturbance.   Respiratory:  Negative for cough, chest tightness and shortness of breath.    Cardiovascular:  Negative for chest pain and palpitations.   Gastrointestinal:  Positive for abdominal pain (He has been having a good bit of reflux and on questioning he has been eating a lot of tomatoes and cucumbers). Negative for blood in stool, constipation, diarrhea, nausea and vomiting.   Genitourinary:  Negative for difficulty urinating, dysuria and hematuria.   Musculoskeletal:  Positive for gait problem. Negative for arthralgias and neck pain.   Skin:  Negative for rash.   Neurological:  Positive for weakness. Negative for dizziness and headaches.   Psychiatric/Behavioral:  Negative for sleep disturbance.        Objective:      Physical Exam  Vitals and nursing note reviewed.   Constitutional:       General: He is not in acute distress.     Appearance: Normal appearance. He is well-developed and normal weight. He is not ill-appearing, toxic-appearing or diaphoretic.      Comments: Good blood pressure control   Normal pulse with regular rhythm   Unable to get a weight today he has a new wheelchair that we have not been able to get a weight on as of yet but his last BMI was 23.1   HENT:      Head: Normocephalic and atraumatic.      Right Ear: Tympanic membrane, ear canal and external ear normal. There is no impacted cerumen (Small amount of dry wax in the external auditory canal but no impaction).      Left Ear: Tympanic membrane, ear canal and external ear normal. There is no impacted cerumen.      Nose: Congestion present. No rhinorrhea.      Comments: Mild swelling of the turbinates worse on the right side than the left.  Possibly some Eustachian dysfunction     Mouth/Throat:      Mouth: Mucous membranes  are moist.      Pharynx: Oropharynx is clear. No oropharyngeal exudate or posterior oropharyngeal erythema.   Eyes:      General: No scleral icterus.        Right eye: No discharge.         Left eye: No discharge.      Extraocular Movements: Extraocular movements intact.      Conjunctiva/sclera: Conjunctivae normal.      Pupils: Pupils are equal, round, and reactive to light.   Neck:      Thyroid: No thyromegaly.      Vascular: No JVD.      Trachea: No tracheal deviation.   Cardiovascular:      Rate and Rhythm: Normal rate and regular rhythm.      Heart sounds: Normal heart sounds. No murmur heard.     No friction rub. No gallop.   Pulmonary:      Effort: Pulmonary effort is normal. No respiratory distress.      Breath sounds: Normal breath sounds. No stridor. No wheezing or rales.   Chest:      Chest wall: No tenderness.   Abdominal:      General: Bowel sounds are normal. There is no distension.      Palpations: Abdomen is soft. There is no mass.      Tenderness: There is no abdominal tenderness. There is no guarding or rebound.      Hernia: No hernia is present.   Musculoskeletal:         General: Normal range of motion.      Cervical back: Normal range of motion and neck supple.   Lymphadenopathy:      Cervical: No cervical adenopathy.   Skin:     General: Skin is warm and dry.      Coloration: Skin is not jaundiced or pale.      Findings: Lesion (Small keloid in the left upper intrascapular area.  It looks like it is starting to turn pale and may be shutting down.  Suggest he use some lidocaine gel when it itches) present. No bruising, erythema or rash.   Neurological:      Mental Status: He is alert and oriented to person, place, and time. Mental status is at baseline.      Cranial Nerves: No cranial nerve deficit.      Motor: Weakness present.      Deep Tendon Reflexes: Reflexes are normal and symmetric.   Psychiatric:         Mood and Affect: Mood normal.         Behavior: Behavior normal.         Thought  Content: Thought content normal.         Judgment: Judgment normal.         Assessment:       1. Quadriplegia    2. Prostate cancer screening    3. Testosterone deficiency    4. Encounter for lipid screening for cardiovascular disease    5. Orthostatic hypotension    6. Gastroesophageal reflux disease    7. Autonomic dysfunction    8. SIRS (systemic inflammatory response syndrome)    9. Gastroesophageal reflux disease, unspecified whether esophagitis present    10. Gait abnormality    11. Neutropenic fever    12. BMI 23.0-23.9, adult    1. Quadriplegia  Stable  - Comprehensive Metabolic Panel; Future  - Lipid Panel; Future  - CBC Auto Differential; Future    2. Prostate cancer screening  Await PSA result  - PSA, Screening; Future    3. Testosterone deficiency  Check testosterone result, patient would probably feel more comfortable trying the finasteride if he knew he had a good level  - Testosterone, Free; Future    4. Encounter for lipid screening for cardiovascular disease  Await lipid panel results  - Lipid Panel; Future    5. Orthostatic hypotension  Refill ProAmatine  - midodrine (PROAMATINE) 10 MG tablet; TAKE 1 TABLET(10 MG) BY MOUTH THREE TIMES DAILY  Dispense: 270 tablet; Refill: 3    6. Gastroesophageal reflux disease  Continue the Prilosec which generally gives him good relief cautioned about tomatoes, cucumbers, and peppermint  - omeprazole (PRILOSEC) 20 MG capsule; Take 1 capsule (20 mg total) by mouth once daily.  Dispense: 90 capsule; Refill: 3    7. Autonomic dysfunction  Stable    8. SIRS (systemic inflammatory response syndrome)  Stable    9. Gastroesophageal reflux disease, unspecified whether esophagitis present  See above    10. Gait abnormality  Stable    11. Neutropenic fever  Stable    12. BMI 23.0-23.9, adult  Stable      Plan:

## 2024-06-07 ENCOUNTER — PATIENT MESSAGE (OUTPATIENT)
Dept: INTERNAL MEDICINE | Facility: CLINIC | Age: 64
End: 2024-06-07
Payer: MEDICARE

## 2024-06-10 DIAGNOSIS — S14.129A INCOMPLETE INJURY OF CERVICAL SPINAL CORD WITH CENTRAL CORD SYNDROME: ICD-10-CM

## 2024-06-10 DIAGNOSIS — R25.2 SPASM: ICD-10-CM

## 2024-06-10 LAB — TESTOST FREE SERPL-MCNC: 11.9 PG/ML

## 2024-06-10 RX ORDER — LANOLIN ALCOHOL/MO/W.PET/CERES
400 CREAM (GRAM) TOPICAL 2 TIMES DAILY
Qty: 180 TABLET | Refills: 3 | Status: SHIPPED | OUTPATIENT
Start: 2024-06-10

## 2024-06-10 RX ORDER — DIAZEPAM 5 MG/1
TABLET ORAL
Qty: 60 TABLET | Refills: 2 | Status: SHIPPED | OUTPATIENT
Start: 2024-06-10

## 2024-06-10 NOTE — TELEPHONE ENCOUNTER
No care due was identified.  [facility] Embedded Care Due Messages. Reference number: [id number].   6/10/2024 5:46:00 AM CDT

## 2024-06-26 ENCOUNTER — PATIENT MESSAGE (OUTPATIENT)
Dept: INTERNAL MEDICINE | Facility: CLINIC | Age: 64
End: 2024-06-26
Payer: MEDICARE

## 2024-07-04 ENCOUNTER — PATIENT MESSAGE (OUTPATIENT)
Dept: ADMINISTRATIVE | Facility: HOSPITAL | Age: 64
End: 2024-07-04
Payer: MEDICARE

## 2024-07-05 ENCOUNTER — DOCUMENTATION ONLY (OUTPATIENT)
Dept: FAMILY MEDICINE | Facility: CLINIC | Age: 64
End: 2024-07-05
Payer: MEDICARE

## 2024-07-05 DIAGNOSIS — Z12.11 SCREENING FOR COLON CANCER: ICD-10-CM

## 2024-07-11 ENCOUNTER — PATIENT MESSAGE (OUTPATIENT)
Dept: FAMILY MEDICINE | Facility: CLINIC | Age: 64
End: 2024-07-11
Payer: MEDICARE

## 2024-07-11 NOTE — TELEPHONE ENCOUNTER
Please addend last office note to include urinary retention or incontinence due to quadriplegia and needs to use10 sterile catheters a day PERMANENTLY. Addendum needs to explain why he needs to cath 10 times a day and what will happen if he doesn't. [phone] fax Madhavi

## 2024-07-11 NOTE — TELEPHONE ENCOUNTER
I made an addendum to the June 6 visit both in the HPI and in the assessment and plan that I hope will be what he needs.

## 2024-07-16 ENCOUNTER — TELEPHONE (OUTPATIENT)
Dept: FAMILY MEDICINE | Facility: CLINIC | Age: 64
End: 2024-07-16
Payer: MEDICARE

## 2024-07-16 NOTE — TELEPHONE ENCOUNTER
----- Message from Jadyn Eid sent at 7/16/2024  9:27 AM CDT -----  Contact: [facility] [place]  Type: Needs Medical Advice       Who Called: [facility]     Best Call Back Number: [phone] option 5 then option 2    Additional Information: Requesting a call back regarding      They are needing the clinic notes corrected for visit on 06/06 for DME products. They need primary diagnosis of inconstancies They need to know if the issues is permeate or temporary and the clinicals need to be signed and dated by provider. They need to know if it's chronic and when pt needs to follow up.  Pt out of cath's and needs this asap so they can get supplies out to pt today.     Fax# [phone]   Attn:[facility]     Please Advise- Thank you

## 2024-07-24 NOTE — PROGRESS NOTES
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   Sensors placed on R shoe forefoot, and LLE midfoot  Trials of ambulation in // bars with mod A x 1 for improving weight bearing through BLE; mod VC for timing, and technique  Steps on LLE: 118  Steps on RLE: 118     Gait improvements with Bioness L300 plus: improved knee flexion (R>L) for swing, improved right foot clearance for swing, improved ability to advance LLE.    Gait training with bilateral L300 plus and Lofstrand crutches to improve weight bearing in LE- mod-max A + w/c follow < 8 ft x 2 attempts    therapeutic exercise x 15 minutes: (supervised with PT tech)  side lying hip flexion/ extension- 50x  LTR with ball with assist- 30x  Bilateral hip/ knee flex/ ext with A- 30x    Written Home Exercises: 6/2/17- pt educated on hip flexion strengthening in pool  pt instructed to use FES cycle prior to therapy with Bioness to attempt to decrease tone.  Pt demo good understanding of the education  Spoke w/ Mom, appt scheduled 12/23 @ 11:45 @ [facility] w/ Tiera.    provided. Alberto demonstrated good return demonstration of activities.     Education provided re: POC, HEP  No spiritual or educational barriers to learning provided    Pt has no cultural, educational or language barriers to learning provided.    Assessment   Alberto tolerated treatment well. Pt reported hip soreness after last session. Pt demonstrates improved ambulation with L300 plus on right. Today LLE was demonstrating increased extensor tone, requiring manual assist from PT to advance LE with Bioness. PT may adjust program on left for next session. Lofstrand crutches were trialed to improve LE weight bearing. Pt demonstrated poor ability to advance hips over feet and reported anxiety about falling. Pt performed better for 2nd attempt, but LE deonstrated quick fatigue with this AD. Pt can benefit from continued training with Bioness and continued strengthening to address gait ability and technique.     Pt rehab potential is Good. Pt will benefit from continuing skilled outpatient physical therapy to address the deficits listed below in the problem list, provide pt/family education and to maximize pt's level of independence in the home and community environment.         Medical necessity is demonstrated by the following IMPAIRMENTS/PROMBLEM LIST:   1. Fall Risk - impaired balance   2. Weakness   3. Impaired muscle tone  4. Gait deviations   5. Decreased ambulation   6. Decreased activity tolerance   7. Continued inability to participate in vocational pursuits   8. Requires skilled supervision to complete and progress HEP      Anticipated barriers to physical therapy: poor motor control in hips     Pt's spiritual, cultural and educational needs considered and pt agreeable to plan of care and goals as stated below:      GOALS:   1) Facilitate improved tone in LE, Progressing   2) Increase passive ankle DF to neutral, MET 3) Facilitate improved LE flexibility. Progressing  New Short Term Goals Status:    1)  Improve B ankle DF to at least 5* passively to facilitate improved foot clearance during gait,   2) Improve LE flexibility to min to mod limitation in all areas,   3) Assess stand pivot transfers and gait with assistive device.   Long Term Goal Status:  continue per initial plan of care.   1) Increase strength in LE as indicated by improved L-force testing, Progressing   2) Pt will safely ambulate > 200' with supervision using appropriate assistive device/ Bioness units.- pt ambulates up to 30 ft with Bioness L300 on left and L300 Plus  3) Pt will consistently perform safe stand pivot transfers, Not yet assessed.   4) Pt will demonstrate improved postural stability. Met.   5) new 5/4/17:  Pt will demonstrate competency in application of Bioness device and use of training mode.-ongoing- pt able to apply 3/4 pieces without cueing from PT, PT placeed sensors in shoes (6/9/17)  6) new 5/26/17: Pt will demonstrate improved bilateral hip flexion strength to 2/5 to improve ability to perform swing phase/ foot clearance during gait with Bioness L300 Plus.- trace bilaterally        Plan   Continue PT 1- 2x weekly under established Plan of Care, with treatment to include: pt education, HEP, therapeutic exercises, neuromuscular re-education/balance exercises, therapeutic activities, joint mobilizations, and modalities PRN, to work towards established goals. Pt may be seen by PTA to carry out plan of care.     Kala Bajwa, PT   06/09/2017

## 2024-08-05 DIAGNOSIS — I95.1 ORTHOSTATIC HYPOTENSION: ICD-10-CM

## 2024-08-05 RX ORDER — MIDODRINE HYDROCHLORIDE 10 MG/1
TABLET ORAL
Qty: 270 TABLET | Refills: 3 | Status: SHIPPED | OUTPATIENT
Start: 2024-08-05

## 2024-08-12 ENCOUNTER — TELEPHONE (OUTPATIENT)
Dept: FAMILY MEDICINE | Facility: CLINIC | Age: 64
End: 2024-08-12
Payer: MEDICARE

## 2024-08-12 NOTE — TELEPHONE ENCOUNTER
----- Message from Natasha Tomlinson, Patient Care Assistant sent at 8/12/2024  1:34 PM CDT -----  Regarding: advice  Contact: Lia with [facility]  Type: Needs Medical Advice    Who Called:  Lia with [facility]    Best Call Back Number:  fax # [phone]     Additional Information: Lia with [facility] states she would like a callback regarding office notes from 3/2024. Please call to advise. Thanks!

## 2024-08-15 ENCOUNTER — TELEPHONE (OUTPATIENT)
Dept: FAMILY MEDICINE | Facility: CLINIC | Age: 64
End: 2024-08-15
Payer: MEDICARE

## 2024-08-15 NOTE — TELEPHONE ENCOUNTER
----- Message from Mimi Eircrobtom sent at 8/15/2024  2:12 PM CDT -----  Regarding: call back  Contact: [facility]  Type: Needs Medical Advice  Who Called:  [facility]   Symptoms (please be specific):   How long has patient had these symptoms:    Pharmacy name and phone #:    [phone]    Additional Information: jeri choudhary there is calling back to speak with u are u available MRN: [id number] ODALYS AZAR  has context menu   Regarding office notes 03/2024 catder retentions permentancy     They have an audit .

## 2024-08-30 ENCOUNTER — PATIENT MESSAGE (OUTPATIENT)
Dept: FAMILY MEDICINE | Facility: CLINIC | Age: 64
End: 2024-08-30
Payer: MEDICARE

## 2024-11-03 DIAGNOSIS — R25.2 SPASM: ICD-10-CM

## 2024-11-03 NOTE — TELEPHONE ENCOUNTER
Care Due:                  Date            Visit Type   Department     Provider  --------------------------------------------------------------------------------                                EP -                              PRIMARY      [facility]  Last Visit: 06-      CARE ([facility])   PRACTICE       Dirk Ortiz  Next Visit: None Scheduled  None         None Found                                                            Last  Test          Frequency    Reason                     Performed    Due Date  --------------------------------------------------------------------------------    Mg Level....  12 months..  magnesium................  Not Found    Overdue    [employer] Embedded Care Due Messages. Reference number: [id number].   11/03/2024 12:20:51 PM CST

## 2024-11-04 RX ORDER — DIAZEPAM 5 MG/1
TABLET ORAL
Qty: 60 TABLET | Refills: 5 | Status: SHIPPED | OUTPATIENT
Start: 2024-11-04

## 2024-12-04 ENCOUNTER — TELEPHONE (OUTPATIENT)
Dept: FAMILY MEDICINE | Facility: CLINIC | Age: 64
End: 2024-12-04
Payer: MEDICARE

## 2024-12-04 DIAGNOSIS — R25.2 SPASM: ICD-10-CM

## 2024-12-04 NOTE — TELEPHONE ENCOUNTER
----- Message from Marta sent at 12/4/2024  8:42 AM CST -----  Contact: self  Type: Needs Medical Advice  Who Called:  pt   [facility] Call Back Number: [phone]   Additional Information: pt states that doctor is retiring and needs meds up until he sees his new docter appt is in June please call

## 2024-12-04 NOTE — TELEPHONE ENCOUNTER
Patient is requesting enough refills for his medication.   He may run out of his Valium   His next appt is 6/24/2025 with Dr Palacios

## 2025-01-21 NOTE — PROGRESS NOTES
"Name: Alberto Dangelo  [facility] Number: [id number]  Date of Treatment: 08/08/2019   Diagnosis:   Encounter Diagnoses   Name Primary?   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 "Had a good session last time." Reports gen mm soreness after last session. Mod I in manual w/c with service dog and mod I stretching prior to session. Has been preparing for his upcoming trip.     Objective:    Patient received individual therapy to increase strength, endurance, ROM, flexibility, posture and core stabilization with activities as follows:     Gait training via [employer] system for 60:01 minutes (plus set up):     Set up at 5 kg unloading. Computer-assisted robotic gait training via [employer] x 3.0 km/h. GF x 40% x 11', 30% x 19', then 15% for distance of 3004 m. Three stoppages.     Continue HEP daily.    Pt demo good understanding of the education provided. Patient demonstrated good return demonstration of activities.     Assessment:     Stoppages when GF decreased to 15% but corrected with strapping adjustments to ankles/feet.     Pt will continue to benefit from skilled PT intervention. Medical Necessity is demonstrated by:  Requires skilled supervision to complete and progress HEP and Weakness.    Patient is making good progress towards established goals.    Plan:    Continue with established Plan of Care towards PT goals.       "
Detail Level: Zone
Otc Regimen: Hydrocortisone BID 5-7 days then as needed.

## 2025-06-23 DIAGNOSIS — Z00.00 ENCOUNTER FOR MEDICARE ANNUAL WELLNESS EXAM: ICD-10-CM

## 2025-06-24 ENCOUNTER — LAB VISIT (OUTPATIENT)
Dept: LAB | Facility: HOSPITAL | Age: 65
End: 2025-06-24
Attending: FAMILY MEDICINE
Payer: MEDICARE

## 2025-06-24 ENCOUNTER — OFFICE VISIT (OUTPATIENT)
Dept: FAMILY MEDICINE | Facility: CLINIC | Age: 65
End: 2025-06-24
Payer: MEDICARE

## 2025-06-24 VITALS
TEMPERATURE: 98 F | BODY MASS INDEX: 23.01 KG/M2 | SYSTOLIC BLOOD PRESSURE: 136 MMHG | HEART RATE: 56 BPM | DIASTOLIC BLOOD PRESSURE: 80 MMHG | RESPIRATION RATE: 17 BRPM | OXYGEN SATURATION: 98 % | HEIGHT: 71 IN

## 2025-06-24 DIAGNOSIS — Z00.00 HEALTHCARE MAINTENANCE: Primary | ICD-10-CM

## 2025-06-24 DIAGNOSIS — S14.129A INCOMPLETE INJURY OF CERVICAL SPINAL CORD WITH CENTRAL CORD SYNDROME: ICD-10-CM

## 2025-06-24 DIAGNOSIS — Z12.11 SCREENING FOR COLON CANCER: ICD-10-CM

## 2025-06-24 DIAGNOSIS — I95.1 ORTHOSTATIC HYPOTENSION: ICD-10-CM

## 2025-06-24 DIAGNOSIS — R25.2 SPASM: ICD-10-CM

## 2025-06-24 DIAGNOSIS — Z12.5 ENCOUNTER FOR SCREENING FOR MALIGNANT NEOPLASM OF PROSTATE: ICD-10-CM

## 2025-06-24 DIAGNOSIS — Z00.00 HEALTHCARE MAINTENANCE: ICD-10-CM

## 2025-06-24 DIAGNOSIS — K21.9 GASTROESOPHAGEAL REFLUX DISEASE, UNSPECIFIED WHETHER ESOPHAGITIS PRESENT: ICD-10-CM

## 2025-06-24 LAB
ABSOLUTE EOSINOPHIL (OHS): 0.17 K/UL
ABSOLUTE MONOCYTE (OHS): 0.48 K/UL (ref 0.3–1)
ABSOLUTE NEUTROPHIL COUNT (OHS): 3.26 K/UL (ref 1.8–7.7)
ALBUMIN SERPL BCP-MCNC: 4.8 G/DL (ref 3.5–5.2)
ALP SERPL-CCNC: 67 UNIT/L (ref 40–150)
ALT SERPL W/O P-5'-P-CCNC: 44 UNIT/L (ref 10–44)
ANION GAP (OHS): 11 MMOL/L (ref 8–16)
AST SERPL-CCNC: 31 UNIT/L (ref 11–45)
BASOPHILS # BLD AUTO: 0.05 K/UL
BASOPHILS NFR BLD AUTO: 0.8 %
BILIRUB SERPL-MCNC: 0.9 MG/DL (ref 0.1–1)
BUN SERPL-MCNC: 19 MG/DL (ref 8–23)
CALCIUM SERPL-MCNC: 10 MG/DL (ref 8.7–10.5)
CHLORIDE SERPL-SCNC: 105 MMOL/L (ref 95–110)
CHOLEST SERPL-MCNC: 177 MG/DL (ref 120–199)
CHOLEST/HDLC SERPL: 4 {RATIO} (ref 2–5)
CO2 SERPL-SCNC: 24 MMOL/L (ref 23–29)
CREAT SERPL-MCNC: 1.2 MG/DL (ref 0.5–1.4)
EAG (OHS): 100 MG/DL (ref 68–131)
ERYTHROCYTE [DISTWIDTH] IN BLOOD BY AUTOMATED COUNT: 12 % (ref 11.5–14.5)
GFR SERPLBLD CREATININE-BSD FMLA CKD-EPI: >60 ML/MIN/1.73/M2
GLUCOSE SERPL-MCNC: 97 MG/DL (ref 70–110)
HBA1C MFR BLD: 5.1 % (ref 4–5.6)
HCT VFR BLD AUTO: 45 % (ref 40–54)
HDLC SERPL-MCNC: 44 MG/DL (ref 40–75)
HDLC SERPL: 24.9 % (ref 20–50)
HGB BLD-MCNC: 15.3 GM/DL (ref 14–18)
IMM GRANULOCYTES # BLD AUTO: 0.03 K/UL (ref 0–0.04)
IMM GRANULOCYTES NFR BLD AUTO: 0.5 % (ref 0–0.5)
LDLC SERPL CALC-MCNC: 104.2 MG/DL (ref 63–159)
LYMPHOCYTES # BLD AUTO: 1.93 K/UL (ref 1–4.8)
MCH RBC QN AUTO: 31.2 PG (ref 27–31)
MCHC RBC AUTO-ENTMCNC: 34 G/DL (ref 32–36)
MCV RBC AUTO: 92 FL (ref 82–98)
NONHDLC SERPL-MCNC: 133 MG/DL
NUCLEATED RBC (/100WBC) (OHS): 0 /100 WBC
PLATELET # BLD AUTO: 180 K/UL (ref 150–450)
PMV BLD AUTO: 12.4 FL (ref 9.2–12.9)
POTASSIUM SERPL-SCNC: 4 MMOL/L (ref 3.5–5.1)
PROT SERPL-MCNC: 7.9 GM/DL (ref 6–8.4)
PSA SERPL-MCNC: 1.74 NG/ML
RBC # BLD AUTO: 4.9 M/UL (ref 4.6–6.2)
RELATIVE EOSINOPHIL (OHS): 2.9 %
RELATIVE LYMPHOCYTE (OHS): 32.6 % (ref 18–48)
RELATIVE MONOCYTE (OHS): 8.1 % (ref 4–15)
RELATIVE NEUTROPHIL (OHS): 55.1 % (ref 38–73)
SODIUM SERPL-SCNC: 140 MMOL/L (ref 136–145)
TRIGL SERPL-MCNC: 144 MG/DL (ref 30–150)
WBC # BLD AUTO: 5.92 K/UL (ref 3.9–12.7)

## 2025-06-24 PROCEDURE — 99999 PR PBB SHADOW E&M-EST. PATIENT-LVL III: CPT | Mod: PBBFAC,,, | Performed by: FAMILY MEDICINE

## 2025-06-24 PROCEDURE — 99214 OFFICE O/P EST MOD 30 MIN: CPT | Mod: S$PBB,,, | Performed by: FAMILY MEDICINE

## 2025-06-24 PROCEDURE — 99213 OFFICE O/P EST LOW 20 MIN: CPT | Mod: PBBFAC,PO | Performed by: FAMILY MEDICINE

## 2025-06-24 PROCEDURE — 36415 COLL VENOUS BLD VENIPUNCTURE: CPT | Mod: PO

## 2025-06-24 PROCEDURE — 84153 ASSAY OF PSA TOTAL: CPT

## 2025-06-24 PROCEDURE — 82040 ASSAY OF SERUM ALBUMIN: CPT

## 2025-06-24 PROCEDURE — G2211 COMPLEX E/M VISIT ADD ON: HCPCS | Mod: S$PBB,,, | Performed by: FAMILY MEDICINE

## 2025-06-24 PROCEDURE — 85025 COMPLETE CBC W/AUTO DIFF WBC: CPT

## 2025-06-24 PROCEDURE — 83036 HEMOGLOBIN GLYCOSYLATED A1C: CPT

## 2025-06-24 PROCEDURE — 80061 LIPID PANEL: CPT

## 2025-06-24 RX ORDER — DIAZEPAM 5 MG/1
5 TABLET ORAL EVERY 12 HOURS PRN
Qty: 60 TABLET | Refills: 5 | Status: CANCELLED | OUTPATIENT
Start: 2025-06-24

## 2025-06-25 ENCOUNTER — RESULTS FOLLOW-UP (OUTPATIENT)
Dept: FAMILY MEDICINE | Facility: CLINIC | Age: 65
End: 2025-06-25

## 2025-06-25 RX ORDER — LANOLIN ALCOHOL/MO/W.PET/CERES
400 CREAM (GRAM) TOPICAL 2 TIMES DAILY
Qty: 180 TABLET | Refills: 3 | Status: SHIPPED | OUTPATIENT
Start: 2025-06-25

## 2025-06-25 RX ORDER — DIAZEPAM 5 MG/1
5 TABLET ORAL EVERY 12 HOURS PRN
Qty: 60 TABLET | Refills: 5 | Status: SHIPPED | OUTPATIENT
Start: 2025-06-25

## 2025-06-25 RX ORDER — OMEPRAZOLE 20 MG/1
20 CAPSULE, DELAYED RELEASE ORAL DAILY
Qty: 90 CAPSULE | Refills: 3 | Status: SHIPPED | OUTPATIENT
Start: 2025-06-25

## 2025-06-25 RX ORDER — MIDODRINE HYDROCHLORIDE 10 MG/1
TABLET ORAL
Qty: 270 TABLET | Refills: 3 | Status: SHIPPED | OUTPATIENT
Start: 2025-06-25

## 2025-06-25 NOTE — PROGRESS NOTES
Subjective:   Patient ID: Alberto Dangelo is a 65 y.o. male     Chief Complaint:Establish Care      Here for checkup      Review of Systems   Respiratory:  Negative for shortness of breath.    Cardiovascular:  Negative for chest pain.   Gastrointestinal:  Negative for abdominal pain.   Genitourinary:  Negative for dysuria.     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  Physical Exam  Vitals and nursing note reviewed.   Constitutional:       Appearance: He is well-developed.   HENT:      Head: Normocephalic and atraumatic.   Eyes:      General: No scleral icterus.     Conjunctiva/sclera: Conjunctivae normal.   Cardiovascular:      Heart sounds: No murmur heard.  Pulmonary:      Effort: Pulmonary effort is normal. No respiratory distress.   Musculoskeletal:         General: No deformity. Normal range of motion.      Cervical back: Normal range of motion and neck supple.   Skin:     Coloration: Skin is not pale.      Findings: No rash.   Neurological:      Mental Status: He is alert and oriented to person, place, and time.   Psychiatric:         Behavior: Behavior normal.         Thought Content: Thought content normal.         Judgment: Judgment normal.       Assessment:     1. Healthcare maintenance    2. Encounter for screening for malignant neoplasm of prostate    3. Screening for colon cancer    4. 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 Orthostatic hypotension    6. Gastroesophageal reflux disease, unspecified whether esophagitis present    7. 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  Dispense: 180 tablet; Refill: 3    Orthostatic hypotension  -     midodrine (PROAMATINE) 10 MG tablet; TAKE 1 TABLET(10 MG) BY MOUTH THREE TIMES DAILY  Dispense: 270 tablet; Refill: 3    Gastroesophageal reflux disease, unspecified whether esophagitis present  -     omeprazole (PRILOSEC) 20 MG capsule; Take 1 capsule (20 mg total) by mouth once daily.  Dispense: 90 capsule; Refill: 3    Spasm  -     diazePAM (VALIUM) 5 MG tablet; Take 1 tablet (5 mg total) by mouth every 12 (twelve) hours as needed for Anxiety.  Dispense: 60 tablet; Refill: 5      Chronic condition not otherwise mentioned is stable      Total time spent of Greater than 30 minutes minutes on the day of the visit.This includes face to face time and preparing to see the patient, obtaining and reviewing separately obtained history, documenting clinical information in the electronic or other health record, independently interpreting results and communicating results to the patient/family/caregiver, or care coordinator.    Established patient with me has been instructed that must see me  at least 1 time yearly (every 365 days) for refills of medications. Seeing other providers in this clinic is fine but expectation is to see me yearly.    Young Palacios MD  06/25/2025    Portions of this note have been dictated with CARL Tatum

## 2025-07-01 ENCOUNTER — PATIENT MESSAGE (OUTPATIENT)
Dept: FAMILY MEDICINE | Facility: CLINIC | Age: 65
End: 2025-07-01
Payer: MEDICARE

## 2025-07-02 ENCOUNTER — LAB VISIT (OUTPATIENT)
Dept: LAB | Facility: HOSPITAL | Age: 65
End: 2025-07-02
Attending: FAMILY MEDICINE
Payer: MEDICARE

## 2025-07-02 DIAGNOSIS — Z12.11 SCREENING FOR COLON CANCER: ICD-10-CM

## 2025-07-02 PROCEDURE — 82274 ASSAY TEST FOR BLOOD FECAL: CPT

## 2025-07-03 LAB — OB PNL STL IA: NEGATIVE

## 2025-07-22 ENCOUNTER — OFFICE VISIT (OUTPATIENT)
Dept: FAMILY MEDICINE | Facility: CLINIC | Age: 65
End: 2025-07-22
Payer: MEDICARE

## 2025-07-22 ENCOUNTER — HOSPITAL ENCOUNTER (OUTPATIENT)
Dept: RADIOLOGY | Facility: CLINIC | Age: 65
Discharge: HOME | End: 2025-07-22
Attending: NURSE PRACTITIONER
Payer: MEDICARE

## 2025-07-22 VITALS
OXYGEN SATURATION: 96 % | BODY MASS INDEX: 23.08 KG/M2 | HEIGHT: 71 IN | DIASTOLIC BLOOD PRESSURE: 60 MMHG | SYSTOLIC BLOOD PRESSURE: 110 MMHG | HEART RATE: 67 BPM | WEIGHT: 164.88 LBS | TEMPERATURE: 99 F

## 2025-07-22 DIAGNOSIS — J06.9 UPPER RESPIRATORY TRACT INFECTION, UNSPECIFIED TYPE: ICD-10-CM

## 2025-07-22 DIAGNOSIS — R05.9 COUGH, UNSPECIFIED TYPE: Primary | ICD-10-CM

## 2025-07-22 DIAGNOSIS — J32.0 MAXILLARY SINUSITIS, UNSPECIFIED CHRONICITY: ICD-10-CM

## 2025-07-22 LAB
CTP QC/QA: YES
CTP QC/QA: YES
POC MOLECULAR INFLUENZA A AGN: NEGATIVE
POC MOLECULAR INFLUENZA B AGN: NEGATIVE
SARS-COV-2 RDRP RESP QL NAA+PROBE: NEGATIVE

## 2025-07-22 PROCEDURE — 99214 OFFICE O/P EST MOD 30 MIN: CPT | Mod: PBBFAC,25,PO | Performed by: NURSE PRACTITIONER

## 2025-07-22 PROCEDURE — 87635 SARS-COV-2 COVID-19 AMP PRB: CPT | Mod: PBBFAC,PO | Performed by: NURSE PRACTITIONER

## 2025-07-22 PROCEDURE — 87502 INFLUENZA DNA AMP PROBE: CPT | Mod: PBBFAC,PO | Performed by: NURSE PRACTITIONER

## 2025-07-22 PROCEDURE — 99214 OFFICE O/P EST MOD 30 MIN: CPT | Mod: S$PBB,,, | Performed by: NURSE PRACTITIONER

## 2025-07-22 PROCEDURE — 71046 X-RAY EXAM CHEST 2 VIEWS: CPT | Mod: 26,,, | Performed by: RADIOLOGY

## 2025-07-22 PROCEDURE — 99999PBSHW: Mod: PBBFAC,,,

## 2025-07-22 PROCEDURE — 71046 X-RAY EXAM CHEST 2 VIEWS: CPT | Mod: TC,PO

## 2025-07-22 PROCEDURE — 99999 PR PBB SHADOW E&M-EST. PATIENT-LVL IV: CPT | Mod: PBBFAC,,, | Performed by: NURSE PRACTITIONER

## 2025-07-22 PROCEDURE — 99999PBSHW POCT INFLUENZA A/B MOLECULAR: Mod: PBBFAC,,,

## 2025-07-22 RX ORDER — PROMETHAZINE HYDROCHLORIDE AND DEXTROMETHORPHAN HYDROBROMIDE 6.25; 15 MG/5ML; MG/5ML
5 SYRUP ORAL NIGHTLY PRN
Qty: 118 ML | Refills: 0 | Status: SHIPPED | OUTPATIENT
Start: 2025-07-22

## 2025-07-22 RX ORDER — BENZONATATE 100 MG/1
100 CAPSULE ORAL 3 TIMES DAILY PRN
Qty: 30 CAPSULE | Refills: 0 | Status: SHIPPED | OUTPATIENT
Start: 2025-07-22 | End: 2025-08-01

## 2025-07-22 RX ORDER — MAGNESIUM HYDROXIDE 400 MG/5ML
SUSPENSION, ORAL (FINAL DOSE FORM) ORAL
COMMUNITY
Start: 2024-06-04

## 2025-07-22 RX ORDER — AZITHROMYCIN 250 MG/1
TABLET, FILM COATED ORAL
Qty: 6 TABLET | Refills: 0 | Status: CANCELLED | OUTPATIENT
Start: 2025-07-22 | End: 2025-07-27

## 2025-07-22 RX ORDER — AMOXICILLIN AND CLAVULANATE POTASSIUM 875; 125 MG/1; MG/1
1 TABLET, FILM COATED ORAL EVERY 12 HOURS
Qty: 14 TABLET | Refills: 0 | Status: SHIPPED | OUTPATIENT
Start: 2025-07-22 | End: 2025-07-29

## 2025-07-22 NOTE — PROGRESS NOTES
"Subjective:       Patient ID: Alberto Dangelo is a 65 y.o. male.    Chief Complaint: Fever, Sore Throat, Nasal Congestion, Headache, and Cough     HPI   66 y/o male patient with medical problems listed below presents for nasal congestion, headache, sinus pain and pressure, sore throat, dry cough, postnasal drip, and low-grade fever for the past 5 days. He denies chest pain or shortness of breath. He reports taking DayQuil, ibuprofen, Zyrtec, and Flonase nasal spray for symptom relief.  He states the cough is dry and interferes with his sleep. He denies recent travel.    Problem List[1]     Review of patient's allergies indicates:   Allergen Reactions    Codeine Other (See Comments)     Cause bp to drop    Ambien [zolpidem] Other (See Comments)     depression     Past Surgical History:   Procedure Laterality Date    NECK SURGERY      SPINE SURGERY      fuse C 6 - T 1 burst C7    TONSILLECTOMY      WISDOM TOOTH EXTRACTION        Current Medications[2]    Review of Systems   Constitutional:  Positive for fever. Negative for chills.   HENT:  Positive for congestion, postnasal drip, rhinorrhea, sinus pressure, sinus pain and sore throat.    Respiratory:  Positive for cough. Negative for shortness of breath.    Cardiovascular:  Negative for chest pain and palpitations.   Gastrointestinal:  Negative for abdominal pain.   Neurological:  Negative for dizziness and headaches.       Objective:   /60 (BP Location: Left arm, Patient Position: Sitting)   Pulse 67   Temp 98.7 °F (37.1 °C) (Oral)   Ht 5' 11" (1.803 m)   Wt 74.8 kg (164 lb 14.5 oz)   SpO2 96%   BMI 23.00 kg/m²         Physical Exam  Vitals reviewed.   Constitutional:       General: He is not in acute distress.     Appearance: Normal appearance.      Comments: Patient is sitting on wheelchair   HENT:      Nose:      Right Sinus: Maxillary sinus tenderness present.      Left Sinus: Maxillary sinus tenderness present.      Mouth/Throat:      Pharynx: No " oropharyngeal exudate or posterior oropharyngeal erythema.   Cardiovascular:      Rate and Rhythm: Normal rate and regular rhythm.      Pulses: Normal pulses.      Heart sounds: Normal heart sounds.   Pulmonary:      Effort: Pulmonary effort is normal.      Breath sounds: Normal breath sounds.   Chest:      Chest wall: No deformity, swelling or edema.   Abdominal:      General: Abdomen is flat. Bowel sounds are normal.      Palpations: Abdomen is soft.   Neurological:      Mental Status: He is alert.         Assessment:       1. Cough, unspecified type    2. Upper respiratory tract infection, unspecified type    3. Maxillary sinusitis, unspecified chronicity        Plan:       1. Upper respiratory tract infection, unspecified type  - X-Ray Chest PA And Lateral; Future  - benzonatate (TESSALON) 100 MG capsule; Take 1 capsule (100 mg total) by mouth 3 (three) times daily as needed for Cough.  Dispense: 30 capsule; Refill: 0  - promethazine-dextromethorphan (PROMETHAZINE-DM) 6.25-15 mg/5 mL Syrp; Take 5 mLs by mouth nightly as needed (cough).  Dispense: 118 mL; Refill: 0  - amoxicillin-clavulanate 875-125mg (AUGMENTIN) 875-125 mg per tablet; Take 1 tablet by mouth every 12 (twelve) hours. for 7 days  Dispense: 14 tablet; Refill: 0    2. Maxillary sinusitis, unspecified chronicity  - amoxicillin-clavulanate 875-125mg (AUGMENTIN) 875-125 mg per tablet; Take 1 tablet by mouth every 12 (twelve) hours. for 7 days  Dispense: 14 tablet; Refill: 0    3. Cough, unspecified type (Primary)  - POCT Influenza A/B Molecular  - POCT COVID-19 Rapid Screening     Will follow up on the results  Juan NP         [1]   Patient Active Problem List  Diagnosis    GERD (gastroesophageal reflux disease)    Autonomic dysfunction    History of DVT (deep vein thrombosis)    History of pulmonary embolism    Gait disturbance    Neutropenic fever    SIRS (systemic inflammatory response syndrome)    Quadriplegia    Gait abnormality    Decreased  strength   [2]   Current Outpatient Medications:     aspirin (ECOTRIN) 81 MG EC tablet, Take 81 mg by mouth once daily., Disp: , Rfl:     bisacodyl (FLEET) 10 mg/30 mL Enem, Place 10 mg rectally once daily., Disp: , Rfl:     cyanocobalamin, vitamin B-12, 5,000 mcg TbDL, , Disp: , Rfl:     diazePAM (VALIUM) 5 MG tablet, Take 1 tablet (5 mg total) by mouth every 12 (twelve) hours as needed for Anxiety., Disp: 60 tablet, Rfl: 5    magnesium oxide (MAG-OX) 400 mg (241.3 mg magnesium) tablet, Take 1 tablet (400 mg total) by mouth 2 (two) times daily., Disp: 180 tablet, Rfl: 3    MEN'S MULTI-VITAMIN ORAL, , Disp: , Rfl:     midodrine (PROAMATINE) 10 MG tablet, TAKE 1 TABLET(10 MG) BY MOUTH THREE TIMES DAILY, Disp: 270 tablet, Rfl: 3    omeprazole (PRILOSEC) 20 MG capsule, Take 1 capsule (20 mg total) by mouth once daily., Disp: 90 capsule, Rfl: 3    POLYETHYLENE GLYCOL 3350 (MIRALAX ORAL), Take 1 Dose by mouth every evening., Disp: , Rfl:     senna (SENOKOT) 8.6 mg tablet, Take 2 tablets by mouth once daily., Disp: , Rfl:     vitamin D 1000 units Tab, Take 185 mg by mouth once daily., Disp: , Rfl:     amoxicillin-clavulanate 875-125mg (AUGMENTIN) 875-125 mg per tablet, Take 1 tablet by mouth every 12 (twelve) hours. for 7 days, Disp: 14 tablet, Rfl: 0    benzonatate (TESSALON) 100 MG capsule, Take 1 capsule (100 mg total) by mouth 3 (three) times daily as needed for Cough., Disp: 30 capsule, Rfl: 0    promethazine-dextromethorphan (PROMETHAZINE-DM) 6.25-15 mg/5 mL Syrp, Take 5 mLs by mouth nightly as needed (cough)., Disp: 118 mL, Rfl: 0